# Patient Record
Sex: MALE | Race: WHITE | Employment: OTHER | URBAN - METROPOLITAN AREA
[De-identification: names, ages, dates, MRNs, and addresses within clinical notes are randomized per-mention and may not be internally consistent; named-entity substitution may affect disease eponyms.]

---

## 2018-03-04 ENCOUNTER — HOSPITAL ENCOUNTER (EMERGENCY)
Age: 78
Discharge: HOME | End: 2018-03-04
Attending: EMERGENCY MEDICINE
Payer: MEDICARE

## 2018-03-04 ENCOUNTER — APPOINTMENT (OUTPATIENT)
Dept: GENERAL RADIOLOGY | Age: 78
End: 2018-03-04
Attending: EMERGENCY MEDICINE
Payer: MEDICARE

## 2018-03-04 VITALS
TEMPERATURE: 99 F | DIASTOLIC BLOOD PRESSURE: 47 MMHG | OXYGEN SATURATION: 95 % | HEART RATE: 72 BPM | RESPIRATION RATE: 15 BRPM | WEIGHT: 190 LBS | HEIGHT: 69 IN | SYSTOLIC BLOOD PRESSURE: 113 MMHG | BODY MASS INDEX: 28.14 KG/M2

## 2018-03-04 DIAGNOSIS — J06.9 ACUTE UPPER RESPIRATORY INFECTION: Primary | ICD-10-CM

## 2018-03-04 LAB
ALBUMIN SERPL-MCNC: 3.2 G/DL (ref 3.5–4.8)
ALP LIVER SERPL-CCNC: 59 U/L (ref 45–117)
ALT SERPL-CCNC: 30 U/L (ref 17–63)
AST SERPL-CCNC: 49 U/L (ref 15–41)
BASOPHILS # BLD AUTO: 0.01 X10(3) UL (ref 0–0.1)
BASOPHILS NFR BLD AUTO: 0.3 %
BILIRUB SERPL-MCNC: 0.5 MG/DL (ref 0.1–2)
BUN BLD-MCNC: 14 MG/DL (ref 8–20)
CALCIUM BLD-MCNC: 8.2 MG/DL (ref 8.3–10.3)
CHLORIDE: 101 MMOL/L (ref 101–111)
CO2: 23 MMOL/L (ref 22–32)
CREAT BLD-MCNC: 0.96 MG/DL (ref 0.7–1.3)
EOSINOPHIL # BLD AUTO: 0.01 X10(3) UL (ref 0–0.3)
EOSINOPHIL NFR BLD AUTO: 0.3 %
ERYTHROCYTE [DISTWIDTH] IN BLOOD BY AUTOMATED COUNT: 14 % (ref 11.5–16)
GLUCOSE BLD-MCNC: 110 MG/DL (ref 70–99)
HCT VFR BLD AUTO: 35.3 % (ref 37–53)
HGB BLD-MCNC: 11.7 G/DL (ref 13–17)
IMMATURE GRANULOCYTE COUNT: 0.01 X10(3) UL (ref 0–1)
IMMATURE GRANULOCYTE RATIO %: 0.3 %
LYMPHOCYTES # BLD AUTO: 0.43 X10(3) UL (ref 0.9–4)
LYMPHOCYTES NFR BLD AUTO: 13.2 %
M PROTEIN MFR SERPL ELPH: 7.3 G/DL (ref 6.1–8.3)
MCH RBC QN AUTO: 28.6 PG (ref 27–33.2)
MCHC RBC AUTO-ENTMCNC: 33.1 G/DL (ref 31–37)
MCV RBC AUTO: 86.3 FL (ref 80–99)
MONOCYTES # BLD AUTO: 0.9 X10(3) UL (ref 0.1–1)
MONOCYTES NFR BLD AUTO: 27.7 %
NEUTROPHIL ABS PRELIM: 1.89 X10 (3) UL (ref 1.3–6.7)
NEUTROPHILS # BLD AUTO: 1.89 X10(3) UL (ref 1.3–6.7)
NEUTROPHILS NFR BLD AUTO: 58.2 %
PLATELET # BLD AUTO: 91 10(3)UL (ref 150–450)
POTASSIUM SERPL-SCNC: 4.3 MMOL/L (ref 3.6–5.1)
PRO-BETA NATRIURETIC PEPTIDE: 289 PG/ML (ref ?–450)
RBC # BLD AUTO: 4.09 X10(6)UL (ref 3.8–5.8)
RED CELL DISTRIBUTION WIDTH-SD: 44.3 FL (ref 35.1–46.3)
SODIUM SERPL-SCNC: 134 MMOL/L (ref 136–144)
TROPONIN: <0.046 NG/ML (ref ?–0.05)
WBC # BLD AUTO: 3.3 X10(3) UL (ref 4–13)

## 2018-03-04 PROCEDURE — 83880 ASSAY OF NATRIURETIC PEPTIDE: CPT | Performed by: EMERGENCY MEDICINE

## 2018-03-04 PROCEDURE — 85025 COMPLETE CBC W/AUTO DIFF WBC: CPT | Performed by: EMERGENCY MEDICINE

## 2018-03-04 PROCEDURE — 80053 COMPREHEN METABOLIC PANEL: CPT | Performed by: EMERGENCY MEDICINE

## 2018-03-04 PROCEDURE — 93010 ELECTROCARDIOGRAM REPORT: CPT

## 2018-03-04 PROCEDURE — 36415 COLL VENOUS BLD VENIPUNCTURE: CPT

## 2018-03-04 PROCEDURE — 71046 X-RAY EXAM CHEST 2 VIEWS: CPT | Performed by: EMERGENCY MEDICINE

## 2018-03-04 PROCEDURE — 99285 EMERGENCY DEPT VISIT HI MDM: CPT

## 2018-03-04 PROCEDURE — 84484 ASSAY OF TROPONIN QUANT: CPT | Performed by: EMERGENCY MEDICINE

## 2018-03-04 PROCEDURE — 93005 ELECTROCARDIOGRAM TRACING: CPT

## 2018-03-04 RX ORDER — ACETAMINOPHEN 500 MG
1000 TABLET ORAL ONCE
Status: COMPLETED | OUTPATIENT
Start: 2018-03-04 | End: 2018-03-04

## 2018-03-05 LAB
ATRIAL RATE: 79 BPM
P AXIS: 70 DEGREES
P-R INTERVAL: 228 MS
Q-T INTERVAL: 390 MS
QRS DURATION: 94 MS
QTC CALCULATION (BEZET): 447 MS
R AXIS: -22 DEGREES
T AXIS: 64 DEGREES
VENTRICULAR RATE: 79 BPM

## 2018-03-05 NOTE — ED INITIAL ASSESSMENT (HPI)
Pt to ed states \"I've got a fever and congestion in my chest\" and my chest hurts when I cough pt reports cleaning out his garage earlier today and used his blower and afterwards began to experience these symptoms

## 2018-03-05 NOTE — ED PROVIDER NOTES
Patient Seen in: [facility] Emergency Department In [place]    History   Patient presents with:  Cough/URI    Stated Complaint: cough, chest congestion    HPI    Patient is a 70-year-old male with a history of coronary artery disease status post CABG, pre Ht 175.3 cm (5' 9\")   Wt 86.2 kg   SpO2 95%   BMI 28.06 kg/m²         Physical Exam   Constitutional: He is oriented to person, place, and time. He appears well-developed and well-nourished. HENT:   Head: Normocephalic and atraumatic.    Mouth/Throat: 3/4/2018  CONCLUSION:    Borderline heart size. No acute pneumonia. Blunting of the posterior costophrenic angles on the lateral view may reflect very small pleural effusion. No lobar consolidation. The lungs do appear mildly hyperinflated.     Dictated

## 2018-03-14 ENCOUNTER — OFFICE VISIT (OUTPATIENT)
Dept: FAMILY MEDICINE CLINIC | Facility: CLINIC | Age: 78
End: 2018-03-14

## 2018-03-14 VITALS
TEMPERATURE: 98 F | RESPIRATION RATE: 18 BRPM | OXYGEN SATURATION: 98 % | DIASTOLIC BLOOD PRESSURE: 80 MMHG | SYSTOLIC BLOOD PRESSURE: 118 MMHG | WEIGHT: 189 LBS | HEART RATE: 68 BPM | HEIGHT: 64.5 IN | BODY MASS INDEX: 31.87 KG/M2

## 2018-03-14 DIAGNOSIS — J20.9 ACUTE BRONCHITIS WITH SYMPTOMS > 10 DAYS: Primary | ICD-10-CM

## 2018-03-14 PROCEDURE — 99213 OFFICE O/P EST LOW 20 MIN: CPT | Performed by: NURSE PRACTITIONER

## 2018-03-14 RX ORDER — BENZONATATE 200 MG/1
200 CAPSULE ORAL 3 TIMES DAILY PRN
Qty: 20 CAPSULE | Refills: 0 | Status: SHIPPED | OUTPATIENT
Start: 2018-03-14 | End: 2018-03-21

## 2018-03-14 RX ORDER — DOXYCYCLINE HYCLATE 100 MG/1
100 CAPSULE ORAL 2 TIMES DAILY
Qty: 20 CAPSULE | Refills: 0 | Status: SHIPPED | OUTPATIENT
Start: 2018-03-14 | End: 2018-03-24

## 2018-03-14 NOTE — PROGRESS NOTES
CHIEF COMPLAINT:   Patient presents with:  Cough: over 1 week. HPI:   Kwan Gray is a 65year old male who presents for upper respiratory symptoms for  10 days. Patient reports congestion, dry cough. Symptoms have been stable since onset.   Sotero Turner mouth daily. Disp:  Rfl:    Acidophilus/Pectin Oral Cap Take 1 capsule by mouth daily.  Disp:  Rfl:       Past Medical History:   Diagnosis Date   • Atherosclerosis of coronary artery    • Esophageal reflux    • Heart attack ([facility] [address]    • Hyperlipidemia    • ASSESSMENT AND PLAN:   Jhon Turner is a 65year old male who presents with     ASSESSMENT: Acute bronchitis with symptoms > 10 days  (primary encounter diagnosis)    PLAN: Meds as below.   Comfort care as described in Patient Instructions    Close fol

## 2018-03-30 ENCOUNTER — CHARTING TRANS (OUTPATIENT)
Dept: OTHER | Age: 78
End: 2018-03-30

## 2018-04-02 ENCOUNTER — HOSPITAL ENCOUNTER (EMERGENCY)
Age: 78
Discharge: HOME | End: 2018-04-02
Attending: EMERGENCY MEDICINE
Payer: MEDICARE

## 2018-04-02 ENCOUNTER — APPOINTMENT (OUTPATIENT)
Dept: GENERAL RADIOLOGY | Age: 78
End: 2018-04-02
Attending: EMERGENCY MEDICINE
Payer: MEDICARE

## 2018-04-02 ENCOUNTER — APPOINTMENT (OUTPATIENT)
Dept: CT IMAGING | Age: 78
End: 2018-04-02
Attending: EMERGENCY MEDICINE
Payer: MEDICARE

## 2018-04-02 VITALS
OXYGEN SATURATION: 98 % | HEIGHT: 65.5 IN | RESPIRATION RATE: 18 BRPM | TEMPERATURE: 98 F | HEART RATE: 68 BPM | WEIGHT: 185 LBS | DIASTOLIC BLOOD PRESSURE: 71 MMHG | SYSTOLIC BLOOD PRESSURE: 138 MMHG | BODY MASS INDEX: 30.45 KG/M2

## 2018-04-02 DIAGNOSIS — R25.1 SHAKINESS: Primary | ICD-10-CM

## 2018-04-02 PROCEDURE — 96360 HYDRATION IV INFUSION INIT: CPT

## 2018-04-02 PROCEDURE — 71046 X-RAY EXAM CHEST 2 VIEWS: CPT | Performed by: EMERGENCY MEDICINE

## 2018-04-02 PROCEDURE — 85025 COMPLETE CBC W/AUTO DIFF WBC: CPT | Performed by: EMERGENCY MEDICINE

## 2018-04-02 PROCEDURE — 99285 EMERGENCY DEPT VISIT HI MDM: CPT

## 2018-04-02 PROCEDURE — 93005 ELECTROCARDIOGRAM TRACING: CPT

## 2018-04-02 PROCEDURE — 81003 URINALYSIS AUTO W/O SCOPE: CPT | Performed by: EMERGENCY MEDICINE

## 2018-04-02 PROCEDURE — 93010 ELECTROCARDIOGRAM REPORT: CPT

## 2018-04-02 PROCEDURE — 70450 CT HEAD/BRAIN W/O DYE: CPT | Performed by: EMERGENCY MEDICINE

## 2018-04-02 PROCEDURE — 84484 ASSAY OF TROPONIN QUANT: CPT | Performed by: EMERGENCY MEDICINE

## 2018-04-02 PROCEDURE — 80053 COMPREHEN METABOLIC PANEL: CPT | Performed by: EMERGENCY MEDICINE

## 2018-04-03 NOTE — ED PROVIDER NOTES
Patient Seen in: [facility] Emergency Department In [place]    History   Patient presents with:  Dizziness (neurologic)  Numbness Weakness (neurologic)  Urinary Symptoms (urologic)    Stated Complaint: LIGHTHEADED/TINGLES IN HIS TOES/ SHAKINESS/TIRED SINC are as noted in HPI. Constitutional and vital signs reviewed. All other systems reviewed and negative except as noted above.     Physical Exam   ED Triage Vitals [04/02/18 1927]  BP: 142/78  Pulse: 78  Resp: 18  Temp: 98.1 °F (36.7 °C)  Temp src: Temp DIFFERENTIAL - Abnormal; Notable for the following:     WBC 3.5 (*)     HGB 12.7 (*)     .0 (*)     RDW-SD 47.1 (*)     All other components within normal limits   TROPONIN I - Normal   CBC WITH DIFFERENTIAL WITH PLATELET    Narrative:      The follo collection. ED Course as of Apr 02 2212  ------------------------------------------------------------       MDM   Patient had an IV established in the emergency room was placed a cardiac monitor was observed.   Patient remained symptom-free during his vi

## 2018-04-03 NOTE — ED INITIAL ASSESSMENT (HPI)
Patient states he had a URI and treated for bronchitis about a month ago and has felt shaky with light headedness since. Patient states urinary frequency started today.

## 2018-11-01 VITALS
BODY MASS INDEX: 30.36 KG/M2 | DIASTOLIC BLOOD PRESSURE: 70 MMHG | SYSTOLIC BLOOD PRESSURE: 124 MMHG | WEIGHT: 188.94 LBS | HEART RATE: 68 BPM | TEMPERATURE: 98.1 F | HEIGHT: 66 IN | RESPIRATION RATE: 16 BRPM

## 2019-03-19 ENCOUNTER — HOSPITAL ENCOUNTER (OUTPATIENT)
Dept: GENERAL RADIOLOGY | Age: 79
Discharge: HOME | End: 2019-03-19
Attending: FAMILY MEDICINE
Payer: MEDICARE

## 2019-03-19 DIAGNOSIS — M54.16 BILATERAL LUMBAR RADICULOPATHY: ICD-10-CM

## 2019-03-19 PROCEDURE — 72110 X-RAY EXAM L-2 SPINE 4/>VWS: CPT | Performed by: FAMILY MEDICINE

## 2019-07-10 PROBLEM — G20.A1 PARKINSON DISEASE (HCC): Status: ACTIVE | Noted: 2019-07-10

## 2019-07-10 PROBLEM — G20 PARKINSON DISEASE (HCC): Status: ACTIVE | Noted: 2019-07-10

## 2019-07-10 PROBLEM — G20.A1 PARKINSON DISEASE: Status: ACTIVE | Noted: 2019-07-10

## 2019-12-05 ENCOUNTER — APPOINTMENT (OUTPATIENT)
Dept: CT IMAGING | Age: 79
End: 2019-12-05
Attending: EMERGENCY MEDICINE
Payer: MEDICARE

## 2019-12-05 ENCOUNTER — HOSPITAL ENCOUNTER (OUTPATIENT)
Facility: HOSPITAL | Age: 79
Setting detail: OBSERVATION
Discharge: HOME | End: 2019-12-06
Attending: EMERGENCY MEDICINE | Admitting: HOSPITALIST
Payer: MEDICARE

## 2019-12-05 ENCOUNTER — APPOINTMENT (OUTPATIENT)
Dept: GENERAL RADIOLOGY | Age: 79
End: 2019-12-05
Attending: EMERGENCY MEDICINE
Payer: MEDICARE

## 2019-12-05 DIAGNOSIS — R07.89 CHEST PAIN, ATYPICAL: Primary | ICD-10-CM

## 2019-12-05 DIAGNOSIS — I48.91 ATRIAL FIBRILLATION, NEW ONSET (HCC): ICD-10-CM

## 2019-12-05 DIAGNOSIS — R42 DIZZINESS: ICD-10-CM

## 2019-12-05 PROBLEM — R73.9 HYPERGLYCEMIA: Status: ACTIVE | Noted: 2019-12-05

## 2019-12-05 PROBLEM — D64.9 ANEMIA: Status: ACTIVE | Noted: 2019-12-05

## 2019-12-05 PROBLEM — D69.6 THROMBOCYTOPENIA: Status: ACTIVE | Noted: 2019-12-05

## 2019-12-05 PROBLEM — D69.6 THROMBOCYTOPENIA (HCC): Status: ACTIVE | Noted: 2019-12-05

## 2019-12-05 PROCEDURE — 99220 INITIAL OBSERVATION CARE,LEVL III: CPT | Performed by: INTERNAL MEDICINE

## 2019-12-05 PROCEDURE — 71045 X-RAY EXAM CHEST 1 VIEW: CPT | Performed by: EMERGENCY MEDICINE

## 2019-12-05 PROCEDURE — 70450 CT HEAD/BRAIN W/O DYE: CPT | Performed by: EMERGENCY MEDICINE

## 2019-12-05 RX ORDER — ATORVASTATIN CALCIUM 20 MG/1
20 TABLET, FILM COATED ORAL NIGHTLY
Status: DISCONTINUED | OUTPATIENT
Start: 2019-12-05 | End: 2019-12-06

## 2019-12-05 RX ORDER — ONDANSETRON 2 MG/ML
4 INJECTION INTRAMUSCULAR; INTRAVENOUS EVERY 6 HOURS PRN
Status: DISCONTINUED | OUTPATIENT
Start: 2019-12-05 | End: 2019-12-06

## 2019-12-05 RX ORDER — ASPIRIN 81 MG/1
81 TABLET, CHEWABLE ORAL ONCE
Status: DISCONTINUED | OUTPATIENT
Start: 2019-12-05 | End: 2019-12-05

## 2019-12-05 RX ORDER — METOPROLOL SUCCINATE 50 MG/1
50 TABLET, EXTENDED RELEASE ORAL
Status: DISCONTINUED | OUTPATIENT
Start: 2019-12-06 | End: 2019-12-06

## 2019-12-05 RX ORDER — ASPIRIN 325 MG
325 TABLET ORAL DAILY
Status: DISCONTINUED | OUTPATIENT
Start: 2019-12-06 | End: 2019-12-06

## 2019-12-05 RX ORDER — ACETAMINOPHEN 325 MG/1
650 TABLET ORAL EVERY 6 HOURS PRN
Status: DISCONTINUED | OUTPATIENT
Start: 2019-12-05 | End: 2019-12-06

## 2019-12-05 RX ORDER — EZETIMIBE 10 MG/1
10 TABLET ORAL
Status: DISCONTINUED | OUTPATIENT
Start: 2019-12-06 | End: 2019-12-06

## 2019-12-05 RX ORDER — NITROGLYCERIN 0.4 MG/1
0.4 TABLET SUBLINGUAL EVERY 5 MIN PRN
Status: DISCONTINUED | OUTPATIENT
Start: 2019-12-05 | End: 2019-12-06

## 2019-12-05 RX ORDER — CLOPIDOGREL BISULFATE 75 MG/1
75 TABLET ORAL NIGHTLY
Status: DISCONTINUED | OUTPATIENT
Start: 2019-12-05 | End: 2019-12-06

## 2019-12-05 RX ORDER — HEPARIN SODIUM 5000 [USP'U]/ML
5000 INJECTION, SOLUTION INTRAVENOUS; SUBCUTANEOUS EVERY 8 HOURS SCHEDULED
Status: DISCONTINUED | OUTPATIENT
Start: 2019-12-05 | End: 2019-12-06

## 2019-12-05 RX ORDER — SODIUM CHLORIDE 9 MG/ML
INJECTION, SOLUTION INTRAVENOUS ONCE
Status: COMPLETED | OUTPATIENT
Start: 2019-12-05 | End: 2019-12-05

## 2019-12-05 RX ORDER — ASPIRIN 81 MG/1
81 TABLET ORAL DAILY
Status: DISCONTINUED | OUTPATIENT
Start: 2019-12-05 | End: 2019-12-05 | Stop reason: ALTCHOICE

## 2019-12-05 RX ORDER — PANTOPRAZOLE SODIUM 40 MG/1
40 TABLET, DELAYED RELEASE ORAL
Status: DISCONTINUED | OUTPATIENT
Start: 2019-12-06 | End: 2019-12-06

## 2019-12-05 RX ORDER — ASPIRIN 81 MG/1
324 TABLET, CHEWABLE ORAL ONCE
Status: COMPLETED | OUTPATIENT
Start: 2019-12-05 | End: 2019-12-05

## 2019-12-05 RX ORDER — METOCLOPRAMIDE HYDROCHLORIDE 5 MG/ML
10 INJECTION INTRAMUSCULAR; INTRAVENOUS EVERY 8 HOURS PRN
Status: DISCONTINUED | OUTPATIENT
Start: 2019-12-05 | End: 2019-12-06

## 2019-12-05 RX ORDER — ONDANSETRON 2 MG/ML
4 INJECTION INTRAMUSCULAR; INTRAVENOUS EVERY 4 HOURS PRN
Status: DISCONTINUED | OUTPATIENT
Start: 2019-12-05 | End: 2019-12-05

## 2019-12-05 NOTE — H&P
[facility]  History and Physical     Bernard Ceja Patient Status:  Emergency    8/10/1940 MRN [id number]   Location [facility] Attending Micha Clement MD   Hosp Day # 0 PCP Ivelisse Russo MD     Chief Complaint: c that this vessel is widely patent. • CABG  1984    x2       Social History:  reports that he quit smoking about 37 years ago. He has never used smokeless tobacco. He reports current alcohol use. He reports that he does not use drugs.     Family History: mouth 3 (three) times daily. , Disp: , Rfl:   FIBER COMPLETE OR, Take by mouth daily. , Disp: , Rfl:   Acidophilus/Pectin Oral Cap, Take 1 capsule by mouth daily. , Disp: , Rfl:         Review of Systems:   A comprehensive 14 point review of systems was c Afib  1. Check ECHO, TSH  2. AC per cardiology  3. Rate controlled  4. Dizziness  1. CT head negative  2. Likely due to arrtyhmia   5. Parkinsons's  1. Continue sinemet  6. CAD  1. ASA, BB  7. Ess HTN  8. Dylipidemia  1. statin  9.  GERD    Quality:  · DVT

## 2019-12-05 NOTE — ED PROVIDER NOTES
Patient Seen in: THE [facility] Emergency Department In [place]      History   Patient presents with:  Chest Pain Angina    Stated Complaint: chest pain    HPI    Patient is an 48-year-old male who presents emergency room with a history of multiple complaints vein graft to the right coronary artery. • ANGIOGRAM  07/26/2011    [address] [place] [address], Dr. Bernarda Aguilar: Successful stenting of the circumflex artery in the proximal and mid portions.  Relook angiography of the vein graft to the right coronary artery showed that this vess There is no cyanosis, clubbing, or edema appreciated. Pulses are 2+ and equal in all 4 extremities. NEURO: Patient is awake, alert and oriented to time place and person. Motor strength is 5 over 5 in all 4 extremities.  There are no gross motor or sensory persistent clinical concern then recommend MRI.    Dictated by: Mariam Heart MD on 12/05/2019 at 15:42     Approved by: Mariam Heart MD on 12/05/2019 at 15:44          Xr Chest Ap Portable  (cpt=71045)    Result Date: 12/5/2019  PROCEDURE:  XR CHEST AP PORTABLE Patient's case discussed with Dr. Melanie Kramer as well as Dr. Praveena Ortiz and the patient will be admitted to the hospital for further care. Dr. Praveena Ortiz was okay with not starting anticoagulation at this time. Patient was admitted for further care.           Disposit

## 2019-12-06 ENCOUNTER — APPOINTMENT (OUTPATIENT)
Dept: CV DIAGNOSTICS | Facility: HOSPITAL | Age: 79
End: 2019-12-06
Attending: INTERNAL MEDICINE
Payer: MEDICARE

## 2019-12-06 VITALS
HEART RATE: 73 BPM | HEIGHT: 66 IN | TEMPERATURE: 98 F | WEIGHT: 185.19 LBS | BODY MASS INDEX: 29.76 KG/M2 | SYSTOLIC BLOOD PRESSURE: 124 MMHG | RESPIRATION RATE: 17 BRPM | DIASTOLIC BLOOD PRESSURE: 66 MMHG | OXYGEN SATURATION: 99 %

## 2019-12-06 PROCEDURE — 93306 TTE W/DOPPLER COMPLETE: CPT | Performed by: INTERNAL MEDICINE

## 2019-12-06 PROCEDURE — C22G1ZZ TOMOGRAPHIC (TOMO) NUCLEAR MEDICINE IMAGING OF MYOCARDIUM USING TECHNETIUM 99M (TC-99M): ICD-10-PCS | Performed by: RADIOLOGY

## 2019-12-06 PROCEDURE — 93017 CV STRESS TEST TRACING ONLY: CPT | Performed by: INTERNAL MEDICINE

## 2019-12-06 PROCEDURE — 78452 HT MUSCLE IMAGE SPECT MULT: CPT | Performed by: INTERNAL MEDICINE

## 2019-12-06 PROCEDURE — 99217 OBSERVATION CARE DISCHARGE: CPT | Performed by: HOSPITALIST

## 2019-12-06 PROCEDURE — 93018 CV STRESS TEST I&R ONLY: CPT | Performed by: INTERNAL MEDICINE

## 2019-12-06 RX ORDER — HEPARIN SODIUM AND DEXTROSE 10000; 5 [USP'U]/100ML; G/100ML
12 INJECTION INTRAVENOUS ONCE
Status: COMPLETED | OUTPATIENT
Start: 2019-12-06 | End: 2019-12-06

## 2019-12-06 RX ORDER — HEPARIN SODIUM AND DEXTROSE 10000; 5 [USP'U]/100ML; G/100ML
INJECTION INTRAVENOUS CONTINUOUS
Status: DISCONTINUED | OUTPATIENT
Start: 2019-12-06 | End: 2019-12-06

## 2019-12-06 RX ORDER — ASPIRIN 81 MG/1
81 TABLET, CHEWABLE ORAL DAILY
Status: DISCONTINUED | OUTPATIENT
Start: 2019-12-07 | End: 2019-12-06

## 2019-12-06 RX ORDER — HEPARIN SODIUM 5000 [USP'U]/ML
60 INJECTION INTRAVENOUS; SUBCUTANEOUS ONCE
Status: DISCONTINUED | OUTPATIENT
Start: 2019-12-06 | End: 2019-12-06

## 2019-12-06 NOTE — CONSULTS
[facility] Cardiology Consultation    Ema Toño Patient Status:  Observation    8/10/1940 MRN [id number]   [place] [facility] 2NE-A Attending Lanny Marin MD   Hosp Day # 0 PCP Ludmila Martinez MD     Reason for Consultation:  Atrial Fibrillation       Hi of the circumflex artery in the proximal and mid portions. Relook angiography of the vein graft to the right coronary artery showed that this vessel is widely patent.    • CABG  1984    x2   • CATH BARE METAL STENT (BMS)     • CATH DRUG ELUTING STENT °C)  Pulse: 73  Resp: 18  BP: 114/68      General:  Appears comfortable  HEENT: No focal deficits. Neck: No JVD, carotids 2+ no bruits. Cardiac: Irregular S1S2. No S3, S4, rub, click. No murmur. Lungs: Clear to auscultation and percussion.   Abdomen: S heparinization, followed by xarelto    4.  Stress Cardiolyte Study today to exclude significant ischemia          Kristi Soto  12/6/2019  9:28 AM

## 2019-12-06 NOTE — PROGRESS NOTES
[facility]  Progress Note     [facility] Patient Status:  Observation    8/10/1940 MRN [id number]   [facility] 2NE-A Attending Elmer Mathis MD   Hosp Day # 0 PCP Teo Barnett MD     Chief Complaint: CP  S:  Patient denies chest • ezetimibe  10 mg Oral Daily   • Metoprolol Succinate ER  50 mg Oral Daily Beta Blocker   • Pantoprazole Sodium  40 mg Oral Daily with breakfast   • atorvastatin  20 mg Oral Nightly     ASSESSMENT / PLAN:   1. Acute Chest Pain- resolved   1.  F/U Check E

## 2019-12-06 NOTE — PLAN OF CARE
Assumed care at 0730. New onset of afib. Pt is A&Ox4. Up ad juan. Denies chest pain at this time. O2 sats WNL on RA. Active bowel sounds in all 4 quadrants, no tenderness upon palpation, last BM 12/4. Pt in controlled afib with rates 70s-80s on tele.  S1 S2 antiarrhythmic and heart rate control medications as ordered  - Initiate emergency measures for life threatening arrhythmias  - Monitor electrolytes and administer replacement therapy as ordered  Outcome: Progressing     Problem: HEMATOLOGIC - ADULT  Goal:

## 2019-12-06 NOTE — PROGRESS NOTES
Preliminary stress test results per Dr. Jimmy Drake: EF 68% with normal perfusion. Discussed with Dr. Brant Amos, stable for discharge from cardiology standpoint. Xarelto initiated, will address med rec. Follow up with cardiologist in [facility].

## 2019-12-20 ENCOUNTER — HOSPITAL ENCOUNTER (EMERGENCY)
Age: 79
Discharge: HOME | End: 2019-12-20
Attending: EMERGENCY MEDICINE
Payer: MEDICARE

## 2019-12-20 VITALS
WEIGHT: 180 LBS | TEMPERATURE: 98 F | HEIGHT: 65 IN | HEART RATE: 71 BPM | BODY MASS INDEX: 29.99 KG/M2 | SYSTOLIC BLOOD PRESSURE: 121 MMHG | RESPIRATION RATE: 16 BRPM | DIASTOLIC BLOOD PRESSURE: 72 MMHG | OXYGEN SATURATION: 97 %

## 2019-12-20 DIAGNOSIS — K06.8 GUMS, BLEEDING: Primary | ICD-10-CM

## 2019-12-20 PROCEDURE — 36415 COLL VENOUS BLD VENIPUNCTURE: CPT

## 2019-12-20 PROCEDURE — 99283 EMERGENCY DEPT VISIT LOW MDM: CPT

## 2019-12-20 PROCEDURE — 85025 COMPLETE CBC W/AUTO DIFF WBC: CPT | Performed by: EMERGENCY MEDICINE

## 2019-12-20 RX ORDER — TRANEXAMIC ACID 100 MG/ML
INJECTION, SOLUTION INTRAVENOUS
Status: COMPLETED
Start: 2019-12-20 | End: 2019-12-20

## 2019-12-20 RX ORDER — TRANEXAMIC ACID 100 MG/ML
5 INJECTION, SOLUTION INTRAVENOUS ONCE
Status: COMPLETED | OUTPATIENT
Start: 2019-12-20 | End: 2019-12-20

## 2019-12-20 NOTE — ED PROVIDER NOTES
Patient Seen in: THE [facility] Emergency Department In [place]      History   Patient presents with:  Bleeding    Stated Complaint: bleeding from mouth - on xarelto     HPI    This is a 79-year-old male who was on Plavix before and switched to Xarelto 3 week coronary artery. • ANGIOGRAM  07/26/2011    [address] [place] [address], Dr. Ahumada Slight: Successful stenting of the circumflex artery in the proximal and mid portions. Relook angiography of the vein graft to the right coronary artery showed that this vessel is widely patent.    • were created for panel order CBC WITH DIFFERENTIAL WITH PLATELET.   Procedure                               Abnormality         Status                     ---------                               -----------         ------                     CBC W/ DIFFERDIANA

## 2019-12-20 NOTE — ED INITIAL ASSESSMENT (HPI)
Pt was on plavix before and switched to xarelto 3weeks ago for new onset a fib. Pt woke up w mouth bleeding. Pt also has a bruise on hip from falling.

## 2020-01-27 RX ORDER — RIVAROXABAN 20 MG/1
TABLET, FILM COATED ORAL
Qty: 30 TABLET | Refills: 0 | OUTPATIENT
Start: 2020-01-27

## 2020-02-03 RX ORDER — RIVAROXABAN 20 MG/1
TABLET, FILM COATED ORAL
Qty: 30 TABLET | Refills: 3 | Status: SHIPPED | OUTPATIENT
Start: 2020-02-03 | End: 2020-06-09

## 2020-04-30 ENCOUNTER — APPOINTMENT (OUTPATIENT)
Dept: GENERAL RADIOLOGY | Age: 80
End: 2020-04-30
Attending: EMERGENCY MEDICINE
Payer: MEDICARE

## 2020-04-30 ENCOUNTER — HOSPITAL ENCOUNTER (EMERGENCY)
Age: 80
Discharge: HOME | End: 2020-04-30
Attending: EMERGENCY MEDICINE
Payer: MEDICARE

## 2020-04-30 VITALS
TEMPERATURE: 98 F | DIASTOLIC BLOOD PRESSURE: 57 MMHG | SYSTOLIC BLOOD PRESSURE: 145 MMHG | HEART RATE: 90 BPM | OXYGEN SATURATION: 95 % | RESPIRATION RATE: 18 BRPM

## 2020-04-30 DIAGNOSIS — S66.911A WRIST STRAIN, RIGHT, INITIAL ENCOUNTER: Primary | ICD-10-CM

## 2020-04-30 PROCEDURE — 73110 X-RAY EXAM OF WRIST: CPT | Performed by: EMERGENCY MEDICINE

## 2020-04-30 PROCEDURE — 99283 EMERGENCY DEPT VISIT LOW MDM: CPT

## 2020-04-30 PROCEDURE — 99284 EMERGENCY DEPT VISIT MOD MDM: CPT

## 2020-04-30 RX ORDER — TRAMADOL HYDROCHLORIDE 50 MG/1
100 TABLET ORAL ONCE
Status: COMPLETED | OUTPATIENT
Start: 2020-04-30 | End: 2020-04-30

## 2020-04-30 RX ORDER — TRAMADOL HYDROCHLORIDE 50 MG/1
TABLET ORAL EVERY 6 HOURS PRN
Qty: 20 TABLET | Refills: 0 | Status: SHIPPED | OUTPATIENT
Start: 2020-04-30 | End: 2020-05-05

## 2020-04-30 RX ORDER — ACETAMINOPHEN 500 MG
1000 TABLET ORAL ONCE
Status: COMPLETED | OUTPATIENT
Start: 2020-04-30 | End: 2020-04-30

## 2020-04-30 NOTE — ED INITIAL ASSESSMENT (HPI)
Woke this am with pain and swelling to left wrist. States yesterday was working in the garden,  Tuesday was carrying groceries. States his fingers feel ok. States he also cut the grass yesterday.

## 2020-04-30 NOTE — ED PROVIDER NOTES
Patient Seen in: THE [facility] Emergency Department In [place]      History   Patient presents with:  Upper Extremity Injury    Stated Complaint: left wrist injury    HPI    68-year-old male on Xarelto for atrial fibrillation presents to the emergency depart status: Former Smoker        Quit date: 1982        Years since quittin.1      Smokeless tobacco: Never Used    Alcohol use: Yes      Alcohol/week: 0.0 standard drinks      Comment: Social    Drug use:  No             Review of Systems    Positive work done on the prior day. No significant warmth or erythema that would suggest gout at this time.               Disposition and Plan     Clinical Impression:  Wrist strain, right, initial encounter  (primary encounter diagnosis)    Disposition:  [facility]

## 2021-06-03 PROBLEM — M48.061 SPINAL STENOSIS OF LUMBAR REGION, UNSPECIFIED WHETHER NEUROGENIC CLAUDICATION PRESENT: Status: ACTIVE | Noted: 2021-06-03

## 2021-07-08 ENCOUNTER — APPOINTMENT (OUTPATIENT)
Dept: GENERAL RADIOLOGY | Age: 81
End: 2021-07-08
Attending: EMERGENCY MEDICINE
Payer: MEDICARE

## 2021-07-08 ENCOUNTER — APPOINTMENT (OUTPATIENT)
Dept: CT IMAGING | Age: 81
End: 2021-07-08
Attending: EMERGENCY MEDICINE
Payer: MEDICARE

## 2021-07-08 ENCOUNTER — HOSPITAL ENCOUNTER (EMERGENCY)
Age: 81
Discharge: HOME | End: 2021-07-08
Attending: EMERGENCY MEDICINE
Payer: MEDICARE

## 2021-07-08 VITALS
WEIGHT: 183 LBS | DIASTOLIC BLOOD PRESSURE: 85 MMHG | TEMPERATURE: 98 F | BODY MASS INDEX: 28.72 KG/M2 | SYSTOLIC BLOOD PRESSURE: 138 MMHG | RESPIRATION RATE: 16 BRPM | HEART RATE: 70 BPM | HEIGHT: 67 IN | OXYGEN SATURATION: 97 %

## 2021-07-08 DIAGNOSIS — S42.92XA CLOSED FRACTURE OF LEFT SHOULDER, INITIAL ENCOUNTER: Primary | ICD-10-CM

## 2021-07-08 PROCEDURE — 70450 CT HEAD/BRAIN W/O DYE: CPT | Performed by: EMERGENCY MEDICINE

## 2021-07-08 PROCEDURE — 99284 EMERGENCY DEPT VISIT MOD MDM: CPT

## 2021-07-08 PROCEDURE — 73030 X-RAY EXAM OF SHOULDER: CPT | Performed by: EMERGENCY MEDICINE

## 2021-07-08 PROCEDURE — 71101 X-RAY EXAM UNILAT RIBS/CHEST: CPT | Performed by: EMERGENCY MEDICINE

## 2021-07-08 RX ORDER — HYDROCODONE BITARTRATE AND ACETAMINOPHEN 5; 325 MG/1; MG/1
1-2 TABLET ORAL EVERY 4 HOURS PRN
Qty: 20 TABLET | Refills: 0 | Status: SHIPPED | OUTPATIENT
Start: 2021-07-08

## 2021-07-08 RX ORDER — HYDROCODONE BITARTRATE AND ACETAMINOPHEN 5; 325 MG/1; MG/1
1 TABLET ORAL ONCE
Status: COMPLETED | OUTPATIENT
Start: 2021-07-08 | End: 2021-07-08
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
## 2021-07-08 NOTE — ED INITIAL ASSESSMENT (HPI)
Pt was working in the yard when he fell on his left side injuring his left shoulder and left ribs. Pt landed on grass. No loc, no other complaints.  Pt unable to move left arm

## 2021-07-12 PROBLEM — S42.202D CLOSED FRACTURE OF PROXIMAL END OF LEFT HUMERUS WITH ROUTINE HEALING: Status: ACTIVE | Noted: 2021-07-12

## 2022-03-10 PROBLEM — Z95.1 S/P CABG X 2: Status: ACTIVE | Noted: 2022-03-10

## 2022-03-10 PROBLEM — I48.0 PAF (PAROXYSMAL ATRIAL FIBRILLATION) (HCC): Status: ACTIVE | Noted: 2019-12-05

## 2022-05-07 ENCOUNTER — OFFICE VISIT (OUTPATIENT)
Dept: FAMILY MEDICINE CLINIC | Facility: CLINIC | Age: 82
End: 2022-05-07
Payer: MEDICARE

## 2022-05-07 VITALS
DIASTOLIC BLOOD PRESSURE: 74 MMHG | HEART RATE: 76 BPM | TEMPERATURE: 98 F | OXYGEN SATURATION: 98 % | SYSTOLIC BLOOD PRESSURE: 118 MMHG | RESPIRATION RATE: 20 BRPM

## 2022-05-07 DIAGNOSIS — L02.11 CUTANEOUS ABSCESS OF NECK: Primary | ICD-10-CM

## 2022-05-07 PROCEDURE — 99213 OFFICE O/P EST LOW 20 MIN: CPT | Performed by: NURSE PRACTITIONER

## 2022-05-07 RX ORDER — SULFAMETHOXAZOLE AND TRIMETHOPRIM 800; 160 MG/1; MG/1
1 TABLET ORAL 2 TIMES DAILY
Qty: 14 TABLET | Refills: 0 | Status: SHIPPED | OUTPATIENT
Start: 2022-05-07 | End: 2022-05-14

## 2022-09-23 ENCOUNTER — HOSPITAL ENCOUNTER (EMERGENCY)
Facility: HOSPITAL | Age: 82
Discharge: HOME | End: 2022-09-23
Attending: EMERGENCY MEDICINE

## 2022-09-23 VITALS
SYSTOLIC BLOOD PRESSURE: 132 MMHG | TEMPERATURE: 99 F | WEIGHT: 180 LBS | HEART RATE: 79 BPM | HEIGHT: 66 IN | BODY MASS INDEX: 28.93 KG/M2 | DIASTOLIC BLOOD PRESSURE: 66 MMHG | OXYGEN SATURATION: 99 % | RESPIRATION RATE: 22 BRPM

## 2022-09-23 DIAGNOSIS — T14.8XXA HEMORRHAGE FROM WOUND: ICD-10-CM

## 2022-09-23 DIAGNOSIS — D69.6 THROMBOCYTOPENIA (HCC): Primary | ICD-10-CM

## 2022-09-23 LAB
ANTIBODY SCREEN: NEGATIVE
BASOPHILS # BLD AUTO: 0.02 X10(3) UL (ref 0–0.2)
BASOPHILS NFR BLD AUTO: 0.3 %
EOSINOPHIL # BLD AUTO: 0.01 X10(3) UL (ref 0–0.7)
EOSINOPHIL NFR BLD AUTO: 0.2 %
ERYTHROCYTE [DISTWIDTH] IN BLOOD BY AUTOMATED COUNT: 12.6 %
HCT VFR BLD AUTO: 40.6 %
HGB BLD-MCNC: 13.4 G/DL
IMM GRANULOCYTES # BLD AUTO: 0.02 X10(3) UL (ref 0–1)
IMM GRANULOCYTES NFR BLD: 0.3 %
LYMPHOCYTES # BLD AUTO: 1.12 X10(3) UL (ref 1–4)
LYMPHOCYTES NFR BLD AUTO: 18.2 %
MCH RBC QN AUTO: 32.1 PG (ref 26–34)
MCHC RBC AUTO-ENTMCNC: 33 G/DL (ref 31–37)
MCV RBC AUTO: 97.1 FL
MONOCYTES # BLD AUTO: 0.69 X10(3) UL (ref 0.1–1)
MONOCYTES NFR BLD AUTO: 11.2 %
NEUTROPHILS # BLD AUTO: 4.29 X10 (3) UL (ref 1.5–7.7)
NEUTROPHILS # BLD AUTO: 4.29 X10(3) UL (ref 1.5–7.7)
NEUTROPHILS NFR BLD AUTO: 69.8 %
PLATELET # BLD AUTO: 110 10(3)UL (ref 150–450)
RBC # BLD AUTO: 4.18 X10(6)UL
RH BLOOD TYPE: POSITIVE
WBC # BLD AUTO: 6.2 X10(3) UL (ref 4–11)

## 2022-09-23 PROCEDURE — 36415 COLL VENOUS BLD VENIPUNCTURE: CPT

## 2022-09-23 PROCEDURE — 86901 BLOOD TYPING SEROLOGIC RH(D): CPT | Performed by: EMERGENCY MEDICINE

## 2022-09-23 PROCEDURE — 36430 TRANSFUSION BLD/BLD COMPNT: CPT

## 2022-09-23 PROCEDURE — 99285 EMERGENCY DEPT VISIT HI MDM: CPT

## 2022-09-23 PROCEDURE — 86850 RBC ANTIBODY SCREEN: CPT | Performed by: EMERGENCY MEDICINE

## 2022-09-23 PROCEDURE — 86900 BLOOD TYPING SEROLOGIC ABO: CPT | Performed by: EMERGENCY MEDICINE

## 2022-09-23 PROCEDURE — 85025 COMPLETE CBC W/AUTO DIFF WBC: CPT | Performed by: EMERGENCY MEDICINE

## 2022-09-23 NOTE — ED INITIAL ASSESSMENT (HPI)
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 Reports bleeding over night. Went to office and states it was stitched and cauterized but still won't stop. On xarelto.

## 2022-09-25 LAB — BLOOD TYPE BARCODE: 6200

## 2023-11-19 ENCOUNTER — HOSPITAL ENCOUNTER (EMERGENCY)
Age: 83
Discharge: HOME | End: 2023-11-19
Attending: STUDENT IN AN ORGANIZED HEALTH CARE EDUCATION/TRAINING PROGRAM
Payer: MEDICARE

## 2023-11-19 VITALS
BODY MASS INDEX: 27.32 KG/M2 | OXYGEN SATURATION: 95 % | WEIGHT: 170 LBS | HEIGHT: 66 IN | SYSTOLIC BLOOD PRESSURE: 142 MMHG | HEART RATE: 66 BPM | DIASTOLIC BLOOD PRESSURE: 67 MMHG | RESPIRATION RATE: 16 BRPM

## 2023-11-19 DIAGNOSIS — S76.211A INGUINAL STRAIN, RIGHT, INITIAL ENCOUNTER: Primary | ICD-10-CM

## 2023-11-19 PROCEDURE — 99282 EMERGENCY DEPT VISIT SF MDM: CPT

## 2023-11-19 PROCEDURE — 99283 EMERGENCY DEPT VISIT LOW MDM: CPT

## 2023-11-19 NOTE — ED PROVIDER NOTES
History     Chief Complaint   Patient presents with    Abdomen/Flank Pain       HPI    79year old male presents for evaluation of groin pain. Patient was getting up from a glider a few days ago and felt like he strained his right groin region, has had occasional pain when he does his leg exercises or in certain positions. No bulge, fevers, urinary or bowel symptoms. Past Medical History:   Diagnosis Date    Anemia     Atherosclerosis of coronary artery     Chronic atrial fibrillation (HCC)     Esophageal reflux     Heart attack ([facility] [address]     Hyperlipidemia     Hypertension     Lupus ([facility] [address]     Parkinson disease        Past Surgical History:   Procedure Laterality Date    [address]  2002    [address] [place], [place] [address]Dr. Idris: 1. Successful PTCA of the infarct related artery which was the proximal graft of the saphenous vein graft to the right posterior descending/LV branch which was dilated and stented using a 4 x 13 BX Velocity to a maximum diameter of 4.45 mm. 2.Proximal PDA primarily stented using a 3 x 18 Penta stent from about 70 to 80% to 0%, HUGH III flow, no dissection. ANGIOGRAM  2011    [address] [place] [address] Dr. Zaina Bryant: Successful stenting with a drug-eluting stent to the graft to the right coronary artery. Severe native CAD. Patent vein graft to LAD. Severe disease in the vein graft to the right coronary artery. ANGIOGRAM  2011    [address] [place] [address] Dr. Zaina Bryant: Successful stenting of the circumflex artery in the proximal and mid portions. Relook angiography of the vein graft to the right coronary artery showed that this vessel is widely patent.     CABG  1984    x2    CATH BARE METAL STENT (BMS)      CATH DRUG ELUTING STENT      SHOULDER SURG PROC UNLISTED Left     fracture       Social History     Socioeconomic History    Marital status:    Tobacco Use    Smoking status: Former     Types: Cigarettes     Quit date: 1982     Years since quittin.6 Smokeless tobacco: Never   Substance and Sexual Activity    Alcohol use: Yes     Alcohol/week: 0.0 standard drinks of alcohol     Comment: Social    Drug use: No                   Physical Exam     ED Triage Vitals [11/19/23 1404]   /67   Pulse 66   Resp 16   Temp    Temp src    SpO2 95 %   O2 Device None (Room air)       Physical Exam  Constitutional:       General: He is not in acute distress. Abdominal:      General: Abdomen is flat. Palpations: Abdomen is soft. Hernia: No hernia is present. Comments: Mild tenderness to palpation along the right inguinal region. No palpable fascial defects or hernias, no scrotal abnormalities. Neurological:      Mental Status: He is alert. ED Course     Labs Reviewed - No data to display  No results found. MDM     Vitals:    11/19/23 1403 11/19/23 1404   BP:  142/67   Pulse:  66   Resp:  16   SpO2:  95%   Weight: 77.1 kg    Height: 167.6 cm (5' 6\")        Patient very likely has inguinal strain, symptoms are reproducible with extension of the hip region on palpation of the inguinal/hip flexor region. No hernias are palpable. Abdominal exam is benign otherwise. Advised heating packs, range of motion exercises, supportive measures. Patient is able to ambulate. PCP follow-up and return precautions. Disposition and Plan     Clinical Impression:  1.  Inguinal strain, right, initial encounter        Disposition:  Discharge    Follow-up:  Tito Lilly [address] [place] [address]  [phone]    Follow up        Medications Prescribed:  Discharge Medication List as of 11/19/2023  2:59 PM

## 2023-11-19 NOTE — ED INITIAL ASSESSMENT (HPI)
About 3 days ago while getting out of a chair he started with rlq abd.   States \"I think I have a hernia\"

## 2024-04-20 ENCOUNTER — HOSPITAL ENCOUNTER (OUTPATIENT)
Facility: HOSPITAL | Age: 84
Setting detail: OBSERVATION
Discharge: HOME HEALTH | End: 2024-04-23
Attending: EMERGENCY MEDICINE | Admitting: STUDENT IN AN ORGANIZED HEALTH CARE EDUCATION/TRAINING PROGRAM
Payer: MEDICARE

## 2024-04-20 ENCOUNTER — APPOINTMENT (OUTPATIENT)
Dept: GENERAL RADIOLOGY | Facility: HOSPITAL | Age: 84
End: 2024-04-20
Attending: EMERGENCY MEDICINE
Payer: MEDICARE

## 2024-04-20 ENCOUNTER — APPOINTMENT (OUTPATIENT)
Dept: ULTRASOUND IMAGING | Facility: HOSPITAL | Age: 84
End: 2024-04-20
Attending: EMERGENCY MEDICINE
Payer: MEDICARE

## 2024-04-20 ENCOUNTER — APPOINTMENT (OUTPATIENT)
Dept: CT IMAGING | Facility: HOSPITAL | Age: 84
End: 2024-04-20
Attending: ORTHOPAEDIC SURGERY
Payer: MEDICARE

## 2024-04-20 DIAGNOSIS — S76.212A STRAIN OF ADDUCTOR MAGNUS MUSCLE OF LEFT LOWER EXTREMITY, INITIAL ENCOUNTER: Primary | ICD-10-CM

## 2024-04-20 DIAGNOSIS — T14.8XXA AVULSION FRACTURE: ICD-10-CM

## 2024-04-20 LAB
ALBUMIN SERPL-MCNC: 3.3 G/DL (ref 3.4–5)
ALBUMIN/GLOB SERPL: 0.9 {RATIO} (ref 1–2)
ALP LIVER SERPL-CCNC: 147 U/L
ALT SERPL-CCNC: 31 U/L
ANION GAP SERPL CALC-SCNC: 5 MMOL/L (ref 0–18)
AST SERPL-CCNC: 95 U/L (ref 15–37)
BASOPHILS # BLD AUTO: 0.03 X10(3) UL (ref 0–0.2)
BASOPHILS NFR BLD AUTO: 0.7 %
BILIRUB SERPL-MCNC: 0.7 MG/DL (ref 0.1–2)
BUN BLD-MCNC: 20 MG/DL (ref 9–23)
CALCIUM BLD-MCNC: 9.1 MG/DL (ref 8.5–10.1)
CHLORIDE SERPL-SCNC: 106 MMOL/L (ref 98–112)
CO2 SERPL-SCNC: 25 MMOL/L (ref 21–32)
CREAT BLD-MCNC: 1.31 MG/DL
EGFRCR SERPLBLD CKD-EPI 2021: 54 ML/MIN/1.73M2 (ref 60–?)
EOSINOPHIL # BLD AUTO: 0.03 X10(3) UL (ref 0–0.7)
EOSINOPHIL NFR BLD AUTO: 0.7 %
ERYTHROCYTE [DISTWIDTH] IN BLOOD BY AUTOMATED COUNT: 13.8 %
GLOBULIN PLAS-MCNC: 3.7 G/DL (ref 2.8–4.4)
GLUCOSE BLD-MCNC: 108 MG/DL (ref 70–99)
HCT VFR BLD AUTO: 37.4 %
HGB BLD-MCNC: 12.3 G/DL
IMM GRANULOCYTES # BLD AUTO: 0 X10(3) UL (ref 0–1)
IMM GRANULOCYTES NFR BLD: 0 %
LYMPHOCYTES # BLD AUTO: 1.25 X10(3) UL (ref 1–4)
LYMPHOCYTES NFR BLD AUTO: 29.8 %
MCH RBC QN AUTO: 31.7 PG (ref 26–34)
MCHC RBC AUTO-ENTMCNC: 32.9 G/DL (ref 31–37)
MCV RBC AUTO: 96.4 FL
MONOCYTES # BLD AUTO: 0.71 X10(3) UL (ref 0.1–1)
MONOCYTES NFR BLD AUTO: 16.9 %
NEUTROPHILS # BLD AUTO: 2.17 X10 (3) UL (ref 1.5–7.7)
NEUTROPHILS # BLD AUTO: 2.17 X10(3) UL (ref 1.5–7.7)
NEUTROPHILS NFR BLD AUTO: 51.9 %
OSMOLALITY SERPL CALC.SUM OF ELEC: 285 MOSM/KG (ref 275–295)
PLATELET # BLD AUTO: 87 10(3)UL (ref 150–450)
PLATELETS.RETICULATED NFR BLD AUTO: 5.3 % (ref 0–7)
POTASSIUM SERPL-SCNC: 4.3 MMOL/L (ref 3.5–5.1)
PROT SERPL-MCNC: 7 G/DL (ref 6.4–8.2)
RBC # BLD AUTO: 3.88 X10(6)UL
SODIUM SERPL-SCNC: 136 MMOL/L (ref 136–145)
WBC # BLD AUTO: 4.2 X10(3) UL (ref 4–11)

## 2024-04-20 PROCEDURE — 73552 X-RAY EXAM OF FEMUR 2/>: CPT | Performed by: EMERGENCY MEDICINE

## 2024-04-20 PROCEDURE — 99223 1ST HOSP IP/OBS HIGH 75: CPT | Performed by: HOSPITALIST

## 2024-04-20 PROCEDURE — 73700 CT LOWER EXTREMITY W/O DYE: CPT | Performed by: ORTHOPAEDIC SURGERY

## 2024-04-20 PROCEDURE — 93971 EXTREMITY STUDY: CPT | Performed by: EMERGENCY MEDICINE

## 2024-04-20 PROCEDURE — 72170 X-RAY EXAM OF PELVIS: CPT | Performed by: EMERGENCY MEDICINE

## 2024-04-20 PROCEDURE — 76377 3D RENDER W/INTRP POSTPROCES: CPT | Performed by: ORTHOPAEDIC SURGERY

## 2024-04-20 RX ORDER — MELATONIN
3 NIGHTLY PRN
Status: DISCONTINUED | OUTPATIENT
Start: 2024-04-20 | End: 2024-04-23

## 2024-04-20 RX ORDER — TRAMADOL HYDROCHLORIDE 50 MG/1
50 TABLET ORAL ONCE
Status: COMPLETED | OUTPATIENT
Start: 2024-04-20 | End: 2024-04-20

## 2024-04-20 RX ORDER — HYDROXYCHLOROQUINE SULFATE 200 MG/1
200 TABLET, FILM COATED ORAL 2 TIMES DAILY
COMMUNITY

## 2024-04-20 RX ORDER — SULFASALAZINE 500 MG/1
500 TABLET ORAL 2 TIMES DAILY
COMMUNITY

## 2024-04-20 RX ORDER — HYDROCODONE BITARTRATE AND ACETAMINOPHEN 5; 325 MG/1; MG/1
1 TABLET ORAL EVERY 4 HOURS PRN
Status: DISCONTINUED | OUTPATIENT
Start: 2024-04-20 | End: 2024-04-23

## 2024-04-20 RX ORDER — ACETAMINOPHEN 325 MG/1
650 TABLET ORAL EVERY 4 HOURS PRN
Status: DISCONTINUED | OUTPATIENT
Start: 2024-04-20 | End: 2024-04-23

## 2024-04-20 RX ORDER — MORPHINE SULFATE 4 MG/ML
4 INJECTION, SOLUTION INTRAMUSCULAR; INTRAVENOUS ONCE
Status: COMPLETED | OUTPATIENT
Start: 2024-04-20 | End: 2024-04-20

## 2024-04-20 RX ORDER — MORPHINE SULFATE 2 MG/ML
2 INJECTION, SOLUTION INTRAMUSCULAR; INTRAVENOUS EVERY 2 HOUR PRN
Status: DISCONTINUED | OUTPATIENT
Start: 2024-04-20 | End: 2024-04-23

## 2024-04-20 RX ORDER — ATORVASTATIN CALCIUM 20 MG/1
20 TABLET, FILM COATED ORAL NIGHTLY
Status: DISCONTINUED | OUTPATIENT
Start: 2024-04-20 | End: 2024-04-23

## 2024-04-20 RX ORDER — SULFASALAZINE 500 MG/1
500 TABLET ORAL 2 TIMES DAILY
Status: DISCONTINUED | OUTPATIENT
Start: 2024-04-20 | End: 2024-04-23

## 2024-04-20 RX ORDER — ONDANSETRON 2 MG/ML
4 INJECTION INTRAMUSCULAR; INTRAVENOUS EVERY 6 HOURS PRN
Status: DISCONTINUED | OUTPATIENT
Start: 2024-04-20 | End: 2024-04-23

## 2024-04-20 RX ORDER — METOPROLOL SUCCINATE 50 MG/1
50 TABLET, EXTENDED RELEASE ORAL
Status: DISCONTINUED | OUTPATIENT
Start: 2024-04-20 | End: 2024-04-23

## 2024-04-20 RX ORDER — BISACODYL 10 MG
10 SUPPOSITORY, RECTAL RECTAL
Status: DISCONTINUED | OUTPATIENT
Start: 2024-04-20 | End: 2024-04-23

## 2024-04-20 RX ORDER — MORPHINE SULFATE 2 MG/ML
1 INJECTION, SOLUTION INTRAMUSCULAR; INTRAVENOUS EVERY 2 HOUR PRN
Status: DISCONTINUED | OUTPATIENT
Start: 2024-04-20 | End: 2024-04-23

## 2024-04-20 RX ORDER — HYDROXYCHLOROQUINE SULFATE 200 MG/1
200 TABLET, FILM COATED ORAL 2 TIMES DAILY
Status: DISCONTINUED | OUTPATIENT
Start: 2024-04-20 | End: 2024-04-23

## 2024-04-20 RX ORDER — LORAZEPAM 2 MG/ML
1 INJECTION INTRAMUSCULAR
Status: ACTIVE | OUTPATIENT
Start: 2024-04-20 | End: 2024-04-21

## 2024-04-20 RX ORDER — HYDROMORPHONE HYDROCHLORIDE 1 MG/ML
0.2 INJECTION, SOLUTION INTRAMUSCULAR; INTRAVENOUS; SUBCUTANEOUS EVERY 2 HOUR PRN
Status: DISCONTINUED | OUTPATIENT
Start: 2024-04-20 | End: 2024-04-23

## 2024-04-20 RX ORDER — MELATONIN
500 2 TIMES DAILY
Status: DISCONTINUED | OUTPATIENT
Start: 2024-04-20 | End: 2024-04-23

## 2024-04-20 RX ORDER — SENNOSIDES 8.6 MG
17.2 TABLET ORAL NIGHTLY PRN
Status: DISCONTINUED | OUTPATIENT
Start: 2024-04-20 | End: 2024-04-23

## 2024-04-20 RX ORDER — POLYETHYLENE GLYCOL 3350 17 G/17G
17 POWDER, FOR SOLUTION ORAL DAILY PRN
Status: DISCONTINUED | OUTPATIENT
Start: 2024-04-20 | End: 2024-04-23

## 2024-04-20 RX ORDER — HYDROCODONE BITARTRATE AND ACETAMINOPHEN 5; 325 MG/1; MG/1
2 TABLET ORAL EVERY 4 HOURS PRN
Status: DISCONTINUED | OUTPATIENT
Start: 2024-04-20 | End: 2024-04-23

## 2024-04-20 RX ORDER — EZETIMIBE 10 MG/1
10 TABLET ORAL DAILY
Status: DISCONTINUED | OUTPATIENT
Start: 2024-04-20 | End: 2024-04-23

## 2024-04-20 RX ORDER — CARBIDOPA AND LEVODOPA 25; 100 MG/1; MG/1
1.5 TABLET, EXTENDED RELEASE ORAL 2 TIMES DAILY
COMMUNITY

## 2024-04-20 RX ORDER — BENZONATATE 100 MG/1
200 CAPSULE ORAL 3 TIMES DAILY PRN
Status: DISCONTINUED | OUTPATIENT
Start: 2024-04-20 | End: 2024-04-23

## 2024-04-20 RX ORDER — CETIRIZINE HYDROCHLORIDE 10 MG/1
10 TABLET ORAL DAILY
Status: DISCONTINUED | OUTPATIENT
Start: 2024-04-20 | End: 2024-04-23

## 2024-04-20 RX ORDER — ECHINACEA PURPUREA EXTRACT 125 MG
1 TABLET ORAL
Status: DISCONTINUED | OUTPATIENT
Start: 2024-04-20 | End: 2024-04-23

## 2024-04-20 RX ORDER — MORPHINE SULFATE 4 MG/ML
4 INJECTION, SOLUTION INTRAMUSCULAR; INTRAVENOUS EVERY 2 HOUR PRN
Status: DISCONTINUED | OUTPATIENT
Start: 2024-04-20 | End: 2024-04-23

## 2024-04-20 RX ORDER — GARLIC EXTRACT 500 MG
1 CAPSULE ORAL DAILY
Status: DISCONTINUED | OUTPATIENT
Start: 2024-04-20 | End: 2024-04-23

## 2024-04-20 RX ORDER — HYDROMORPHONE HYDROCHLORIDE 1 MG/ML
0.8 INJECTION, SOLUTION INTRAMUSCULAR; INTRAVENOUS; SUBCUTANEOUS EVERY 2 HOUR PRN
Status: DISCONTINUED | OUTPATIENT
Start: 2024-04-20 | End: 2024-04-23

## 2024-04-20 RX ORDER — ENEMA 19; 7 G/133ML; G/133ML
1 ENEMA RECTAL ONCE AS NEEDED
Status: DISCONTINUED | OUTPATIENT
Start: 2024-04-20 | End: 2024-04-23

## 2024-04-20 RX ORDER — ACETAMINOPHEN 500 MG
1000 TABLET ORAL EVERY 4 HOURS PRN
Status: DISCONTINUED | OUTPATIENT
Start: 2024-04-20 | End: 2024-04-23

## 2024-04-20 RX ORDER — CARBIDOPA AND LEVODOPA 25; 100 MG/1; MG/1
1.5 TABLET, EXTENDED RELEASE ORAL 2 TIMES DAILY
Status: DISCONTINUED | OUTPATIENT
Start: 2024-04-20 | End: 2024-04-23

## 2024-04-20 RX ORDER — ONDANSETRON 2 MG/ML
4 INJECTION INTRAMUSCULAR; INTRAVENOUS ONCE
Status: COMPLETED | OUTPATIENT
Start: 2024-04-20 | End: 2024-04-20

## 2024-04-20 RX ORDER — MORPHINE SULFATE 2 MG/ML
1 INJECTION, SOLUTION INTRAMUSCULAR; INTRAVENOUS ONCE
Status: DISCONTINUED | OUTPATIENT
Start: 2024-04-20 | End: 2024-04-20

## 2024-04-20 RX ORDER — METOCLOPRAMIDE HYDROCHLORIDE 5 MG/ML
5 INJECTION INTRAMUSCULAR; INTRAVENOUS EVERY 8 HOURS PRN
Status: DISCONTINUED | OUTPATIENT
Start: 2024-04-20 | End: 2024-04-23

## 2024-04-20 RX ORDER — HYDROMORPHONE HYDROCHLORIDE 1 MG/ML
0.4 INJECTION, SOLUTION INTRAMUSCULAR; INTRAVENOUS; SUBCUTANEOUS EVERY 2 HOUR PRN
Status: DISCONTINUED | OUTPATIENT
Start: 2024-04-20 | End: 2024-04-23

## 2024-04-20 RX ORDER — TRAMADOL HYDROCHLORIDE 50 MG/1
TABLET ORAL EVERY 6 HOURS PRN
Qty: 10 TABLET | Refills: 0 | Status: SHIPPED | OUTPATIENT
Start: 2024-04-20 | End: 2024-04-23

## 2024-04-20 RX ORDER — ASPIRIN 81 MG/1
81 TABLET ORAL EVERY OTHER DAY
Status: DISCONTINUED | OUTPATIENT
Start: 2024-04-21 | End: 2024-04-23

## 2024-04-20 RX ORDER — MORPHINE SULFATE 2 MG/ML
INJECTION, SOLUTION INTRAMUSCULAR; INTRAVENOUS
Status: COMPLETED
Start: 2024-04-20 | End: 2024-04-20

## 2024-04-20 RX ORDER — CALCIUM POLYCARBOPHIL 625 MG 625 MG/1
625 TABLET ORAL DAILY
Status: DISCONTINUED | OUTPATIENT
Start: 2024-04-20 | End: 2024-04-23

## 2024-04-20 NOTE — ED QUICK NOTES
Orders for admission, patient is aware of plan and ready to go upstairs. Any questions, please call ED RN Sil or AM Shift ED RN at extension [phone].     Patient Covid vaccination status: Fully vaccinated     COVID Test Ordered in ED: None    COVID Suspicion at Admission: N/A    Running Infusions:  None    Mental Status/LOC at time of transport: A/A/ O x 3-4    Other pertinent information: Hx of Parkinson's. Please see ED Note at 0504. Left Knee immobilizer removed at 0515 per Dr. Silva's order.  CIWA score: N/A   NIH score:  N/A

## 2024-04-20 NOTE — CM/SW NOTE
Received a call from Dr. Silva requesting to eval pt for a safe dc plan. Edcm spoke to pt and pt's son/William. Pt unable to walk even w/ assistance. ELZA placement vs HHPT discussed. Pt lives w/ the wife only. Pt's son lives near the pt's house. Pt and son made aware PT eval will be ordered. Pt and son agreeable. Dr. Silva made aware of the above. PASSR completed. PT/OT eval ordered. AIDIN referral initiated.  William/ son phone # [phone].

## 2024-04-20 NOTE — PLAN OF CARE
Patient admitted under Dr Gonzalez and Carole. Vitals stable, pain controlled. For Ct. Dr Gonzalez at bedside.

## 2024-04-20 NOTE — ED PROVIDER NOTES
Patient Seen in: [facility] Emergency Department      History     Chief Complaint   Patient presents with    Leg Pain     Stated Complaint: L Leg Pain    Subjective:   HPI    Patient is an 83-year-old male presenting to the ED with left lower extremity pain.  The history is obtained from patient as well as the son at bedside.  The patient states that he rolled over this morning and felt a sudden pop in his left thigh with pain that had worsened throughout the day.  He noticed some subtle bruising and minimal swelling as well.  This is worse with movement and attempted weightbearing.  He arrived via EMS.  It was reported he received nitrous oxide prior to arrival for pain.  He does not require anything for pain upon arrival in the ED.  There was no direct injury or trauma.  No distal weakness or loss of sensation.  No similar episodes in the past.  Present, the pain was severe and causing difficulty in ambulation.  The patient ambulates with a cane at baseline but does have a walker at home to use as needed as well or for longer distances.  He lives at home with his wife.    Objective:   Past Medical History:    Anemia    Atherosclerosis of coronary artery    Chronic atrial fibrillation (HCC)    Esophageal reflux    Heart attack (HCC)    Hyperlipidemia    Hypertension    Lupus (HCC)    Parkinson disease (HCC)              Past Surgical History:   Procedure Laterality Date    Angiogram  1984    Angiogram  1996    [facility] 1.Successful PTCA of the infarct related artery which was the proximal graft of the saphenous vein graft to the right posterior descending/LV branch which was dilated and stented using a 4 x 13 BX Velocity to a maximum diameter of 4.45 mm.  2.Proximal PDA primarily stented using a 3 x 18 Penta stent from about 70 to 80% to 0%, HUGH III flow, no dissection.     [facility] Successful stenting with a drug-eluting  stent to the graft to the right coronary artery. Severe native CAD. Patent vein graft to LAD. Severe disease in the vein graft to the right coronary artery.    Angiogram  2011    [facility], Dr. Steele: Successful stenting of the circumflex artery in the proximal and mid portions. Relook angiography of the vein graft to the right coronary artery showed that this vessel is widely patent.    Cabg  1984    x2    Cath bare metal stent (bms)      Cath drug eluting stent      Shoulder surg proc unlisted Left     fracture                Social History     Socioeconomic History    Marital status:    Tobacco Use    Smoking status: Former     Current packs/day: 0.00     Types: Cigarettes     Quit date: 1982     Years since quittin.0    Smokeless tobacco: Never   Substance and Sexual Activity    Alcohol use: Yes     Alcohol/week: 0.0 standard drinks of alcohol     Comment: Social    Drug use: No     Social Determinants of Health     Food Insecurity: No Food Insecurity (2024)    Food Insecurity     Food Insecurity: Never true   Transportation Needs: No Transportation Needs (2024)    Transportation Needs     Lack of Transportation: No   Housing Stability: Low Risk  (2024)    Housing Stability     Housing Instability: No              Review of Systems    Positive for stated complaint: L Leg Pain  Other systems are as noted in HPI.  Constitutional and vital signs reviewed.      All other systems reviewed and negative except as noted above.    Physical Exam     ED Triage Vitals [24 0036]   /56   Pulse 65   Resp 19   Temp 98 °F (36.7 °C)   Temp src Temporal   SpO2 98 %   O2 Device None (Room air)       Current:/53 (BP Location: Left arm)   Pulse 70   Temp 98 °F (36.7 °C) (Oral)   Resp 18   Ht 170.2 cm (5' 7\")   Wt 76.3 kg   SpO2 98%   BMI 26.34 kg/m²         Physical Exam  Vitals and nursing note reviewed.   Constitutional:       General: He is not in acute distress.      Appearance: Normal appearance. He is not ill-appearing.      Comments: Son at bedside.   HENT:      Head: Normocephalic and atraumatic.      Right Ear: External ear normal.      Left Ear: External ear normal.      Nose: Nose normal.      Mouth/Throat:      Mouth: Mucous membranes are moist.      Pharynx: Oropharynx is clear. No posterior oropharyngeal erythema.   Eyes:      Conjunctiva/sclera: Conjunctivae normal.   Cardiovascular:      Rate and Rhythm: Normal rate and regular rhythm.      Pulses: Normal pulses.   Pulmonary:      Effort: Pulmonary effort is normal. No respiratory distress.      Breath sounds: Normal breath sounds.   Abdominal:      General: Abdomen is flat. Bowel sounds are normal. There is no distension.      Tenderness: There is no abdominal tenderness.   Musculoskeletal:      Left hip: Decreased range of motion (due to pain in medial thigh).      Left upper leg: Swelling and tenderness present. No deformity or bony tenderness.      Left knee: Normal.      Right lower leg: No edema.      Left lower leg: No edema.        Legs:    Skin:     General: Skin is warm.      Capillary Refill: Capillary refill takes less than 2 seconds.      Findings: No rash.   Neurological:      General: No focal deficit present.      Mental Status: He is alert and oriented to person, place, and time.   Psychiatric:         Mood and Affect: Mood normal.         Behavior: Behavior normal.               ED Course     Labs Reviewed   COMP METABOLIC PANEL (14) - Abnormal; Notable for the following components:       Result Value    Glucose 108 (*)     Creatinine 1.31 (*)     eGFR-Cr 54 (*)     AST 95 (*)     Alkaline Phosphatase 147 (*)     Albumin 3.3 (*)     A/G Ratio 0.9 (*)     All other components within normal limits   COMP METABOLIC PANEL (14) - Abnormal; Notable for the following components:    Calcium, Total 8.4 (*)     AST 56 (*)     ALT 13 (*)     Total Protein 6.1 (*)     Albumin 2.6 (*)     A/G Ratio 0.7 (*)      All other components within normal limits   CBC W/ DIFFERENTIAL - Abnormal; Notable for the following components:    HGB 12.3 (*)     HCT 37.4 (*)     PLT 87.0 (*)     All other components within normal limits   CBC W/ DIFFERENTIAL - Abnormal; Notable for the following components:    WBC 3.4 (*)     RBC 3.28 (*)     HGB 10.5 (*)     HCT 32.4 (*)     PLT 77.0 (*)     All other components within normal limits   VITAMIN D, 25-HYDROXY - Normal   CBC WITH DIFFERENTIAL WITH PLATELET    Narrative:     The following orders were created for panel order CBC With Differential With Platelet.  Procedure                               Abnormality         Status                     ---------                               -----------         ------                     CBC W/ DIFFERENTIAL[[id number]]          Abnormal            Final result                 Please view results for these tests on the individual orders.   CBC WITH DIFFERENTIAL WITH PLATELET    Narrative:     The following orders were created for panel order CBC With Differential With Platelet.  Procedure                               Abnormality         Status                     ---------                               -----------         ------                     CBC W/ DIFFERENTIAL[[id number]]          Abnormal            Final result                 Please view results for these tests on the individual orders.   VITAMIN D    Narrative:     The following orders were created for panel order Vitamin D.  Procedure                               Abnormality         Status                     ---------                               -----------         ------                     Vitamin D, 25-Hydroxy[[id number]]        Normal              Final result                 Please view results for these tests on the individual orders.                      MDM      History obtained from patient and son.     Differential diagnosis includes muscle strain, DVT, dislocation, fracture  less likely given no history of direct injury or trauma.  No signs of arterial insufficiency or peripheral artery occlusion.    Previous records reviewed.  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  Patient has known history of CAD status post CABG, hypertension, dyslipidemia, A-fib on anticoagulation, Parkinson's.    Testing considered and ordered includes ultrasound of the left lower extremity as well as x-rays ordered of the pelvis and left femur.  Ultimately, when patient had difficulty with discharge home and getting up and ambulating, labs were ordered for observation as well.    I personally reviewed the radiographs and my individual interpretation shows no fracture or dislocation.  Case management regarding discussion of PT evaluation for possible inpatient rehab.  Recommending observation.  Case was discussed with Jono lassiter.  Case was also discussed with orthopedic surgery.      I also reviewed the official report which shows   US VENOUS DOPPLER (DVT STUDY) LEFT LOWER EXTREMITY    IMPRESSION:    No evidence of DVT left lower extremity.  X-RAY PELVIS: 1 AP IMAGE    COMPARISON: None     IMPRESSION:    No displaced pelvic fractures.  No hip fracture or dislocation.  If high suspicion for fracture, recommend CT pelvis.    X-RAY LEFT FEMUR: 4 VIEWS     IMPRESSION:    Subtle cortical irregularity posterior medial femoral condyle near the adductor tubercle.  Evaluation mildly limited by vascular calcifications.  If pain at this location distal adductor meng avulsion injury could be considered.  Finding is of unclear chronicity.    Otherwise, no acute fracture.  Normal alignment.  No visualized knee joint effusion.  Others who assisted in patient's care included hospitalist, orthopedic surgery, case management.    Interventions in care included tramadol.      Attempted to discharge patient home after discussion regarding disposition planning including offering consultation with case  management for PT evaluation and possible eval for rehab placement as patient lives at home with wife.  He does have a walker.  He would like to try to go home at this time and believes he would be able to ambulate with assistance of a walker and care for himself.  Patient's son requested a knee immobilizer at discharge as he felt that this would help with his pain as patient experiences pain with movement of the left lower extremity    After attempted to discharge per patient's request however he was unable to even transfer from wheelchair into the car as he had significant pain to the left lower extremity.  Patient's son brought him back into the ED as he would like to be further evaluated for PT evaluation and possible rehab                           Medical Decision Making      Disposition and Plan     Clinical Impression:  1. Strain of adductor meng muscle of left lower extremity, initial encounter    2. Avulsion fracture         Disposition:  Admit  4/20/2024  5:09 am    Follow-up:  Anthony Lai MD  [address]  [address]  [facility] [address]  [phone]    Schedule an appointment as soon as possible for a visit in 2 day(s)      Emmett Gonzalez MD  [address]  [address]  [facility] [address]  [phone]    Schedule an appointment as soon as possible for a visit in 2 day(s)      [facility] Emergency Department  [address] [address]  [phone]  Follow up  IF SYMPTOMS WORSEN, PERSIST, OR NEW SYMPTOMS DEVEL          Medications Prescribed:  Discharge Medication List as of 4/23/2024  5:08 PM        START taking these medications    Details   traMADol 50 MG Oral Tab Take 1-2 tablets ( mg total) by mouth every 6 (six) hours as needed for Pain., Print, Disp-10 tablet, R-0                               Hospital Problems       Present on Admission  Date Reviewed: 5/7/2022            ICD-10-CM Noted POA    * (Principal) Strain of adductor meng muscle of left lower  extremity S76.212A 4/20/2024 Unknown    Avulsion fracture T14.8XXA 4/20/2024 Unknown

## 2024-04-20 NOTE — H&P
[facility]  [facility]     Reginaldo Hdz Patient Status:  Observation    8/10/1940 MRN [id number]   [facility] 3SW-A Attending July Lam MD   Hosp Day # 0 PCP Anthony Lai MD     Chief Complaint: LLE pain     Subjective:    History of Present Illness:     Reginaldo Hdz is a 83 year old male with PMH CAD, chronic afib, GERD, HLD, HTN, lupus, parkinson's who p/w LLE pain. Rolled over this AM and felt a sudden poop in his L thigh. Worse through the day. A/w bruising and swelling. Worse w/ wt bearing and movement. Denies any recent trauma. Never had before. No fx's on XR. Unable to transfer to  to go home however.     History/Other:    Past Medical History:  Past Medical History:    Anemia    Atherosclerosis of coronary artery    Chronic atrial fibrillation (HCC)    Esophageal reflux    Heart attack (HCC)    Hyperlipidemia    Hypertension    Lupus (HCC)    Parkinson disease (HCC)     Past Surgical History:   Past Surgical History:   Procedure Laterality Date    Angiogram      Angiogram      [facility] 1.Successful PTCA of the infarct related artery which was the proximal graft of the saphenous vein graft to the right posterior descending/LV branch which was dilated and stented using a 4 x 13 BX Velocity to a maximum diameter of 4.45 mm.  2.Proximal PDA primarily stented using a 3 x 18 Penta stent from about 70 to 80% to 0%, HUGH III flow, no dissection.     Angiogram  2011    [facility]: Successful stenting with a drug-eluting stent to the graft to the right coronary artery. Severe native CAD. Patent vein graft to LAD. Severe disease in the vein graft to the right coronary artery.    Angiogram  2011    [facility]: Successful stenting of the circumflex artery in the proximal and mid portions. Relook angiography of the vein graft to the right coronary artery showed that this vessel is widely patent.     Cabg  1984    x2    Cath bare metal stent (bms)      Cath drug eluting stent      Shoulder surg proc unlisted Left     fracture      Family History:   Family History   Problem Relation Age of Onset    Cancer Father         Liver    Other (Other) Father         Heart Attack    Cancer Mother         uterine    Other (Other) Daughter         Rosemarieimotos    Other (Other) Son         Heart Problem     Social History:    reports that he quit smoking about 42 years ago. His smoking use included cigarettes. He has never used smokeless tobacco. He reports current alcohol use. He reports that he does not use drugs.     Allergies:   Allergies   Allergen Reactions    Inderal [Propranolol] NAUSEA AND VOMITING and UNKNOWN    Isosorbide NAUSEA AND VOMITING       Medications:    No current facility-administered medications on file prior to encounter.     Current Outpatient Medications on File Prior to Encounter   Medication Sig Dispense Refill    carbidopa-levodopa ER  MG Oral Tab CR Take 1.5 tablets by mouth 2 (two) times daily. @0600 and 2300      hydroxychloroquine 200 MG Oral Tab Take 1 tablet (200 mg total) by mouth 2 (two) times daily.      sulfaSALAzine 500 MG Oral Tab Take 1 tablet (500 mg total) by mouth 2 (two) times daily.      Niacin  MG Oral Tab CR Take 1 tablet (500 mg total) by mouth 2 (two) times daily. 180 tablet 1    EZETIMIBE 10 MG Oral Tab TAKE ONE TABLET BY MOUTH ONE TIME DAILY 90 tablet 0    carbidopa-levodopa  MG Oral Tab Take 1.5 tablets by mouth 4 (four) times daily.      XARELTO 20 MG Oral Tab TAKE ONE TABLET BY MOUTH ONE TIME DAILY WITH FOOD 90 tablet 0    Multivitamin Chewtab, ADULT, Oral Chew Tab Chew 1 tablet by mouth daily.      METOPROLOL SUCCINATE 50 MG Oral Tablet 24 Hr TAKE ONE TABLET BY MOUTH ONE TIME DAILY 90 tablet 3    PRAVASTATIN SODIUM 80 MG Oral Tab TAKE 1 TABLET BY MOUTH NIGHTLY 90 tablet 3    cetirizine 10 MG Oral Tab Take 1 tablet (10 mg total) by mouth daily.       aspirin 81 MG Oral Tab EC Take 1 tablet (81 mg total) by mouth every other day.      Omega-3 Fatty Acids (SALMON OIL OR) Take 1 capsule by mouth 3 (three) times daily.        FIBER COMPLETE OR Take by mouth daily.        Acidophilus/Pectin Oral Cap Take 1 capsule by mouth daily.         Review of Systems:   A comprehensive review of systems was completed.    Pertinent positives and negatives noted in the HPI.    Objective:   Physical Exam:    /59 (BP Location: Left arm)   Pulse 67   Temp 98.2 °F (36.8 °C) (Oral)   Resp 18   Ht 5' 7\" (1.702 m)   Wt 165 lb (74.8 kg)   SpO2 92%   BMI 25.84 kg/m²   General: No acute distress, Alert  Respiratory: No rhonchi, no wheezes  Cardiovascular: S1, S2. Regular rate and rhythm  Abdomen: Soft, Non-tender, non-distended, positive bowel sounds  Neuro: No new focal deficits  Extremities: No edema, L thigh pain better    Results:    Labs:      Labs Last 24 Hours:    Recent Labs   Lab 04/20/24  0555   RBC 3.88   HGB 12.3*   HCT 37.4*   MCV 96.4   MCH 31.7   MCHC 32.9   RDW 13.8   NEPRELIM 2.17   WBC 4.2   PLT 87.0*       Recent Labs   Lab 04/20/24  0555   *   BUN 20   CREATSERUM 1.31*   EGFRCR 54*   CA 9.1   ALB 3.3*      K 4.3      CO2 25.0   ALKPHO 147*   AST 95*   ALT 31   BILT 0.7   TP 7.0       Lab Results   Component Value Date    INR 1.08 12/05/2019       No results for input(s): \"TROP\", \"TROPHS\", \"CK\" in the last 168 hours.    No results for input(s): \"TROP\", \"PBNP\" in the last 168 hours.    No results for input(s): \"PCT\" in the last 168 hours.    Imaging: Imaging data reviewed in Epic.    Assessment & Plan:      #L thigh pain  -unable to bear wt or transfer. Better at rest after dilaudid I  -ortho  -CT L hip neg. MRI pending, r/o non displaced fx  -pain control  -PT/OT  -vitD level    #CAD  #chronic afib  -hold xarelto in case of fx requiring surgery    #GERD  #HTN  #HLD  #lupus  #parkinson's dz        Plan of care discussed with ptdestiney  MD Paolo    Supplementary Documentation:     The 21st Century Cures Act makes medical notes like these available to patients in the interest of transparency. Please be advised this is a medical document. Medical documents are intended to carry relevant information, facts as evident, and the clinical opinion of the practitioner. The medical note is intended as peer to peer communication and may appear blunt or direct. It is written in medical language and may contain abbreviations or verbiage that are unfamiliar.

## 2024-04-20 NOTE — ED QUICK NOTES
Pt was discharged and escorted to lobby per wheelchair, assisted by ED PCT and Pt's Son to private car. Pt returned to ED Triage desk per wheelchair with his Son stating that his left leg pain is worse and would like to be admitted for the offered rehab as previously discussed by ED Physician - Dr. Silva. ED Charge RN Edwina Pagan and Dr. Silva were both made aware and agreeable for Pt coming back to  and chart re-activated.

## 2024-04-20 NOTE — PHYSICAL THERAPY NOTE
PT eval orders received, chart reviewed. Noted pt with stat CT ordered for evaluation of left hip pain per orthopedist Dr. Gonzalez. Will hold therapy until Dr. Gonzalez completes evaluation of scans and recommendations made.     Second attempt 1231: RN Reports pt in significant pain would be unable to tolerate PT. CT completed and MRI has been ordered. Will continue to follow.

## 2024-04-20 NOTE — DISCHARGE INSTRUCTIONS
Weightbearing as tolerated to the left lower extremity, use walker for ambulatory assistance.    Home Health Provider  Sometimes managing your health at home requires assistance.  The [facility] team has recognized your preference to use [facility]. They can be reached by phone at [phone]. The fax number for your reference is [phone]. . A representative from the home health agency will contact you or your family to schedule your first visit.

## 2024-04-20 NOTE — PROGRESS NOTES
Patient with left hip lateral pain and pain into groin with axial load and rotation, recommend CT of left hip to rule out nondisplaced fracture    Full note to follow  Keep NpO for now

## 2024-04-20 NOTE — ED INITIAL ASSESSMENT (HPI)
Pt to ED brought by EMS for c/o intermittent left leg pain while getting out of bed yesterday AM - Friday when Pt felt something \"pop\". Rapid release Tylenol taken at 2130. Nitrous Oxide given by EMS PTA.

## 2024-04-20 NOTE — CONSULTS
ORTHOPAEDIC SURGERY CONSULT NOTE    Attending Physician: Carole  Consulting Physician: Emmett Gonzalez MD    Patient Name: Reginaldo Hdz  Age:  83 year old  Sex: male  MRN: [id number]  : 8/10/1940  Date of Admission: 2024    REASON FOR CONSULTATION:  Left hip pain    HISTORY OF PRESENT ILLNESS:  This is a 82 yo M with hx of Parkinson's chronic calf pain, was rolling over and felt a sudden pop in his left thigh with pain that worsened throughout the day with movement and weight bearing. He ambulates with a cane at baseline    Past Medical History:    Anemia    Atherosclerosis of coronary artery    Chronic atrial fibrillation (HCC)    Esophageal reflux    Heart attack (HCC)    Hyperlipidemia    Hypertension    Lupus (HCC)    Parkinson disease (HCC)     Past Surgical History:   Procedure Laterality Date    Angiogram      Angiogram  1996    [facility] 1.Successful PTCA of the infarct related artery which was the proximal graft of the saphenous vein graft to the right posterior descending/LV branch which was dilated and stented using a 4 x 13 BX Velocity to a maximum diameter of 4.45 mm.  2.Proximal PDA primarily stented using a 3 x 18 Penta stent from about 70 to 80% to 0%, HUGH III flow, no dissection.     [facility] Successful stenting with a drug-eluting stent to the graft to the right coronary artery. Severe native CAD. Patent vein graft to LAD. Severe disease in the vein graft to the right coronary artery.    [facility]  [facility] Successful stenting of the circumflex artery in the proximal and mid portions. Relook angiography of the vein graft to the right coronary artery showed that this vessel is widely patent.    Cabg  1984    x2    Cath bare metal stent (bms)      Cath drug eluting stent      Shoulder surg proc unlisted Left     fracture       Current Outpatient Medications:     traMADol 50 MG Oral Tab,  Take 1-2 tablets ( mg total) by mouth every 6 (six) hours as needed for Pain., Disp: 10 tablet, Rfl: 0  Allergies   Allergen Reactions    Inderal [Propranolol] NAUSEA AND VOMITING and UNKNOWN    Isosorbide NAUSEA AND VOMITING     [unfilled]  Family History   Problem Relation Age of Onset    Cancer Father         Liver    Other (Other) Father         Heart Attack    Cancer Mother         uterine    Other (Other) Daughter         Hosimotos    Other (Other) Son         Heart Problem       Review of Systems:  Pertinent orthopedic positives include Left hip pain    PHYSICAL EXAM:  Vitals:    04/20/24 0847   BP: 138/59   Pulse: 67   Resp: 18   Temp: 98.2 °F (36.8 °C)      Left let with pain to lateral hip on palpation and anterior hip, he can flex his hip slightly but with pain to groin and lateral hip, there is no back pain no radiation of pain down leg, he has no knee pain and no calf pain at this time, distally he can move all toes    Diagnostics:  Left hip and pelvis xrays show mild arthritic changes but no fractures or malalignment    ASSESSMENT AND PLAN:  This is a 82 yo M with left hip pain    - While adductor or hip strain is certainly possible, he is having a greater than expected amount of pain for a strain. I have recommended a STAT CT scan of the left hip to evaluate for possible non-displaced hip fracture.     MRI can be considered as well     Keep NPO for now    Will FU Today when CT is completed    Thank you for allowing me to participate in this patient’s care.    Emmett Gonzalez MD  [facility]  Board Certified Orthopedic Surgeon  Hand and Upper Extremity Specialist  [phone] Scheduling Line  [email or web address]

## 2024-04-21 ENCOUNTER — APPOINTMENT (OUTPATIENT)
Dept: MRI IMAGING | Facility: HOSPITAL | Age: 84
End: 2024-04-21
Attending: ORTHOPAEDIC SURGERY
Payer: MEDICARE

## 2024-04-21 LAB
ALBUMIN SERPL-MCNC: 2.6 G/DL (ref 3.4–5)
ALBUMIN/GLOB SERPL: 0.7 {RATIO} (ref 1–2)
ALP LIVER SERPL-CCNC: 117 U/L
ALT SERPL-CCNC: 13 U/L
ANION GAP SERPL CALC-SCNC: 6 MMOL/L (ref 0–18)
AST SERPL-CCNC: 56 U/L (ref 15–37)
BASOPHILS # BLD AUTO: 0.02 X10(3) UL (ref 0–0.2)
BASOPHILS NFR BLD AUTO: 0.6 %
BILIRUB SERPL-MCNC: 0.7 MG/DL (ref 0.1–2)
BUN BLD-MCNC: 19 MG/DL (ref 9–23)
CALCIUM BLD-MCNC: 8.4 MG/DL (ref 8.5–10.1)
CHLORIDE SERPL-SCNC: 104 MMOL/L (ref 98–112)
CO2 SERPL-SCNC: 26 MMOL/L (ref 21–32)
CREAT BLD-MCNC: 0.86 MG/DL
EGFRCR SERPLBLD CKD-EPI 2021: 85 ML/MIN/1.73M2 (ref 60–?)
EOSINOPHIL # BLD AUTO: 0.06 X10(3) UL (ref 0–0.7)
EOSINOPHIL NFR BLD AUTO: 1.8 %
ERYTHROCYTE [DISTWIDTH] IN BLOOD BY AUTOMATED COUNT: 14 %
GLOBULIN PLAS-MCNC: 3.5 G/DL (ref 2.8–4.4)
GLUCOSE BLD-MCNC: 99 MG/DL (ref 70–99)
HCT VFR BLD AUTO: 32.4 %
HGB BLD-MCNC: 10.5 G/DL
IMM GRANULOCYTES # BLD AUTO: 0.01 X10(3) UL (ref 0–1)
IMM GRANULOCYTES NFR BLD: 0.3 %
LYMPHOCYTES # BLD AUTO: 1.14 X10(3) UL (ref 1–4)
LYMPHOCYTES NFR BLD AUTO: 33.4 %
MCH RBC QN AUTO: 32 PG (ref 26–34)
MCHC RBC AUTO-ENTMCNC: 32.4 G/DL (ref 31–37)
MCV RBC AUTO: 98.8 FL
MONOCYTES # BLD AUTO: 0.55 X10(3) UL (ref 0.1–1)
MONOCYTES NFR BLD AUTO: 16.1 %
NEUTROPHILS # BLD AUTO: 1.63 X10 (3) UL (ref 1.5–7.7)
NEUTROPHILS # BLD AUTO: 1.63 X10(3) UL (ref 1.5–7.7)
NEUTROPHILS NFR BLD AUTO: 47.8 %
OSMOLALITY SERPL CALC.SUM OF ELEC: 284 MOSM/KG (ref 275–295)
PLATELET # BLD AUTO: 77 10(3)UL (ref 150–450)
PLATELETS.RETICULATED NFR BLD AUTO: 4.8 % (ref 0–7)
POTASSIUM SERPL-SCNC: 4.3 MMOL/L (ref 3.5–5.1)
PROT SERPL-MCNC: 6.1 G/DL (ref 6.4–8.2)
RBC # BLD AUTO: 3.28 X10(6)UL
SODIUM SERPL-SCNC: 136 MMOL/L (ref 136–145)
VIT D+METAB SERPL-MCNC: 38.4 NG/ML (ref 30–100)
WBC # BLD AUTO: 3.4 X10(3) UL (ref 4–11)

## 2024-04-21 PROCEDURE — 99232 SBSQ HOSP IP/OBS MODERATE 35: CPT | Performed by: HOSPITALIST

## 2024-04-21 RX ORDER — MULTIPLE VITAMINS W/ MINERALS TAB 9MG-400MCG
1 TAB ORAL DAILY
Status: DISCONTINUED | OUTPATIENT
Start: 2024-04-22 | End: 2024-04-23

## 2024-04-21 RX ORDER — LORAZEPAM 2 MG/ML
1 INJECTION INTRAMUSCULAR ONCE
Status: COMPLETED | OUTPATIENT
Start: 2024-04-20 | End: 2024-04-21

## 2024-04-21 RX ORDER — LORAZEPAM 2 MG/ML
INJECTION INTRAMUSCULAR
Status: COMPLETED
Start: 2024-04-21 | End: 2024-04-21

## 2024-04-21 NOTE — PLAN OF CARE
Patient A & O x4. VSS, on RA. C/o mild pain controlled with norco. NPO at MN. Voiding via urinal. Safety measures in place. Instructed to use call light.

## 2024-04-21 NOTE — OCCUPATIONAL THERAPY NOTE
OT order received, chart reviewed. Patient is currently on bedrest with MRIs of hip and thigh pending. Will re-attempt at a later date/time when appropriate for activity.

## 2024-04-21 NOTE — PHYSICAL THERAPY NOTE
PT orders received, chart reviewed. Pt currently has bedrest orders, as well as MRI of the hip and thigh is pending. Will hold at this time, and re-attempt to see pt when appropriate and as time allows.KATLIN

## 2024-04-21 NOTE — DIETARY NOTE
[facility]   part of Located within [facility]  NUTRITION ASSESSMENT    Pt does not meet malnutrition criteria at this time.    NUTRITION INTERVENTION:    Meal and Snacks - Liberalize diet to Regular Diet as tolerated; monitor patient po intake. Encourage adequate po of appropriate diet.  Medical Food Supplements - RD added Ensure Plus HP vanilla daily. Rationale/use for oral supplements discussed.  Vitamin and Mineral Supplements - Recommend adding Multivitamin with minerals    PATIENT STATUS: 83 year old male admitted on 4/20 presents with L hip/thigh pain. Pt screened d/t MST score 2. Visited pt at bedside. Pt reports decreased appetite/PO intake over the past 3 weeks d/t pain. Reports his UBW 3 weeks ago was 173 lbs and reports his current weight is 165 lbs per chart. Reports he was having nausea upon admit but none currently. Chronic constipation and takes miralax at baseline; last BM couple days ago per pt. No chewing or swallowing difficulties and NKFA. Offered ONS while appetite decreased and pt agreeable to vanilla Ensure. All questions answered at this time.    PMH:  has a past medical history of Anemia, Atherosclerosis of coronary artery, Chronic atrial fibrillation (HCC), Esophageal reflux, Heart attack (HCC), Hyperlipidemia, Hypertension, Lupus (HCC), and Parkinson disease (HCC).    ANTHROPOMETRICS:  Ht: 170.2 cm (5' 7\")  Wt: 76.3 kg (168 lb 3.2 oz).   BMI: Body mass index is 26.34 kg/m².  IBW: 67.3 kg    WEIGHT HISTORY: Pt reports ~8 lb wt loss x 3 weeks (4.6%, not significant per standards).  Patient Weight(s) for the past 336 hrs:   Weight   04/20/24 0852 76.3 kg (168 lb 3.2 oz)   04/20/24 0036 74.8 kg (165 lb)       Wt Readings from Last 10 Encounters:   04/20/24 76.3 kg (168 lb 3.2 oz)   11/19/23 77.1 kg (170 lb)   09/23/22 81.6 kg (180 lb)   03/10/22 83.5 kg (184 lb)   11/17/21 84.9 kg (187 lb 1.6 oz)   11/06/21 81.6 kg (180 lb)   09/02/21 81.6 kg (180 lb)   08/06/21 83.9 kg (185 lb)   07/28/21 86.3 kg  (190 lb 3.2 oz)   07/08/21 83 kg (183 lb)        NUTRITION:  Diet:       Procedures    Cardiac diet Cardiac; Is Patient on Accuchecks? No        Percent Meals Eaten (last 3 days)       None            Food Allergies: No  Cultural/Ethnic/Protestant Preferences Addressed: Yes    GI SYSTEM REVIEW: constipation and nausea; last BM 4/18  Skin/Wounds: WNL    NUTRITION RELATED PHYSICAL FINDINGS:     1. Body Fat/Muscle Mass: no wasting noted     2. Fluid Accumulation: none per RN documentation     NUTRITION PRESCRIPTION: 76.3 kg  Calories: 7994-8928 calories/day (25-30 kcal/kg)  Protein: 76-92 grams protein/day ( 1-1.2  gm/kg)  Fluid: ~1 ml/kcal or per MD discretion    NUTRITION DIAGNOSIS/PROBLEM:  Inadequate oral intake related to insufficient appetite resulting in inadequate nutrition intake as evidenced by documented/reported insufficient oral intake and documented/reported unintentional weight loss    MONITOR AND EVALUATE/NUTRITION GOALS:  PO intake of 75% of meals TID - New  PO intake of 75% of oral nutrition supplement/s - New  Weight stable within 1 to 2 lbs during admission - New      MEDICATIONS:  Probiotic, fibertab, niacin, prn zofran    LABS:  Reviewed     Pt is at Moderate nutrition risk    Beena Krishnamurthy, RD, LDN, CNSC  Clinical Dietitian  Spectra: [phone]

## 2024-04-21 NOTE — PROGRESS NOTES
[facility]   part of [facility]     Hospitalist Progress Note     Reginaldo Hdz Patient Status:  Observation    8/10/1940 MRN [id number]   Location [facility] 3SW-A Attending July Lam MD   Hosp Day # 0 PCP Anthony Lai MD     Chief Complaint: thigh pain     Subjective:     Patient s/p ativan/MRI. Drowsy currently    Objective:    Review of Systems:   A comprehensive review of systems was completed; pertinent positive and negatives stated in subjective.    Vital signs:  Temp:  [97.7 °F (36.5 °C)-98.5 °F (36.9 °C)] 97.7 °F (36.5 °C)  Pulse:  [52-63] 63  Resp:  [16-19] 16  BP: (107-136)/(47-60) 136/60  SpO2:  [94 %-97 %] 95 %    Physical Exam:    General: No acute distress  Respiratory: No wheezes, no rhonchi  Cardiovascular: S1, S2, regular rate and rhythm  Abdomen: Soft, Non-tender, non-distended, positive bowel sounds  Neuro: No new focal deficits.   Extremities: No edema      Diagnostic Data:    Labs:  Recent Labs   Lab 24  0555 24  0549   WBC 4.2 3.4*   HGB 12.3* 10.5*   MCV 96.4 98.8   PLT 87.0* 77.0*       Recent Labs   Lab 24  0555 24  0549   * 99   BUN 20 19   CREATSERUM 1.31* 0.86   CA 9.1 8.4*   ALB 3.3* 2.6*    136   K 4.3 4.3    104   CO2 25.0 26.0   ALKPHO 147* 117   AST 95* 56*   ALT 31 13*   BILT 0.7 0.7   TP 7.0 6.1*       Estimated Creatinine Clearance: 59.8 mL/min (based on SCr of 0.86 mg/dL).    No results for input(s): \"TROP\", \"TROPHS\", \"CK\" in the last 168 hours.    No results for input(s): \"PTP\", \"INR\" in the last 168 hours.               Microbiology    No results found for this visit on 24.      Imaging: Reviewed in Epic.    Medications:    [START ON 2024] multivitamin with minerals  1 tablet Oral Daily    acidophilus-pectin  1 capsule Oral Daily    aspirin  81 mg Oral QOD    carbidopa-levodopa  1.5 tablet Oral QID    carbidopa-levodopa ER  1.5 tablet Oral BID    cetirizine  10 mg Oral Daily    ezetimibe  10 mg Oral Daily     hydroxychloroquine  200 mg Oral BID    polycarbophil  625 mg Oral Daily    metoprolol succinate ER  50 mg Oral Daily Beta Blocker    niacin ER  500 mg Oral BID    atorvastatin  20 mg Oral Nightly    sulfaSALAzine  500 mg Oral BID       Assessment & Plan:      #L thigh pain, possible avulsion fx?  -unable to bear wt or transfer. Better at rest after dilaudid I  -ortho  -CT L hip neg. MRI pending, r/o non displaced fx. Unable to tolerate w/ ativan  -MRI tomorrow w/ sedation   -pain control  -PT/OT  -vitD level wnl      #CAD  #chronic afib  -hold xarelto in case of fx requiring surgery     #GERD  #HTN  #HLD  #lupus  #parkinson's dz      Leland Boone MD    Supplementary Documentation:     Quality:  DVT Mechanical Prophylaxis:   SCDs, Early ambuation  DVT Pharmacologic Prophylaxis   Medication   None         DVT Pharmacologic prophylaxis: Aspirin 81 mg      Code Status: Full Code  Milan: No urinary catheter in place  Milan Duration (in days):   Central line:    WICHO:     Discharge is dependent on: course  At this point Mr. Hdz is expected to be discharge to: tbd    The 21st Century Cures Act makes medical notes like these available to patients in the interest of transparency. Please be advised this is a medical document. Medical documents are intended to carry relevant information, facts as evident, and the clinical opinion of the practitioner. The medical note is intended as peer to peer communication and may appear blunt or direct. It is written in medical language and may contain abbreviations or verbiage that are unfamiliar.

## 2024-04-21 NOTE — PROGRESS NOTES
[facility]   part of [facility]      Reginaldo Hdz Patient Status:  Observation    8/10/1940 MRN [id number]   Location [facility] 3SW-A Attending July Lam MD   Hosp Day # 0 PCP Anthony Lai MD       Subjective:  Left thigh/hip pain     Patient sitting up in bed, reporting no pain at rest.   Denies fever, chills, chest pain, shortness of breath, calf pain        Objective:  Vital signs in last 24 hours:  Temp:  [98.2 °F (36.8 °C)-98.5 °F (36.9 °C)] 98.5 °F (36.9 °C)  Pulse:  [52-68] 61  Resp:  [17-19] 17  BP: (107-137)/(47-60) 117/60  SpO2:  [94 %-97 %] 94 %      General: A&Ox3, NAD  MSK: Compartments soft, nontender. +EHL/PF/DF. Sensation intact. Pulses intact. No signs of DVT.         Data Review  CBC:   Lab Results   Component Value Date    WBC 3.4 (L) 2024    RBC 3.28 (L) 2024    HGB 10.5 (L) 2024    HCT 32.4 (L) 2024    PLT 77.0 (L) 2024           Assessment/Plan:    Left thigh/hip pain    Continue present management  CT negative. MRI pending  Pain control  NPO at midnight pending MRI        Jacque SCHMIDT  Discussed with Dr. Gonzalez

## 2024-04-22 ENCOUNTER — APPOINTMENT (OUTPATIENT)
Dept: MRI IMAGING | Facility: HOSPITAL | Age: 84
End: 2024-04-22
Attending: ORTHOPAEDIC SURGERY
Payer: MEDICARE

## 2024-04-22 ENCOUNTER — ANESTHESIA EVENT (OUTPATIENT)
Dept: MRI IMAGING | Facility: HOSPITAL | Age: 84
End: 2024-04-22
Payer: MEDICARE

## 2024-04-22 ENCOUNTER — ANESTHESIA (OUTPATIENT)
Dept: MRI IMAGING | Facility: HOSPITAL | Age: 84
End: 2024-04-22
Payer: MEDICARE

## 2024-04-22 PROCEDURE — 99232 SBSQ HOSP IP/OBS MODERATE 35: CPT | Performed by: HOSPITALIST

## 2024-04-22 PROCEDURE — 73718 MRI LOWER EXTREMITY W/O DYE: CPT | Performed by: ORTHOPAEDIC SURGERY

## 2024-04-22 RX ORDER — HYDROMORPHONE HYDROCHLORIDE 1 MG/ML
0.2 INJECTION, SOLUTION INTRAMUSCULAR; INTRAVENOUS; SUBCUTANEOUS EVERY 5 MIN PRN
Status: DISCONTINUED | OUTPATIENT
Start: 2024-04-22 | End: 2024-04-22 | Stop reason: HOSPADM

## 2024-04-22 RX ORDER — NALOXONE HYDROCHLORIDE 0.4 MG/ML
0.08 INJECTION, SOLUTION INTRAMUSCULAR; INTRAVENOUS; SUBCUTANEOUS AS NEEDED
Status: DISCONTINUED | OUTPATIENT
Start: 2024-04-22 | End: 2024-04-22 | Stop reason: HOSPADM

## 2024-04-22 RX ORDER — HYDROMORPHONE HYDROCHLORIDE 1 MG/ML
0.4 INJECTION, SOLUTION INTRAMUSCULAR; INTRAVENOUS; SUBCUTANEOUS EVERY 5 MIN PRN
Status: DISCONTINUED | OUTPATIENT
Start: 2024-04-22 | End: 2024-04-22 | Stop reason: HOSPADM

## 2024-04-22 RX ORDER — HYDROMORPHONE HYDROCHLORIDE 1 MG/ML
0.6 INJECTION, SOLUTION INTRAMUSCULAR; INTRAVENOUS; SUBCUTANEOUS EVERY 5 MIN PRN
Status: DISCONTINUED | OUTPATIENT
Start: 2024-04-22 | End: 2024-04-22 | Stop reason: HOSPADM

## 2024-04-22 RX ORDER — SODIUM CHLORIDE, SODIUM LACTATE, POTASSIUM CHLORIDE, CALCIUM CHLORIDE 600; 310; 30; 20 MG/100ML; MG/100ML; MG/100ML; MG/100ML
INJECTION, SOLUTION INTRAVENOUS CONTINUOUS PRN
Status: DISCONTINUED | OUTPATIENT
Start: 2024-04-22 | End: 2024-04-22 | Stop reason: SURG

## 2024-04-22 RX ORDER — SODIUM CHLORIDE, SODIUM LACTATE, POTASSIUM CHLORIDE, CALCIUM CHLORIDE 600; 310; 30; 20 MG/100ML; MG/100ML; MG/100ML; MG/100ML
INJECTION, SOLUTION INTRAVENOUS CONTINUOUS
Status: DISCONTINUED | OUTPATIENT
Start: 2024-04-22 | End: 2024-04-22 | Stop reason: HOSPADM

## 2024-04-22 RX ADMIN — SODIUM CHLORIDE, SODIUM LACTATE, POTASSIUM CHLORIDE, CALCIUM CHLORIDE: 600; 310; 30; 20 INJECTION, SOLUTION INTRAVENOUS at 09:44:00

## 2024-04-22 NOTE — ANESTHESIA POSTPROCEDURE EVALUATION
[facility]    Reginaldo Hdz Patient Status:  Observation   Age/Gender 83 year old male MRN [id number]   Location [facility] 3SW-A Attending July Lam MD   Hosp Day # 0 PCP Anthony Lai MD       Anesthesia Post-op Note        Procedure Summary       Date: 04/22/24 Room / Location: [facility]    Anesthesia Start: 0942 Anesthesia Stop:     Procedure: MRI THIGH, LEFT (CPT=73718) Diagnosis: (possible adductor insertion avulsion)    Scheduled Providers:  Anesthesiologist: Eric Puckett MD    Anesthesia Type: MAC ASA Status: 3            Anesthesia Type: MAC    Vitals Value Taken Time   /64 04/22/24 1150   Temp 98.9 °F (37.2 °C) 04/22/24 1150   Pulse 68 04/22/24 1150   Resp 13 04/22/24 1150   SpO2 97 % 04/22/24 1150   Vitals shown include unfiled device data.    Patient Location: PACU    Anesthesia Type: MAC    Airway Patency: patent    Postop Pain Control: adequate    Mental Status: mildly sedated but able to meaningfully participate in the post-anesthesia evaluation    Nausea/Vomiting: none    Cardiopulmonary/Hydration status: stable euvolemic    Complications: no apparent anesthesia related complications    Postop vital signs: stable    Dental Exam: Unchanged from Preop    Patient to be discharged from PACU when criteria met.

## 2024-04-22 NOTE — PROGRESS NOTES
[facility]   part of [facility]     Hospitalist Progress Note     Reginaldo Hdz Patient Status:  Observation    8/10/1940 MRN [id number]   Location [facility] 3SW-A Attending July Lam MD   Hosp Day # 0 PCP Anthony Lai MD     Chief Complaint: thigh pain     Subjective:     Awake, not confused    Objective:    Review of Systems:   A comprehensive review of systems was completed; pertinent positive and negatives stated in subjective.    Vital signs:  Temp:  [97.7 °F (36.5 °C)-98.9 °F (37.2 °C)] 98.2 °F (36.8 °C)  Pulse:  [58-80] 69  Resp:  [11-24] 18  BP: (109-146)/() 110/55  SpO2:  [95 %-100 %] 98 %    Physical Exam:    General: No acute distress  Respiratory: No wheezes, no rhonchi  Cardiovascular: S1, S2, regular rate and rhythm  Abdomen: Soft, Non-tender, non-distended, positive bowel sounds  Neuro: No new focal deficits.   Extremities: No edema      Diagnostic Data:    Labs:  Recent Labs   Lab 24  0555 24  0549   WBC 4.2 3.4*   HGB 12.3* 10.5*   MCV 96.4 98.8   PLT 87.0* 77.0*       Recent Labs   Lab 24  0555 24  0549   * 99   BUN 20 19   CREATSERUM 1.31* 0.86   CA 9.1 8.4*   ALB 3.3* 2.6*    136   K 4.3 4.3    104   CO2 25.0 26.0   ALKPHO 147* 117   AST 95* 56*   ALT 31 13*   BILT 0.7 0.7   TP 7.0 6.1*       Estimated Creatinine Clearance: 59.8 mL/min (based on SCr of 0.86 mg/dL).    No results for input(s): \"TROP\", \"TROPHS\", \"CK\" in the last 168 hours.    No results for input(s): \"PTP\", \"INR\" in the last 168 hours.               Microbiology    No results found for this visit on 24.      Imaging: Reviewed in Epic.    Medications:    multivitamin with minerals  1 tablet Oral Daily    acidophilus-pectin  1 capsule Oral Daily    aspirin  81 mg Oral QOD    carbidopa-levodopa  1.5 tablet Oral QID    carbidopa-levodopa ER  1.5 tablet Oral BID    cetirizine  10 mg Oral Daily    ezetimibe  10 mg Oral Daily    hydroxychloroquine  200 mg Oral BID     polycarbophil  625 mg Oral Daily    metoprolol succinate ER  50 mg Oral Daily Beta Blocker    niacin ER  500 mg Oral BID    atorvastatin  20 mg Oral Nightly    sulfaSALAzine  500 mg Oral BID       Assessment & Plan:      #L thigh pain, possible avulsion fx?  -unable to bear wt or transfer. Better at rest after dilaudid I  -ortho  -CT L hip neg. MRI pending, r/o non displaced fx. Unable to tolerate w/ ativan  -s/p MRI  - L iliopsoas tear and adductor strain. Non surgical  -pain control  -PT/OT  -vitD level wnl   -PT to eval, likely to need ELZA     #CAD  #chronic afib  -resume xarelto      #GERD  #HTN  #HLD  #lupus  #parkinson's dz    Dispo: medically ready for DC once placement arranged     Leland Boone MD    Supplementary Documentation:     Quality:  DVT Mechanical Prophylaxis:   SCDs, Early ambuation  DVT Pharmacologic Prophylaxis   Medication   None         DVT Pharmacologic prophylaxis: Aspirin 81 mg      Code Status: Full Code  Milan: External urinary catheter in place  Milan Duration (in days):   Central line:    WICHO:     Discharge is dependent on: course  At this point Mr. Hdz is expected to be discharge to: tbd    The 21st Century Cures Act makes medical notes like these available to patients in the interest of transparency. Please be advised this is a medical document. Medical documents are intended to carry relevant information, facts as evident, and the clinical opinion of the practitioner. The medical note is intended as peer to peer communication and may appear blunt or direct. It is written in medical language and may contain abbreviations or verbiage that are unfamiliar.

## 2024-04-22 NOTE — ANESTHESIA PREPROCEDURE EVALUATION
PRE-OP EVALUATION    Patient Name: Reginaldo Hdz    Admit Diagnosis: Avulsion fracture [T14.8XXA]  Strain of adductor meng muscle of left lower extremity, initial encounter [Q44.570A]    Pre-op Diagnosis: * No surgery found *        Anesthesia Procedure: MRI THIGH (W+WO), LEFT (CPT=73720)    * Surgery not found *    Pre-op vitals reviewed.  Temp: 97.7 °F (36.5 °C)  Pulse: 62  Resp: 18  BP: 138/69  SpO2: 96 %  Body mass index is 26.34 kg/m².    Current medications reviewed.  Hospital Medications:   [COMPLETED] LORazepam (Ativan) 2 mg/mL injection 1 mg  1 mg Intravenous Once    multivitamin with minerals (Thera M Plus) tab 1 tablet  1 tablet Oral Daily    [COMPLETED] traMADol (Ultram) tab 50 mg  50 mg Oral Once    [COMPLETED] morphINE PF 4 MG/ML injection 4 mg  4 mg Intravenous Once    [COMPLETED] ondansetron (Zofran) 4 MG/2ML injection 4 mg  4 mg Intravenous Once    acidophilus-pectin (Probiotic) cap 1 capsule  1 capsule Oral Daily    aspirin DR tab 81 mg  81 mg Oral QOD    carbidopa-levodopa (SINEMET)  MG per tab 1.5 tablet  1.5 tablet Oral QID    carbidopa-levodopa ER (SINEMET CR)  MG per tab 1.5 tablet  1.5 tablet Oral BID    cetirizine (ZyrTEC) tab 10 mg  10 mg Oral Daily    ezetimibe (Zetia) tab 10 mg  10 mg Oral Daily    hydroxychloroquine (Plaquenil) tab 200 mg  200 mg Oral BID    polycarbophil (Fibertab) tab 625 mg  625 mg Oral Daily    metoprolol succinate ER (Toprol XL) 24 hr tab 50 mg  50 mg Oral Daily Beta Blocker    niacin ER (Niaspan) tab 500 mg  500 mg Oral BID    atorvastatin (Lipitor) tab 20 mg  20 mg Oral Nightly    sulfaSALAzine (Azulfidine) tab 500 mg  500 mg Oral BID    acetaminophen (Tylenol Extra Strength) tab 1,000 mg  1,000 mg Oral Q4H PRN    melatonin tab 3 mg  3 mg Oral Nightly PRN    polyethylene glycol (PEG 3350) (Miralax) 17 g oral packet 17 g  17 g Oral Daily PRN    sennosides (Senokot) tab 17.2 mg  17.2 mg Oral Nightly PRN    bisacodyl (Dulcolax) 10 MG rectal suppository 10  mg  10 mg Rectal Daily PRN    fleet enema (Fleet) 7-19 GM/118ML rectal enema 133 mL  1 enema Rectal Once PRN    ondansetron (Zofran) 4 MG/2ML injection 4 mg  4 mg Intravenous Q6H PRN    metoclopramide (Reglan) 5 mg/mL injection 5 mg  5 mg Intravenous Q8H PRN    benzonatate (Tessalon) cap 200 mg  200 mg Oral TID PRN    acetaminophen (Tylenol) tab 650 mg  650 mg Oral Q4H PRN    Or    HYDROcodone-acetaminophen (Norco) 5-325 MG per tab 1 tablet  1 tablet Oral Q4H PRN    Or    HYDROcodone-acetaminophen (Norco) 5-325 MG per tab 2 tablet  2 tablet Oral Q4H PRN    morphINE PF 2 MG/ML injection 1 mg  1 mg Intravenous Q2H PRN    Or    morphINE PF 2 MG/ML injection 2 mg  2 mg Intravenous Q2H PRN    Or    morphINE PF 4 MG/ML injection 4 mg  4 mg Intravenous Q2H PRN    sodium chloride (Saline Mist) 0.65 % nasal solution 1 spray  1 spray Each Nare Q3H PRN    glycerin-hypromellose- (Artificial Tears) 0.2-0.2-1 % ophthalmic solution 1 drop  1 drop Both Eyes QID PRN    HYDROmorphone (Dilaudid) 1 MG/ML injection 0.4 mg  0.4 mg Intravenous Q2H PRN    Or    HYDROmorphone (Dilaudid) 1 MG/ML injection 0.8 mg  0.8 mg Intravenous Q2H PRN    Or    HYDROmorphone (Dilaudid) 1 MG/ML injection 0.2 mg  0.2 mg Intravenous Q2H PRN    [] LORazepam (Ativan) 2 mg/mL injection 1 mg  1 mg Intravenous Once PRN       Outpatient Medications:     Medications Prior to Admission   Medication Sig Dispense Refill Last Dose    carbidopa-levodopa ER  MG Oral Tab CR Take 1.5 tablets by mouth 2 (two) times daily. @0600 and 2300   2024    hydroxychloroquine 200 MG Oral Tab Take 1 tablet (200 mg total) by mouth 2 (two) times daily.   2024    sulfaSALAzine 500 MG Oral Tab Take 1 tablet (500 mg total) by mouth 2 (two) times daily.   2024    Niacin  MG Oral Tab CR Take 1 tablet (500 mg total) by mouth 2 (two) times daily. 180 tablet 1 2024    EZETIMIBE 10 MG Oral Tab TAKE ONE TABLET BY MOUTH ONE TIME DAILY 90 tablet 0  4/19/2024    carbidopa-levodopa  MG Oral Tab Take 1.5 tablets by mouth 4 (four) times daily.   4/19/2024    XARELTO 20 MG Oral Tab TAKE ONE TABLET BY MOUTH ONE TIME DAILY WITH FOOD 90 tablet 0 4/19/2024    Multivitamin Chewtab, ADULT, Oral Chew Tab Chew 1 tablet by mouth daily.   4/19/2024    METOPROLOL SUCCINATE 50 MG Oral Tablet 24 Hr TAKE ONE TABLET BY MOUTH ONE TIME DAILY 90 tablet 3 4/19/2024    PRAVASTATIN SODIUM 80 MG Oral Tab TAKE 1 TABLET BY MOUTH NIGHTLY 90 tablet 3 4/19/2024    cetirizine 10 MG Oral Tab Take 1 tablet (10 mg total) by mouth daily.   4/19/2024    aspirin 81 MG Oral Tab EC Take 1 tablet (81 mg total) by mouth every other day.   4/19/2024    Omega-3 Fatty Acids (SALMON OIL OR) Take 1 capsule by mouth 3 (three) times daily.     4/19/2024    FIBER COMPLETE OR Take by mouth daily.     4/19/2024    Acidophilus/Pectin Oral Cap Take 1 capsule by mouth daily.   4/19/2024       Allergies: Inderal [propranolol] and Isosorbide      Anesthesia Evaluation    Patient summary reviewed.    Anesthetic Complications           GI/Hepatic/Renal      (+) GERD                           Cardiovascular      ECG reviewed.            (+) hypertension   (+) hyperlipidemia  (+) CAD    (+) CABG/stent         (+) dysrhythmias and atrial fibrillation                  Endo/Other    Negative endo/other ROS.                              Pulmonary    Negative pulmonary ROS.                       Neuro/Psych                              Parkinson disease        Past Surgical History:   Procedure Laterality Date    Angiogram  1984    Angiogram  1996    Angiogram  03/27/2002    [facility], Dr. Scott/ Mikayla: 1.Successful PTCA of the infarct related artery which was the proximal graft of the saphenous vein graft to the right posterior descending/LV branch which was dilated and stented using a 4 x 13 BX Velocity to a maximum diameter of 4.45 mm.  2.Proximal PDA primarily stented using a 3 x 18 Penta stent from about 70 to  80% to 0%, HUGH III flow, no dissection.     [facility] with a drug-eluting stent to the graft to the right coronary artery. Severe native CAD. Patent vein graft to LAD. Severe disease in the vein graft to the right coronary artery.    [facility] Success[facility] stenting of the circumflex artery in the proximal and mid portions. Relook angiography of the vein graft to the right coronary artery showed that this vessel is widely patent.    Cabg  1984    x2    Cath bare metal stent (bms)      Cath drug eluting stent      Shoulder surg proc unlisted Left     fracture     Social History     Socioeconomic History    Marital status:    Tobacco Use    Smoking status: Former     Current packs/day: 0.00     Types: Cigarettes     Quit date: 1982     Years since quittin.0    Smokeless tobacco: Never   Substance and Sexual Activity    Alcohol use: Yes     Alcohol/week: 0.0 standard drinks of alcohol     Comment: Social    Drug use: No     History   Drug Use No     Available pre-op labs reviewed.  Lab Results   Component Value Date    WBC 3.4 (L) 2024    RBC 3.28 (L) 2024    HGB 10.5 (L) 2024    HCT 32.4 (L) 2024    MCV 98.8 2024    MCH 32.0 2024    MCHC 32.4 2024    RDW 14.0 2024    PLT 77.0 (L) 2024     Lab Results   Component Value Date     2024    K 4.3 2024     2024    CO2 26.0 2024    BUN 19 2024    CREATSERUM 0.86 2024    GLU 99 2024    CA 8.4 (L) 2024            Airway      Mallampati: II  Mouth opening: >3 FB  TM distance: > 6 cm  Neck ROM: full Cardiovascular      Rhythm: irregular  Rate: abnormal     Dental    Dentition appears grossly intact         Pulmonary    Pulmonary exam normal.  Breath sounds clear to auscultation bilaterally.               Other findings              ASA: 3   Plan: MAC  NPO status  verified and patient meets guidelines.    Post-procedure pain management plan discussed with surgeon and patient.      Plan/risks discussed with: patient                Present on Admission:  **None**

## 2024-04-22 NOTE — PROGRESS NOTES
[facility]   part of [facility]    Subjective:  Left thigh/hip pain     Patient sitting up in bed, reporting no pain at rest and improved pain  Denies fever, chills, chest pain, shortness of breath, calf pain        Objective:  Vital signs in last 24 hours:  Temp:  [97.7 °F (36.5 °C)-98.5 °F (36.9 °C)] 98.4 °F (36.9 °C)  Pulse:  [58-72] 58  Resp:  [16-18] 18  BP: (117-139)/(58-63) 134/62  SpO2:  [94 %-100 %] 96 %      General: A&Ox3, NAD  MSK: Compartments soft, nontender. +EHL/PF/DF. Sensation intact. Pulses intact. No signs of DVT.     Can flex hip and bend knee better than before but still with distal thigh and lateral hip pain        Data Review  CBC:   Lab Results   Component Value Date    WBC 3.4 (L) 04/21/2024    RBC 3.28 (L) 04/21/2024    HGB 10.5 (L) 04/21/2024    HCT 32.4 (L) 04/21/2024    PLT 77.0 (L) 04/21/2024           Assessment/Plan:    Left thigh/hip pain    Continue present management  CT negative. Continue to recommend MRI hip and femur to rule out nondisplaced bony pathology, if negative, can start WBAT PT   Pain contro      Emmett Gonzalez MD  [facility]  Board Certified Orthopedic Surgeon  Hand and Upper Extremity Specialist  [phone] Scheduling Line  [email or web address]

## 2024-04-22 NOTE — OCCUPATIONAL THERAPY NOTE
OT orders received, chart reviewed. Patient still awaiting hip and thigh MRI. Will continue to hold at this time. Will reattempt as able and as appropriate pending plan of care. RN aware.

## 2024-04-22 NOTE — PHYSICAL THERAPY NOTE
PHYSICAL THERAPY EVALUATION - INPATIENT     Room Number: 373/373-A  Evaluation Date: 4/22/2024  Type of Evaluation: Initial  Physician Order: PT Eval and Treat    Presenting Problem: intermittent left leg pain and a pop  Co-Morbidities : HTN, afib, CAAD, CABG, Parkinsons  Reason for Therapy: Mobility Dysfunction and Discharge Planning    IMAGING:   MRI LEFT THIGH:  No acute osseous abnormalities are noted.  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    SOFT TISSUES:  There is a full-thickness disruption/tear involving the left iliopsoas tendon which is noted anterior inferior to the left hip joint space and approximately 3 cm proximal to the iliopsoas tendon insertion on the lesser trochanter.  There   is also a mild to moderate degree of adjacent muscular strain and edema involving the left abductor musculature including the adductor longus, meng and brevis.  No high-grade muscle injuries.   XR PELVIS, XR FEMUR, US Doppler, CT HIP (-) for acute processes     PHYSICAL THERAPY ASSESSMENT   Patient is currently functioning below baseline with bed mobility, transfers, gait, stair negotiation, maintaining seated position, standing prolonged periods, and performing household tasks.  Prior to admission, patient's baseline is Link to indep.  Patient is requiring contact guard assist and minimal assist as a result of the following impairments: decreased functional strength, decreased endurance/aerobic capacity, pain, impaired static and dynamic standing balance, impaired coordination, impaired motor planning, and decreased muscular endurance.  Physical Therapy will continue to follow for duration of hospitalization.    Patient will benefit from continued skilled PT Services at discharge to promote functional independence and safety with additional support and return home with home health PT.    PLAN  PT Treatment Plan: Bed mobility;Body mechanics;Coordination;Endurance;Energy conservation;Patient  education;Family education;Gait training;Neuromuscular re-educate;Range of motion;Strengthening;Stoop training;Stair training;Transfer training;Balance training  Rehab Potential : Good  Frequency (Obs): 3-5x/week  Number of Visits to Meet Established Goals: 3    CURRENT GOALS    Goal #1 Patient is able to demonstrate supine - sit EOB @ level: supervision   Goal #2 Patient is able to demonstrate transfers Sit to/from Stand at assistance level: modified independent   Goal #3 Patient is able to ambulate 50 feet with assist device: walker - rolling at assistance level: supervision     Goal #4 Pt able to ambulate 150 feet w/ RW at supervision.   Goal #5 Pt able to ascend/descend 4 steps with use of rail at close SBA.   Goal #6    Goal Comments: Goals established on 4/22/2024    PHYSICAL THERAPY MEDICAL/SOCIAL HISTORY  History related to current admission: Patient is a 83 year old male admitted on 4/20/2024 from home for a pop in the left leg.  Pt diagnosed with left iliopsoas tendon tear.    HOME SITUATION  Type of Home: House   Home Layout: Two level;Bed/bath upstairs;Stairlift  Stairs to Enter : 4  Railing: Yes  Stairs to Bedroom: 13  Railing: Yes    Lives With: Spouse  Drives: Yes  Patient Owned Equipment: Rolling walker;Cane  Patient Regularly Uses: Reading glasses    Prior Level of [place]:   Per pt and pt's family- lives in two story home with spouse. Has been sleeping on couch on main floor of home, however does own lift recliner on main floor. Full bath on main floor. Ambulates in community sometimes with cane. Owns RW as well.   Stairlift to reach second floor (bed/bath on second floor). Four steps to get into home with a railing. Spouse has macular degeneration and hx of stroke - can't provide much physical assist, provides supervision. Pt with no hx of falls. Drives.    SUBJECTIVE  \"I had surgery yesterday!\"    OBJECTIVE  Precautions: Bed/chair alarm  Fall Risk: High fall risk    WEIGHT BEARING  RESTRICTION  Weight Bearing Restriction: L lower extremity           L Lower Extremity: Weight Bearing as Tolerated    PAIN ASSESSMENT  Rating: 3  Location: left thigh  Management Techniques: Activity promotion;Body mechanics;Repositioning    COGNITION  Overall Cognitive Status:  Impaired  Memory:  decreased recall of recent events and decreased short term memory  Following Commands:  follows one step commands with increased time  Safety Judgement:  decreased awareness of need for assistance and decreased awareness of need for safety  Awareness of Errors:  assistance required to identify errors made, assistance required to correct errors made, and decreased awareness of errors   Awareness of Deficits:  decreased awareness of deficits    RANGE OF MOTION AND STRENGTH ASSESSMENT  Upper extremity ROM and strength are within functional limits   Lower extremity ROM is within functional limits   Lower extremity strength is within functional limits     BALANCE  Static Sitting: Fair +  Dynamic Sitting: Fair -  Static Standing: Fair -  Dynamic Standing: Fair -    ADDITIONAL TESTS                                    ACTIVITY TOLERANCE                         O2 WALK       NEUROLOGICAL FINDINGS                        AM-PAC '6-Clicks' INPATIENT SHORT FORM - BASIC MOBILITY  How much difficulty does the patient currently have...  Patient Difficulty: Turning over in bed (including adjusting bedclothes, sheets and blankets)?: A Little   Patient Difficulty: Sitting down on and standing up from a chair with arms (e.g., wheelchair, bedside commode, etc.): A Little   Patient Difficulty: Moving from lying on back to sitting on the side of the bed?: A Little   How much help from another person does the patient currently need...   Help from Another: Moving to and from a bed to a chair (including a wheelchair)?: A Little   Help from Another: Need to walk in hospital room?: A Little   Help from Another: Climbing 3-5 steps with a railing?: A  Marlen       AM-PAC Score:  Raw Score: 18   Approx Degree of Impairment: 46.58%   Standardized Score (AM-PAC Scale): 43.63   CMS Modifier (G-Code): CK    FUNCTIONAL ABILITY STATUS  Gait Assessment   Functional Mobility/Gait Assessment  Gait Assistance: Contact guard assist;Supervision  Distance (ft): 150  Assistive Device: Rolling walker  Pattern: Festinating;Shuffle    Skilled Therapy Provided     Bed Mobility:  Rolling: NT  Supine to sit: w/ HOB flat [place] to reach sitting EOB- needed assist at LLE to move leg, and wanted writers UE to pull for leverage   Sit to supine: NT     Transfer Mobility:  Sit to stand: SBA to RW- v/c for hand placement with poor carry through- pulls via BUE on RW   Stand to sit: SBA  Gait = supervision, intermittent CGA w/ RW x 150 feet, festinating shuffle gait pattern. A few times picked up RW     Therapist's Comments:   Patient presents sitting up in bed. Son, daughter, and spouse present in room. Discussed role and goal of physical therapy in hospital setting. Pt in agreement to session. Pain reported at 3/10- to left thigh. Educated on WBAT to LLE- pt verbalizes understanding. Pt intermittently confused during session stating he had surgery yesterday- needed re-directing.     Bed mobility at [place] w/ HOB flat. Sit to stand SBA to RW. Ambulated 150 feet w/ RW at supervision, intermittent CGA. Increased difficulty with backwards stepping when backing up to bedside chair. Lots of verbal cues for safety awareness and insight into deficits/need for safety and assistance. Pt upright in chair at end of session. Discussed importance of out of bed mobility. Encouraged continued ambulation with nursing staff. Pt verbalizes understanding. RN aware.     Exercise/Education Provided:  Bed mobility  Body mechanics  Energy conservation  Functional activity tolerated  Gait training  Posture  Transfer training    Patient End of Session: Up in chair;Needs met;Call light within reach;RN aware of  session/findings;All patient questions and concerns addressed;Alarm set;Family present;Discussed recommendations with /    Patient Evaluation Complexity Level:  History Moderate - 1 or 2 personal factors and/or co-morbidities   Examination of body systems Low - addressing 1-2 elements   Clinical Presentation Low - Stable   Clinical Decision Making Low - Stable     PT Session Time: 35 minutes  Gait Training: 15 minutes

## 2024-04-22 NOTE — PROGRESS NOTES
MRI confirms Left Iliopsoas tendon tear and adductor strain  No surgical intervention required  Recommend supportive care, pain control and PT WBAT  Placement if needed

## 2024-04-22 NOTE — OCCUPATIONAL THERAPY NOTE
OCCUPATIONAL THERAPY EVALUATION - INPATIENT     Room Number: 373/373-A  Evaluation Date: 4/22/2024  Type of Evaluation: Initial  Presenting Problem: L thigh pain, ileopsoas tendon tear    Physician Order: IP Consult to Occupational Therapy  Reason for Therapy: ADL/IADL Dysfunction and Discharge Planning    OCCUPATIONAL THERAPY ASSESSMENT   Patient is currently functioning near baseline with toileting, lower body dressing, bed mobility, transfers, and dynamic standing balance. Prior to admission, patient's baseline is independent except help sock management PRN.  Patient is requiring  grossly CGA to min assist for transfers and LB/standing ADLs  as a result of the following impairments: decreased functional reach, decreased endurance, pain, and impaired dynamic standing balance. Occupational Therapy will continue to follow for duration of hospitalization.    IMAGING:   MRI LEFT THIGH:  No acute osseous abnormalities are noted.  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    SOFT TISSUES:  There is a full-thickness disruption/tear involving the left iliopsoas tendon which is noted anterior inferior to the left hip joint space and approximately 3 cm proximal to the iliopsoas tendon insertion on the lesser trochanter.  There   is also a mild to moderate degree of adjacent muscular strain and edema involving the left abductor musculature including the adductor longus, meng and brevis.  No high-grade muscle injuries.   XR PELVIS, XR FEMUR, US Doppler, CT HIP (-) for acute processes     Patient will benefit from continued skilled OT Services at discharge to promote functional independence and safety with additional support and return home with home health OT      History Related to Current Admission: Patient is a 83 year old male admitted on 4/20/2024 with Presenting Problem: L thigh pain, ileopsoas tendon tear. Co-Morbidities : HTN, afib, CAAD, CABG, Parkinsons    WEIGHT BEARING RESTRICTION  Weight Bearing  Restriction: L lower extremity           L Lower Extremity: Weight Bearing as Tolerated    Recommendations for nursing staff:   Transfers: 1-person  Toileting location: toilet    EVALUATION SESSION:  Patient Start of Session: supine  FUNCTIONAL TRANSFER ASSESSMENT  Sit to Stand: Edge of Bed  Edge of Bed: Supervision  Toilet Transfer: Contact Guard Assist (cues for hand placement on grab bar, reports counter adjacent at home, discussed recommendation of safety frame or RTS with arms for increased safety)    BED MOBILITY  Supine to Sit : Minimal Assist (reports has been sleeping on couch lately, can sleep on recliner)    BALANCE ASSESSMENT     FUNCTIONAL ADL ASSESSMENT  LB Dressing Seated: Minimal Assist (to don B shoes, reports typically uses long-handled shoehorn at home)  LB Dressing Standing: Contact Guard Assist (simulated LB dressing to/from knees with unilateral hand support on RW)  Toileting Standing: Contact Guard Assist (simulated LB dressing to/from knees with unilateral hand support on RW)      ACTIVITY TOLERANCE:                          O2 SATURATIONS       COGNITION  Attention Span:  attends with cues to redirect  Following Commands:  follows one step commands with increased time and follows one step commands with repetition  Awareness of Deficits:  decreased awareness of deficits    Upper Extremity   ROM: within functional limits (B shoulders approx 50-75% AROM, reports baseline but functional)  Strength: within functional limits     EDUCATION PROVIDED  Patient: Role of Occupational Therapy; Functional Transfer Techniques; Fall Prevention; Weight Bear Status; Compensatory ADL Techniques  Patient's Response to Education: Verbalized Understanding; Requires Further Education (wife, son and daughter at bedside)    Equipment used: RW  Demonstrates functional use, Would benefit from additional trial      Therapist comments: Patient motivated and participatory, grossly CGA to min assist with min-mod safety  cues, reporting no increase in pain above 3/10 with activity. Supportive family at bedside, aware of recommendation of initial increased supervision, son and daughter both report live locally. Will continue to follow.    Patient End of Session: Up in chair;Needs met;Call light within reach;RN aware of session/findings;All patient questions and concerns addressed;Alarm set;Family present    OCCUPATIONAL PROFILE    HOME SITUATION  Type of Home: House  Home Layout: Two level;Bed/bath upstairs;Stairlift  Lives With: Spouse    Toilet and Equipment: Standard height toilet (counter adjacent)  Shower/Tub and Equipment: Tub-shower combo;Shower chair;Grab bar  Other Equipment: Long-handled shoehorn    Occupation/Status: retired, worked in construction     Drives: Yes  Patient Regularly Uses: Reading glasses    Prior Level of Function: Patient reports independent in most I/ADL and functional mobility without device (cane PRN in community) prior to admission. Patient reports he vacuums, help with laundry, cooks. Wife helps with sock management PRN and supervision for shower transfers/bathing. Has been sleeping on the couch for the past month due to Parkinson's, per wife.     SUBJECTIVE   \"The pain isn't that bad\"    PAIN ASSESSMENT  Rating: 3  Location: L thigh  Management Techniques: Activity promotion;Repositioning;Nurse notified;Body mechanics    OBJECTIVE  Precautions: Bed/chair alarm  Fall Risk: High fall risk      ASSESSMENTS    AM-PAC ‘6-Clicks’ Inpatient Daily Activity Short Form  -   Putting on and taking off regular lower body clothing?: A Little  -   Bathing (including washing, rinsing, drying)?: A Little  -   Toileting, which includes using toilet, bedpan or urinal? : A Little  -   Putting on and taking off regular upper body clothing?: None  -   Taking care of personal grooming such as brushing teeth?: None  -   Eating meals?: None    AM-PAC Score:  Score: 21  Approx Degree of Impairment: 32.79%  Standardized Score  (AM-PAC Scale): 44.27    ADDITIONAL TESTS     NEUROLOGICAL FINDINGS      COGNITION ASSESSMENTS       PLAN  OT Treatment Plan: Balance activities;ADL training;Functional transfer training;Endurance training;Patient/Family education;Patient/Family training;Compensatory technique education  Rehab Potential : Good  Frequency: 3x/week  Number of Visits to Meet Established Goals: 2    ADL GOALS   Patient will perform lower body dressing with supervision  Patient will perform toileting with supervision    FUNCTIONAL TRANSFER GOALS   Patient will perform supine to sit with supervision [optional, reports can sleep in recliner if needed]  Patient will perform sit to supine with supervision [optional, reports can sleep in recliner if needed]  Patient will transfer to toilet with supervision    Patient Evaluation Complexity Level:   Occupational Profile/Medical History LOW - Brief history including review of medical or therapy records    Specific performance deficits impacting engagement in ADL/IADL LOW  1 - 3 performance deficits    Client Assessment/Performance Deficits LOW - No comorbidities nor modifications of tasks    Clinical Decision Making LOW - Analysis of occupational profile, problem-focused assessments, limited treatment options    Overall Complexity LOW     OT Session Time: 35 minutes  Self-Care Home Management: 10 minutes

## 2024-04-22 NOTE — PHYSICAL THERAPY NOTE
PT order received. Chart reviewed. Pt has MRI of the hip and thigh pending at this time. Will hold on initiation of therapy services until imaging completed and POC developed by orthopedics. RN aware.

## 2024-04-22 NOTE — CM/SW NOTE
04/22/24 1600   CM/SW Referral Data   Referral Source Physician   Reason for Referral Discharge planning   Informant Patient;Spouse/Significant Other;Son;Daughter   Patient Info   Patient's Current Mental Status at Time of Assessment Alert;Oriented   Patient's Home Environment House   Patient lives with Spouse/Significant other   Patient Status Prior to Admission   Independent with ADLs and Mobility No   Pt. requires assistance with Housework;Bathing   Services in place prior to admission DME/Supplies at home   Type of DME/Supplies Wheeled Walker;Stair Lift   Discharge Needs   Anticipated D/C needs Home health care   Choice of Post-Acute Provider   Informed patient of right to choose their preferred provider Yes     Notified by RN that pt just worked w/therapy and he's ambulating better and can discharge home w/HH    Pt is a 83 year old male admitted w/ leg pain, iliopsoas tendon tear.    Met w/pt, wife, daughter and son at the bedside to discuss plans.  Pt/family agreeable to  services at discharge. Wife inquired about assistance w/ADLs at home.  Provided caregiver list and contact for [facility] liaison    Referral sent in Aidin for     / to remain available for support and/or discharge planning.     Esther Wood MBA MSN, RN CTL/  [id number]

## 2024-04-22 NOTE — PLAN OF CARE
Patient confused, transferred from [facility] to [facility] for safety.  Patient asks to urinate a lot only giving out about a 100 ml at a time.  Bladder scanned a few time but never over 250.  Tried a purewick but patient would pull off gown, brief, and purewick multiple times.  Starting to be more aware this morning.

## 2024-04-23 VITALS
TEMPERATURE: 98 F | WEIGHT: 168.19 LBS | DIASTOLIC BLOOD PRESSURE: 53 MMHG | BODY MASS INDEX: 26.4 KG/M2 | HEART RATE: 70 BPM | HEIGHT: 67 IN | RESPIRATION RATE: 18 BRPM | OXYGEN SATURATION: 98 % | SYSTOLIC BLOOD PRESSURE: 101 MMHG

## 2024-04-23 PROBLEM — S76.212A STRAIN OF ADDUCTOR MAGNUS MUSCLE OF LEFT LOWER EXTREMITY: Status: ACTIVE | Noted: 2024-04-20

## 2024-04-23 PROBLEM — S76.819A STRAIN OF ILIOPSOAS MUSCLE: Status: ACTIVE | Noted: 2024-04-20

## 2024-04-23 PROCEDURE — 99239 HOSP IP/OBS DSCHRG MGMT >30: CPT | Performed by: HOSPITALIST

## 2024-04-23 RX ORDER — HYDROCODONE BITARTRATE AND ACETAMINOPHEN 5; 325 MG/1; MG/1
1-2 TABLET ORAL EVERY 4 HOURS PRN
Qty: 20 TABLET | Refills: 0 | Status: SHIPPED | OUTPATIENT
Start: 2024-04-23

## 2024-04-23 NOTE — PHYSICAL THERAPY NOTE
PHYSICAL THERAPY TREATMENT NOTE - INPATIENT    Room Number: 373/373-A     Session: 1     Number of Visits to Meet Established Goals: 3    Presenting Problem: intermittent left leg pain and a pop  Co-Morbidities : HTN, afib, CAAD, CABG, Parkinsons    ASSESSMENT   Patient demonstrates fair progress this session, goals  remain in progress.    Patient continues to function below baseline with transfers, gait, and stair negotiation.  Contributing factors to remaining limitations include decreased functional strength, decreased endurance/aerobic capacity, and decreased muscular endurance.  Next session anticipate patient to progress transfers, gait, and stair negotiation.  Physical Therapy will continue to follow patient for duration of hospitalization.    Patient continues to benefit from continued skilled PT services: at discharge to promote functional independence in home.  Anticipate patient will return home with home health PT.    PLAN  PT Treatment Plan: Bed mobility;Body mechanics;Coordination;Endurance;Energy conservation;Patient education;Family education;Gait training;Neuromuscular re-educate;Range of motion;Strengthening;Stoop training;Stair training;Transfer training;Balance training  Rehab Potential : Good  Frequency (Obs): 3-5x/week    CURRENT GOALS   Goal #1 Patient is able to demonstrate supine - sit EOB @ level: supervision   Goal #2 Patient is able to demonstrate transfers Sit to/from Stand at assistance level: modified independent   Goal #3 Patient is able to ambulate 50 feet with assist device: walker - rolling at assistance level: supervision      Goal #4 Pt able to ambulate 150 feet w/ RW at supervision.   Goal #5 Pt able to ascend/descend 4 steps with use of rail at close SBA.   Goal #6     Goal Comments: Goals established on 4/22/2024 4/23/2024 all goals ongoing    SUBJECTIVE  Pt pleasant and cooperative    OBJECTIVE  Precautions: Bed/chair alarm    WEIGHT BEARING RESTRICTION  Weight Bearing  Restriction: L lower extremity           L Lower Extremity: Weight Bearing as Tolerated    PAIN ASSESSMENT   Rating: Unable to rate  Location: denies any pain  Management Techniques: Activity promotion;Body mechanics;Repositioning    BALANCE                                                                                                                       Static Sitting: Fair +  Dynamic Sitting: Fair           Static Standing: Fair -  Dynamic Standing: Fair -    ACTIVITY TOLERANCE                         O2 WALK         AM-PAC '6-Clicks' INPATIENT SHORT FORM - BASIC MOBILITY  How much difficulty does the patient currently have...  Patient Difficulty: Turning over in bed (including adjusting bedclothes, sheets and blankets)?: A Little   Patient Difficulty: Sitting down on and standing up from a chair with arms (e.g., wheelchair, bedside commode, etc.): A Little   Patient Difficulty: Moving from lying on back to sitting on the side of the bed?: A Little   How much help from another person does the patient currently need...   Help from Another: Moving to and from a bed to a chair (including a wheelchair)?: A Little   Help from Another: Need to walk in hospital room?: A Little   Help from Another: Climbing 3-5 steps with a railing?: A Little       AM-PAC Score:  Raw Score: 18   Approx Degree of Impairment: 46.58%   Standardized Score (AM-PAC Scale): 43.63   CMS Modifier (G-Code): CK    FUNCTIONAL ABILITY STATUS  Gait Assessment   Functional Mobility/Gait Assessment  Gait Assistance: Contact guard assist  Distance (ft): 60, 100  Assistive Device: Rolling walker  Pattern: Shuffle  Stairs: Stairs;Car transfer  How Many Stairs: 4x1  Device: 1 Rail  Assist: Contact guard assist  Pattern: Ascend and Descend  Ascend and Descend : Step to  Car transfer: contact guard assist    Skilled Therapy Provided  Pt presents up in chair c family and SW  Pt demos fair sit to stand mechanics c minimal cuing for sequencing  Pt ambulates  well with RW and maintaining it within [place]   Pt educated on car transfer - required cuing on sequencing and safety  Pt educated on stair navigation - cuing for step to pattern for increased safety as pt was attempting to do reciprocal pattern  Pt demos good standing tolerance without LOB noted when performing urination task per RN request    Bed Mobility:  Rolling: NT   Supine<>Sit: NT   Sit<>Supine: NT     Transfer Mobility:  Sit<>Stand: CGA   Stand<>Sit: CGA   Gait: CGA    Therapist's Comments: RN aware of session      THERAPEUTIC EXERCISES  Lower Extremity Alternating marching  Ankle pumps  Heel raises     Upper Extremity      Position      Repetitions   10   Sets   1     Patient End of Session: Up in chair;Needs met;Family present;With  staff;Call light within reach;RN aware of session/findings;All patient questions and concerns addressed;Alarm set    PT Session Time: 30 minutes  Gait Trainin minutes  Therapeutic Activity: 12 minutes  Therapeutic Exercise: 6 minutes   Neuromuscular Re-education: 0 minutes

## 2024-04-23 NOTE — PLAN OF CARE
Pt walking with min assist today.  Worked with therapy, met goals for dc to home with HH.  Denies pain.  Voiding per urinal.  Confused at times.  Family at bedside.  Seen by CAROL Juarez for dc to home today.  HH arranged by [facility].  Pt and family verbalized understanding of POC and agrees.  Plan dc approx 1730.

## 2024-04-23 NOTE — PLAN OF CARE
Written and verbal dc instructions given to pt and family.  Verbalized understanding.  Left hospital with family.

## 2024-04-23 NOTE — CM/SW NOTE
04/23/24 1309   Choice of Post-Acute Provider   Informed patient of right to choose their preferred provider Yes   List of appropriate post-acute services provided to patient/family with quality data Yes   Patient/family choice [facility]     Daughter called and choice for HH is [facility].  Reserved in [facility] and notified [facility]     / to remain available for support and/or discharge planning.     Esther Wood MBA MSN, RN CTL/  [id number]

## 2024-04-23 NOTE — DISCHARGE SUMMARY
[facility]     Reginaldo Hdz Patient Status:  Observation    8/10/1940 MRN [id number]   Location [facility] 3SW-A Attending July Lam MD   Hosp Day # 0 PCP Anthony Lai MD     Date of Admission: 2024  Date of Discharge:   24    Discharge Disposition: Home or Self Care    Discharge Diagnosis:  #L thigh pain, L iliopsoas tear and adductor strain  -unable to bear wt or transfer. Better at rest after dilaudid I  -ortho  -CT L hip neg.   -s/p MRI  - L iliopsoas tear and adductor strain. Non surgical  -pain control  -PT/OT  -vitD level wnl   -PT - HHC     #CAD  #chronic afib  -resume xarelto      #GERD  #HTN  #HLD  #lupus  #parkinson's dz       History of Present Illness: Reginaldo Hdz is a 83 year old male with PMH CAD, chronic afib, GERD, HLD, HTN, lupus, parkinson's who p/w LLE pain. Rolled over this AM and felt a sudden poop in his L thigh. Worse through the day. A/w bruising and swelling. Worse w/ wt bearing and movement. Denies any recent trauma. Never had before. No fx's on XR. Unable to transfer to  to go home however.     Brief Synopsis: pt w/ L thigh pain. Found to have L iliopsoas tear and adductor strain. Non surgical. Cleared by PT for HHC. Ok to [place] home.     Lace+ Score: 50  59-90 High Risk  29-58 Medium Risk  0-28   Low Risk       TCM Follow-Up Recommendation:  LACE 29-58: Moderate Risk of readmission after discharge from the hospital.      Procedures during hospitalization:   none    Incidental or significant findings and recommendations (brief descriptions):  none    Lab/Test results pending at Discharge:   none    Consultants:  ortho    Discharge Medication List:     Discharge Medications        START taking these medications        Instructions Prescription details   traMADol 50 MG Tabs  Commonly known as: Ultram      Take 1-2 tablets ( mg total) by mouth every 6 (six) hours as needed for Pain.   Stop taking on: 2024  Quantity: 10  tablet  Refills: 0            CONTINUE taking these medications        Instructions Prescription details   acidophilus-pectin Caps  Commonly known as: Probiotic      Take 1 capsule by mouth daily.   Refills: 0     aspirin 81 MG Tbec      Take 1 tablet (81 mg total) by mouth every other day.   Refills: 0     carbidopa-levodopa  MG Tabs  Commonly known as: SINEMET      Take 1.5 tablets by mouth 4 (four) times daily.   Refills: 0     carbidopa-levodopa ER  MG Tbcr  Commonly known as: SINEMET CR      Take 1.5 tablets by mouth 2 (two) times daily. @0600 and 2300   Refills: 0     cetirizine 10 MG Tabs  Commonly known as: ZyrTEC      Take 1 tablet (10 mg total) by mouth daily.   Refills: 0     ezetimibe 10 MG Tabs  Commonly known as: Zetia      TAKE ONE TABLET BY MOUTH ONE TIME DAILY   Quantity: 90 tablet  Refills: 0     FIBER COMPLETE OR      Take by mouth daily.   Refills: 0     hydroxychloroquine 200 MG Tabs  Commonly known as: Plaquenil      Take 1 tablet (200 mg total) by mouth 2 (two) times daily.   Refills: 0     metoprolol succinate ER 50 MG Tb24  Commonly known as: Toprol XL      TAKE ONE TABLET BY MOUTH ONE TIME DAILY   Quantity: 90 tablet  Refills: 3     multivitamin Chew      Chew 1 tablet by mouth daily.   Refills: 0     Niacin  MG Tbcr  Commonly known as: SLO-NIACIN      Take 1 tablet (500 mg total) by mouth 2 (two) times daily.   Quantity: 180 tablet  Refills: 1     Pravastatin Sodium 80 MG Tabs  Commonly known as: PRAVACHOL      TAKE 1 TABLET BY MOUTH NIGHTLY   Quantity: 90 tablet  Refills: 3     SALMON OIL OR      Take 1 capsule by mouth 3 (three) times daily.   Refills: 0     sulfaSALAzine 500 MG Tabs  Commonly known as: Azulfidine      Take 1 tablet (500 mg total) by mouth 2 (two) times daily.   Refills: 0     Xarelto 20 MG Tabs  Generic drug: rivaroxaban      TAKE ONE TABLET BY MOUTH ONE TIME DAILY WITH FOOD   Quantity: 90 tablet  Refills: 0               Where to Get Your Medications         Please  your prescriptions at the location directed by your doctor or nurse    Bring a paper prescription for each of these medications  traMADol 50 MG Tabs         [place]    Follow-up appointment:   Anthony Lai MD  [address]  [address]  [facility] [address]  [phone]    Schedule an appointment as soon as possible for a visit in 2 day(s)      Emmett Gonzalez MD  [address]  [address]  [facility] [address]  [phone]    Schedule an appointment as soon as possible for a visit in 2 day(s)      [facility]  [address] [place] [address]  [phone]  Follow up  IF SYMPTOMS WORSEN, PERSIST, OR NEW SYMPTOMS DEVEL    Appointments for Next 30 Days 2024 - 2024      None            Vital signs:  Temp:  [97.8 °F (36.6 °C)-98.9 °F (37.2 °C)] 98 °F (36.7 °C)  Pulse:  [69-80] 70  Resp:  [11-24] 18  BP: (101-146)/() 101/53  SpO2:  [95 %-100 %] 98 %    Physical Exam:    General: No acute distress   Lungs: clear to auscultation  Cardiovascular: S1, S2  Abdomen: Soft      -----------------------------------------------------------------------------------------------  PATIENT DISCHARGE INSTRUCTIONS: See electronic chart    Leland Boone MD    Total time spent on discharge plannin minutes     The  [employer] Cures Act makes medical notes like these available to patients in the interest of transparency. Please be advised this is a medical document. Medical documents are intended to carry relevant information, facts as evident, and the clinical opinion of the practitioner. The medical note is intended as peer to peer communication and may appear blunt or direct. It is written in medical language and may contain abbreviations or verbiage that are unfamiliar.

## 2024-04-23 NOTE — OCCUPATIONAL THERAPY NOTE
OCCUPATIONAL THERAPY TREATMENT NOTE - INPATIENT     Room Number: 373/373-A  Session: 1   Number of Visits to Meet Established Goals: 2    Presenting Problem: L thigh pain, ileopsoas tendon tear  Prior Level of Function: Patient reports independent in most I/ADL and functional mobility without device (cane PRN in community) prior to admission. Patient reports he vacuums, help with laundry, cooks. Wife helps with sock management PRN and supervision for shower transfers/bathing. Has been sleeping on the couch for the past month due to Parkinson's, per wife.     ASSESSMENT   Patient demonstrates good  progress this session, goals remain in progress.    Patient functions near baseline with toileting, lower body dressing, bed mobility, transfers, functional standing tolerance, and energy conservation strategies.   Occupational Therapy will discharge patient at this time as all goals have been met at SBA/supervision.    Patient would benefit from home with HHOT at discharge.         OT Device Recommendations: Transfer tub bench (safety frame)    History: Patient is a 83 year old male admitted on 4/20/2024 with Presenting Problem: L thigh pain, ileopsoas tendon tear. Co-Morbidities : HTN, afib, CAAD, CABG, Parkinsons    IMAGING:   MRI LEFT THIGH:  No acute osseous abnormalities are noted.  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    SOFT TISSUES:  There is a full-thickness disruption/tear involving the left iliopsoas tendon which is noted anterior inferior to the left hip joint space and approximately 3 cm proximal to the iliopsoas tendon insertion on the lesser trochanter.  There   is also a mild to moderate degree of adjacent muscular strain and edema involving the left abductor musculature including the adductor longus, meng and brevis.  No high-grade muscle injuries.   XR PELVIS, XR FEMUR, US Doppler, CT HIP (-) for acute processes     WEIGHT BEARING RESTRICTION  Weight Bearing Restriction: L lower  extremity           L Lower Extremity: Weight Bearing as Tolerated    Recommendations for nursing staff:   Transfers: 1 person  Toileting location: toilet    TREATMENT SESSION:  Patient Start of Session: semi supine in bed; family present  FUNCTIONAL TRANSFER ASSESSMENT  Sit to Stand: Edge of Bed  Edge of Bed: Supervision  Toilet Transfer: Stand-by Assist    BED MOBILITY  Supine to Sit : Minimal Assist  Sit to Supine (OT): Minimal Assist    BALANCE ASSESSMENT  Static Sitting: Supervision  Static Standing: Stand-by Assist    FUNCTIONAL ADL ASSESSMENT  Grooming Standing: Stand-by Assist  UB Dressing Seated: Supervision  LB Dressing Seated: Minimal Assist  LB Dressing Standing: Contact Guard Assist  Toileting Standing: Stand-by Assist      ACTIVITY TOLERANCE: WFL                         O2 SATURATIONS       EDUCATION PROVIDED  Patient: Role of Occupational Therapy; Plan of Care; Discharge Recommendations; DME Recommendations; Functional Transfer Techniques; Fall Prevention; Posture/Positioning; Energy Conservation; Abdominal Protective Strategies  Patient's Response to Education: Verbalized Understanding; Returned Demonstration      Equipment used: RW  Demonstrates functional use     Therapist comments: Pt received semi supine in bed, pleasant and cooperative for OT. Pt agreeable for OOB activity and ADL training. Pt performs bed mobility at min A to sit EOB with cues provided for hand placement and B LE engagement. While seated EOB, pt completes LB dressing requiring min A to thread underwear and pants through B LE and CGA/SBA to advance up past hips in standing. Pt then reports needing to use the bathroom and performs ambulatory toilet transfer with RW requiring SBA. While pt is seated on toilet, this writer discussed with family AE/DME needs for increased independence at home with ADLs. Recommended tub transfer bench and 3 in 1 commode to place beside couch as family reports that pt has tendency to need the bathroom  during the night. Pt then completes kait-care hygiene in standing requiring SBA and hand washing hygiene in standing at SBA. Pt returns to sitting EOB and completes UB dressing requiring supervision. Min A required to return to supine in bed. Family with no further questions/concerns at this time.  Patient End of Session: In bed;Needs met;Call light within reach;RN aware of session/findings;All patient questions and concerns addressed;Alarm set;Family present    SUBJECTIVE  \"I'm running out of here.\"    PAIN ASSESSMENT  Ratin  Location: denies  Management Techniques: Activity promotion;Repositioning;Nurse notified;Body mechanics     OBJECTIVE  Precautions: Bed/chair alarm    AM-PAC ‘6-Clicks’ Inpatient Daily Activity Short Form  -   Putting on and taking off regular lower body clothing?: A Little  -   Bathing (including washing, rinsing, drying)?: A Little  -   Toileting, which includes using toilet, bedpan or urinal? : A Little  -   Putting on and taking off regular upper body clothing?: None  -   Taking care of personal grooming such as brushing teeth?: None  -   Eating meals?: None    AM-PAC Score:  Score: 21  Approx Degree of Impairment: 32.79%  Standardized Score (AM-PAC Scale): 44.27    PLAN  OT Treatment Plan: Balance activities;ADL training;Functional transfer training;Endurance training;Patient/Family education;Patient/Family training;Compensatory technique education  Rehab Potential : Good  Frequency: 3x/week    OT Goals:     All goals ongoing     ADL GOALS   Patient will perform lower body dressing with supervision  Patient will perform toileting with supervision     FUNCTIONAL TRANSFER GOALS   Patient will perform supine to sit with supervision [optional, reports can sleep in recliner if needed]  Patient will perform sit to supine with supervision [optional, reports can sleep in recliner if needed]  Patient will transfer to toilet with supervision    OT Session Time: 30 minutes  Self-Care Home  Management: 30 minutes

## 2024-04-23 NOTE — CM/SW NOTE
Met w/pt and wife and daughter at the bedside w/list of available HH providers.  Await choice    Desiree, from [facility] also met w/pt and family and plan is to start caregivers tomorrow.  Daughter to stay overnight for a few nights and decide whether overnight cg will be needed.  Family considering AL as well and Desiree will assist w/possible respite stay in an AL as a trial in the near future    / to remain available for support and/or discharge planning.     Esther TUBBSA MSN, RN CTL/  [phone]

## 2024-04-23 NOTE — PLAN OF CARE
Patient is alert and oriented x 1. Vitals stable on room air. Denies pain. Tingling to LLE toes. Patient reporting urgency to void but unable to in urinal and when standing.  straight cath 500mL. DTV 0430. Last BM 4/18/23 bowel regimen in place. Tolerating regular diet. Patient has unsteady gait, requires moderate assistance with ambulation. Plan discharge with  once cleared. Plan of care discussed with patient.

## 2024-05-19 ENCOUNTER — HOSPITAL ENCOUNTER (EMERGENCY)
Facility: HOSPITAL | Age: 84
Discharge: HOME | End: 2024-05-19
Attending: EMERGENCY MEDICINE

## 2024-05-19 VITALS
HEIGHT: 66 IN | RESPIRATION RATE: 18 BRPM | BODY MASS INDEX: 26.52 KG/M2 | TEMPERATURE: 98 F | SYSTOLIC BLOOD PRESSURE: 145 MMHG | OXYGEN SATURATION: 98 % | HEART RATE: 73 BPM | DIASTOLIC BLOOD PRESSURE: 92 MMHG | WEIGHT: 165 LBS

## 2024-05-19 DIAGNOSIS — N30.90 CYSTITIS: Primary | ICD-10-CM

## 2024-05-19 LAB
ALBUMIN SERPL-MCNC: 3 G/DL (ref 3.4–5)
ALBUMIN/GLOB SERPL: 0.7 {RATIO} (ref 1–2)
ALP LIVER SERPL-CCNC: 180 U/L
ALT SERPL-CCNC: 20 U/L
ANION GAP SERPL CALC-SCNC: 4 MMOL/L (ref 0–18)
AST SERPL-CCNC: 117 U/L (ref 15–37)
BASOPHILS # BLD AUTO: 0.02 X10(3) UL (ref 0–0.2)
BASOPHILS NFR BLD AUTO: 0.4 %
BILIRUB SERPL-MCNC: 0.9 MG/DL (ref 0.1–2)
BILIRUB UR QL STRIP.AUTO: NEGATIVE
BUN BLD-MCNC: 19 MG/DL (ref 9–23)
CALCIUM BLD-MCNC: 8.8 MG/DL (ref 8.5–10.1)
CHLORIDE SERPL-SCNC: 104 MMOL/L (ref 98–112)
CO2 SERPL-SCNC: 27 MMOL/L (ref 21–32)
COLOR UR AUTO: YELLOW
CREAT BLD-MCNC: 1.1 MG/DL
EGFRCR SERPLBLD CKD-EPI 2021: 66 ML/MIN/1.73M2 (ref 60–?)
EOSINOPHIL # BLD AUTO: 0.08 X10(3) UL (ref 0–0.7)
EOSINOPHIL NFR BLD AUTO: 1.7 %
ERYTHROCYTE [DISTWIDTH] IN BLOOD BY AUTOMATED COUNT: 15.1 %
GLOBULIN PLAS-MCNC: 4.3 G/DL (ref 2.8–4.4)
GLUCOSE BLD-MCNC: 114 MG/DL (ref 70–99)
GLUCOSE UR STRIP.AUTO-MCNC: NORMAL MG/DL
HCT VFR BLD AUTO: 34.2 %
HGB BLD-MCNC: 11.8 G/DL
IMM GRANULOCYTES # BLD AUTO: 0.01 X10(3) UL (ref 0–1)
IMM GRANULOCYTES NFR BLD: 0.2 %
KETONES UR STRIP.AUTO-MCNC: NEGATIVE MG/DL
LEUKOCYTE ESTERASE UR QL STRIP.AUTO: 500
LYMPHOCYTES # BLD AUTO: 1.44 X10(3) UL (ref 1–4)
LYMPHOCYTES NFR BLD AUTO: 30.1 %
MCH RBC QN AUTO: 33.6 PG (ref 26–34)
MCHC RBC AUTO-ENTMCNC: 34.5 G/DL (ref 31–37)
MCV RBC AUTO: 97.4 FL
MONOCYTES # BLD AUTO: 0.66 X10(3) UL (ref 0.1–1)
MONOCYTES NFR BLD AUTO: 13.8 %
NEUTROPHILS # BLD AUTO: 2.58 X10 (3) UL (ref 1.5–7.7)
NEUTROPHILS # BLD AUTO: 2.58 X10(3) UL (ref 1.5–7.7)
NEUTROPHILS NFR BLD AUTO: 53.8 %
NITRITE UR QL STRIP.AUTO: NEGATIVE
OSMOLALITY SERPL CALC.SUM OF ELEC: 283 MOSM/KG (ref 275–295)
PH UR STRIP.AUTO: 7 [PH] (ref 5–8)
PLATELET # BLD AUTO: 91 10(3)UL (ref 150–450)
POTASSIUM SERPL-SCNC: 3.9 MMOL/L (ref 3.5–5.1)
PROT SERPL-MCNC: 7.3 G/DL (ref 6.4–8.2)
PROT UR STRIP.AUTO-MCNC: 50 MG/DL
RBC # BLD AUTO: 3.51 X10(6)UL
RBC #/AREA URNS AUTO: >10 /HPF
SODIUM SERPL-SCNC: 135 MMOL/L (ref 136–145)
SP GR UR STRIP.AUTO: 1.02 (ref 1–1.03)
UROBILINOGEN UR STRIP.AUTO-MCNC: NORMAL MG/DL
WBC # BLD AUTO: 4.8 X10(3) UL (ref 4–11)
WBC #/AREA URNS AUTO: >50 /HPF
WBC CLUMPS UR QL AUTO: PRESENT /HPF
YEAST UR QL: PRESENT /HPF

## 2024-05-19 PROCEDURE — 87086 URINE CULTURE/COLONY COUNT: CPT | Performed by: EMERGENCY MEDICINE

## 2024-05-19 PROCEDURE — 80053 COMPREHEN METABOLIC PANEL: CPT | Performed by: EMERGENCY MEDICINE

## 2024-05-19 PROCEDURE — 99284 EMERGENCY DEPT VISIT MOD MDM: CPT

## 2024-05-19 PROCEDURE — 36415 COLL VENOUS BLD VENIPUNCTURE: CPT

## 2024-05-19 PROCEDURE — 99283 EMERGENCY DEPT VISIT LOW MDM: CPT

## 2024-05-19 PROCEDURE — 81001 URINALYSIS AUTO W/SCOPE: CPT | Performed by: EMERGENCY MEDICINE

## 2024-05-19 PROCEDURE — 85025 COMPLETE CBC W/AUTO DIFF WBC: CPT | Performed by: EMERGENCY MEDICINE

## 2024-05-19 RX ORDER — CEPHALEXIN 500 MG/1
500 CAPSULE ORAL 3 TIMES DAILY
Qty: 21 CAPSULE | Refills: 0 | Status: SHIPPED | OUTPATIENT
Start: 2024-05-19 | End: 2024-05-26

## 2024-05-19 NOTE — ED PROVIDER NOTES
Patient Seen in: [facility] Emergency Department      History     Chief Complaint   Patient presents with    Urinary Symptoms     Stated Complaint: URINARY SYMPTOMS    Subjective:   HPI    83-year-old male with a past medical history as below including hypertension, hyperlipidemia, CAD, atrial fibrillation, lupus and Parkinson's disease presents with urinary symptoms starting yesterday with frequent urination and only urinating small amounts at a time.  He reports some dysuria and suprapubic discomfort as well.  Denies fever, chills, nausea or vomiting.  Denies flank pain.  States he has been moving his bowels regularly.  Son states he does have a history of enlarged prostate but has not needed catheter before.  Unsure if he has had UTIs in the past.    Objective:   Past Medical History:    Anemia    Atherosclerosis of coronary artery    Chronic atrial fibrillation (HCC)    Esophageal reflux    Heart attack (HCC)    Hyperlipidemia    Hypertension    Lupus (HCC)    Parkinson disease (HCC)              Past Surgical History:   Procedure Laterality Date    Angiogram  1984    Angiogram  1996    Angiogram  03/27/2002    [facility], Dr. Scott/ Mikayla: 1.Successful PTCA of the infarct related artery which was the proximal graft of the saphenous vein graft to the right posterior descending/LV branch which was dilated and stented using a 4 x 13 BX Velocity to a maximum diameter of 4.45 mm.  2.Proximal PDA primarily stented using a 3 x 18 Penta stent from about 70 to 80% to 0%, HUGH III flow, no dissection.     [facility] Successful stenting with a drug-eluting stent to the graft to the right coronary artery. Severe native CAD. Patent vein graft to LAD. Severe disease in the vein graft to the right coronary artery.    [facility]  [facility] Successful stenting of the circumflex artery in the proximal and mid portions. Relook angiography of the vein graft to the  right coronary artery showed that this vessel is widely patent.    Cabg  1984    x2    Cath bare metal stent (bms)      Cath drug eluting stent      Shoulder surg proc unlisted Left     fracture                Social History     Socioeconomic History    Marital status:    Tobacco Use    Smoking status: Former     Current packs/day: 0.00     Types: Cigarettes     Quit date: 1982     Years since quittin.1    Smokeless tobacco: Never   Substance and Sexual Activity    Alcohol use: Yes     Alcohol/week: 0.0 standard drinks of alcohol     Comment: Social    Drug use: No     Social Determinants of Health     Food Insecurity: No Food Insecurity (2024)    Food Insecurity     Food Insecurity: Never true   Transportation Needs: No Transportation Needs (2024)    Transportation Needs     Lack of Transportation: No   Housing Stability: Low Risk  (2024)    Housing Stability     Housing Instability: No              Review of Systems    Positive for stated complaint: URINARY SYMPTOMS  Other systems are as noted in HPI.  Constitutional and vital signs reviewed.      All other systems reviewed and negative except as noted above.    Physical Exam     ED Triage Vitals   BP 24 1030 (!) 145/92   Pulse 24 1015 70   Resp 24 1015 19   Temp 24 1032 98.2 °F (36.8 °C)   Temp src 24 1032 Temporal   SpO2 24 1015 100 %   O2 Device 24 1030 None (Room air)       Current Vitals:   Vital Signs  BP: (!) 145/92  Pulse: 73  Resp: 18  Temp: 98.2 °F (36.8 °C)  Temp src: Temporal  MAP (mmHg): (!) 106    Oxygen Therapy  SpO2: 98 %  O2 Device: None (Room air)            Physical Exam  Vitals and nursing note reviewed.   Constitutional:       General: He is not in acute distress.     Appearance: He is well-developed. He is not ill-appearing.   HENT:      Head: Normocephalic and atraumatic.      Mouth/Throat:      Mouth: Mucous membranes are moist.   Eyes:      General: No scleral  icterus.     Extraocular Movements: Extraocular movements intact.   Cardiovascular:      Rate and Rhythm: Normal rate and regular rhythm.   Pulmonary:      Effort: Pulmonary effort is normal.      Breath sounds: Normal breath sounds.   Abdominal:      General: Bowel sounds are normal. There is no distension.      Palpations: Abdomen is soft. There is no mass.      Tenderness: There is abdominal tenderness. There is no guarding or rebound.      Comments: Mild suprapubic tenderness  No distention noted     Musculoskeletal:      Cervical back: Neck supple.   Skin:     General: Skin is warm and dry.      Capillary Refill: Capillary refill takes less than 2 seconds.   Neurological:      Mental Status: He is alert and oriented to person, place, and time.      GCS: GCS eye subscore is 4. GCS verbal subscore is 5. GCS motor subscore is 6.   Psychiatric:         Mood and Affect: Mood normal.         Behavior: Behavior normal.               ED Course     Labs Reviewed   URINALYSIS WITH CULTURE REFLEX - Abnormal; Notable for the following components:       Result Value    Clarity Urine Ex.Turbid (*)     Blood Urine 1+ (*)     Protein Urine 50 (*)     Leukocyte Esterase Urine 500 (*)     WBC Urine >50 (*)     RBC Urine >10 (*)     Bacteria Urine 1+ (*)     Squamous Epi. Cells Few (*)     Yeast Urine Present (*)     WBC Clump Present (*)     All other components within normal limits   COMP METABOLIC PANEL (14) - Abnormal; Notable for the following components:    Glucose 114 (*)     Sodium 135 (*)      (*)     Alkaline Phosphatase 180 (*)     Albumin 3.0 (*)     A/G Ratio 0.7 (*)     All other components within normal limits   CBC W/ DIFFERENTIAL - Abnormal; Notable for the following components:    RBC 3.51 (*)     HGB 11.8 (*)     HCT 34.2 (*)     PLT 91.0 (*)     All other components within normal limits   CBC WITH DIFFERENTIAL WITH PLATELET    Narrative:     The following orders were created for panel order CBC With  Differential With Platelet.  Procedure                               Abnormality         Status                     ---------                               -----------         ------                     CBC W/ DIFFERENTIAL[[id number]]          Abnormal            Final result                 Please view results for these tests on the individual orders.   URINE CULTURE, ROUTINE                      MDM   83-year-old male with a past medical history as below including hypertension, hyperlipidemia, CAD, atrial fibrillation, lupus and Parkinson's disease presents with urinary symptoms starting yesterday with frequent urination and only urinating small amounts at a time.     Differential includes but is not limited to UTI, urinary retention, overactive bladder    Bladder scan showed only 250 mL and patient was able to void afterwards.    UA shows strong evidence UTI.  Labs are otherwise unremarkable with normal WBC and renal function.    Will treat cystitis with Rx for Keflex pending culture results.  Instructed to push p.o. fluids and follow-up with his PCP.  Return precaution discussed.    Medical Decision Making  Amount and/or Complexity of Data Reviewed  Independent Historian: caregiver     Details: Son, see HPI  Labs: ordered. Decision-making details documented in ED Course.    Risk  Prescription drug management.        Disposition and Plan     Clinical Impression:  1. Cystitis         Disposition:  Discharge  5/19/2024 11:23 am    Follow-up:  Anthony Lai MD  [address]  [address] [place] [address]  [phone]    Schedule an appointment as soon as possible for a visit in 2 day(s)            Medications Prescribed:  Current Discharge Medication List        START taking these medications    Details   cephalexin 500 MG Oral Cap Take 1 capsule (500 mg total) by mouth 3 (three) times daily for 7 days.  Qty: 21 capsule, Refills: 0

## 2024-05-19 NOTE — ED INITIAL ASSESSMENT (HPI)
Patient to the ED with c/o urinary retention. States he has only been able to urinate a small amount. States he goes back to bed and then wakes up shortly after with the intense urge to urinate again. Symptoms have been ongoing for approx one or two days. Patient mentions that he does have a hernia. States this has never happened before. Patient states he follows [facility].

## 2024-07-01 ENCOUNTER — HOSPITAL ENCOUNTER (EMERGENCY)
Age: 84
Discharge: HOME | End: 2024-07-01
Attending: STUDENT IN AN ORGANIZED HEALTH CARE EDUCATION/TRAINING PROGRAM
Payer: MEDICARE

## 2024-07-01 ENCOUNTER — APPOINTMENT (OUTPATIENT)
Dept: GENERAL RADIOLOGY | Age: 84
End: 2024-07-01
Attending: STUDENT IN AN ORGANIZED HEALTH CARE EDUCATION/TRAINING PROGRAM
Payer: MEDICARE

## 2024-07-01 VITALS
WEIGHT: 170 LBS | DIASTOLIC BLOOD PRESSURE: 72 MMHG | BODY MASS INDEX: 27 KG/M2 | HEART RATE: 63 BPM | RESPIRATION RATE: 16 BRPM | SYSTOLIC BLOOD PRESSURE: 155 MMHG | OXYGEN SATURATION: 99 % | TEMPERATURE: 98 F

## 2024-07-01 DIAGNOSIS — S22.060A COMPRESSION FRACTURE OF T8 VERTEBRA, INITIAL ENCOUNTER (HCC): Primary | ICD-10-CM

## 2024-07-01 PROCEDURE — 99284 EMERGENCY DEPT VISIT MOD MDM: CPT

## 2024-07-01 PROCEDURE — 99283 EMERGENCY DEPT VISIT LOW MDM: CPT

## 2024-07-01 PROCEDURE — 72072 X-RAY EXAM THORAC SPINE 3VWS: CPT | Performed by: STUDENT IN AN ORGANIZED HEALTH CARE EDUCATION/TRAINING PROGRAM

## 2024-07-01 RX ORDER — PANTOPRAZOLE SODIUM 40 MG/1
40 TABLET, DELAYED RELEASE ORAL DAILY
Qty: 30 TABLET | Refills: 0 | Status: SHIPPED | OUTPATIENT
Start: 2024-07-01 | End: 2024-07-31

## 2024-07-01 NOTE — ED PROVIDER NOTES
History     Chief Complaint   Patient presents with    Back Pain       HPI    83 year old male presents with mid back pain for the past couple days, aching sensation worse with sitting forward or bending, no pain at rest or with ambulation.  No weakness or paresthesias.  No fevers or vomiting.  Patient is on anticoagulation for A-fib.  He thinks the symptoms started after his caretaker was moving his legs to help get him into bed.          Past Medical History:    Anemia    Atherosclerosis of coronary artery    Chronic atrial fibrillation (HCC)    Esophageal reflux    Heart attack (HCC)    Hyperlipidemia    Hypertension    Lupus (HCC)    Parkinson disease (HCC)       Past Surgical History:   Procedure Laterality Date    Angiogram  1984    Angiogram  1996    Angiogram  03/27/2002    [facility], Dr. Scott/ Mikayla: 1.Successful PTCA of the infarct related artery which was the proximal graft of the saphenous vein graft to the right posterior descending/LV branch which was dilated and stented using a 4 x 13 BX Velocity to a maximum diameter of 4.45 mm.  2.Proximal PDA primarily stented using a 3 x 18 Penta stent from about 70 to 80% to 0%, HGUH III flow, no dissection.     Angiogram  06/24/2011    [facility], Dr. Steele: Successful stenting with a drug-eluting stent to the graft to the right coronary artery. Severe native CAD. Patent vein graft to LAD. Severe disease in the vein graft to the right coronary artery.    [facility] Successful stenting of the circumflex artery in the proximal and mid portions. Relook angiography of the vein graft to the right coronary artery showed that this vessel is widely patent.    Cabg  1984    x2    Cath bare metal stent (bms)      Cath drug eluting stent      Shoulder surg proc unlisted Left     fracture       Social History     Socioeconomic History    Marital status:    Tobacco Use    Smoking status: Former     Current packs/day: 0.00      Types: Cigarettes     Quit date: 1982     Years since quittin.2    Smokeless tobacco: Never   Substance and Sexual Activity    Alcohol use: Yes     Alcohol/week: 0.0 standard drinks of alcohol     Comment: Social    Drug use: No     Social Determinants of Health     Food Insecurity: No Food Insecurity (2024)    Food Insecurity     Food Insecurity: Never true   Transportation Needs: No Transportation Needs (2024)    Transportation Needs     Lack of Transportation: No   Housing Stability: Low Risk  (2024)    Housing Stability     Housing Instability: No                   Physical Exam     ED Triage Vitals [24 1037]   /69   Pulse 65   Resp 16   Temp 97.9 °F (36.6 °C)   Temp src Temporal   SpO2 97 %   O2 Device None (Room air)       Physical Exam  Constitutional:       General: He is not in acute distress.  Eyes:      Extraocular Movements: Extraocular movements intact.   Cardiovascular:      Rate and Rhythm: Normal rate.      Pulses: Normal pulses.   Pulmonary:      Effort: Pulmonary effort is normal. No respiratory distress.   Musculoskeletal:      Cervical back: Normal range of motion.      Comments: no midline C/L TTP  Mild parathoracic spasm and tenderness with mild midline tenderness.  ranging well, can easily turn neck side to side  no deformities  BLE strength 5/5, ranging legs easily, sensation equal  no saddle anesthesia  BLE warm to touch with normal cap refill       Neurological:      General: No focal deficit present.      Mental Status: He is alert.              ED Course     Labs Reviewed - No data to display  XR THORACIC SPINE (3 VIEWS) (CPT=72072)    Result Date: 2024  CONCLUSION:  See above.   LOCATION:  [facility]    Dictated by (CST): Stromberg, LeRoy, MD on 2024 at 11:50 AM     Finalized by (CST): Stromberg, LeRoy, MD on 2024 at 11:52 AM            [facility]     Vitals:    24 1037 24 1145 24 1253   BP: 135/69 147/74 155/72   Pulse: 65 58 63    Resp: 16     Temp: 97.9 °F (36.6 °C)     TempSrc: Temporal     SpO2: 97% 98% 99%   Weight: 77.1 kg         Thoracic spinal tenderness and paraspinal strain, possible fracture.  Neurovascularly intact distally.  No signs of cord compression.  Will get imaging    ED Course as of 07/01/24 1351  ------------------------------------------------------------  Time: 07/01 1201  Comment: My interpretation of x-ray with appears to be compression fracture.  Radiology is noting severe compression fracture at T8     I discussed the case with neurosurgery on-call for spine, recommends TLSO brace and can follow-up in clinic.  Patient was fitted for a brace by [facility].  He is able to ambulate, discharged in stable condition, pain control as needed, follow-up with spine clinic.    Disposition and Plan     Clinical Impression:  1. Compression fracture of T8 vertebra, initial encounter ([facility])        Disposition:  Discharge    Follow-up:  Ben Martinez MD  [address] [place] [id number]  [phone]    Follow up        Medications Prescribed:  Current Discharge Medication List

## 2024-07-09 ENCOUNTER — HOSPITAL ENCOUNTER (INPATIENT)
Facility: HOSPITAL | Age: 84
LOS: 4 days | Discharge: HOME | End: 2024-07-14
Attending: EMERGENCY MEDICINE | Admitting: INTERNAL MEDICINE
Payer: MEDICARE

## 2024-07-09 ENCOUNTER — APPOINTMENT (OUTPATIENT)
Dept: GENERAL RADIOLOGY | Facility: HOSPITAL | Age: 84
End: 2024-07-09
Attending: EMERGENCY MEDICINE
Payer: MEDICARE

## 2024-07-09 DIAGNOSIS — D69.6 THROMBOCYTOPENIA (HCC): ICD-10-CM

## 2024-07-09 DIAGNOSIS — Z20.822 EXPOSURE TO CONFIRMED CASE OF COVID-19: ICD-10-CM

## 2024-07-09 DIAGNOSIS — D64.9 CHRONIC ANEMIA: ICD-10-CM

## 2024-07-09 DIAGNOSIS — U07.1 COVID-19: ICD-10-CM

## 2024-07-09 DIAGNOSIS — R60.0 PERIPHERAL EDEMA: ICD-10-CM

## 2024-07-09 DIAGNOSIS — J90 PLEURAL EFFUSION: Primary | ICD-10-CM

## 2024-07-09 DIAGNOSIS — R06.00 DYSPNEA, UNSPECIFIED TYPE: ICD-10-CM

## 2024-07-09 LAB
ALBUMIN SERPL-MCNC: 2.3 G/DL (ref 3.4–5)
ALBUMIN/GLOB SERPL: 0.5 {RATIO} (ref 1–2)
ALP LIVER SERPL-CCNC: 204 U/L
ALT SERPL-CCNC: 30 U/L
ANION GAP SERPL CALC-SCNC: 4 MMOL/L (ref 0–18)
AST SERPL-CCNC: 184 U/L (ref 15–37)
BASOPHILS # BLD AUTO: 0 X10(3) UL (ref 0–0.2)
BASOPHILS NFR BLD AUTO: 0 %
BILIRUB SERPL-MCNC: 0.6 MG/DL (ref 0.1–2)
BUN BLD-MCNC: 17 MG/DL (ref 9–23)
CALCIUM BLD-MCNC: 8.6 MG/DL (ref 8.5–10.1)
CHLORIDE SERPL-SCNC: 106 MMOL/L (ref 98–112)
CO2 SERPL-SCNC: 28 MMOL/L (ref 21–32)
CREAT BLD-MCNC: 0.94 MG/DL
EGFRCR SERPLBLD CKD-EPI 2021: 80 ML/MIN/1.73M2 (ref 60–?)
EOSINOPHIL # BLD AUTO: 0 X10(3) UL (ref 0–0.7)
EOSINOPHIL NFR BLD AUTO: 0 %
ERYTHROCYTE [DISTWIDTH] IN BLOOD BY AUTOMATED COUNT: 13.8 %
GLOBULIN PLAS-MCNC: 4.3 G/DL (ref 2.8–4.4)
GLUCOSE BLD-MCNC: 112 MG/DL (ref 70–99)
HCT VFR BLD AUTO: 31.2 %
HGB BLD-MCNC: 10.8 G/DL
IMM GRANULOCYTES # BLD AUTO: 0.02 X10(3) UL (ref 0–1)
IMM GRANULOCYTES NFR BLD: 0.3 %
LYMPHOCYTES # BLD AUTO: 0.77 X10(3) UL (ref 1–4)
LYMPHOCYTES NFR BLD AUTO: 13 %
MCH RBC QN AUTO: 32.6 PG (ref 26–34)
MCHC RBC AUTO-ENTMCNC: 34.6 G/DL (ref 31–37)
MCV RBC AUTO: 94.3 FL
MONOCYTES # BLD AUTO: 0.54 X10(3) UL (ref 0.1–1)
MONOCYTES NFR BLD AUTO: 9.1 %
NEUTROPHILS # BLD AUTO: 4.6 X10 (3) UL (ref 1.5–7.7)
NEUTROPHILS # BLD AUTO: 4.6 X10(3) UL (ref 1.5–7.7)
NEUTROPHILS NFR BLD AUTO: 77.6 %
OSMOLALITY SERPL CALC.SUM OF ELEC: 288 MOSM/KG (ref 275–295)
PLATELET # BLD AUTO: 75 10(3)UL (ref 150–450)
POTASSIUM SERPL-SCNC: 3.8 MMOL/L (ref 3.5–5.1)
PROT SERPL-MCNC: 6.6 G/DL (ref 6.4–8.2)
RBC # BLD AUTO: 3.31 X10(6)UL
SODIUM SERPL-SCNC: 138 MMOL/L (ref 136–145)
TROPONIN I SERPL HS-MCNC: 37 NG/L
WBC # BLD AUTO: 5.9 X10(3) UL (ref 4–11)

## 2024-07-09 PROCEDURE — 85025 COMPLETE CBC W/AUTO DIFF WBC: CPT | Performed by: EMERGENCY MEDICINE

## 2024-07-09 PROCEDURE — 96374 THER/PROPH/DIAG INJ IV PUSH: CPT

## 2024-07-09 PROCEDURE — 71045 X-RAY EXAM CHEST 1 VIEW: CPT | Performed by: EMERGENCY MEDICINE

## 2024-07-09 PROCEDURE — 93005 ELECTROCARDIOGRAM TRACING: CPT

## 2024-07-09 PROCEDURE — 93010 ELECTROCARDIOGRAM REPORT: CPT

## 2024-07-09 PROCEDURE — 0241U SARS-COV-2/FLU A AND B/RSV BY PCR (GENEXPERT): CPT | Performed by: EMERGENCY MEDICINE

## 2024-07-09 PROCEDURE — 80053 COMPREHEN METABOLIC PANEL: CPT | Performed by: EMERGENCY MEDICINE

## 2024-07-09 PROCEDURE — 84484 ASSAY OF TROPONIN QUANT: CPT | Performed by: EMERGENCY MEDICINE

## 2024-07-09 PROCEDURE — 83880 ASSAY OF NATRIURETIC PEPTIDE: CPT | Performed by: EMERGENCY MEDICINE

## 2024-07-09 PROCEDURE — 99285 EMERGENCY DEPT VISIT HI MDM: CPT

## 2024-07-10 ENCOUNTER — APPOINTMENT (OUTPATIENT)
Dept: CV DIAGNOSTICS | Facility: HOSPITAL | Age: 84
End: 2024-07-10
Attending: INTERNAL MEDICINE
Payer: MEDICARE

## 2024-07-10 ENCOUNTER — APPOINTMENT (OUTPATIENT)
Dept: ULTRASOUND IMAGING | Facility: HOSPITAL | Age: 84
End: 2024-07-10
Attending: HOSPITALIST
Payer: MEDICARE

## 2024-07-10 PROBLEM — R60.0 PERIPHERAL EDEMA: Status: ACTIVE | Noted: 2024-07-10

## 2024-07-10 PROBLEM — D64.9 CHRONIC ANEMIA: Status: ACTIVE | Noted: 2024-07-10

## 2024-07-10 PROBLEM — Z20.822 EXPOSURE TO CONFIRMED CASE OF COVID-19: Status: ACTIVE | Noted: 2024-07-10

## 2024-07-10 PROBLEM — U07.1 COVID-19: Status: ACTIVE | Noted: 2024-07-10

## 2024-07-10 PROBLEM — R06.00 DYSPNEA, UNSPECIFIED TYPE: Status: ACTIVE | Noted: 2024-07-10

## 2024-07-10 LAB
ANION GAP SERPL CALC-SCNC: 6 MMOL/L (ref 0–18)
ATRIAL RATE: 62 BPM
BASOPHILS # BLD AUTO: 0.01 X10(3) UL (ref 0–0.2)
BASOPHILS NFR BLD AUTO: 0.1 %
BILIRUB UR QL STRIP.AUTO: NEGATIVE
BUN BLD-MCNC: 17 MG/DL (ref 9–23)
CALCIUM BLD-MCNC: 8.6 MG/DL (ref 8.5–10.1)
CHLORIDE SERPL-SCNC: 105 MMOL/L (ref 98–112)
CLARITY UR REFRACT.AUTO: CLEAR
CO2 SERPL-SCNC: 27 MMOL/L (ref 21–32)
COLOR UR AUTO: YELLOW
CREAT BLD-MCNC: 0.94 MG/DL
CREAT BLD-MCNC: 0.94 MG/DL
EGFRCR SERPLBLD CKD-EPI 2021: 80 ML/MIN/1.73M2 (ref 60–?)
EGFRCR SERPLBLD CKD-EPI 2021: 80 ML/MIN/1.73M2 (ref 60–?)
EOSINOPHIL # BLD AUTO: 0 X10(3) UL (ref 0–0.7)
EOSINOPHIL NFR BLD AUTO: 0 %
ERYTHROCYTE [DISTWIDTH] IN BLOOD BY AUTOMATED COUNT: 13.7 %
FLUAV + FLUBV RNA SPEC NAA+PROBE: NEGATIVE
FLUAV + FLUBV RNA SPEC NAA+PROBE: NEGATIVE
GLUCOSE BLD-MCNC: 90 MG/DL (ref 70–99)
GLUCOSE UR STRIP.AUTO-MCNC: NORMAL MG/DL
HCT VFR BLD AUTO: 33.9 %
HGB BLD-MCNC: 11.4 G/DL
IMM GRANULOCYTES # BLD AUTO: 0.02 X10(3) UL (ref 0–1)
IMM GRANULOCYTES NFR BLD: 0.3 %
KETONES UR STRIP.AUTO-MCNC: NEGATIVE MG/DL
LEUKOCYTE ESTERASE UR QL STRIP.AUTO: NEGATIVE
LYMPHOCYTES # BLD AUTO: 0.74 X10(3) UL (ref 1–4)
LYMPHOCYTES NFR BLD AUTO: 10.8 %
MAGNESIUM SERPL-MCNC: 1.7 MG/DL (ref 1.6–2.6)
MCH RBC QN AUTO: 32.5 PG (ref 26–34)
MCHC RBC AUTO-ENTMCNC: 33.6 G/DL (ref 31–37)
MCV RBC AUTO: 96.6 FL
MONOCYTES # BLD AUTO: 0.4 X10(3) UL (ref 0.1–1)
MONOCYTES NFR BLD AUTO: 5.8 %
NEUTROPHILS # BLD AUTO: 5.67 X10 (3) UL (ref 1.5–7.7)
NEUTROPHILS # BLD AUTO: 5.67 X10(3) UL (ref 1.5–7.7)
NEUTROPHILS NFR BLD AUTO: 83 %
NITRITE UR QL STRIP.AUTO: NEGATIVE
NT-PROBNP SERPL-MCNC: 1591 PG/ML (ref ?–450)
OSMOLALITY SERPL CALC.SUM OF ELEC: 287 MOSM/KG (ref 275–295)
P AXIS: 69 DEGREES
P-R INTERVAL: 268 MS
PH UR STRIP.AUTO: 6.5 [PH] (ref 5–8)
PLATELET # BLD AUTO: 82 10(3)UL (ref 150–450)
POTASSIUM SERPL-SCNC: 3.4 MMOL/L (ref 3.5–5.1)
Q-T INTERVAL: 460 MS
QRS DURATION: 84 MS
QTC CALCULATION (BEZET): 466 MS
R AXIS: -36 DEGREES
RBC # BLD AUTO: 3.51 X10(6)UL
RBC UR QL AUTO: NEGATIVE
RSV RNA SPEC NAA+PROBE: NEGATIVE
SARS-COV-2 RNA RESP QL NAA+PROBE: DETECTED
SODIUM SERPL-SCNC: 138 MMOL/L (ref 136–145)
SP GR UR STRIP.AUTO: 1.01 (ref 1–1.03)
T AXIS: -13 DEGREES
TROPONIN I SERPL HS-MCNC: 50 NG/L
UROBILINOGEN UR STRIP.AUTO-MCNC: NORMAL MG/DL
VENTRICULAR RATE: 62 BPM
WBC # BLD AUTO: 6.8 X10(3) UL (ref 4–11)

## 2024-07-10 PROCEDURE — 97161 PT EVAL LOW COMPLEX 20 MIN: CPT

## 2024-07-10 PROCEDURE — 83735 ASSAY OF MAGNESIUM: CPT

## 2024-07-10 PROCEDURE — 97530 THERAPEUTIC ACTIVITIES: CPT

## 2024-07-10 PROCEDURE — 87070 CULTURE OTHR SPECIMN AEROBIC: CPT | Performed by: HOSPITALIST

## 2024-07-10 PROCEDURE — 87205 SMEAR GRAM STAIN: CPT | Performed by: HOSPITALIST

## 2024-07-10 PROCEDURE — 93971 EXTREMITY STUDY: CPT | Performed by: HOSPITALIST

## 2024-07-10 PROCEDURE — 81003 URINALYSIS AUTO W/O SCOPE: CPT | Performed by: EMERGENCY MEDICINE

## 2024-07-10 PROCEDURE — 93306 TTE W/DOPPLER COMPLETE: CPT | Performed by: INTERNAL MEDICINE

## 2024-07-10 PROCEDURE — 80048 BASIC METABOLIC PNL TOTAL CA: CPT

## 2024-07-10 PROCEDURE — 82565 ASSAY OF CREATININE: CPT | Performed by: HOSPITALIST

## 2024-07-10 PROCEDURE — 85025 COMPLETE CBC W/AUTO DIFF WBC: CPT

## 2024-07-10 PROCEDURE — 84484 ASSAY OF TROPONIN QUANT: CPT | Performed by: INTERNAL MEDICINE

## 2024-07-10 PROCEDURE — XW033E5 INTRODUCTION OF REMDESIVIR ANTI-INFECTIVE INTO PERIPHERAL VEIN, PERCUTANEOUS APPROACH, NEW TECHNOLOGY GROUP 5: ICD-10-PCS | Performed by: HOSPITALIST

## 2024-07-10 RX ORDER — FUROSEMIDE 10 MG/ML
40 INJECTION INTRAMUSCULAR; INTRAVENOUS ONCE
Status: COMPLETED | OUTPATIENT
Start: 2024-07-10 | End: 2024-07-10

## 2024-07-10 RX ORDER — SENNOSIDES 8.6 MG
17.2 TABLET ORAL NIGHTLY PRN
Status: DISCONTINUED | OUTPATIENT
Start: 2024-07-10 | End: 2024-07-14

## 2024-07-10 RX ORDER — BISACODYL 10 MG
10 SUPPOSITORY, RECTAL RECTAL
Status: DISCONTINUED | OUTPATIENT
Start: 2024-07-10 | End: 2024-07-14

## 2024-07-10 RX ORDER — MAGNESIUM OXIDE 400 MG/1
400 TABLET ORAL ONCE
Status: COMPLETED | OUTPATIENT
Start: 2024-07-10 | End: 2024-07-10

## 2024-07-10 RX ORDER — CETIRIZINE HYDROCHLORIDE 10 MG/1
10 TABLET ORAL DAILY
Status: DISCONTINUED | OUTPATIENT
Start: 2024-07-10 | End: 2024-07-10 | Stop reason: DRUGHIGH

## 2024-07-10 RX ORDER — SULFASALAZINE 500 MG/1
500 TABLET ORAL 2 TIMES DAILY
Status: DISCONTINUED | OUTPATIENT
Start: 2024-07-10 | End: 2024-07-11

## 2024-07-10 RX ORDER — POTASSIUM CHLORIDE 20 MEQ/1
40 TABLET, EXTENDED RELEASE ORAL EVERY 4 HOURS
Status: COMPLETED | OUTPATIENT
Start: 2024-07-10 | End: 2024-07-10

## 2024-07-10 RX ORDER — ASPIRIN 81 MG/1
81 TABLET ORAL EVERY OTHER DAY
Status: DISCONTINUED | OUTPATIENT
Start: 2024-07-10 | End: 2024-07-10

## 2024-07-10 RX ORDER — PANTOPRAZOLE SODIUM 40 MG/1
40 TABLET, DELAYED RELEASE ORAL DAILY
Status: DISCONTINUED | OUTPATIENT
Start: 2024-07-10 | End: 2024-07-14

## 2024-07-10 RX ORDER — POLYETHYLENE GLYCOL 3350 17 G/17G
17 POWDER, FOR SOLUTION ORAL DAILY PRN
Status: DISCONTINUED | OUTPATIENT
Start: 2024-07-10 | End: 2024-07-14

## 2024-07-10 RX ORDER — METOCLOPRAMIDE HYDROCHLORIDE 5 MG/ML
10 INJECTION INTRAMUSCULAR; INTRAVENOUS EVERY 8 HOURS PRN
Status: DISCONTINUED | OUTPATIENT
Start: 2024-07-10 | End: 2024-07-14

## 2024-07-10 RX ORDER — LACTOBACILLUS ACIDOPHILUS 500MM CELL
1 CAPSULE ORAL DAILY
Status: DISCONTINUED | OUTPATIENT
Start: 2024-07-10 | End: 2024-07-14

## 2024-07-10 RX ORDER — GUAIFENESIN 600 MG/1
600 TABLET, EXTENDED RELEASE ORAL 2 TIMES DAILY
Status: DISCONTINUED | OUTPATIENT
Start: 2024-07-10 | End: 2024-07-14

## 2024-07-10 RX ORDER — CARBIDOPA AND LEVODOPA 25; 100 MG/1; MG/1
1.5 TABLET, EXTENDED RELEASE ORAL 2 TIMES DAILY
Status: DISCONTINUED | OUTPATIENT
Start: 2024-07-10 | End: 2024-07-10

## 2024-07-10 RX ORDER — EZETIMIBE 10 MG/1
10 TABLET ORAL DAILY
Status: DISCONTINUED | OUTPATIENT
Start: 2024-07-10 | End: 2024-07-14

## 2024-07-10 RX ORDER — ENEMA 19; 7 G/133ML; G/133ML
1 ENEMA RECTAL ONCE AS NEEDED
Status: DISCONTINUED | OUTPATIENT
Start: 2024-07-10 | End: 2024-07-14

## 2024-07-10 RX ORDER — METOPROLOL SUCCINATE 50 MG/1
50 TABLET, EXTENDED RELEASE ORAL DAILY
Status: DISCONTINUED | OUTPATIENT
Start: 2024-07-10 | End: 2024-07-14

## 2024-07-10 RX ORDER — ECHINACEA PURPUREA EXTRACT 125 MG
1 TABLET ORAL
Status: DISCONTINUED | OUTPATIENT
Start: 2024-07-10 | End: 2024-07-14

## 2024-07-10 RX ORDER — ACETAMINOPHEN 500 MG
500 TABLET ORAL EVERY 4 HOURS PRN
Status: DISCONTINUED | OUTPATIENT
Start: 2024-07-10 | End: 2024-07-14

## 2024-07-10 RX ORDER — FUROSEMIDE 10 MG/ML
40 INJECTION INTRAMUSCULAR; INTRAVENOUS DAILY
Status: DISCONTINUED | OUTPATIENT
Start: 2024-07-10 | End: 2024-07-12

## 2024-07-10 RX ORDER — CARBIDOPA AND LEVODOPA 25; 100 MG/1; MG/1
1 TABLET, EXTENDED RELEASE ORAL 2 TIMES DAILY
Status: DISCONTINUED | OUTPATIENT
Start: 2024-07-10 | End: 2024-07-14

## 2024-07-10 RX ORDER — HYDROCODONE BITARTRATE AND ACETAMINOPHEN 5; 325 MG/1; MG/1
1-2 TABLET ORAL EVERY 4 HOURS PRN
Status: DISCONTINUED | OUTPATIENT
Start: 2024-07-10 | End: 2024-07-14

## 2024-07-10 RX ORDER — ATORVASTATIN CALCIUM 20 MG/1
20 TABLET, FILM COATED ORAL NIGHTLY
Status: DISCONTINUED | OUTPATIENT
Start: 2024-07-10 | End: 2024-07-14

## 2024-07-10 RX ORDER — CETIRIZINE HYDROCHLORIDE 5 MG/1
5 TABLET ORAL DAILY
Status: DISCONTINUED | OUTPATIENT
Start: 2024-07-10 | End: 2024-07-14

## 2024-07-10 RX ORDER — ONDANSETRON 2 MG/ML
4 INJECTION INTRAMUSCULAR; INTRAVENOUS EVERY 6 HOURS PRN
Status: DISCONTINUED | OUTPATIENT
Start: 2024-07-10 | End: 2024-07-14

## 2024-07-10 RX ORDER — ASPIRIN 81 MG/1
81 TABLET, CHEWABLE ORAL DAILY
Status: DISCONTINUED | OUTPATIENT
Start: 2024-07-10 | End: 2024-07-14

## 2024-07-10 RX ORDER — HEPARIN SODIUM 5000 [USP'U]/ML
5000 INJECTION, SOLUTION INTRAVENOUS; SUBCUTANEOUS EVERY 8 HOURS SCHEDULED
Status: DISCONTINUED | OUTPATIENT
Start: 2024-07-10 | End: 2024-07-10

## 2024-07-10 RX ORDER — POTASSIUM CHLORIDE 20 MEQ/1
40 TABLET, EXTENDED RELEASE ORAL ONCE
Status: COMPLETED | OUTPATIENT
Start: 2024-07-10 | End: 2024-07-10

## 2024-07-10 RX ORDER — BENZONATATE 200 MG/1
200 CAPSULE ORAL 3 TIMES DAILY PRN
Status: DISCONTINUED | OUTPATIENT
Start: 2024-07-10 | End: 2024-07-14

## 2024-07-10 NOTE — PLAN OF CARE
Patient is very fatigue, alert to self, place , not time. NSR PAC's, Lungs diminished, +BS< Hernia, Edema to bilateral legs.  ID consulted for Covid+.  Tylenol given for temp and pain control.  Meds taken with applesauce with no difficulty.   Primofit intact. Forgets that primofit is on and RN needs to let him know that he wont get wet when voiding.  Patient concerned about letting wife know that he is in the hospital. Per family request patient does not know that wife who has dementia is also in the hospital with Covid.  Start Remdesevir today.   Wife (asymptomic with COVID) will be discharged home today.  [id number] pm, son in law brought in back brace for patient because  patient fractured his back in the past (about 2 weeks according to patient).  Problem: CARDIOVASCULAR - ADULT  Goal: Maintains optimal cardiac output and hemodynamic stability  Description: INTERVENTIONS:  - Monitor vital signs, rhythm, and trends  - Monitor for bleeding, hypotension and signs of decreased cardiac output  - Evaluate effectiveness of vasoactive medications to optimize hemodynamic stability  - Monitor arterial and/or venous puncture sites for bleeding and/or hematoma  - Assess quality of pulses, skin color and temperature  - Assess for signs of decreased coronary artery perfusion - ex. Angina  - Evaluate fluid balance, assess for edema, trend weights  Outcome: Progressing  Goal: Absence of cardiac arrhythmias or at baseline  Description: INTERVENTIONS:  - Continuous cardiac monitoring, monitor vital signs, obtain 12 lead EKG if indicated  - Evaluate effectiveness of antiarrhythmic and heart rate control medications as ordered  - Initiate emergency measures for life threatening arrhythmias  - Monitor electrolytes and administer replacement therapy as ordered  Outcome: Progressing     Problem: RESPIRATORY - ADULT  Goal: Achieves optimal ventilation and oxygenation  Description: INTERVENTIONS:  - Assess for changes in respiratory  status  - Assess for changes in mentation and behavior  - Position to facilitate oxygenation and minimize respiratory effort  - Oxygen supplementation based on oxygen saturation or ABGs  - Provide Smoking Cessation handout, if applicable  - Encourage broncho-pulmonary hygiene including cough, deep breathe, Incentive Spirometry  - Assess the need for suctioning and perform as needed  - Assess and instruct to report SOB or any respiratory difficulty  - Respiratory Therapy support as indicated  - Manage/alleviate anxiety  - Monitor for signs/symptoms of CO2 retention  Outcome: Progressing     Problem: Impaired Functional Mobility  Goal: Achieve highest/safest level of mobility/gait  Description: Interventions:  - Assess patient's functional ability and stability  - Promote increasing activity/tolerance for mobility and gait  - Educate and engage patient/family in tolerated activity level and precautions    Outcome: Progressing     Problem: Impaired Cognition  Goal: Patient will exhibit improved attention, thought processing and/or memory  Description: Interventions:  - Allow additional time for processing after asking questions or providing instructions  Outcome: Progressing     Problem: Impaired Communication  Goal: Patient will achieve maximal communication potential  Description: Interventions:  - Allow additional time for processing after asking questions or providing instructions  Outcome: Progressing     Problem: PAIN - ADULT  Goal: Verbalizes/displays adequate comfort level or patient's stated pain goal  Description: INTERVENTIONS:  - Encourage pt to monitor pain and request assistance  - Assess pain using appropriate pain scale  - Administer analgesics based on type and severity of pain and evaluate response  - Implement non-pharmacological measures as appropriate and evaluate response  - Consider cultural and social influences on pain and pain management  - Manage/alleviate anxiety  - Utilize distraction  and/or relaxation techniques  - Monitor for opioid side effects  - Notify MD/LIP if interventions unsuccessful or patient reports new pain  - Anticipate increased pain with activity and pre-medicate as appropriate  Outcome: Progressing     Problem: SAFETY ADULT - FALL  Goal: Free from fall injury  Description: INTERVENTIONS:  - Assess pt frequently for physical needs  - Identify cognitive and physical deficits and behaviors that affect risk of falls.  - [place] fall precautions as indicated by assessment.  - Educate pt/family on patient safety including physical limitations  - Instruct pt to call for assistance with activity based on assessment  - Modify environment to reduce risk of injury  - Provide assistive devices as appropriate  - Consider OT/PT consult to assist with strengthening/mobility  - Encourage toileting schedule  Outcome: Progressing     Problem: DISCHARGE PLANNING  Goal: Discharge to home or other facility with appropriate resources  Description: INTERVENTIONS:  - Identify barriers to discharge w/pt and caregiver  - Include patient/family/discharge partner in discharge planning  - Arrange for needed discharge resources and transportation as appropriate  - Identify discharge learning needs (meds, wound care, etc)  - Arrange for interpreters to assist at discharge as needed  - Consider post-discharge preferences of patient/family/discharge partner  - Complete POLST form as appropriate  - Assess patient's ability to be responsible for managing their own health  - Refer to Case Management Department for coordinating discharge planning if the patient needs post-hospital services based on physician/LIP order or complex needs related to functional status, cognitive ability or social support system  Outcome: Progressing     Problem: Patient/Family Goals  Goal: Patient/Family Long Term Goal  Description: Patient's Long Term Goal: Discharge adequate resources    Interventions:  - Assistance from CM/SW if  needed  - See additional Care Plan goals for specific interventions  Outcome: Progressing  Goal: Patient/Family Short Term Goal  Description: Patient's Short Term Goal: Feel better     Interventions:   - PRN medications  - See additional Care Plan goals for specific interventions  Outcome: Progressing

## 2024-07-10 NOTE — DIETARY NOTE
[facility]   part of [facility]   CLINICAL NUTRITION    Reginaldo Hdz     Admitting diagnosis:  Peripheral edema [R60.0]  Pleural effusion [J90]  Thrombocytopenia (HCC) [D69.6]  Chronic anemia [D64.9]  Dyspnea, unspecified type [R06.00]  COVID-19 [U07.1]  Exposure to confirmed case of COVID-19 [Z20.822]    Ht: 170.2 cm (5' 7\")  Wt: 71.5 kg (157 lb 10.1 oz).   Body mass index is 24.69 kg/m².    Wt Readings from Last 6 Encounters:   07/10/24 71.5 kg (157 lb 10.1 oz)   07/01/24 77.1 kg (170 lb)   05/19/24 74.8 kg (165 lb)   04/20/24 76.3 kg (168 lb 3.2 oz)   11/19/23 77.1 kg (170 lb)   09/23/22 81.6 kg (180 lb)        Labs/Meds reviewed    Diet:       Procedures    Cardiac diet Cardiac; Sodium Restriction: 2 GM NA; Fluid Restriction: 2000 ml; Is Patient on Accuchecks? No     Percent Meals Eaten (last 3 days)       None            Pt chart reviewed d/t consult for low sodium edu. 82 y/o M with hx of AFIB, cabg, GERD, MI, HTN, lupus, PD, neuropathy admitted for fever, cough, weakness. Found to be COVID positive. Due to COVID precautions, did not provide bedside education. Provided low NA handout for RN to give pt.     Tolerating po diet without diarrhea, emesis, or constipation.   No significant weight changes noted.     Patient is at low nutrition risk at this time.    Please consult if patient status changes or nutrition issues arise.    Agueda \"Yumiko\" NAOMI Mcrae, LDN  Clinical Dietitian

## 2024-07-10 NOTE — CONSULTS
[facility] Consultation    Reginaldo Hdz Patient Status:  Inpatient    8/10/1940 MRN [id number]   [facility] 8NE-A Attending Stevenson Dowell MD   Hosp Day # 0 PCP Olvin Dinh MD     Reason for Consultation:  CHF      History of Present Illness:  Reginaldo Hdz is a a(n) 83 year old male. He has CAD, s/p CABG , Paroxysmal Atrial Fibrillation (xarelto), GERD, Hypertension , lupus, PD, thrombocytopenia, Dyslipidemia .  Limited historian.  Brought to ER with fatigue, fever to 102, cough.  Positive for covid in ER.  Notes increase in LE edema.  Pleural effusions seen on CXR.  Concern for CHF.    History:  Past Medical History:    Anemia    Atherosclerosis of coronary artery    Chronic atrial fibrillation (HCC)    Esophageal reflux    Heart attack (HCC)    Hyperlipidemia    Hypertension    Lupus (HCC)    Parkinson disease (HCC)     Past Surgical History:   Procedure Laterality Date    Angiogram      Angiogram      Angiogram  2002    [facility], Dr. Scott/ Mikayla: 1.Successful PTCA of the infarct related artery which was the proximal graft of the saphenous vein graft to the right posterior descending/LV branch which was dilated and stented using a 4 x 13 BX Velocity to a maximum diameter of 4.45 mm.  2.Proximal PDA primarily stented using a 3 x 18 Penta stent from about 70 to 80% to 0%, HUGH III flow, no dissection.     Angiogram  2011    [facility], Dr. Steele: Successful stenting with a drug-eluting stent to the graft to the right coronary artery. Severe native CAD. Patent vein graft to LAD. Severe disease in the vein graft to the right coronary artery.    [facility] Successful stenting of the circumflex artery in the proximal and mid portions. Relook angiography of the vein graft to the right coronary artery showed that this vessel is widely patent.    Cabg  1984    x2    Cath bare metal stent (bms)      Cath drug eluting stent      Shoulder  surg proc unlisted Left     fracture     Family History   Problem Relation Age of Onset    Cancer Father         Liver    Other (Other) Father         Heart Attack    Cancer Mother         uterine    Other (Other) Daughter         Minesh    Other (Other) Son         Heart Problem         Allergies:  Allergies   Allergen Reactions    Inderal [Propranolol] NAUSEA AND VOMITING and UNKNOWN    Isosorbide NAUSEA AND VOMITING       Medications:   furosemide  40 mg Intravenous Daily    guaiFENesin ER  600 mg Oral BID    acidophilus  1 capsule Oral Daily    carbidopa-levodopa  1.5 tablet Oral QID    rivaroxaban  20 mg Oral Daily with food    carbidopa-levodopa ER  1 tablet Oral BID       Continuous Infusions:      Social History:   reports that he quit smoking about 42 years ago. His smoking use included cigarettes. He has never used smokeless tobacco. He reports current alcohol use. He reports that he does not use drugs.    Review of Systems:  All systems were reviewed and are negative except as described above in HPI.    Physical Exam:      Wt Readings from Last 3 Encounters:   07/10/24 157 lb 10.1 oz (71.5 kg)   07/01/24 170 lb (77.1 kg)   05/19/24 165 lb (74.8 kg)       Vitals:    07/10/24 0247 07/10/24 0250 07/10/24 0335 07/10/24 0520   BP:    125/61   BP Location:    Right arm   Pulse:  71 74 73   Resp:       Temp:    98.6 °F (37 °C)   TempSrc:    Oral   SpO2:  98% 100% 99%   Weight: 160 lb 4.8 oz (72.7 kg) 157 lb 10.1 oz (71.5 kg) 157 lb 10.1 oz (71.5 kg)    Height:           Temp:  [97.9 °F (36.6 °C)-98.6 °F (37 °C)] 98.6 °F (37 °C)  Pulse:  [] 73  Resp:  [13-18] 16  BP: (125-159)/(61-91) 125/61  SpO2:  [95 %-100 %] 99 %    Temp: 98.6 °F (37 °C)  Pulse: 73  Resp: 16  BP: 125/61      General:  Appears comfortable  HEENT: No focal deficits.  Neck: No JVD, carotids 2+ no bruits.  Cardiac: Regular S1S2.  No S3, S4, rub, click.  No murmur.  Lungs: Clear to auscultation and percussion.  Abdomen: Soft, non-tender.    Extremities: 2+  LE edema, R>L.  No clubbing or cyanosis  Neurologic: Alert and oriented, normal affect.  Skin: Warm and dry.     Labs:      HEM:  Recent Labs   Lab 07/09/24 2255   WBC 5.9   HGB 10.8*   HCT 31.2*   PLT 75.0*       Chem:  Recent Labs   Lab 07/09/24  2255 07/10/24  0248     --    K 3.8  --      --    CO2 28.0  --    BUN 17  --    CREATSERUM 0.94 0.94   ALT 30  --    *  --    ALB 2.3*  --        No results for input(s): \"INR\" in the last 168 hours.                  Lab Results   Component Value Date    TROP <0.045 12/06/2019    TROP <0.045 12/05/2019    TROP <0.045 12/05/2019         Invalid input(s): \"PBNPML\"                 Telemetry: SR    Laboratories and Data:  Diagnostics:    EKG, 7/10/2024:  NSR, FAV, PAC's, ASMI    CXR, 7/9/2024:    Impression   CONCLUSION:  Bilateral pleural effusions and bibasilar atelectasis.              Impression:    1.  Covid (fatigue, fever)  2. Volume o/l. Suspect an element of CHF.  Elevated BNP to 1591  3. CAD, s/p CABG 1984  4. Paroxysmal Atrial Fibrillation.  NSR today.  On DOAC  5. Hypertension   6. PD  7. SLE      Recommend:    1.  Telemetry  2. IV lasix  3. Follow I/O's, weights, BMPs  4. Echo for LV fct  5. Will check 2nd troponin  6. Continue op meds          Magdaleno Do MD  7/10/2024  7:48 AM

## 2024-07-10 NOTE — PLAN OF CARE
Problem: CARDIOVASCULAR - ADULT  Goal: Maintains optimal cardiac output and hemodynamic stability  Description: INTERVENTIONS:  - Monitor vital signs, rhythm, and trends  - Monitor for bleeding, hypotension and signs of decreased cardiac output  - Evaluate effectiveness of vasoactive medications to optimize hemodynamic stability  - Monitor arterial and/or venous puncture sites for bleeding and/or hematoma  - Assess quality of pulses, skin color and temperature  - Assess for signs of decreased coronary artery perfusion - ex. Angina  - Evaluate fluid balance, assess for edema, trend weights  7/10/2024 1835 by Niki Benedict, RN  Outcome: Progressing  7/10/2024 1031 by Niki Benedict RN  Outcome: Progressing  Goal: Absence of cardiac arrhythmias or at baseline  Description: INTERVENTIONS:  - Continuous cardiac monitoring, monitor vital signs, obtain 12 lead EKG if indicated  - Evaluate effectiveness of antiarrhythmic and heart rate control medications as ordered  - Initiate emergency measures for life threatening arrhythmias  - Monitor electrolytes and administer replacement therapy as ordered  7/10/2024 1835 by Niki Benedict, RN  Outcome: Progressing  7/10/2024 1031 by Niki Benedict, RN  Outcome: Progressing     Problem: RESPIRATORY - ADULT  Goal: Achieves optimal ventilation and oxygenation  Description: INTERVENTIONS:  - Assess for changes in respiratory status  - Assess for changes in mentation and behavior  - Position to facilitate oxygenation and minimize respiratory effort  - Oxygen supplementation based on oxygen saturation or ABGs  - Provide Smoking Cessation handout, if applicable  - Encourage broncho-pulmonary hygiene including cough, deep breathe, Incentive Spirometry  - Assess the need for suctioning and perform as needed  - Assess and instruct to report SOB or any respiratory difficulty  - Respiratory Therapy support as indicated  - Manage/alleviate anxiety  - Monitor for signs/symptoms of CO2  retention  7/10/2024 1835 by Niki Benedict RN  Outcome: Progressing  7/10/2024 1031 by Niki Benedict RN  Outcome: Progressing     Problem: Impaired Functional Mobility  Goal: Achieve highest/safest level of mobility/gait  Description: Interventions:  - Assess patient's functional ability and stability  - Promote increasing activity/tolerance for mobility and gait  - Educate and engage patient/family in tolerated activity level and precautions    7/10/2024 1835 by Niki Benedict RN  Outcome: Progressing  7/10/2024 1031 by Niki Benedict RN  Outcome: Progressing     Problem: Impaired Cognition  Goal: Patient will exhibit improved attention, thought processing and/or memory  Description: Interventions:  - Allow additional time for processing after asking questions or providing instructions  7/10/2024 1835 by Niki Benedict RN  Outcome: Progressing  7/10/2024 1031 by Niki Benedict RN  Outcome: Progressing     Problem: Impaired Communication  Goal: Patient will achieve maximal communication potential  Description: Interventions:  - Allow additional time for processing after asking questions or providing instructions  7/10/2024 1835 by Niki Benedict RN  Outcome: Progressing  7/10/2024 1031 by Niki Benedict RN  Outcome: Progressing     Problem: PAIN - ADULT  Goal: Verbalizes/displays adequate comfort level or patient's stated pain goal  Description: INTERVENTIONS:  - Encourage pt to monitor pain and request assistance  - Assess pain using appropriate pain scale  - Administer analgesics based on type and severity of pain and evaluate response  - Implement non-pharmacological measures as appropriate and evaluate response  - Consider cultural and social influences on pain and pain management  - Manage/alleviate anxiety  - Utilize distraction and/or relaxation techniques  - Monitor for opioid side effects  - Notify MD/LIP if interventions unsuccessful or patient reports new pain  - Anticipate increased pain with activity and pre-medicate  as appropriate  7/10/2024 1835 by Niki Benedict, RN  Outcome: Progressing  7/10/2024 1031 by Niki Benedict, RN  Outcome: Progressing     Problem: SAFETY ADULT - FALL  Goal: Free from fall injury  Description: INTERVENTIONS:  - Assess pt frequently for physical needs  - Identify cognitive and physical deficits and behaviors that affect risk of falls.  - [place] fall precautions as indicated by assessment.  - Educate pt/family on patient safety including physical limitations  - Instruct pt to call for assistance with activity based on assessment  - Modify environment to reduce risk of injury  - Provide assistive devices as appropriate  - Consider OT/PT consult to assist with strengthening/mobility  - Encourage toileting schedule  7/10/2024 1835 by Niki Benedict, RN  Outcome: Progressing  7/10/2024 1031 by Niki Benedict, RN  Outcome: Progressing     Problem: DISCHARGE PLANNING  Goal: Discharge to home or other facility with appropriate resources  Description: INTERVENTIONS:  - Identify barriers to discharge w/pt and caregiver  - Include patient/family/discharge partner in discharge planning  - Arrange for needed discharge resources and transportation as appropriate  - Identify discharge learning needs (meds, wound care, etc)  - Arrange for interpreters to assist at discharge as needed  - Consider post-discharge preferences of patient/family/discharge partner  - Complete POLST form as appropriate  - Assess patient's ability to be responsible for managing their own health  - Refer to Case Management Department for coordinating discharge planning if the patient needs post-hospital services based on physician/LIP order or complex needs related to functional status, cognitive ability or social support system  7/10/2024 1835 by Niki Benedict, RN  Outcome: Progressing  7/10/2024 1031 by Niki Benedict, RN  Outcome: Progressing     Problem: Patient/Family Goals  Goal: Patient/Family Long Term Goal  Description: Patient's Long Term Goal:  Discharge adequate resources    Interventions:  - Assistance from CM/SW if needed  - See additional Care Plan goals for specific interventions  7/10/2024 1835 by Niki Benedict, RN  Outcome: Progressing  7/10/2024 1031 by Niki Benedict, RN  Outcome: Progressing  Goal: Patient/Family Short Term Goal  Description: Patient's Short Term Goal: Feel better     Interventions:   - PRN medications  - See additional Care Plan goals for specific interventions  7/10/2024 1835 by Niki Benedict, RN  Outcome: Progressing  7/10/2024 1031 by Niki Benedict, RN  Outcome: Progressing

## 2024-07-10 NOTE — ED PROVIDER NOTES
Patient Seen in: [facility] Emergency Department      History     Chief Complaint   Patient presents with    Fatigue     Stated Complaint:     Subjective:   83-year-old male, history of CAD with bypass, A-fib on Xarelto, GERD, hypertension, lupus and Parkinson disease, presents with fever and cough and congestion weakness.  Was currently admitted with COVID and UTI.  He started with mild cough 2 days ago, got worse today with fever of 102 just prior to arrival and was medicated.  States his legs feel like rubber.  States also has worsening peripheral edema with no known history of CHF he states.  Cardiologist is duly out of [place] and PCP is doing [place] as well            Objective:   Past Medical History:    Anemia    Atherosclerosis of coronary artery    Chronic atrial fibrillation (HCC)    Esophageal reflux    Heart attack (HCC)    Hyperlipidemia    Hypertension    Lupus (HCC)    Parkinson disease (HCC)              Past Surgical History:   Procedure Laterality Date    Angiogram  1984    Angiogram  1996    Angiogram  03/27/2002    [facility], Dr. Scott/ Mikayla: 1.Successful PTCA of the infarct related artery which was the proximal graft of the saphenous vein graft to the right posterior descending/LV branch which was dilated and stented using a 4 x 13 BX Velocity to a maximum diameter of 4.45 mm.  2.Proximal PDA primarily stented using a 3 x 18 Penta stent from about 70 to 80% to 0%, HUGH III flow, no dissection.     Angiogram  06/24/2011    [facility], Dr. Steele: Successful stenting with a drug-eluting stent to the graft to the right coronary artery. Severe native CAD. Patent vein graft to LAD. Severe disease in the vein graft to the right coronary artery.    [facility]  [facility] Successful stenting of the circumflex artery in the proximal and mid portions. Relook angiography of the vein graft to the right coronary artery showed that this vessel is widely patent.    Cabg  1984    x2     Cath bare metal stent (bms)      Cath drug eluting stent      Shoulder surg proc unlisted Left     fracture                Social History     Socioeconomic History    Marital status:    Tobacco Use    Smoking status: Former     Current packs/day: 0.00     Types: Cigarettes     Quit date: 1982     Years since quittin.3    Smokeless tobacco: Never   Substance and Sexual Activity    Alcohol use: Yes     Alcohol/week: 0.0 standard drinks of alcohol     Comment: Social    Drug use: No     Social Determinants of Health     Food Insecurity: No Food Insecurity (2024)    Food Insecurity     Food Insecurity: Never true   Transportation Needs: No Transportation Needs (2024)    Transportation Needs     Lack of Transportation: No   Housing Stability: Low Risk  (2024)    Housing Stability     Housing Instability: No              Review of Systems   Constitutional:  Positive for fever.   HENT:  Positive for congestion.    Respiratory:  Positive for cough and shortness of breath.    Cardiovascular:  Positive for leg swelling. Negative for chest pain.   Gastrointestinal:  Negative for abdominal pain.   Genitourinary:  Negative for dysuria.   Musculoskeletal:  Positive for gait problem.   Neurological:  Positive for weakness.   Hematological:  Bruises/bleeds easily.       Positive for stated Chief Complaint: Fatigue    Other systems are as noted in HPI.  Constitutional and vital signs reviewed.      All other systems reviewed and negative except as noted above.    Physical Exam     ED Triage Vitals   BP 24 2253 140/77   Pulse 24 2251 (!) 126   Resp 24 2251 18   Temp 24 97.9 °F (36.6 °C)   Temp src 24 2253 Oral   SpO2 24 96 %   O2 Device 24 None (Room air)       Current Vitals:   Vital Signs  BP: (!) 159/91  Pulse: 55  Resp: 17  Temp: 97.9 °F (36.6 °C)  Temp src: Oral  MAP (mmHg): (!) 111    Oxygen Therapy  SpO2: 96 %  O2 Device: None (Room  air)            Physical Exam  Vitals and nursing note reviewed.   Constitutional:       Appearance: He is not toxic-appearing.   HENT:      Head: Normocephalic.      Nose: Congestion present.      Mouth/Throat:      Pharynx: Oropharynx is clear.   Eyes:      Extraocular Movements: Extraocular movements intact.   Cardiovascular:      Rate and Rhythm: Normal rate. Rhythm irregular.      Pulses: Normal pulses.      Heart sounds: Murmur heard.   Pulmonary:      Breath sounds: Rales present.   Abdominal:      Palpations: Abdomen is soft.      Tenderness: There is no abdominal tenderness.   Musculoskeletal:         General: Swelling present. No tenderness.      Cervical back: Neck supple.      Right lower leg: Edema present.      Left lower leg: Edema present.   Skin:     General: Skin is warm and dry.   Neurological:      General: No focal deficit present.      Mental Status: He is alert. Mental status is at baseline.      Cranial Nerves: No cranial nerve deficit.   Psychiatric:         Mood and Affect: Mood normal.         Behavior: Behavior normal.               ED Course     Labs Reviewed   COMP METABOLIC PANEL (14) - Abnormal; Notable for the following components:       Result Value    Glucose 112 (*)      (*)     Alkaline Phosphatase 204 (*)     Albumin 2.3 (*)     A/G Ratio 0.5 (*)     All other components within normal limits   PRO BETA NATRIURETIC PEPTIDE - Abnormal; Notable for the following components:    Pro-Beta Natriuretic Peptide 1,591 (*)     All other components within normal limits   SARS-COV-2/FLU A AND B/RSV BY PCR (GENEXPERT) - Abnormal; Notable for the following components:    SARS-CoV-2 (COVID-19) - (GeneXpert) Detected (*)     All other components within normal limits    Narrative:     This test is intended for the qualitative detection and differentiation of SARS-CoV-2, influenza A, influenza B, and respiratory syncytial virus (RSV) viral RNA in nasopharyngeal or nares swabs from  individuals suspected of respiratory viral infection consistent with COVID-19 by their healthcare provider. Signs and symptoms of respiratory viral infection due to SARS-CoV-2, influenza, and RSV can be similar.    Test performed using the Xpert Xpress SARS-CoV-2/FLU/RSV (real time RT-PCR)  assay on the [employer] instrument, [employer], [place] [address].   This test is being used under the Food and Drug Administration's Emergency Use Authorization.    The authorized Fact Sheet for Healthcare Providers for this assay is available upon request from the laboratory.   CBC W/ DIFFERENTIAL - Abnormal; Notable for the following components:    RBC 3.31 (*)     HGB 10.8 (*)     HCT 31.2 (*)     PLT 75.0 (*)     Lymphocyte Absolute 0.77 (*)     All other components within normal limits   TROPONIN I HIGH SENSITIVITY - Normal   CBC WITH DIFFERENTIAL WITH PLATELET    Narrative:     The following orders were created for panel order CBC With Differential With Platelet.  Procedure                               Abnormality         Status                     ---------                               -----------         ------                     CBC W/ DIFFERENTIAL[[id number]]          Abnormal            Final result                 Please view results for these tests on the individual orders.   URINALYSIS WITH CULTURE REFLEX   RAINBOW DRAW LAVENDER   RAINBOW DRAW LIGHT GREEN   RAINBOW DRAW BLUE     EKG    Rate, intervals and axes as noted on EKG Report.  Rate: 62  Rhythm: Sinus Rhythm  Reading: EKG sinus rhythm 62 bpm.  First-degree AV block with PACs noted.  Left axis deviation.  No ST elevations..  To December 2019, sinus rhythm with PACs replaces atrial flutter                          MDM      XR CHEST AP PORTABLE  (CPT=71045)    Result Date: 7/9/2024  CONCLUSION:  Bilateral pleural effusions and bibasilar atelectasis.   LOCATION:  [facility]      Dictated by (CST): Duglas Hastings MD on 7/09/2024 at 11:25 PM     Finalized by (CST):  Duglas Hastings MD on 7/09/2024 at 11:26 PM        Admission disposition: 7/10/2024 12:14 AM         I Independently interpreted the x-ray of the chest no bilateral pleural effusions.  Also sternotomy wires    Differential diagnosis includes, but is not limited to, CHF, COVID, viral syndrome bacterial infection, ACS, deconditioning    External chart review demonstrates her outpatient visit with cardiology from [facility] at bedside helpful to provide information on the history presenting illness    83-year-old male presents with general fatigue and weakness in setting of probable COVID-19.  Wife hospitalized with COVID.  Patient started with a cough 2 days ago, fever of 102 today.  Afebrile here.  Bilateral pleural effusions with some Rales.  Peripheral edema.  He is anticoagulated and compliant.  PACs on EKG here with a history of paroxysmal/chronic A-fib.  Does not feel safe at home.  2-week.  Needed help and out of the car as well.  Suspect COVID-19 however COVID swab is still pending in lab for quite some time.  Some chronic thrombocytopenia.  Given IV Lasix.  Cardiology consultation aware.  Admitted to the hospitalist, awaiting bed assignment                           Medical Decision Making      Disposition and Plan     Clinical Impression:  1. Pleural effusion    2. Peripheral edema    3. Dyspnea, unspecified type    4. Chronic anemia    5. Exposure to confirmed case of COVID-19    6. Thrombocytopenia (HCC)    7. COVID-19         Disposition:  Admit  7/10/2024 12:14 am    Follow-up:  No follow-up provider specified.        Medications Prescribed:  Current Discharge Medication List                            Hospital Problems       Present on Admission  Date Reviewed: 5/7/2022            ICD-10-CM Noted POA    * (Principal) Pleural effusion J90 7/9/2024 Unknown

## 2024-07-10 NOTE — PHYSICAL THERAPY NOTE
PT orders received and chart reviewed. Pt occupied with echo. Will follow and re-attempt as able and appropriate.

## 2024-07-10 NOTE — H&P
[facility]   Hospitalist Team  [facility]   part of [facility]     History and Physical    Reginaldo Hdz Patient Status:  Inpatient    8/10/1940 MRN [id number]   Location [facility] 8NE-A Attending Stevenson Dowell MD   Hosp Day # 0 PCP Olvin Dinh MD     Admit Date:  2024    Is this a shared or split note between Advanced Practice Provider and Physician? Yes    ASSESSMENT / PLAN:   83 year old male from home with a PMHx sig for anemia, AFIB (xarelto), s/p pci, s/p cabg, GERD, h/o MI, HTN, HLD, lupus, PD, peripheral neuropathy presents to the hospital with fever, cough, and weakness.    COVID19 with shortness of breath  -progressively worsening cough x 2 days, fever 102 PTA, endorsed bilateral peripheral edema as well  -COVID + , no leukocytosis, mild fever  -CXR w bilateral pleural effusions and atelectasis  -sputum cx in process  -consult ID, will start remdesivir x 3 days given his immunosuppression     Acute CHF exacerbation  Peripheral Edema R > L  -pro BNP elevated 1,591  -cardiology consulted, IV lasix, obtain echo, check 2nd troponin   -RLE doppler neg for DVT    Elevated AST  -, has been increasing this year previously 117 in 2024  Monitor serial and AST's and LFTs  PD  -carbidopa-levodopa    HTN  AFIB  -metoprolol  -asa and xarelto continue for now    HLD  -zetia and statin    Gerd  -ppi    Lupus  Plaquenil  -    Discusses POC with daughter Zari who agrees with the plan.      MA/ACO Reach  -Re- Entry: no  -Consults: cards, ID  -Discharge Needs  -Appointments: PCP      WIL patrick in am  -diet-cardiac    Prophy  -SCD  -xarelto    Dispo  -pending clinical course  PCP: Olvin Dinh MD       Outpatient records or previous hospital records reviewed.   Further recommendations pending patient's clinical course.  [facility] hospitalist  team to continue to follow patient while in house  Concerns regarding plan of care were discussed with patient. Patient agrees with  plan as detailed above. Discussed plan of care with Dr. Elizabeth    Note: This chart was prepared using voice recognition software and may contain unintended word substitution errors.     Sigifredo BARRERA  [facility] Hospitalist Team   Available via Perfect Serve or Bubble Chat (check Availability)    7/10/2024               HISTORY:   CC:   Chief Complaint   Patient presents with    Fatigue        PCP: Olvin Dinh MD    History of Present Illness:83 year old male with a PMHx sig for anemia, AFIB (xarelto), s/p pci, s/p cabg, GERD, h/o MI, HTN, HLD, lupus, PD, peripheral neuropathy presents to the hospital with fever, cough, and weakness.  COVID + in ED, consult ID.  Peripheral edema appreciated in ED R>L, cards consulted.       Review of Systems  12 point systems reviewed, please see HPI for pertinent positives, otherwise negative    OBJECTIVE:  /66 (BP Location: Right arm)   Pulse 74   Temp 99.1 °F (37.3 °C) (Oral)   Resp 18   Ht 5' 7\" (1.702 m)   Wt 157 lb 10.1 oz (71.5 kg)   SpO2 95%   BMI 24.69 kg/m²     GENERAL: elderly, sleepy  NEUROLOGIC: A/A; Ox3: strength normal; sensations intact  RESPIRATORY: normal expansion; non labored, CTA   CARDIOVASCULAR: regular,nl S1 S2  ABDOMEN:  Soft, BS+; non distended, non tender    EXTREMITIES: + LE edema R>L pulse palpable bilaterally     PMH  Past Medical History:    Anemia    Atherosclerosis of coronary artery    Chronic atrial fibrillation (HCC)    Esophageal reflux    Heart attack (HCC)    Hyperlipidemia    Hypertension    Lupus (HCC)    Parkinson disease (HCC)        [facility]  Past Surgical History:   Procedure Laterality Date    Angiogram  1984    Angiogram  1996    Angiogram  03/27/2002    [facility], Dr. Scott/ Mikayla: 1.Successful PTCA of the infarct related artery which was the proximal graft of the saphenous vein graft to the right posterior descending/LV branch which was dilated and stented using a 4 x 13 BX Velocity to a maximum diameter  of 4.45 mm.  2.Proximal PDA primarily stented using a 3 x 18 Penta stent from about 70 to 80% to 0%, HUGH III flow, no dissection.     Angiogram  06/24/2011    [facility], Dr. Steele: Successful stenting with a drug-eluting stent to the graft to the right coronary artery. Severe native CAD. Patent vein graft to LAD. Severe disease in the vein graft to the right coronary artery.    [facility] of the circumflex artery in the proximal and mid portions. Relook angiography of the vein graft to the right coronary artery showed that this vessel is widely patent.    Cabg  1984    x2    Cath bare metal stent (bms)      Cath drug eluting stent      Shoulder surg proc unlisted Left     fracture        ALL:  Allergies   Allergen Reactions    Inderal [Propranolol] NAUSEA AND VOMITING and UNKNOWN    Isosorbide NAUSEA AND VOMITING        Home Medications:  Outpatient Medications Marked as Taking for the 7/9/24 encounter (Hospital Encounter)   Medication Sig Dispense Refill    pantoprazole 40 MG Oral Tab EC Take 1 tablet (40 mg total) by mouth daily. 30 tablet 0    HYDROcodone-acetaminophen 5-325 MG Oral Tab Take 1-2 tablets by mouth every 4 (four) hours as needed for Pain. 20 tablet 0    carbidopa-levodopa ER  MG Oral Tab CR Take 1 tablet by mouth 2 (two) times daily. @0600 and 2300      sulfaSALAzine 500 MG Oral Tab Take 1 tablet (500 mg total) by mouth 2 (two) times daily.      Niacin  MG Oral Tab CR Take 1 tablet (500 mg total) by mouth 2 (two) times daily. 180 tablet 1    EZETIMIBE 10 MG Oral Tab TAKE ONE TABLET BY MOUTH ONE TIME DAILY 90 tablet 0    carbidopa-levodopa  MG Oral Tab Take 1.5 tablets by mouth 4 (four) times daily.      XARELTO 20 MG Oral Tab TAKE ONE TABLET BY MOUTH ONE TIME DAILY WITH FOOD 90 tablet 0    Multivitamin Chewtab, ADULT, Oral Chew Tab Chew 1 tablet by mouth daily.      METOPROLOL SUCCINATE 50 MG Oral Tablet 24 Hr TAKE ONE TABLET BY  MOUTH ONE TIME DAILY 90 tablet 3    PRAVASTATIN SODIUM 80 MG Oral Tab TAKE 1 TABLET BY MOUTH NIGHTLY 90 tablet 3    cetirizine 10 MG Oral Tab Take 1 tablet (10 mg total) by mouth daily.      Omega-3 Fatty Acids (SALMON OIL OR) Take 1 capsule by mouth 3 (three) times daily.        FIBER COMPLETE OR Take by mouth daily.        Acidophilus/Pectin Oral Cap Take 1 capsule by mouth daily.         Soc Hx  Social History     Tobacco Use    Smoking status: Former     Current packs/day: 0.00     Types: Cigarettes     Quit date: 1982     Years since quittin.3    Smokeless tobacco: Never   Substance Use Topics    Alcohol use: Yes     Alcohol/week: 0.0 standard drinks of alcohol     Comment: Social        Fam Hx  Family History   Problem Relation Age of Onset    Cancer Father         Liver    Other (Other) Father         Heart Attack    Cancer Mother         uterine    Other (Other) Daughter         Hosimotos    Other (Other) Son         Heart Problem                  DIAGNOSTIC DATA:   CBC/Chem  Recent Labs   Lab 24  2255   WBC 5.9   HGB 10.8*   MCV 94.3   PLT 75.0*       Recent Labs   Lab 07/09/24  2255 07/10/24  0248     --    K 3.8  --      --    CO2 28.0  --    BUN 17  --    CREATSERUM 0.94 0.94   *  --    CA 8.6  --        Recent Labs   Lab 24  2255   ALT 30   *   ALB 2.3*       No results for input(s): \"TROP\" in the last 168 hours.      CXR: image personally reviewed     Radiology: XR CHEST AP PORTABLE  (CPT=71045)    Result Date: 2024  CONCLUSION:  Bilateral pleural effusions and bibasilar atelectasis.   LOCATION:  [place]      Dictated by (CST): Duglas Hastinsg MD on 2024 at 11:25 PM     Finalized by (CST): Duglas Hastings MD on 2024 at 11:26 PM          SEE ATTENDING NOTE BELOW    Patient seen and examined by me independently and agree with above    -Appears to have acute CHF exacerbation, being diuresed with IV Lasix, echo pending strict I's and O's  -COVID-19  viral infection, getting remdesivir as patient is immunosuppressed on crack window  -Parkinson's disease, continue Sinemet at this time,

## 2024-07-10 NOTE — ED QUICK NOTES
Orders for admission, patient is aware of plan and ready to go upstairs. Any questions, please call ED RN Milagros at extension [phone].     Patient Covid vaccination status: Fully vaccinated     COVID Test Ordered in ED: SARS-CoV-2/Flu A and B/RSV by PCR (GeneXpert)    COVID Suspicion at Admission: N/A    Running Infusions:      Mental Status/LOC at time of transport: a/o x3    Other pertinent information:   CIWA score: N/A   NIH score:  N/A

## 2024-07-10 NOTE — PLAN OF CARE
NURSING ADMISSION NOTE      Patient admitted via Cart  Oriented to room.  Safety precautions initiated.  Bed in low position.  Call light in reach.    Assumed care of patient at approximately 0230. Pt A&Ox3-4, forgetful at times. Glasses. Pt maintaining saturations on room air . Pt does have a productive cough, pt produced thick tan sputum which was sent. On telemetry, NSR w/PAC's. Has Hx Afib on Xarelto. BLE edema , R>L. Per patient he noticed BLE edema about 2 weeks ago. Pt denies c/o pain. Pt up using rolling walker x1-2 assist. Primofit applied d/t increased UOP from IV lasix. Pt updated on plan of care and verbalized understanding.     Problem: CARDIOVASCULAR - ADULT  Goal: Maintains optimal cardiac output and hemodynamic stability  Description: INTERVENTIONS:  - Monitor vital signs, rhythm, and trends  - Monitor for bleeding, hypotension and signs of decreased cardiac output  - Evaluate effectiveness of vasoactive medications to optimize hemodynamic stability  - Monitor arterial and/or venous puncture sites for bleeding and/or hematoma  - Assess quality of pulses, skin color and temperature  - Assess for signs of decreased coronary artery perfusion - ex. Angina  - Evaluate fluid balance, assess for edema, trend weights  7/10/2024 0432 by Mayuri Barksdale RN  Outcome: Progressing  7/10/2024 0432 by Mayuri Barksdale RN  Outcome: Progressing  7/10/2024 0431 by Mayuri Barksdale RN  Outcome: Progressing  Goal: Absence of cardiac arrhythmias or at baseline  Description: INTERVENTIONS:  - Continuous cardiac monitoring, monitor vital signs, obtain 12 lead EKG if indicated  - Evaluate effectiveness of antiarrhythmic and heart rate control medications as ordered  - Initiate emergency measures for life threatening arrhythmias  - Monitor electrolytes and administer replacement therapy as ordered  7/10/2024 0432 by Mayuri Barksdale RN  Outcome: Progressing  7/10/2024 0432 by Myauri Barksdale RN  Outcome:  Progressing  7/10/2024 0431 by Mayuri Barksdale RN  Outcome: Progressing     Problem: RESPIRATORY - ADULT  Goal: Achieves optimal ventilation and oxygenation  Description: INTERVENTIONS:  - Assess for changes in respiratory status  - Assess for changes in mentation and behavior  - Position to facilitate oxygenation and minimize respiratory effort  - Oxygen supplementation based on oxygen saturation or ABGs  - Provide Smoking Cessation handout, if applicable  - Encourage broncho-pulmonary hygiene including cough, deep breathe, Incentive Spirometry  - Assess the need for suctioning and perform as needed  - Assess and instruct to report SOB or any respiratory difficulty  - Respiratory Therapy support as indicated  - Manage/alleviate anxiety  - Monitor for signs/symptoms of CO2 retention  7/10/2024 0432 by Mayuri Barksdale RN  Outcome: Progressing  7/10/2024 0432 by Mayuri Barksdale RN  Outcome: Progressing  7/10/2024 0431 by Mayuri Barksdale RN  Outcome: Progressing     Problem: Impaired Functional Mobility  Goal: Achieve highest/safest level of mobility/gait  Description: Interventions:  - Assess patient's functional ability and stability  - Promote increasing activity/tolerance for mobility and gait  - Educate and engage patient/family in tolerated activity level and precautions    7/10/2024 0432 by Mayuri Barksdale RN  Outcome: Progressing  7/10/2024 0432 by Mayuri Barksdale RN  Outcome: Progressing  7/10/2024 0431 by Mayuri Barksdale RN  Outcome: Progressing     Problem: Impaired Cognition  Goal: Patient will exhibit improved attention, thought processing and/or memory  Description: Interventions:  - Allow additional time for processing after asking questions or providing instructions  7/10/2024 0432 by Mayuri Barksdale RN  Outcome: Progressing  7/10/2024 0432 by Mayuri Barksdale RN  Outcome: Progressing  7/10/2024 0431 by Mayuri Barksdale RN  Outcome: Progressing     Problem: Impaired Communication  Goal: Patient will  achieve maximal communication potential  Description: Interventions:  - Allow additional time for processing after asking questions or providing instructions  7/10/2024 0432 by Mayuri Barksdale RN  Outcome: Progressing  7/10/2024 0432 by Mayuri Barksdale RN  Outcome: Progressing  7/10/2024 0431 by Mayuri Barksdale RN  Outcome: Progressing     Problem: PAIN - ADULT  Goal: Verbalizes/displays adequate comfort level or patient's stated pain goal  Description: INTERVENTIONS:  - Encourage pt to monitor pain and request assistance  - Assess pain using appropriate pain scale  - Administer analgesics based on type and severity of pain and evaluate response  - Implement non-pharmacological measures as appropriate and evaluate response  - Consider cultural and social influences on pain and pain management  - Manage/alleviate anxiety  - Utilize distraction and/or relaxation techniques  - Monitor for opioid side effects  - Notify MD/LIP if interventions unsuccessful or patient reports new pain  - Anticipate increased pain with activity and pre-medicate as appropriate  7/10/2024 0432 by Mayuri Barksdale RN  Outcome: Progressing  7/10/2024 0432 by Mayuri Barksdale RN  Outcome: Progressing  7/10/2024 0431 by Mayuri Barksdale RN  Outcome: Progressing     Problem: SAFETY ADULT - FALL  Goal: Free from fall injury  Description: INTERVENTIONS:  - Assess pt frequently for physical needs  - Identify cognitive and physical deficits and behaviors that affect risk of falls.  - [place] fall precautions as indicated by assessment.  - Educate pt/family on patient safety including physical limitations  - Instruct pt to call for assistance with activity based on assessment  - Modify environment to reduce risk of injury  - Provide assistive devices as appropriate  - Consider OT/PT consult to assist with strengthening/mobility  - Encourage toileting schedule  7/10/2024 0432 by Mayuri Barksdale RN  Outcome: Progressing  7/10/2024 0432 by Mayuri Barksdale  RN  Outcome: Progressing  7/10/2024 0431 by Mayuri Barksdale RN  Outcome: Progressing     Problem: DISCHARGE PLANNING  Goal: Discharge to home or other facility with appropriate resources  Description: INTERVENTIONS:  - Identify barriers to discharge w/pt and caregiver  - Include patient/family/discharge partner in discharge planning  - Arrange for needed discharge resources and transportation as appropriate  - Identify discharge learning needs (meds, wound care, etc)  - Arrange for interpreters to assist at discharge as needed  - Consider post-discharge preferences of patient/family/discharge partner  - Complete POLST form as appropriate  - Assess patient's ability to be responsible for managing their own health  - Refer to Case Management Department for coordinating discharge planning if the patient needs post-hospital services based on physician/LIP order or complex needs related to functional status, cognitive ability or social support system  7/10/2024 0432 by Mayuri Barksdale RN  Outcome: Progressing  7/10/2024 0432 by Mayuri Barksdale RN  Outcome: Progressing  7/10/2024 0431 by Mayuri Barksdale RN  Outcome: Progressing     Problem: Patient/Family Goals  Goal: Patient/Family Long Term Goal  Description: Patient's Long Term Goal: Discharge adequate resources    Interventions:  - Assistance from CM/SW if needed  - See additional Care Plan goals for specific interventions  7/10/2024 0432 by Mayuri Barksdale RN  Outcome: Progressing  7/10/2024 0432 by Mayuri Barksdale RN  Outcome: Progressing  7/10/2024 0431 by Mayuri Barksdale RN  Outcome: Progressing  Goal: Patient/Family Short Term Goal  Description: Patient's Short Term Goal: Feel better     Interventions:   - PRN medications  - See additional Care Plan goals for specific interventions  7/10/2024 0432 by Mayuri Barksdale RN  Outcome: Progressing  7/10/2024 0432 by Mayuri Barksdale RN  Outcome: Progressing  7/10/2024 0431 by Mayuri Barksdale RN  Outcome: Progressing

## 2024-07-10 NOTE — CONSULTS
Infectious Disease Initial Consultation      Date of admission: 7/9/2024 10:46 PM     Date of service: 07/10/24 9:07 AM    Consult requested by: Stevenson Dowell MD     Reason for consult: COVID-19 infection    Chief complaint: Fever and generalized weakness    History of present illness: Reginaldo Hdz is a 83 year old male with history of CAD, A-fib on Xarelto, hypertension, lupus on sulfasalazine, Parkinson disease, who presents here with fever, cough and congestion that started 2 days prior to admission.  He also noted worsening peripheral edema.    In the emergency room, the patient was afebrile, hemodynamically stable.  He is on room air.  Labs revealed slightly elevated LFTs but otherwise unremarkable CMP.  proBNP was elevated at 1591.  CBC showed mild anemia but otherwise unremarkable.  UA without pyuria.  COVID-19 PCR was positive.  Chest x-ray showed bilateral pleural effusion and mild interstitial edema.  No focal infiltrates noted.  No antimicrobials were started.    Review of systems:  All other components of the review of systems are negative, except those described in the history of present illness.     Past Medical History:    Anemia    Atherosclerosis of coronary artery    Chronic atrial fibrillation (HCC)    Esophageal reflux    Heart attack (HCC)    Hyperlipidemia    Hypertension    Lupus (HCC)    Parkinson disease (HCC)     Past Surgical History:   Procedure Laterality Date    Angiogram  1984    Angiogram  1996    [facility] 1.Successful PTCA of the infarct related artery which was the proximal graft of the saphenous vein graft to the right posterior descending/LV branch which was dilated and stented using a 4 x 13 BX Velocity to a maximum diameter of 4.45 mm.  2.Proximal PDA primarily stented using a 3 x 18 Penta stent from about 70 to 80% to 0%, HUGH III flow, no dissection.     Angiogram  06/24/2011    [facility]: Successful stenting with  a drug-eluting stent to the graft to the right coronary artery. Severe native CAD. Patent vein graft to LAD. Severe disease in the vein graft to the right coronary artery.    Angiogram  2011    [facility], Dr. Steele: Successful stenting of the circumflex artery in the proximal and mid portions. Relook angiography of the vein graft to the right coronary artery showed that this vessel is widely patent.    Cabg  1984    x2    Cath bare metal stent (bms)      Cath drug eluting stent      Shoulder surg proc unlisted Left     fracture     Social History     Socioeconomic History    Marital status:    Tobacco Use    Smoking status: Former     Current packs/day: 0.00     Types: Cigarettes     Quit date: 1982     Years since quittin.3    Smokeless tobacco: Never   Substance and Sexual Activity    Alcohol use: Yes     Alcohol/week: 0.0 standard drinks of alcohol     Comment: Social    Drug use: No     Social Determinants of Health     Food Insecurity: No Food Insecurity (7/10/2024)    Food Insecurity     Food Insecurity: Never true   Transportation Needs: No Transportation Needs (7/10/2024)    Transportation Needs     Lack of Transportation: No   Housing Stability: Low Risk  (7/10/2024)    Housing Stability     Housing Instability: No     Family History   Problem Relation Age of Onset    Cancer Father         Liver    Other (Other) Father         Heart Attack    Cancer Mother         uterine    Other (Other) Daughter         Hosimotos    Other (Other) Son         Heart Problem     Reviewed, see above    Medications:    acetaminophen    polyethylene glycol (PEG 3350)    sennosides    bisacodyl    fleet enema    ondansetron    metoclopramide    furosemide    guaiFENesin ER    benzonatate    sodium chloride    acidophilus    carbidopa-levodopa    rivaroxaban    carbidopa-levodopa ER    cetirizine    ezetimibe    HYDROcodone-acetaminophen    metoprolol succinate ER    pantoprazole    atorvastatin     sulfaSALAzine     Allergies:  Allergies   Allergen Reactions    Inderal [Propranolol] NAUSEA AND VOMITING and UNKNOWN    Isosorbide NAUSEA AND VOMITING       Physical Exam:  Vitals:    07/10/24 0731   BP: 126/66   Pulse: 74   Resp: 18   Temp: 99.1 °F (37.3 °C)     Vitals signs and nursing note reviewed.   Constitutional:       Appearance: Normal appearance.  Sleeping but easily arousable  HENT:      Head: Normocephalic and atraumatic.      Mouth: Mucous membranes are moist.   Neck:      Musculoskeletal: Neck supple.   Cardiovascular:      Rate and Rhythm: Normal rate.      Heart sounds: Normal heart sounds. No murmur. No friction rub. No gallop.    Pulmonary:      Effort: Pulmonary effort is normal. No respiratory distress.      Breath sounds: Diminished breath sounds at the bases. No stridor. No wheezing, rhonchi or rales.   Chest:      Chest wall: No tenderness.   Abdominal:      General: Abdomen is flat. There is no distension.      Palpations: Abdomen is soft. There is no mass.      Tenderness: There is no tenderness. There is no guarding or rebound.      Hernia: No hernia is present.   Musculoskeletal:      Right lower leg: +2 edema.      Left lower leg: +2 edema.   Skin:     General: Skin is warm and dry.     Laboratory data:  I have independently reviewed all lab results; including old microbiological results.  Lab Results   Component Value Date    WBC 5.9 07/09/2024    HGB 10.8 07/09/2024    HCT 31.2 07/09/2024    PLT 75.0 07/09/2024    CREATSERUM 0.94 07/10/2024    BUN 17 07/09/2024     07/09/2024    K 3.8 07/09/2024     07/09/2024    CO2 28.0 07/09/2024     07/09/2024    CA 8.6 07/09/2024    ALB 2.3 07/09/2024    ALKPHO 204 07/09/2024    BILT 0.6 07/09/2024    TP 6.6 07/09/2024     07/09/2024    ALT 30 07/09/2024    TROPHS 37 07/09/2024        Recent Labs   Lab 07/09/24  2255   RBC 3.31*   HGB 10.8*   HCT 31.2*   MCV 94.3   MCH 32.6   MCHC 34.6   RDW 13.8   NEPRELIM 4.60   WBC 5.9    PLT 75.0*       Microbiology data:  No results found for this visit on 07/09/24.      Radiology:  I have reviewed all imaging data available independently.   Chest x-ray:  Bilateral pleural effusion.  Mild vascular congestion.    Impression:  Reginaldo Hdz is a 83 year old male with     COVID-19 infection, presenting here with weakness and fever  The patient is otherwise not immunosuppressed  Symptoms are mild  No hypoxia or respiratory failure  Chest x-ray without pneumonia  He would benefit from Paxlovid; however, that interacts with his Xarelto  Mild congestive heart failure with mild vascular congestion and bilateral pleural effusion  Management as per the primary team  History of lupus  On hydroxychloroquine and sulfasalazine  Immunosuppressed    Recommendations:     Supportive care as per primary team  Given his immunosuppression, will start remdesivir for 3 days  Management of heart failure as per the primary team  ID will sign off, please call us with any questions or changes status.  Thank you for this consultation.    The plan of care was discussed with the primary hospital team, Stevenson Dowell MD     Recommendations were also discussed with the patient; all questions were answered.     Thank you for this consultation. Please don't hesitate to call the ID team for questions or any acute changes in patient's clinical condition.    Please note that this report has been produced using speech recognition software and may contain errors related to that system including, but not limited to, errors in grammar, punctuation, and spelling, as well as words and phrases that possibly may have been recognized inappropriately.  If there are any questions or concerns, contact the dictating provider for clarification.    The 21st Century Cures Act makes medical notes like these available to patients in the interest of transparency. Please be advised this is a medical document. Medical documents are intended to carry  relevant information, facts as evident, and the clinical opinion of the practitioner. The medical note is intended as peer to peer communication and may appear blunt or direct. It is written in medical language and may contain abbreviations or verbiage that are unfamiliar.     Tosin Mercado MD  [facility]. Tel: [phone]. Fax: [phone].     Reginaldo Hdz : 8/10/1940 MRN: [id number] [facility]: [id number]

## 2024-07-10 NOTE — PHYSICAL THERAPY NOTE
PHYSICAL THERAPY EVALUATION - INPATIENT     Room Number: 8600/8600-A  Evaluation Date: 7/10/2024  Type of Evaluation: Initial  Physician Order: PT Eval and Treat    Presenting Problem: covid, CHF  Co-Morbidities : PD, lupus, anemia, MI, afib, HTN, CABG, L shoulder surgery  Reason for Therapy: Mobility Dysfunction and Discharge Planning    PHYSICAL THERAPY ASSESSMENT   Patient is currently functioning below baseline with bed mobility, transfers, and gait.  Prior to admission, patient's baseline is mod ind with RW.  Patient is requiring contact guard assist as a result of the following impairments: decreased functional strength, decreased endurance/aerobic capacity, impaired   balance, and decreased muscular endurance.  Physical Therapy will continue to follow for duration of hospitalization.    Patient will benefit from continued skilled PT Services at discharge to promote prior level of function and safety with additional support and return home with home health PT. Patient would benefit from a more supportive living environment.     PLAN  PT Treatment Plan: Bed mobility;Endurance;Energy conservation;Patient education;Family education;Gait training;Strengthening;Transfer training;Balance training;Stair training;Stoop training  Rehab Potential : Good  Frequency (Obs): 3-5x/week  Number of Visits to Meet Established Goals: 4      CURRENT GOALS    Goal #1 Patient is able to demonstrate supine - sit EOB @ level: supervision     Goal #2 Patient is able to demonstrate transfers Sit to/from Stand at assistance level: supervision     Goal #3 Patient is able to ambulate 75 feet with assist device: walker - rolling at assistance level: supervision     Goal #4    Goal #5    Goal #6    Goal Comments: Goals established on 7/10/2024      PHYSICAL THERAPY MEDICAL/SOCIAL HISTORY  History related to current admission: Patient is a 83 year old male admitted on 7/9/2024 from home for covid and CHF.      HOME SITUATION  Type of  Home: House   Home Layout: Two level;Bed/bath upstairs;Able to live on main level;Stairlift  Stairs to Enter : 4  Railing: Yes  Stairs to Bedroom: 13  Railing: Yes    Lives With: Spouse  Drives: No  Patient Owned Equipment: Rolling walker;Cane (lift recliner)  Patient Regularly Uses: Glasses    Prior Level of Island: Pt is typically mod ind with ADLs though has assist for showers. Pt ambulates with a RW. Pt does not drive. Pt has a lift recliner and a full bath on the first floor.     SUBJECTIVE  Pt's spouse also in the hospital and visiting during this evaluation. Both pt and pt's spouse crying and appeared confused and needed re-directing. Emotional support provided.       OBJECTIVE  Precautions: Bed/chair alarm  Fall Risk: High fall risk    WEIGHT BEARING RESTRICTION  Weight Bearing Restriction: None                PAIN ASSESSMENT  Ratin          COGNITION  Orientation Level:  oriented x4  Following Commands:  follows one step commands with increased time  Safety Judgement:  decreased awareness of need for assistance and decreased awareness of need for safety  Awareness of Deficits:  decreased awareness of deficits  Problem Solving:  assistance required to identify errors made, assistance required to generate solutions, and assistance required to implement solutions    RANGE OF MOTION AND STRENGTH ASSESSMENT  Upper extremity ROM and strength are within functional limits     Lower extremity ROM is within functional limits     Lower extremity strength is within functional limits, except generalized weakness      BALANCE  Static Sitting: Good  Dynamic Sitting: Fair +  Static Standing: Fair -  Dynamic Standing: Poor +    ADDITIONAL TESTS                                    ACTIVITY TOLERANCE                         O2 WALK       NEUROLOGICAL FINDINGS                        AM-PAC '6-Clicks' INPATIENT SHORT FORM - BASIC MOBILITY  How much difficulty does the patient currently have...  Patient Difficulty:  Turning over in bed (including adjusting bedclothes, sheets and blankets)?: A Little   Patient Difficulty: Sitting down on and standing up from a chair with arms (e.g., wheelchair, bedside commode, etc.): A Little   Patient Difficulty: Moving from lying on back to sitting on the side of the bed?: A Little   How much help from another person does the patient currently need...   Help from Another: Moving to and from a bed to a chair (including a wheelchair)?: A Little   Help from Another: Need to walk in hospital room?: A Little   Help from Another: Climbing 3-5 steps with a railing?: A Lot       AM-PAC Score:  Raw Score: 17   Approx Degree of Impairment: 50.57%   Standardized Score (AM-PAC Scale): 42.13   CMS Modifier (G-Code): CK    FUNCTIONAL ABILITY STATUS  Gait Assessment   Functional Mobility/Gait Assessment  Gait Assistance: Contact guard assist  Distance (ft): 2  Assistive Device: Rolling walker  Pattern: Shuffle (kyphotic posture)    Skilled Therapy Provided: Per RN okay to work with pt. Pt received on commode and was agreeable to PT session.     Bed Mobility:  Rolling: NT  Supine to sit: NT   Sit to supine: NT     Transfer Mobility:  Sit to stand: min A that progressed to CGA. Pt tolerated static standing with supervision while RN provided total A for pericare   Stand to sit: CGA with cues for hand placement   Gait = Pt took several steps from commode to chair with RW and CGA    Therapist's Comments: Pt educated on role of therapy, goals for session, safety, fall prevention, and activity recommendations.     Exercise/Education Provided:  Energy conservation  Functional activity tolerated  Posture  Strengthening  Transfer training    Patient End of Session: Up in chair;With  staff;Needs met;Call light within reach;RN aware of session/findings;All patient questions and concerns addressed;Alarm set;Family present      Patient Evaluation Complexity Level:  History Moderate - 1 or 2 personal factors and/or  co-morbidities   Examination of body systems Low - addressing 1-2 elements   Clinical Presentation Low - Stable   Clinical Decision Making Low - Stable       PT Session Time: 23 minutes  Therapeutic Activity: 8 minutes

## 2024-07-11 LAB
ALBUMIN SERPL-MCNC: 2.2 G/DL (ref 3.4–5)
ALP LIVER SERPL-CCNC: 193 U/L
ALT SERPL-CCNC: 49 U/L
ANION GAP SERPL CALC-SCNC: 6 MMOL/L (ref 0–18)
AST SERPL-CCNC: 203 U/L (ref 15–37)
BASOPHILS # BLD AUTO: 0.02 X10(3) UL (ref 0–0.2)
BASOPHILS NFR BLD AUTO: 0.5 %
BILIRUB DIRECT SERPL-MCNC: 0.3 MG/DL (ref 0–0.2)
BILIRUB SERPL-MCNC: 0.6 MG/DL (ref 0.1–2)
BUN BLD-MCNC: 18 MG/DL (ref 9–23)
CALCIUM BLD-MCNC: 8.5 MG/DL (ref 8.5–10.1)
CHLORIDE SERPL-SCNC: 107 MMOL/L (ref 98–112)
CO2 SERPL-SCNC: 28 MMOL/L (ref 21–32)
CREAT BLD-MCNC: 0.86 MG/DL
EGFRCR SERPLBLD CKD-EPI 2021: 85 ML/MIN/1.73M2 (ref 60–?)
EOSINOPHIL # BLD AUTO: 0.02 X10(3) UL (ref 0–0.7)
EOSINOPHIL NFR BLD AUTO: 0.5 %
ERYTHROCYTE [DISTWIDTH] IN BLOOD BY AUTOMATED COUNT: 13.6 %
GLUCOSE BLD-MCNC: 88 MG/DL (ref 70–99)
HCT VFR BLD AUTO: 32.5 %
HGB BLD-MCNC: 11.1 G/DL
IMM GRANULOCYTES # BLD AUTO: 0.01 X10(3) UL (ref 0–1)
IMM GRANULOCYTES NFR BLD: 0.3 %
LYMPHOCYTES # BLD AUTO: 0.89 X10(3) UL (ref 1–4)
LYMPHOCYTES NFR BLD AUTO: 22.4 %
MAGNESIUM SERPL-MCNC: 2 MG/DL (ref 1.6–2.6)
MCH RBC QN AUTO: 32.2 PG (ref 26–34)
MCHC RBC AUTO-ENTMCNC: 34.2 G/DL (ref 31–37)
MCV RBC AUTO: 94.2 FL
MONOCYTES # BLD AUTO: 0.42 X10(3) UL (ref 0.1–1)
MONOCYTES NFR BLD AUTO: 10.6 %
NEUTROPHILS # BLD AUTO: 2.62 X10 (3) UL (ref 1.5–7.7)
NEUTROPHILS # BLD AUTO: 2.62 X10(3) UL (ref 1.5–7.7)
NEUTROPHILS NFR BLD AUTO: 65.7 %
OSMOLALITY SERPL CALC.SUM OF ELEC: 293 MOSM/KG (ref 275–295)
PLATELET # BLD AUTO: 88 10(3)UL (ref 150–450)
POTASSIUM SERPL-SCNC: 3.6 MMOL/L (ref 3.5–5.1)
POTASSIUM SERPL-SCNC: 3.8 MMOL/L (ref 3.5–5.1)
POTASSIUM SERPL-SCNC: 3.8 MMOL/L (ref 3.5–5.1)
PROT SERPL-MCNC: 6.6 G/DL (ref 6.4–8.2)
RBC # BLD AUTO: 3.45 X10(6)UL
SODIUM SERPL-SCNC: 141 MMOL/L (ref 136–145)
TROPONIN I SERPL HS-MCNC: 41 NG/L
WBC # BLD AUTO: 4 X10(3) UL (ref 4–11)

## 2024-07-11 PROCEDURE — 83735 ASSAY OF MAGNESIUM: CPT | Performed by: INTERNAL MEDICINE

## 2024-07-11 PROCEDURE — 85025 COMPLETE CBC W/AUTO DIFF WBC: CPT | Performed by: HOSPITALIST

## 2024-07-11 PROCEDURE — 84484 ASSAY OF TROPONIN QUANT: CPT | Performed by: INTERNAL MEDICINE

## 2024-07-11 PROCEDURE — 84132 ASSAY OF SERUM POTASSIUM: CPT | Performed by: HOSPITALIST

## 2024-07-11 PROCEDURE — 84132 ASSAY OF SERUM POTASSIUM: CPT | Performed by: INTERNAL MEDICINE

## 2024-07-11 PROCEDURE — 80076 HEPATIC FUNCTION PANEL: CPT

## 2024-07-11 PROCEDURE — 80048 BASIC METABOLIC PNL TOTAL CA: CPT | Performed by: HOSPITALIST

## 2024-07-11 RX ORDER — POTASSIUM CHLORIDE 20 MEQ/1
40 TABLET, EXTENDED RELEASE ORAL ONCE
Status: COMPLETED | OUTPATIENT
Start: 2024-07-11 | End: 2024-07-11

## 2024-07-11 RX ORDER — ALPRAZOLAM 0.25 MG/1
0.25 TABLET ORAL DAILY PRN
Status: DISCONTINUED | OUTPATIENT
Start: 2024-07-11 | End: 2024-07-14

## 2024-07-11 RX ORDER — ALPRAZOLAM 0.25 MG/1
0.25 TABLET ORAL NIGHTLY PRN
Status: DISCONTINUED | OUTPATIENT
Start: 2024-07-11 | End: 2024-07-11

## 2024-07-11 RX ORDER — HYDROXYCHLOROQUINE SULFATE 200 MG/1
200 TABLET, FILM COATED ORAL 2 TIMES DAILY
Status: DISCONTINUED | OUTPATIENT
Start: 2024-07-11 | End: 2024-07-14

## 2024-07-11 NOTE — PROGRESS NOTES
[facility] Cardiology Progress Note        Reginaldo Hdz Patient Status:  Inpatient    8/10/1940 MRN [id number]   [facility] 8NE-A Attending Stevenson Dowell MD   Hosp Day # 1 PCP Olvin Dinh MD     Subjective:  The patient denies  chest pain and shortness of breath.    Medications:   potassium chloride  40 mEq Oral Once    furosemide  40 mg Intravenous Daily    guaiFENesin ER  600 mg Oral BID    acidophilus  1 capsule Oral Daily    carbidopa-levodopa  1.5 tablet Oral QID    rivaroxaban  20 mg Oral Daily with food    carbidopa-levodopa ER  1 tablet Oral BID    ezetimibe  10 mg Oral Daily    metoprolol succinate ER  50 mg Oral Daily    pantoprazole  40 mg Oral Daily    atorvastatin  20 mg Oral Nightly    sulfaSALAzine  500 mg Oral BID    cetirizine  5 mg Oral Daily    aspirin  81 mg Oral Daily    remdesivir  100 mg Intravenous Q24H       Continuous Infusions:        Allergies:  Allergies   Allergen Reactions    Inderal [Propranolol] NAUSEA AND VOMITING and UNKNOWN    Isosorbide NAUSEA AND VOMITING         Objective:        Intake/Output:      Intake/Output Summary (Last 24 hours) at 2024 0741  Last data filed at 2024 0520  Gross per 24 hour   Intake --   Output 1600 ml   Net -1600 ml     Wt Readings from Last 3 Encounters:   24 152 lb 5.4 oz (69.1 kg)   24 170 lb (77.1 kg)   24 165 lb (74.8 kg)       Physical Exam:        Vitals:    07/10/24 1542 07/10/24 2001 07/10/24 2335 24 0520   BP: 100/49 109/66 142/68 146/86   BP Location: Right arm Right arm Right arm Right arm   Pulse: 71 84 67 69   Resp: 18 20 18 15   Temp: 97.8 °F (36.6 °C) 97.8 °F (36.6 °C) 97.7 °F (36.5 °C) 97.5 °F (36.4 °C)   TempSrc: Oral Oral Oral Oral   SpO2: 97% 100% 99% 100%   Weight:    152 lb 5.4 oz (69.1 kg)   Height:           Temp:  [97.5 °F (36.4 °C)-98.2 °F (36.8 °C)] 97.5 °F (36.4 °C)  Pulse:  [67-84] 69  Resp:  [15-20] 15  BP: (100-146)/(49-86) 146/86  SpO2:  [95  %-100 %] 100 %      Temp: 97.5 °F (36.4 °C)  Pulse: 69  Resp: 15  BP: 146/86  General:  Appears comfortable  HEENT: No focal deficits.  Neck: No JVD, carotids 2+ no bruits.  Cardiac: Regular S1S2.  No S3, S4, rub, click.  No murmur.  Lungs: basilar rales  Abdomen: Soft, non-tender.   Extremities: No LE edema.  No clubbing or cyanosis.    Neurologic: Alert and oriented, normal affect.  Skin: Warm and dry.           LABS:      HEM:  Recent Labs   Lab 07/09/24  2255 07/10/24  1007 07/11/24  0501   WBC 5.9 6.8 4.0   HGB 10.8* 11.4* 11.1*   HCT 31.2* 33.9* 32.5*   PLT 75.0* 82.0* 88.0*       Chem:  Recent Labs   Lab 07/09/24  2255 07/10/24  0248 07/10/24  1007 07/11/24  0501     --  138 141   K 3.8  --  3.4* 3.8  3.8  3.6     --  105 107   CO2 28.0  --  27.0 28.0   BUN 17  --  17 18   CREATSERUM 0.94 0.94 0.94 0.86   CA 8.6  --  8.6 8.5   MG  --   --  1.7 2.0   *  --  90 88       Recent Labs   Lab 07/09/24 2255 07/11/24  0501   ALT 30 49   * 203*   ALB 2.3* 2.2*       No results for input(s): \"PT\", \"PTT\", \"INR\" in the last 168 hours.        Lab Results   Component Value Date    TROP <0.045 12/06/2019    TROP <0.045 12/05/2019    TROP <0.045 12/05/2019         Invalid input(s): \"PBNPML\"                       Diagnostics:          Telemetry:      Laboratories and Data:  Diagnostics:     EKG, 7/10/2024:  NSR, FAV, PAC's, ASMI     CXR, 7/9/2024:    Impression   CONCLUSION:  Bilateral pleural effusions and bibasilar atelectasis.          echo, 7/10/24:    1. Left ventricle: The cavity size was normal. Wall thickness was normal.      Systolic function was normal. The estimated ejection fraction was 60-65%,      by visual assessment. Unable to assess LV diastolic function due to heart      rhythm.   2. Left atrium: The left atrial volume was moderately increased.   3. Aortic root: The aortic root was 4.0cm diameter. The aortic root was      mildly dilated.   4. Pulmonary arteries: Systolic  pressure was mildly increased, in the range      of 35mm Hg to 40mm Hg. Estimated pulmonary artery diastolic pressure was      15mm Hg.         Impression:     1.  Covid (fatigue, fever). On remdesivir    2. Volume o/l. Suspect an element of CHF.      -     Elevated BNP to 1591.     -     net out = 2.4 liters    3. CAD, s/p CABG 1984    -          Normal troponins x 2    4. Paroxysmal Atrial Fibrillation.  NSR today.  On DOAC  5. Hypertension   6. PD  7. SLE        Recommend:     1.  Telemetry  2. Continue IV lasix today  3. Follow I/O's, weights, BMPs  4. Echo for LV fct shows LVEF = 60-65%  6. Continue op meds       Magdaleno Do MD  7/11/2024  7:41 AM

## 2024-07-11 NOTE — DISCHARGE INSTRUCTIONS
Sometimes managing your health at home requires assistance.  The [facility] team has recognized your preference to use [facility].  They can be reached at [phone].  The fax number for your reference is [phone].  A representative from the home health agency will contact you or your family to schedule your first visit.      Repeat liver function test in 1 week with primary care doctor    Going Home Instructions  In this section you will find the tools which will guide you through the first few days after you leave the hospital. Continued use of these tools will help you develop the skills necessary to keep your heart failure under control.     Home Care Instructions Following Heart Failure - the most important things to do every day include:   Weigh yourself and review the “Self-Check Plan” sheet every morning.   Call your cardiologist office if you are in the “Pay Attention-Use Caution” (yellow zone) or “Medical Alert-Warning!” (red zone) as outlined in the Self-Check Plan sheet.  Take your medicines as prescribed.  Limit your sodium (salt) intake.  Know when to call your cardiologist, primary doctor, or nurse.  Know when to seek emergency care.      Things for You to Remember:   1. See your doctor or healthcare provider as written on your discharge instructions.  It is important that you attend this appointment to make sure your symptoms are under control.     2. Your recommended sodium intake is 3220-7878 mg daily.    3.  Weigh yourself every day.    4. Some exercise and activity is important to help keep your heart functioning and strong. Unless instructed not to exercise, you may walk at a slow to moderate pace for 10-15 minutes 2-3 days per week to start. Pace your activity to prevent shortness of breath or fatigue. Stop exercising if you develop chest pain, lightheadedness, or significant shortness of breath.       Call Your Cardiologist If:   You gain 2-3 pounds in one day  or 5 pounds in one week.  You have more difficulty breathing.  You are getting more tired with normal activity.  You are more short of breath lying down, or awaken at night short of breath.  You have swelling of your feet or legs.  You urinate less often during the day and more often at night.  You have cramps in your legs.  You have blurred vision or see yellowish-green halos around objects of lights.    Go to the Emergency Room If:   You have pain or tightness in your chest  You are extremely short of breath  You are coughing up pink-frothy mucus  You are traveling and develop symptoms of worsening heart failure      ** Please follow up with your cardiologist or Advanced Practice Provider as written on your discharge instructions. If you are not provided with an appointment, let your nurse know so you can get an appointment**

## 2024-07-11 NOTE — CM/SW NOTE
Sw spoke to Woody from [facility]  [phone] ( formerly [facility]) requesting clinical for their DON to review (fax [phone]).  Clinical faxed- due to pt having COVID they could not accept as new admission until after 5 days for positive test.    Peggy Andres, TAMIKAW  /Discharge Planner

## 2024-07-11 NOTE — CDS QUERY
Dear Sigifredo Clark,    Please clarify the type of heart failure      (    ) Acute Diastolic Heart Failure  (    ) Other - please specify:         DOCUMENTATION FROM THE MEDICAL RECORD    Clinical Indicators: 7/9 ED 83 Y/M- fatigue, rales present with BLE edema    BNP- 1591    CXR- CXR- Bilateral pleural effusions and bibasilar atelectasis    Echo - Systolic function was normal. The estimated ejection fraction was 60-65%, by visual assessment. Unable to assess LV diastolic function due to heart rhythm.     Weight 7/10- 160 Ib 4.8 ounces, weight 7/11 152 Ib 5.4 oz    7/0-711 Hospitalist PN- Acute CHF exacerbation  Peripheral Edema R > L  -pro BNP elevated 1,591  -cardiology consulted, IV lasix (2.4 L urine output in 24 H), obtain echo (EF 60-65, mild PAH), trop normal x 3      7/10 cardiology- Volume o/l. Suspect an element of CHF      Risks: HTN, DM, AVR    Treatment: CXR, IV lasix, echo, strict I/Os, cardiology consult, cardiac monitoring, heart failure coordinator consult        Use of terms such as suspected, possible, or probable (associated with a specific diagnosis that is being evaluated, monitored, or treated as if it exists) are acceptable and can be coded in the inpatient setting, when documented at the time of discharge.     Please add any additional documentation to your progress note and continue to document this through discharge.                Clinical : Mary Grace Love -[phone]. Thank You.    THIS FORM IS A PERMANENT PART OF THE MEDICAL RECORD

## 2024-07-11 NOTE — PROGRESS NOTES
[facility]   part of [facility]     Progress Note  Reginaldo Hdz Patient Status:  Inpatient    8/10/1940 MRN [id number]   Location [facility] 8NE-A Attending Stevenson Dowell MD   Hosp Day # 1 PCP Olvin Dinh MD       SEE ATTENDING NOTE AT BOTTOM OF PAGE    Is this a shared or split note between Advanced Practice Provider and Physician? Yes    Assessment and Plan:    83 year old male from home with a PMHx sig for anemia, AFIB (xarelto), s/p pci, s/p cabg, GERD, h/o MI, HTN, HLD, lupus, PD, peripheral neuropathy presents to the hospital with fever, cough, and weakness.     COVID19 with shortness of breath  -progressively worsening cough x 2 days, fever 102 PTA, endorsed bilateral peripheral edema as well  -COVID + , no leukocytosis, mild fever  -CXR w bilateral pleural effusions and atelectasis  -sputum cx w 2+ normal resp dave, 3+WBCs, 1+gram pos rods, 2+ gram neg rods  -consult ID, will start remdesivir x 3 days given his immunosuppression      Acute diastolic CHF exacerbation  Peripheral Edema R > L  -pro BNP elevated 1,591  -cardiology consulted, IV lasix (2.4 L urine output in 24 H), obtain echo (EF 60-65, mild PAH), trop normal x 3  -RLE doppler neg for DVT  -Continue Lasix for now, transition to p.o. diuretics when okay with cardiology     Elevated AST  - continuing to uptrend, will discuss with Dr Elizabeth  -, has been increasing this year previously 117 in 2024, he does take niacin as an outpatient which was initiated in 2022, his AST was normal in    Monitor serial and AST's and LFTs  -Could be secondary to hepatic congestion, continue to monitor    PD  -carbidopa-levodopa     HTN  AFIB  -metoprolol  -asa and xarelto continue for now     HLD  -zetia and statin     Gerd  -ppi     Lupus  -Plaquenil     Discusses POC with daughter Zari who agrees with the plan.        MA/ACO Reach  -Re- Entry: no  -Consults: cards, ID  -Discharge  Needs  -Appointments: PCP       WIL patrick in am  -diet-cardiac     Prophy  -SCD  -xarelto     Dispo  -pending clinical course    PCP: Olvin Dinh MD    Concerns regarding plan of care were discussed with patient. Patient agrees with plan as detailed above. Discussed plan of care with Dr. Elizabeth    Note: This chart was prepared using voice recognition software and may contain unintended word substitution errors.          Sigifredo Clark JIM  [facility] Hospitalist Team  Contact via [employer] and [employer] (Check Availability)  7/11/2024             SUBJECTIVE:         Patient is doing well, not short of breath, laying down in bed, not on any oxygen at this time no fevers or chills at             OBJECTIVE:   Blood pressure 129/73, pulse 84, temperature 98 °F (36.7 °C), temperature source Oral, resp. rate 19, height 5' 7\" (1.702 m), weight 152 lb 5.4 oz (69.1 kg), SpO2 100%.    GENERAL: no apparent distress, elderly  NEURO: A/A Ox3  RESP: non labored, CTA  CARDIO: Regular, no murmur  ABD: soft, NT, ND, BS+  EXTREMITIES: + edema R > L, no calf tenderness, pulses palpable     DIAGNOSTIC DATA:   Labs:     Recent Labs   Lab 07/09/24  2255 07/10/24  1007 07/11/24  0501   WBC 5.9 6.8 4.0   HGB 10.8* 11.4* 11.1*   MCV 94.3 96.6 94.2   PLT 75.0* 82.0* 88.0*       Recent Labs   Lab 07/09/24  2255 07/10/24  0248 07/10/24  1007 07/11/24  0501     --  138 141   K 3.8  --  3.4* 3.8  3.8  3.6     --  105 107   CO2 28.0  --  27.0 28.0   BUN 17  --  17 18   CREATSERUM 0.94 0.94 0.94 0.86   CA 8.6  --  8.6 8.5   MG  --   --  1.7 2.0   *  --  90 88       Recent Labs   Lab 07/09/24 2255 07/11/24  0501   ALT 30 49   * 203*   ALB 2.3* 2.2*       No results for input(s): \"PGLU\" in the last 168 hours.    No results for input(s): \"TROP\" in the last 168 hours.      MEDICATIONS      Current Facility-Administered Medications   Medication Dose Route Frequency    hydroxychloroquine (Plaquenil) tab  200 mg  200 mg Oral BID    ALPRAZolam (Xanax) tab 0.25 mg  0.25 mg Oral Daily PRN    acetaminophen (Tylenol Extra Strength) tab 500 mg  500 mg Oral Q4H PRN    polyethylene glycol (PEG 3350) (Miralax) 17 g oral packet 17 g  17 g Oral Daily PRN    sennosides (Senokot) tab 17.2 mg  17.2 mg Oral Nightly PRN    bisacodyl (Dulcolax) 10 MG rectal suppository 10 mg  10 mg Rectal Daily PRN    fleet enema (Fleet) 7-19 GM/118ML rectal enema 133 mL  1 enema Rectal Once PRN    ondansetron (Zofran) 4 MG/2ML injection 4 mg  4 mg Intravenous Q6H PRN    metoclopramide (Reglan) 5 mg/mL injection 10 mg  10 mg Intravenous Q8H PRN    furosemide (Lasix) 10 mg/mL injection 40 mg  40 mg Intravenous Daily    guaiFENesin ER (Mucinex) 12 hr tab 600 mg  600 mg Oral BID    benzonatate (Tessalon) cap 200 mg  200 mg Oral TID PRN    sodium chloride (Saline Mist) 0.65 % nasal solution 1 spray  1 spray Each Nare Q3H PRN    acidophilus (Probiotic) cap/tab 1 capsule  1 capsule Oral Daily    carbidopa-levodopa (SINEMET)  MG per tab 1.5 tablet  1.5 tablet Oral QID    rivaroxaban (Xarelto) tab 20 mg  20 mg Oral Daily with food    carbidopa-levodopa ER (SINEMET CR)  MG per tab 1 tablet  1 tablet Oral BID    ezetimibe (Zetia) tab 10 mg  10 mg Oral Daily    HYDROcodone-acetaminophen (Norco) 5-325 MG per tab 1-2 tablet  1-2 tablet Oral Q4H PRN    metoprolol succinate ER (Toprol XL) 24 hr tab 50 mg  50 mg Oral Daily    pantoprazole (Protonix) DR tab 40 mg  40 mg Oral Daily    atorvastatin (Lipitor) tab 20 mg  20 mg Oral Nightly    cetirizine (ZyrTEC) tab 5 mg  5 mg Oral Daily    aspirin chewable tab 81 mg  81 mg Oral Daily                  IMAGING     US VENOUS DOPPLER LEG RIGHT - DIAG IMG (CPT=93971)    Result Date: 7/10/2024  CONCLUSION:  No DVT.   LOCATION:  [id number]    Dictated by (CST): William Johnson MD on 7/10/2024 at 9:21 AM     Finalized by (CST): William Johnson MD on 7/10/2024 at 9:22 AM       XR CHEST AP PORTABLE   (CPT=71045)    Result Date: 7/9/2024  CONCLUSION:  Bilateral pleural effusions and bibasilar atelectasis.   LOCATION:  [facility]      Dictated by (CST): Duglas Hastings MD on 7/09/2024 at 11:25 PM     Finalized by (CST): Duglas Hastings MD on 7/09/2024 at 11:26 PM          SEE ATTENDING NOTE BELOW:   Patient seen and examined by me dependently and agree with above,    Acute diastolic heart failure exacerbation, continue IV Lasix, echo completed and reviewed, continue remdesivir for COVID-19 viral infection  EF of 60%,-unable to assess diastolic dysfunction, elevated pulmonary artery pressure

## 2024-07-11 NOTE — PLAN OF CARE
Pt admitted to unit for COVID. Received pt in bed. A&Ox 4. RA. Rhythm NSR. GI/ WNL. Some complaint of pain in back. All medications given as ordered. Pt resting in bed w/ all safety measures in place and call light within reach.     Care completed over shift:Tylenol x1 PRN for back pain.    Plan is continue diuretics.    Problem: CARDIOVASCULAR - ADULT  Goal: Maintains optimal cardiac output and hemodynamic stability  Description: INTERVENTIONS:  - Monitor vital signs, rhythm, and trends  - Monitor for bleeding, hypotension and signs of decreased cardiac output  - Evaluate effectiveness of vasoactive medications to optimize hemodynamic stability  - Monitor arterial and/or venous puncture sites for bleeding and/or hematoma  - Assess quality of pulses, skin color and temperature  - Assess for signs of decreased coronary artery perfusion - ex. Angina  - Evaluate fluid balance, assess for edema, trend weights  Outcome: Progressing  Goal: Absence of cardiac arrhythmias or at baseline  Description: INTERVENTIONS:  - Continuous cardiac monitoring, monitor vital signs, obtain 12 lead EKG if indicated  - Evaluate effectiveness of antiarrhythmic and heart rate control medications as ordered  - Initiate emergency measures for life threatening arrhythmias  - Monitor electrolytes and administer replacement therapy as ordered  Outcome: Progressing     Problem: RESPIRATORY - ADULT  Goal: Achieves optimal ventilation and oxygenation  Description: INTERVENTIONS:  - Assess for changes in respiratory status  - Assess for changes in mentation and behavior  - Position to facilitate oxygenation and minimize respiratory effort  - Oxygen supplementation based on oxygen saturation or ABGs  - Provide Smoking Cessation handout, if applicable  - Encourage broncho-pulmonary hygiene including cough, deep breathe, Incentive Spirometry  - Assess the need for suctioning and perform as needed  - Assess and instruct to report SOB or any respiratory  difficulty  - Respiratory Therapy support as indicated  - Manage/alleviate anxiety  - Monitor for signs/symptoms of CO2 retention  Outcome: Progressing     Problem: Impaired Functional Mobility  Goal: Achieve highest/safest level of mobility/gait  Description: Interventions:  - Assess patient's functional ability and stability  - Promote increasing activity/tolerance for mobility and gait  - Educate and engage patient/family in tolerated activity level and precautions    Outcome: Progressing     Problem: Impaired Cognition  Goal: Patient will exhibit improved attention, thought processing and/or memory  Description: Interventions:  - Allow additional time for processing after asking questions or providing instructions  Outcome: Progressing     Problem: Impaired Communication  Goal: Patient will achieve maximal communication potential  Description: Interventions:  - Allow additional time for processing after asking questions or providing instructions  Outcome: Progressing     Problem: PAIN - ADULT  Goal: Verbalizes/displays adequate comfort level or patient's stated pain goal  Description: INTERVENTIONS:  - Encourage pt to monitor pain and request assistance  - Assess pain using appropriate pain scale  - Administer analgesics based on type and severity of pain and evaluate response  - Implement non-pharmacological measures as appropriate and evaluate response  - Consider cultural and social influences on pain and pain management  - Manage/alleviate anxiety  - Utilize distraction and/or relaxation techniques  - Monitor for opioid side effects  - Notify MD/LIP if interventions unsuccessful or patient reports new pain  - Anticipate increased pain with activity and pre-medicate as appropriate  Outcome: Progressing     Problem: SAFETY ADULT - FALL  Goal: Free from fall injury  Description: INTERVENTIONS:  - Assess pt frequently for physical needs  - Identify cognitive and physical deficits and behaviors that affect risk  of falls.  - [place] fall precautions as indicated by assessment.  - Educate pt/family on patient safety including physical limitations  - Instruct pt to call for assistance with activity based on assessment  - Modify environment to reduce risk of injury  - Provide assistive devices as appropriate  - Consider OT/PT consult to assist with strengthening/mobility  - Encourage toileting schedule  Outcome: Progressing     Problem: DISCHARGE PLANNING  Goal: Discharge to home or other facility with appropriate resources  Description: INTERVENTIONS:  - Identify barriers to discharge w/pt and caregiver  - Include patient/family/discharge partner in discharge planning  - Arrange for needed discharge resources and transportation as appropriate  - Identify discharge learning needs (meds, wound care, etc)  - Arrange for interpreters to assist at discharge as needed  - Consider post-discharge preferences of patient/family/discharge partner  - Complete POLST form as appropriate  - Assess patient's ability to be responsible for managing their own health  - Refer to Case Management Department for coordinating discharge planning if the patient needs post-hospital services based on physician/LIP order or complex needs related to functional status, cognitive ability or social support system  Outcome: Progressing     Problem: Patient/Family Goals  Goal: Patient/Family Long Term Goal  Description: Patient's Long Term Goal: Discharge adequate resources    Interventions:  - Assistance from CM/SW if needed  - See additional Care Plan goals for specific interventions  Outcome: Progressing  Goal: Patient/Family Short Term Goal  Description: Patient's Short Term Goal: Feel better     Interventions:   - PRN medications  - See additional Care Plan goals for specific interventions  Outcome: Progressing

## 2024-07-11 NOTE — CM/SW NOTE
07/11/24 1000   CM/SW Referral Data   Referral Source Physician   Reason for Referral Discharge planning   Informant Daughter;EMR;Clinical Staff Member     Sw spoke to pt's daughter today re: dc planning.  Pt is 84 yo male, admitted due to sob, COVID, CHF.    Pt normally resides with his wife who had also been admitted to the hospital with COVID.  She dc's home yesterday.  Daughter states that her mother has some cognitive disorder and can be verbally abusive at home and paranoid.    Pt and his wife have a private caregiver in place from 8pm- 2pm every day.  Discussed PT recs for [facility] and daughter agrees.  Pt had [facility] and daughter would like to use them again. Referral sent in aidin.    Daughter states that family has been looking into getting pt and wife into [facility] [place]- pt in assisted and wife in memory care there.  Daughter states they need MD to complete admission form.  If [facility] would take pt directly from hospital, she is asking that hospitalist complete form. She will see if they will accept him right away especially since he has covid.    Peggy Andres LCSW  /Discharge Planner

## 2024-07-11 NOTE — PLAN OF CARE
Assumed care of pt at 0730. A/ox3/4 - forgetful at times, but very anxious. PRN dose of xanax administered. On rm air, SR w/ first degree block and frequent PVCs on tele. Reported slight back pain. Breath sounds diminished w/ crackles upon auscultation. Reports dry, non-productive cough. Edema present in bilateral legs, right > left. Up x1 assist to commode - had a BM this morning. Impaired vision - wears glasses.    Discussed POC w/ pt.    Problem: CARDIOVASCULAR - ADULT  Goal: Maintains optimal cardiac output and hemodynamic stability  Description: INTERVENTIONS:  - Monitor vital signs, rhythm, and trends  - Monitor for bleeding, hypotension and signs of decreased cardiac output  - Evaluate effectiveness of vasoactive medications to optimize hemodynamic stability  - Monitor arterial and/or venous puncture sites for bleeding and/or hematoma  - Assess quality of pulses, skin color and temperature  - Assess for signs of decreased coronary artery perfusion - ex. Angina  - Evaluate fluid balance, assess for edema, trend weights  Outcome: Progressing  Goal: Absence of cardiac arrhythmias or at baseline  Description: INTERVENTIONS:  - Continuous cardiac monitoring, monitor vital signs, obtain 12 lead EKG if indicated  - Evaluate effectiveness of antiarrhythmic and heart rate control medications as ordered  - Initiate emergency measures for life threatening arrhythmias  - Monitor electrolytes and administer replacement therapy as ordered  Outcome: Progressing     Problem: RESPIRATORY - ADULT  Goal: Achieves optimal ventilation and oxygenation  Description: INTERVENTIONS:  - Assess for changes in respiratory status  - Assess for changes in mentation and behavior  - Position to facilitate oxygenation and minimize respiratory effort  - Oxygen supplementation based on oxygen saturation or ABGs  - Provide Smoking Cessation handout, if applicable  - Encourage broncho-pulmonary hygiene including cough, deep breathe, Incentive  Spirometry  - Assess the need for suctioning and perform as needed  - Assess and instruct to report SOB or any respiratory difficulty  - Respiratory Therapy support as indicated  - Manage/alleviate anxiety  - Monitor for signs/symptoms of CO2 retention  Outcome: Progressing     Problem: Impaired Functional Mobility  Goal: Achieve highest/safest level of mobility/gait  Description: Interventions:  - Assess patient's functional ability and stability  - Promote increasing activity/tolerance for mobility and gait  - Educate and engage patient/family in tolerated activity level and precautions    Outcome: Progressing     Problem: Impaired Cognition  Goal: Patient will exhibit improved attention, thought processing and/or memory  Description: Interventions:  - Allow additional time for processing after asking questions or providing instructions  Outcome: Progressing     Problem: Impaired Communication  Goal: Patient will achieve maximal communication potential  Description: Interventions:  - Allow additional time for processing after asking questions or providing instructions  Outcome: Progressing     Problem: PAIN - ADULT  Goal: Verbalizes/displays adequate comfort level or patient's stated pain goal  Description: INTERVENTIONS:  - Encourage pt to monitor pain and request assistance  - Assess pain using appropriate pain scale  - Administer analgesics based on type and severity of pain and evaluate response  - Implement non-pharmacological measures as appropriate and evaluate response  - Consider cultural and social influences on pain and pain management  - Manage/alleviate anxiety  - Utilize distraction and/or relaxation techniques  - Monitor for opioid side effects  - Notify MD/LIP if interventions unsuccessful or patient reports new pain  - Anticipate increased pain with activity and pre-medicate as appropriate  Outcome: Progressing     Problem: SAFETY ADULT - FALL  Goal: Free from fall injury  Description:  INTERVENTIONS:  - Assess pt frequently for physical needs  - Identify cognitive and physical deficits and behaviors that affect risk of falls.  - [place] fall precautions as indicated by assessment.  - Educate pt/family on patient safety including physical limitations  - Instruct pt to call for assistance with activity based on assessment  - Modify environment to reduce risk of injury  - Provide assistive devices as appropriate  - Consider OT/PT consult to assist with strengthening/mobility  - Encourage toileting schedule  Outcome: Progressing     Problem: DISCHARGE PLANNING  Goal: Discharge to home or other facility with appropriate resources  Description: INTERVENTIONS:  - Identify barriers to discharge w/pt and caregiver  - Include patient/family/discharge partner in discharge planning  - Arrange for needed discharge resources and transportation as appropriate  - Identify discharge learning needs (meds, wound care, etc)  - Arrange for interpreters to assist at discharge as needed  - Consider post-discharge preferences of patient/family/discharge partner  - Complete POLST form as appropriate  - Assess patient's ability to be responsible for managing their own health  - Refer to Case Management Department for coordinating discharge planning if the patient needs post-hospital services based on physician/LIP order or complex needs related to functional status, cognitive ability or social support system  Outcome: Progressing     Problem: Patient/Family Goals  Goal: Patient/Family Long Term Goal  Description: Patient's Long Term Goal: Discharge adequate resources    Interventions:  - Assistance from CM/SW if needed  - See additional Care Plan goals for specific interventions  Outcome: Progressing  Goal: Patient/Family Short Term Goal  Description: Patient's Short Term Goal: Feel better     Interventions:   - PRN medications  - See additional Care Plan goals for specific interventions  Outcome: Progressing

## 2024-07-12 LAB
BASOPHILS # BLD AUTO: 0.01 X10(3) UL (ref 0–0.2)
BASOPHILS NFR BLD AUTO: 0.3 %
CREAT BLD-MCNC: 0.75 MG/DL
EGFRCR SERPLBLD CKD-EPI 2021: 89 ML/MIN/1.73M2 (ref 60–?)
EOSINOPHIL # BLD AUTO: 0.01 X10(3) UL (ref 0–0.7)
EOSINOPHIL NFR BLD AUTO: 0.3 %
ERYTHROCYTE [DISTWIDTH] IN BLOOD BY AUTOMATED COUNT: 13.4 %
HCT VFR BLD AUTO: 34.5 %
HGB BLD-MCNC: 11.7 G/DL
IMM GRANULOCYTES # BLD AUTO: 0.01 X10(3) UL (ref 0–1)
IMM GRANULOCYTES NFR BLD: 0.3 %
LYMPHOCYTES # BLD AUTO: 0.75 X10(3) UL (ref 1–4)
LYMPHOCYTES NFR BLD AUTO: 22 %
MCH RBC QN AUTO: 32.4 PG (ref 26–34)
MCHC RBC AUTO-ENTMCNC: 33.9 G/DL (ref 31–37)
MCV RBC AUTO: 95.6 FL
MONOCYTES # BLD AUTO: 0.33 X10(3) UL (ref 0.1–1)
MONOCYTES NFR BLD AUTO: 9.7 %
NEUTROPHILS # BLD AUTO: 2.3 X10 (3) UL (ref 1.5–7.7)
NEUTROPHILS # BLD AUTO: 2.3 X10(3) UL (ref 1.5–7.7)
NEUTROPHILS NFR BLD AUTO: 67.4 %
PLATELET # BLD AUTO: 88 10(3)UL (ref 150–450)
POTASSIUM SERPL-SCNC: 3.7 MMOL/L (ref 3.5–5.1)
RBC # BLD AUTO: 3.61 X10(6)UL
WBC # BLD AUTO: 3.4 X10(3) UL (ref 4–11)

## 2024-07-12 PROCEDURE — 97116 GAIT TRAINING THERAPY: CPT

## 2024-07-12 PROCEDURE — 82565 ASSAY OF CREATININE: CPT | Performed by: HOSPITALIST

## 2024-07-12 PROCEDURE — 99211 OFF/OP EST MAY X REQ PHY/QHP: CPT

## 2024-07-12 PROCEDURE — 97110 THERAPEUTIC EXERCISES: CPT

## 2024-07-12 PROCEDURE — 84132 ASSAY OF SERUM POTASSIUM: CPT | Performed by: INTERNAL MEDICINE

## 2024-07-12 PROCEDURE — 85025 COMPLETE CBC W/AUTO DIFF WBC: CPT | Performed by: HOSPITALIST

## 2024-07-12 RX ORDER — POTASSIUM CHLORIDE 20 MEQ/1
40 TABLET, EXTENDED RELEASE ORAL ONCE
Status: COMPLETED | OUTPATIENT
Start: 2024-07-12 | End: 2024-07-12

## 2024-07-12 NOTE — PHYSICAL THERAPY NOTE
PHYSICAL THERAPY TREATMENT NOTE - INPATIENT    Room Number: 8600/8600-A     Session: 1     Number of Visits to Meet Established Goals: 4    Presenting Problem: covid, CHF  Co-Morbidities : PD, lupus, anemia, MI, afib, HTN, CABG, L shoulder surgery    ASSESSMENT   Patient demonstrates good  progress this session, goals remain in progress.    Patient continues to function below baseline with transfers, gait, and stair negotiation. Contributing factors to remaining limitations include decreased functional strength, decreased endurance/aerobic capacity, pain, impaired standing balance, decreased muscular endurance, and medical status.  Next session anticipate patient to progress bed mobility, transfers, gait, and stair negotiation.  Physical Therapy will continue to follow patient for duration of hospitalization.    Patient continues to benefit from continued skilled PT services: at discharge to promote prior level of function and safety with additional support and return home with home health PT.    PLAN  PT Treatment Plan: Bed mobility;Endurance;Energy conservation;Patient education;Family education;Gait training;Strengthening;Transfer training;Balance training;Stair training;Stoop training  Rehab Potential : Good  Frequency (Obs): 3-5x/week    CURRENT GOALS     Goal #1 Patient is able to demonstrate supine - sit EOB @ level: supervision      Goal #2 Patient is able to demonstrate transfers Sit to/from Stand at assistance level: supervision      Goal #3 Patient is able to ambulate 75 feet with assist device: walker - rolling at assistance level: supervision      Goal #4     Goal #5     Goal #6     Goal Comments: Goals established on 7/10/2024     7/12/2024 all goals ongoing    SUBJECTIVE  \"Can you get this off me.\"  Re: TLSO    OBJECTIVE  Precautions: Bed/chair alarm    WEIGHT BEARING RESTRICTION  Weight Bearing Restriction: None                PAIN ASSESSMENT   Rating: Unable to rate  Location: abdomen  Management  Techniques: Activity promotion    BALANCE                                                                                                                       Static Sitting: Good  Dynamic Sitting: Fair +           Static Standing: Fair  Dynamic Standing: Fair -    ACTIVITY TOLERANCE                         O2 WALK         AM-PAC '6-Clicks' INPATIENT SHORT FORM - BASIC MOBILITY  How much difficulty does the patient currently have...  Patient Difficulty: Turning over in bed (including adjusting bedclothes, sheets and blankets)?: A Little   Patient Difficulty: Sitting down on and standing up from a chair with arms (e.g., wheelchair, bedside commode, etc.): A Little   Patient Difficulty: Moving from lying on back to sitting on the side of the bed?: A Little   How much help from another person does the patient currently need...   Help from Another: Moving to and from a bed to a chair (including a wheelchair)?: A Little   Help from Another: Need to walk in hospital room?: A Little   Help from Another: Climbing 3-5 steps with a railing?: A Lot       AM-PAC Score:  Raw Score: 17   Approx Degree of Impairment: 50.57%   Standardized Score (AM-PAC Scale): 42.13   CMS Modifier (G-Code): CK    FUNCTIONAL ABILITY STATUS  Gait Assessment   Functional Mobility/Gait Assessment  Gait Assistance: Contact guard assist  Distance (ft): 40  Assistive Device: Rolling walker  Pattern: Shuffle (flexed posture)    Skilled Therapy Provided    Bed Mobility:  Rolling: not teste   Supine<>Sit: not tested   Sit<>Supine: not tested     Transfer Mobility:  Sit<>Stand: CGA from bedside chair to RW   Stand<>Sit: CGA   Gait: pt amb within pt room x 40 feet with RW CGA, no LOB.    Therapist's Comments: per RN pt ok to be seen. Discussed with RN, TLSO noted at bedside.  No orders regarding TLSO.  Per RN pt has T8 compression fx.  Per pt, pt has had TLSO/fx x 1week.  Pt required max assist to don TLSO in sitting, adjusted in standing with RW. Pt  instructed in seated B LE ex for strength and endurance. Pt vitals monitored and stable throughout session on RA. Pt amb within pt room due to covid prec, x 40 feet with RW. Pt was seated back in bedside chair, reports SOB, O2 sat wNL.  Pt requesting this writer to doff TLSO while pt in chair.  RN notified of above.  Pt left in sitting with needs in reach and alarm set.      THERAPEUTIC EXERCISES  Lower Extremity Alternating marching  Ankle pumps  LAQ     Upper Extremity N/a     Position Sitting     Repetitions   10   Sets   1-2     Patient End of Session: Up in chair;Needs met;Call light within reach;RN aware of session/findings;All patient questions and concerns addressed;Alarm set    PT Session Time: 25 minutes  Gait Training: 10 minutes  Therapeutic Activity: 5 minutes  Therapeutic Exercise: 15 minutes

## 2024-07-12 NOTE — PROGRESS NOTES
[facility] Hospitalist Progress Note                                                                     [facility]   part of [facility]      Reginaldo Hdz  8/10/1940    SUBJECTIVE: Patient denies any chest pain, palpitations, shortness of breath, cough, nausea, vomiting, abdominal pain.    OBJECTIVE:  Temp:  [97.6 °F (36.4 °C)-98.6 °F (37 °C)] 98 °F (36.7 °C)  Pulse:  [61-84] 66  Resp:  [16-20] 20  BP: (129-174)/() 174/87  SpO2:  [95 %-100 %] 98 %  Exam  Gen: No acute distress, alert and oriented  Pulm: Lungs clear bilaterally, normal respiratory effort, no crackles, no wheezing  CV: Heart with regular rate and rhythm, no murmur.   Abd: Abdomen soft, nontender, nondistended, bowel sounds present  MSK: No significant pitting edema or tenderness of the LE  Skin: no new rashes or lesions    Labs:   Recent Labs   Lab 07/09/24  2255 07/10/24  1007 07/11/24  0501 07/12/24  0504   WBC 5.9 6.8 4.0 3.4*   HGB 10.8* 11.4* 11.1* 11.7*   MCV 94.3 96.6 94.2 95.6   PLT 75.0* 82.0* 88.0* 88.0*       Recent Labs   Lab 07/09/24  2255 07/10/24  0248 07/10/24  1007 07/11/24  0501 07/12/24  0504     --  138 141  --    K 3.8  --  3.4* 3.8  3.8  3.6 3.7     --  105 107  --    CO2 28.0  --  27.0 28.0  --    BUN 17  --  17 18  --    CREATSERUM 0.94 0.94 0.94 0.86 0.75   CA 8.6  --  8.6 8.5  --    MG  --   --  1.7 2.0  --    *  --  90 88  --        Recent Labs   Lab 07/09/24 2255 07/11/24  0501   ALT 30 49   * 203*   ALB 2.3* 2.2*       No results for input(s): \"PGLU\" in the last 168 hours.    Meds:   Scheduled:    hydroxychloroquine  200 mg Oral BID    furosemide  40 mg Intravenous Daily    guaiFENesin ER  600 mg Oral BID    acidophilus  1 capsule Oral Daily    carbidopa-levodopa  1.5 tablet Oral QID    rivaroxaban  20 mg Oral Daily with food    carbidopa-levodopa ER  1 tablet Oral BID    ezetimibe  10 mg Oral Daily    metoprolol succinate ER  50 mg Oral  Daily    pantoprazole  40 mg Oral Daily    atorvastatin  20 mg Oral Nightly    cetirizine  5 mg Oral Daily    aspirin  81 mg Oral Daily     Continuous Infusions:   PRN:   ALPRAZolam    acetaminophen    polyethylene glycol (PEG 3350)    sennosides    bisacodyl    fleet enema    ondansetron    metoclopramide    benzonatate    sodium chloride    HYDROcodone-acetaminophen    Assessment and Plan:     83 year old male from home with a PMHx sig for anemia, AFIB (xarelto), s/p pci, s/p cabg, GERD, h/o MI, HTN, HLD, lupus, PD, peripheral neuropathy presents to the hospital with fever, cough, and weakness.     COVID19 with shortness of breath  -progressively worsening cough x 2 days, fever 102 PTA, endorsed bilateral peripheral edema as well  -COVID + , no leukocytosis, mild fever  -CXR w bilateral pleural effusions and atelectasis  -sputum cx w 2+ normal resp dave, 3+WBCs, 1+gram pos rods, 2+ gram neg rods  -consult ID, will start remdesivir x 3 days given his immunosuppression --> stopped as off o2     Acute diastolic CHF exacerbation  Peripheral Edema R > L  -pro BNP elevated 1,591  -cardiology consulted, IV lasix (2.4 L urine output in 24 H), obtain echo (EF 60-65, mild PAH), trop normal x 3  -RLE doppler neg for DVT  -Continue Lasix for now, transition to p.o. diuretics when okay with cardiology--> stopped per cards today      Elevated AST  -, has been increasing this year previously 117 in 5/2024, he does take niacin as an outpatient which was initiated in 4/2022, his AST was normal in 2021   Monitor serial and AST's and LFTs  -Could be secondary to hepatic congestion, continue to monitor  -if elevated tomorrow will consult GI vs outpt GI eval     PD  -carbidopa-levodopa     HTN  AFIB  -metoprolol  -asa and xarelto continue for now     HLD  -zetia and statin     Gerd  -ppi     Lupus  -Plaquenil     MA/ACJANET Reach  -Re- Entry: no  -Consults: cards, ID  -Discharge Needs  -Appointments: PCP       WIL patrick in  am  -diet-cardiac     Prophy  -SCD  -xarelto     Dispo  -pending clinical course     PCP: MD Delvin Shelton MD  [facility]  [phone]    Addendum: cardiology stopped lasix. Will monitor off lasix and repeat LFT as uptrending. If all stable tomorrow will likely dc tomorrow

## 2024-07-12 NOTE — PLAN OF CARE
Patient is alert, oriented x4, but forgetful. On room air maintaining SPO@ >92%. On 2L fluid restriction. Purewick in place with moderate amount of  output. Edema noted on bilateral legs. No PRN meds given. Isolation maintained. At 0415, SBP of 160-170's. Notified provider- no new orders. Call light within reach. Bed on lower position. Kept monitored.       Problem: CARDIOVASCULAR - ADULT  Goal: Maintains optimal cardiac output and hemodynamic stability  Description: INTERVENTIONS:  - Monitor vital signs, rhythm, and trends  - Monitor for bleeding, hypotension and signs of decreased cardiac output  - Evaluate effectiveness of vasoactive medications to optimize hemodynamic stability  - Monitor arterial and/or venous puncture sites for bleeding and/or hematoma  - Assess quality of pulses, skin color and temperature  - Assess for signs of decreased coronary artery perfusion - ex. Angina  - Evaluate fluid balance, assess for edema, trend weights  Outcome: Progressing  Goal: Absence of cardiac arrhythmias or at baseline  Description: INTERVENTIONS:  - Continuous cardiac monitoring, monitor vital signs, obtain 12 lead EKG if indicated  - Evaluate effectiveness of antiarrhythmic and heart rate control medications as ordered  - Initiate emergency measures for life threatening arrhythmias  - Monitor electrolytes and administer replacement therapy as ordered  Outcome: Progressing     Problem: RESPIRATORY - ADULT  Goal: Achieves optimal ventilation and oxygenation  Description: INTERVENTIONS:  - Assess for changes in respiratory status  - Assess for changes in mentation and behavior  - Position to facilitate oxygenation and minimize respiratory effort  - Oxygen supplementation based on oxygen saturation or ABGs  - Provide Smoking Cessation handout, if applicable  - Encourage broncho-pulmonary hygiene including cough, deep breathe, Incentive Spirometry  - Assess the need for suctioning and perform as needed  - Assess and  instruct to report SOB or any respiratory difficulty  - Respiratory Therapy support as indicated  - Manage/alleviate anxiety  - Monitor for signs/symptoms of CO2 retention  Outcome: Progressing     Problem: Impaired Cognition  Goal: Patient will exhibit improved attention, thought processing and/or memory  Description: Interventions:  - Allow additional time for processing after asking questions or providing instructions  Outcome: Progressing     Problem: PAIN - ADULT  Goal: Verbalizes/displays adequate comfort level or patient's stated pain goal  Description: INTERVENTIONS:  - Encourage pt to monitor pain and request assistance  - Assess pain using appropriate pain scale  - Administer analgesics based on type and severity of pain and evaluate response  - Implement non-pharmacological measures as appropriate and evaluate response  - Consider cultural and social influences on pain and pain management  - Manage/alleviate anxiety  - Utilize distraction and/or relaxation techniques  - Monitor for opioid side effects  - Notify MD/LIP if interventions unsuccessful or patient reports new pain  - Anticipate increased pain with activity and pre-medicate as appropriate  Outcome: Progressing     Problem: SAFETY ADULT - FALL  Goal: Free from fall injury  Description: INTERVENTIONS:  - Assess pt frequently for physical needs  - Identify cognitive and physical deficits and behaviors that affect risk of falls.  - [place] fall precautions as indicated by assessment.  - Educate pt/family on patient safety including physical limitations  - Instruct pt to call for assistance with activity based on assessment  - Modify environment to reduce risk of injury  - Provide assistive devices as appropriate  - Consider OT/PT consult to assist with strengthening/mobility  - Encourage toileting schedule  Outcome: Progressing

## 2024-07-12 NOTE — CM/SW NOTE
Saulo sent completed Medical Application for [facility] to [facility].    TAMIKA SoniW  /Discharge Planner

## 2024-07-12 NOTE — CM/SW NOTE
Sw spoke to daughter today.  The soonest that pt could get into [facility] would be Monday.  She does have 24/7 caregivers for her mother right now.  Plan will be for pt to dc home with [facility] and then family will coordinate move into [facility] from home.    Await clearance for dc.      Peggy Andres LCSW  /Discharge Planner

## 2024-07-12 NOTE — PLAN OF CARE
Assumed care of pt at 0730. A/ox3/4 - forgetful at times, anxious again this morning. PRN dose of xanax administered. On rm air, SR w/ first degree block and frequent PVCs on tele. Reported slight back pain and slight right flank pain. Breath sounds diminished upon auscultation. Reports dry, non-productive cough. Up x1 assist & walker. Impaired vision - wears glasses.     Discussed POC w/ pt.    1211: paged cardiology NP asking if patient cleared for discharge from their standpoint. No response received as of 142    Problem: CARDIOVASCULAR - ADULT  Goal: Maintains optimal cardiac output and hemodynamic stability  Description: INTERVENTIONS:  - Monitor vital signs, rhythm, and trends  - Monitor for bleeding, hypotension and signs of decreased cardiac output  - Evaluate effectiveness of vasoactive medications to optimize hemodynamic stability  - Monitor arterial and/or venous puncture sites for bleeding and/or hematoma  - Assess quality of pulses, skin color and temperature  - Assess for signs of decreased coronary artery perfusion - ex. Angina  - Evaluate fluid balance, assess for edema, trend weights  Outcome: Progressing  Goal: Absence of cardiac arrhythmias or at baseline  Description: INTERVENTIONS:  - Continuous cardiac monitoring, monitor vital signs, obtain 12 lead EKG if indicated  - Evaluate effectiveness of antiarrhythmic and heart rate control medications as ordered  - Initiate emergency measures for life threatening arrhythmias  - Monitor electrolytes and administer replacement therapy as ordered  Outcome: Progressing     Problem: RESPIRATORY - ADULT  Goal: Achieves optimal ventilation and oxygenation  Description: INTERVENTIONS:  - Assess for changes in respiratory status  - Assess for changes in mentation and behavior  - Position to facilitate oxygenation and minimize respiratory effort  - Oxygen supplementation based on oxygen saturation or ABGs  - Provide Smoking Cessation handout, if applicable  -  Encourage broncho-pulmonary hygiene including cough, deep breathe, Incentive Spirometry  - Assess the need for suctioning and perform as needed  - Assess and instruct to report SOB or any respiratory difficulty  - Respiratory Therapy support as indicated  - Manage/alleviate anxiety  - Monitor for signs/symptoms of CO2 retention  Outcome: Progressing     Problem: Impaired Functional Mobility  Goal: Achieve highest/safest level of mobility/gait  Description: Interventions:  - Assess patient's functional ability and stability  - Promote increasing activity/tolerance for mobility and gait  - Educate and engage patient/family in tolerated activity level and precautions    Outcome: Progressing     Problem: Impaired Cognition  Goal: Patient will exhibit improved attention, thought processing and/or memory  Description: Interventions:  - Allow additional time for processing after asking questions or providing instructions  Outcome: Progressing     Problem: Impaired Communication  Goal: Patient will achieve maximal communication potential  Description: Interventions:  - Allow additional time for processing after asking questions or providing instructions  Outcome: Progressing     Problem: PAIN - ADULT  Goal: Verbalizes/displays adequate comfort level or patient's stated pain goal  Description: INTERVENTIONS:  - Encourage pt to monitor pain and request assistance  - Assess pain using appropriate pain scale  - Administer analgesics based on type and severity of pain and evaluate response  - Implement non-pharmacological measures as appropriate and evaluate response  - Consider cultural and social influences on pain and pain management  - Manage/alleviate anxiety  - Utilize distraction and/or relaxation techniques  - Monitor for opioid side effects  - Notify MD/LIP if interventions unsuccessful or patient reports new pain  - Anticipate increased pain with activity and pre-medicate as appropriate  Outcome: Progressing      Problem: SAFETY ADULT - FALL  Goal: Free from fall injury  Description: INTERVENTIONS:  - Assess pt frequently for physical needs  - Identify cognitive and physical deficits and behaviors that affect risk of falls.  - [place] fall precautions as indicated by assessment.  - Educate pt/family on patient safety including physical limitations  - Instruct pt to call for assistance with activity based on assessment  - Modify environment to reduce risk of injury  - Provide assistive devices as appropriate  - Consider OT/PT consult to assist with strengthening/mobility  - Encourage toileting schedule  Outcome: Progressing     Problem: DISCHARGE PLANNING  Goal: Discharge to home or other facility with appropriate resources  Description: INTERVENTIONS:  - Identify barriers to discharge w/pt and caregiver  - Include patient/family/discharge partner in discharge planning  - Arrange for needed discharge resources and transportation as appropriate  - Identify discharge learning needs (meds, wound care, etc)  - Arrange for interpreters to assist at discharge as needed  - Consider post-discharge preferences of patient/family/discharge partner  - Complete POLST form as appropriate  - Assess patient's ability to be responsible for managing their own health  - Refer to Case Management Department for coordinating discharge planning if the patient needs post-hospital services based on physician/LIP order or complex needs related to functional status, cognitive ability or social support system  Outcome: Progressing     Problem: Patient/Family Goals  Goal: Patient/Family Long Term Goal  Description: Patient's Long Term Goal: Discharge adequate resources    Interventions:  - Assistance from CM/SW if needed  - See additional Care Plan goals for specific interventions  Outcome: Progressing  Goal: Patient/Family Short Term Goal  Description: Patient's Short Term Goal: Feel better     Interventions:   - PRN medications  - See additional  Care Plan goals for specific interventions  Outcome: Progressing     Problem: Diabetes/Glucose Control  Goal: Glucose maintained within prescribed range  Description: INTERVENTIONS:  - Monitor Blood Glucose as ordered  - Assess for signs and symptoms of hyperglycemia and hypoglycemia  - Administer ordered medications to maintain glucose within target range  - Assess barriers to adequate nutritional intake and initiate nutrition consult as needed  - Instruct patient on self management of diabetes  Outcome: Progressing

## 2024-07-12 NOTE — PROGRESS NOTES
[facility] Cardiology Progress Note        Reginaldo dHz Patient Status:  Inpatient    8/10/1940 MRN [id number]   [facility] 8NE-A Attending Stevenson Dowell MD   Hosp Day # 2 PCP Olvin Dinh MD     Subjective:  The patient denies  chest pain and shortness of breath.    Medications:   hydroxychloroquine  200 mg Oral BID    furosemide  40 mg Intravenous Daily    guaiFENesin ER  600 mg Oral BID    acidophilus  1 capsule Oral Daily    carbidopa-levodopa  1.5 tablet Oral QID    rivaroxaban  20 mg Oral Daily with food    carbidopa-levodopa ER  1 tablet Oral BID    ezetimibe  10 mg Oral Daily    metoprolol succinate ER  50 mg Oral Daily    pantoprazole  40 mg Oral Daily    atorvastatin  20 mg Oral Nightly    cetirizine  5 mg Oral Daily    aspirin  81 mg Oral Daily       Continuous Infusions:        Allergies:  Allergies   Allergen Reactions    Inderal [Propranolol] NAUSEA AND VOMITING and UNKNOWN    Isosorbide NAUSEA AND VOMITING         Objective:        Intake/Output:      Intake/Output Summary (Last 24 hours) at 2024 0855  Last data filed at 2024 0815  Gross per 24 hour   Intake 85 ml   Output 3250 ml   Net -3165 ml     Wt Readings from Last 3 Encounters:   24 144 lb (65.3 kg)   24 170 lb (77.1 kg)   24 165 lb (74.8 kg)       Physical Exam:        Vitals:    24 0424 24 0500 24 0700 24 0822   BP:  (!) 174/87  (!) 165/78   BP Location:    Left arm   Pulse:  66 76 74   Resp:    20   Temp:    98 °F (36.7 °C)   TempSrc:    Oral   SpO2:  98% 98% 100%   Weight: 144 lb (65.3 kg)      Height:           Temp:  [97.6 °F (36.4 °C)-98.6 °F (37 °C)] 98 °F (36.7 °C)  Pulse:  [61-84] 74  Resp:  [16-20] 20  BP: (140-174)/() 165/78  SpO2:  [95 %-100 %] 100 %      Temp: 98 °F (36.7 °C)  Pulse: 74  Resp: 20  BP: 165/78  General:  Appears comfortable  HEENT: No focal deficits.  Neck: No JVD, carotids 2+ no bruits.  Cardiac: Regular S1S2.   No S3, S4, rub, click.  No murmur.  Lungs: clear  Abdomen: Soft, non-tender.   Extremities: No LE edema.  No clubbing or cyanosis.    Neurologic: Alert and oriented, normal affect.  Skin: Warm and dry.           LABS:      HEM:  Recent Labs   Lab 07/09/24  2255 07/10/24  1007 07/11/24  0501 07/12/24  0504   WBC 5.9 6.8 4.0 3.4*   HGB 10.8* 11.4* 11.1* 11.7*   HCT 31.2* 33.9* 32.5* 34.5*   PLT 75.0* 82.0* 88.0* 88.0*       Chem:  Recent Labs   Lab 07/09/24  2255 07/10/24  0248 07/10/24  1007 07/11/24  0501 07/12/24  0504     --  138 141  --    K 3.8  --  3.4* 3.8  3.8  3.6 3.7     --  105 107  --    CO2 28.0  --  27.0 28.0  --    BUN 17  --  17 18  --    CREATSERUM 0.94 0.94 0.94 0.86 0.75   CA 8.6  --  8.6 8.5  --    MG  --   --  1.7 2.0  --    *  --  90 88  --        Recent Labs   Lab 07/09/24 2255 07/11/24  0501   ALT 30 49   * 203*   ALB 2.3* 2.2*       No results for input(s): \"PT\", \"PTT\", \"INR\" in the last 168 hours.        Lab Results   Component Value Date    TROP <0.045 12/06/2019    TROP <0.045 12/05/2019    TROP <0.045 12/05/2019         Invalid input(s): \"PBNPML\"                       Diagnostics:          Telemetry:      Laboratories and Data:  Diagnostics:     EKG, 7/10/2024:  NSR, FAV, PAC's, ASMI     CXR, 7/9/2024:    Impression   CONCLUSION:  Bilateral pleural effusions and bibasilar atelectasis.          echo, 7/10/24:    1. Left ventricle: The cavity size was normal. Wall thickness was normal.      Systolic function was normal. The estimated ejection fraction was 60-65%,      by visual assessment. Unable to assess LV diastolic function due to heart      rhythm.   2. Left atrium: The left atrial volume was moderately increased.   3. Aortic root: The aortic root was 4.0cm diameter. The aortic root was      mildly dilated.   4. Pulmonary arteries: Systolic pressure was mildly increased, in the range      of 35mm Hg to 40mm Hg. Estimated pulmonary artery diastolic  pressure was      15mm Hg.         Impression:     1.  Covid (fatigue, fever).     2. Volume o/l. Suspect an element of CHF.  Seems better post lasix    -     Elevated BNP to 1591.     -     net out = 5.6 liters    3. CAD, s/p CABG 1984    -          Normal troponins x 2    4. Paroxysmal Atrial Fibrillation.  NSR today.  On DOAC  5. Hypertension   6. PD  7. SLE        Recommend:     1.  Telemetry  2. Stop IV lasix following this am's dose  3. Follow I/O's, weights,  4. Echo for LV fct shows LVEF = 60-65%  6. Continue op meds       Magdaleno Do MD

## 2024-07-13 LAB
ALBUMIN SERPL-MCNC: 2.3 G/DL (ref 3.4–5)
ALBUMIN/GLOB SERPL: 0.5 {RATIO} (ref 1–2)
ALP LIVER SERPL-CCNC: 216 U/L
ALT SERPL-CCNC: 33 U/L
ANION GAP SERPL CALC-SCNC: 3 MMOL/L (ref 0–18)
AST SERPL-CCNC: 210 U/L (ref 15–37)
BASOPHILS # BLD AUTO: 0.02 X10(3) UL (ref 0–0.2)
BASOPHILS NFR BLD AUTO: 0.5 %
BILIRUB SERPL-MCNC: 0.6 MG/DL (ref 0.1–2)
BUN BLD-MCNC: 17 MG/DL (ref 9–23)
CALCIUM BLD-MCNC: 8.9 MG/DL (ref 8.5–10.1)
CHLORIDE SERPL-SCNC: 106 MMOL/L (ref 98–112)
CO2 SERPL-SCNC: 28 MMOL/L (ref 21–32)
CREAT BLD-MCNC: 0.78 MG/DL
DEPRECATED HBV CORE AB SER IA-ACNC: 350.7 NG/ML
EGFRCR SERPLBLD CKD-EPI 2021: 88 ML/MIN/1.73M2 (ref 60–?)
EOSINOPHIL # BLD AUTO: 0.03 X10(3) UL (ref 0–0.7)
EOSINOPHIL NFR BLD AUTO: 0.8 %
ERYTHROCYTE [DISTWIDTH] IN BLOOD BY AUTOMATED COUNT: 13.3 %
GLOBULIN PLAS-MCNC: 4.6 G/DL (ref 2.8–4.4)
GLUCOSE BLD-MCNC: 100 MG/DL (ref 70–99)
HCT VFR BLD AUTO: 34.3 %
HGB BLD-MCNC: 11.8 G/DL
IMM GRANULOCYTES # BLD AUTO: 0.02 X10(3) UL (ref 0–1)
IMM GRANULOCYTES NFR BLD: 0.5 %
IRON SATN MFR SERPL: 19 %
IRON SERPL-MCNC: 43 UG/DL
LYMPHOCYTES # BLD AUTO: 0.74 X10(3) UL (ref 1–4)
LYMPHOCYTES NFR BLD AUTO: 20 %
MAGNESIUM SERPL-MCNC: 1.9 MG/DL (ref 1.6–2.6)
MCH RBC QN AUTO: 32.8 PG (ref 26–34)
MCHC RBC AUTO-ENTMCNC: 34.4 G/DL (ref 31–37)
MCV RBC AUTO: 95.3 FL
MONOCYTES # BLD AUTO: 0.34 X10(3) UL (ref 0.1–1)
MONOCYTES NFR BLD AUTO: 9.2 %
NEUTROPHILS # BLD AUTO: 2.55 X10 (3) UL (ref 1.5–7.7)
NEUTROPHILS # BLD AUTO: 2.55 X10(3) UL (ref 1.5–7.7)
NEUTROPHILS NFR BLD AUTO: 69 %
OSMOLALITY SERPL CALC.SUM OF ELEC: 286 MOSM/KG (ref 275–295)
PLATELET # BLD AUTO: 106 10(3)UL (ref 150–450)
POTASSIUM SERPL-SCNC: 3.8 MMOL/L (ref 3.5–5.1)
POTASSIUM SERPL-SCNC: 3.8 MMOL/L (ref 3.5–5.1)
PROT SERPL-MCNC: 6.9 G/DL (ref 6.4–8.2)
RBC # BLD AUTO: 3.6 X10(6)UL
SODIUM SERPL-SCNC: 137 MMOL/L (ref 136–145)
TIBC SERPL-MCNC: 224 UG/DL (ref 240–450)
TRANSFERRIN SERPL-MCNC: 150 MG/DL (ref 200–360)
WBC # BLD AUTO: 3.7 X10(3) UL (ref 4–11)

## 2024-07-13 PROCEDURE — 93010 ELECTROCARDIOGRAM REPORT: CPT | Performed by: INTERNAL MEDICINE

## 2024-07-13 PROCEDURE — 80053 COMPREHEN METABOLIC PANEL: CPT | Performed by: HOSPITALIST

## 2024-07-13 PROCEDURE — 82728 ASSAY OF FERRITIN: CPT | Performed by: INTERNAL MEDICINE

## 2024-07-13 PROCEDURE — 84132 ASSAY OF SERUM POTASSIUM: CPT | Performed by: INTERNAL MEDICINE

## 2024-07-13 PROCEDURE — 83735 ASSAY OF MAGNESIUM: CPT | Performed by: HOSPITALIST

## 2024-07-13 PROCEDURE — 83550 IRON BINDING TEST: CPT | Performed by: INTERNAL MEDICINE

## 2024-07-13 PROCEDURE — 85025 COMPLETE CBC W/AUTO DIFF WBC: CPT | Performed by: HOSPITALIST

## 2024-07-13 PROCEDURE — 83540 ASSAY OF IRON: CPT | Performed by: INTERNAL MEDICINE

## 2024-07-13 PROCEDURE — 93005 ELECTROCARDIOGRAM TRACING: CPT

## 2024-07-13 RX ORDER — POTASSIUM CHLORIDE 20 MEQ/1
40 TABLET, EXTENDED RELEASE ORAL ONCE
Status: COMPLETED | OUTPATIENT
Start: 2024-07-13 | End: 2024-07-13

## 2024-07-13 NOTE — PLAN OF CARE
Received report on this Pt with bedside rounding at 0720. Pt A&Ox4, can be forgetful at times.  Pt is in SR with 1st degree AVB on Tele monitor, sats greater than 92% on RA.  PRN Norco administered per Pt request for abdominal pain, effective, Dr. Arevalo notified, orders received.  GI Dr. Thorne consulted. Plan for Pt to be NPO after midnight and Ultrasound in AM.  Potassium 3.8, replaced per protocol. POC discussed with Pt and his questions answered. Call light is in reach, bed alarm is on for safety.  Pt turned and repositioned, declined to get out of bed and in chair.  Will continue POC.     Problem: CARDIOVASCULAR - ADULT  Goal: Maintains optimal cardiac output and hemodynamic stability  Description: INTERVENTIONS:  - Monitor vital signs, rhythm, and trends  - Monitor for bleeding, hypotension and signs of decreased cardiac output  - Evaluate effectiveness of vasoactive medications to optimize hemodynamic stability  - Monitor arterial and/or venous puncture sites for bleeding and/or hematoma  - Assess quality of pulses, skin color and temperature  - Assess for signs of decreased coronary artery perfusion - ex. Angina  - Evaluate fluid balance, assess for edema, trend weights  Outcome: Progressing  Goal: Absence of cardiac arrhythmias or at baseline  Description: INTERVENTIONS:  - Continuous cardiac monitoring, monitor vital signs, obtain 12 lead EKG if indicated  - Evaluate effectiveness of antiarrhythmic and heart rate control medications as ordered  - Initiate emergency measures for life threatening arrhythmias  - Monitor electrolytes and administer replacement therapy as ordered  Outcome: Progressing     Problem: RESPIRATORY - ADULT  Goal: Achieves optimal ventilation and oxygenation  Description: INTERVENTIONS:  - Assess for changes in respiratory status  - Assess for changes in mentation and behavior  - Position to facilitate oxygenation and minimize respiratory effort  - Oxygen supplementation based on  oxygen saturation or ABGs  - Provide Smoking Cessation handout, if applicable  - Encourage broncho-pulmonary hygiene including cough, deep breathe, Incentive Spirometry  - Assess the need for suctioning and perform as needed  - Assess and instruct to report SOB or any respiratory difficulty  - Respiratory Therapy support as indicated  - Manage/alleviate anxiety  - Monitor for signs/symptoms of CO2 retention  Outcome: Progressing     Problem: Impaired Functional Mobility  Goal: Achieve highest/safest level of mobility/gait  Description: Interventions:  - Assess patient's functional ability and stability  - Promote increasing activity/tolerance for mobility and gait  - Educate and engage patient/family in tolerated activity level and precautions    Outcome: Progressing     Problem: Impaired Cognition  Goal: Patient will exhibit improved attention, thought processing and/or memory  Description: Interventions:  - Allow additional time for processing after asking questions or providing instructions  Outcome: Progressing     Problem: PAIN - ADULT  Goal: Verbalizes/displays adequate comfort level or patient's stated pain goal  Description: INTERVENTIONS:  - Encourage pt to monitor pain and request assistance  - Assess pain using appropriate pain scale  - Administer analgesics based on type and severity of pain and evaluate response  - Implement non-pharmacological measures as appropriate and evaluate response  - Consider cultural and social influences on pain and pain management  - Manage/alleviate anxiety  - Utilize distraction and/or relaxation techniques  - Monitor for opioid side effects  - Notify MD/LIP if interventions unsuccessful or patient reports new pain  - Anticipate increased pain with activity and pre-medicate as appropriate  Outcome: Progressing     Problem: SAFETY ADULT - FALL  Goal: Free from fall injury  Description: INTERVENTIONS:  - Assess pt frequently for physical needs  - Identify cognitive and  physical deficits and behaviors that affect risk of falls.  - [place] fall precautions as indicated by assessment.  - Educate pt/family on patient safety including physical limitations  - Instruct pt to call for assistance with activity based on assessment  - Modify environment to reduce risk of injury  - Provide assistive devices as appropriate  - Consider OT/PT consult to assist with strengthening/mobility  - Encourage toileting schedule  Outcome: Progressing     Problem: Patient/Family Goals  Goal: Patient/Family Long Term Goal  Description: Patient's Long Term Goal: Discharge adequate resources    Interventions:  - Assistance from CM/SW if needed  - See additional Care Plan goals for specific interventions  Outcome: Progressing  Goal: Patient/Family Short Term Goal  Description: Patient's Short Term Goal: Feel better     Interventions:   - PRN medications  - See additional Care Plan goals for specific interventions  Outcome: Progressing

## 2024-07-13 NOTE — PLAN OF CARE
Assumed care of pt at 1930 on 7/12  A/Ox4, forgetful at times, easily redirected  Room air w/ adequate o2 sats.   NSR w/ 1st degree block on tele. No cardiac symptoms.   Continent of bladder and bowel. Voiding per primofit for accurate I/Os.   Possible discharge 7/13.   Fall and isolation precautions in place. Call light in reach. Pt updated on plan of care.       Problem: CARDIOVASCULAR - ADULT  Goal: Maintains optimal cardiac output and hemodynamic stability  Description: INTERVENTIONS:  - Monitor vital signs, rhythm, and trends  - Monitor for bleeding, hypotension and signs of decreased cardiac output  - Evaluate effectiveness of vasoactive medications to optimize hemodynamic stability  - Monitor arterial and/or venous puncture sites for bleeding and/or hematoma  - Assess quality of pulses, skin color and temperature  - Assess for signs of decreased coronary artery perfusion - ex. Angina  - Evaluate fluid balance, assess for edema, trend weights  Outcome: Progressing  Goal: Absence of cardiac arrhythmias or at baseline  Description: INTERVENTIONS:  - Continuous cardiac monitoring, monitor vital signs, obtain 12 lead EKG if indicated  - Evaluate effectiveness of antiarrhythmic and heart rate control medications as ordered  - Initiate emergency measures for life threatening arrhythmias  - Monitor electrolytes and administer replacement therapy as ordered  Outcome: Progressing     Problem: RESPIRATORY - ADULT  Goal: Achieves optimal ventilation and oxygenation  Description: INTERVENTIONS:  - Assess for changes in respiratory status  - Assess for changes in mentation and behavior  - Position to facilitate oxygenation and minimize respiratory effort  - Oxygen supplementation based on oxygen saturation or ABGs  - Provide Smoking Cessation handout, if applicable  - Encourage broncho-pulmonary hygiene including cough, deep breathe, Incentive Spirometry  - Assess the need for suctioning and perform as needed  - Assess and  instruct to report SOB or any respiratory difficulty  - Respiratory Therapy support as indicated  - Manage/alleviate anxiety  - Monitor for signs/symptoms of CO2 retention  Outcome: Progressing     Problem: Impaired Functional Mobility  Goal: Achieve highest/safest level of mobility/gait  Description: Interventions:  - Assess patient's functional ability and stability  - Promote increasing activity/tolerance for mobility and gait  - Educate and engage patient/family in tolerated activity level and precautions    Outcome: Progressing     Problem: Impaired Cognition  Goal: Patient will exhibit improved attention, thought processing and/or memory  Description: Interventions:  - Allow additional time for processing after asking questions or providing instructions  Outcome: Progressing     Problem: Impaired Communication  Goal: Patient will achieve maximal communication potential  Description: Interventions:  - Allow additional time for processing after asking questions or providing instructions  Outcome: Progressing     Problem: PAIN - ADULT  Goal: Verbalizes/displays adequate comfort level or patient's stated pain goal  Description: INTERVENTIONS:  - Encourage pt to monitor pain and request assistance  - Assess pain using appropriate pain scale  - Administer analgesics based on type and severity of pain and evaluate response  - Implement non-pharmacological measures as appropriate and evaluate response  - Consider cultural and social influences on pain and pain management  - Manage/alleviate anxiety  - Utilize distraction and/or relaxation techniques  - Monitor for opioid side effects  - Notify MD/LIP if interventions unsuccessful or patient reports new pain  - Anticipate increased pain with activity and pre-medicate as appropriate  Outcome: Progressing     Problem: SAFETY ADULT - FALL  Goal: Free from fall injury  Description: INTERVENTIONS:  - Assess pt frequently for physical needs  - Identify cognitive and physical  deficits and behaviors that affect risk of falls.  - [place] fall precautions as indicated by assessment.  - Educate pt/family on patient safety including physical limitations  - Instruct pt to call for assistance with activity based on assessment  - Modify environment to reduce risk of injury  - Provide assistive devices as appropriate  - Consider OT/PT consult to assist with strengthening/mobility  - Encourage toileting schedule  Outcome: Progressing     Problem: DISCHARGE PLANNING  Goal: Discharge to home or other facility with appropriate resources  Description: INTERVENTIONS:  - Identify barriers to discharge w/pt and caregiver  - Include patient/family/discharge partner in discharge planning  - Arrange for needed discharge resources and transportation as appropriate  - Identify discharge learning needs (meds, wound care, etc)  - Arrange for interpreters to assist at discharge as needed  - Consider post-discharge preferences of patient/family/discharge partner  - Complete POLST form as appropriate  - Assess patient's ability to be responsible for managing their own health  - Refer to Case Management Department for coordinating discharge planning if the patient needs post-hospital services based on physician/LIP order or complex needs related to functional status, cognitive ability or social support system  Outcome: Progressing     Problem: Patient/Family Goals  Goal: Patient/Family Long Term Goal  Description: Patient's Long Term Goal: Discharge adequate resources    Interventions:  - Assistance from CM/SW if needed  - See additional Care Plan goals for specific interventions  Outcome: Progressing  Goal: Patient/Family Short Term Goal  Description: Patient's Short Term Goal: Feel better     Interventions:   - PRN medications  - See additional Care Plan goals for specific interventions  Outcome: Progressing     Problem: Diabetes/Glucose Control  Goal: Glucose maintained within prescribed range  Description:  INTERVENTIONS:  - Monitor Blood Glucose as ordered  - Assess for signs and symptoms of hyperglycemia and hypoglycemia  - Administer ordered medications to maintain glucose within target range  - Assess barriers to adequate nutritional intake and initiate nutrition consult as needed  - Instruct patient on self management of diabetes  Outcome: Progressing     Problem: CARDIOVASCULAR - ADULT  Goal: Maintains optimal cardiac output and hemodynamic stability  Description: INTERVENTIONS:  - Monitor vital signs, rhythm, and trends  - Monitor for bleeding, hypotension and signs of decreased cardiac output  - Evaluate effectiveness of vasoactive medications to optimize hemodynamic stability  - Monitor arterial and/or venous puncture sites for bleeding and/or hematoma  - Assess quality of pulses, skin color and temperature  - Assess for signs of decreased coronary artery perfusion - ex. Angina  - Evaluate fluid balance, assess for edema, trend weights  Outcome: Progressing  Goal: Absence of cardiac arrhythmias or at baseline  Description: INTERVENTIONS:  - Continuous cardiac monitoring, monitor vital signs, obtain 12 lead EKG if indicated  - Evaluate effectiveness of antiarrhythmic and heart rate control medications as ordered  - Initiate emergency measures for life threatening arrhythmias  - Monitor electrolytes and administer replacement therapy as ordered  Outcome: Progressing     Problem: RESPIRATORY - ADULT  Goal: Achieves optimal ventilation and oxygenation  Description: INTERVENTIONS:  - Assess for changes in respiratory status  - Assess for changes in mentation and behavior  - Position to facilitate oxygenation and minimize respiratory effort  - Oxygen supplementation based on oxygen saturation or ABGs  - Provide Smoking Cessation handout, if applicable  - Encourage broncho-pulmonary hygiene including cough, deep breathe, Incentive Spirometry  - Assess the need for suctioning and perform as needed  - Assess and  deficits and behaviors that affect risk of falls.  - [place] fall precautions as indicated by assessment.  - Educate pt/family on patient safety including physical limitations  - Instruct pt to call for assistance with activity based on assessment  - Modify environment to reduce risk of injury  - Provide assistive devices as appropriate  - Consider OT/PT consult to assist with strengthening/mobility  - Encourage toileting schedule  Outcome: Progressing     Problem: DISCHARGE PLANNING  Goal: Discharge to home or other facility with appropriate resources  Description: INTERVENTIONS:  - Identify barriers to discharge w/pt and caregiver  - Include patient/family/discharge partner in discharge planning  - Arrange for needed discharge resources and transportation as appropriate  - Identify discharge learning needs (meds, wound care, etc)  - Arrange for interpreters to assist at discharge as needed  - Consider post-discharge preferences of patient/family/discharge partner  - Complete POLST form as appropriate  - Assess patient's ability to be responsible for managing their own health  - Refer to Case Management Department for coordinating discharge planning if the patient needs post-hospital services based on physician/LIP order or complex needs related to functional status, cognitive ability or social support system  Outcome: Progressing     Problem: Patient/Family Goals  Goal: Patient/Family Long Term Goal  Description: Patient's Long Term Goal: Discharge adequate resources    Interventions:  - Assistance from CM/SW if needed  - See additional Care Plan goals for specific interventions  Outcome: Progressing  Goal: Patient/Family Short Term Goal  Description: Patient's Short Term Goal: Feel better     Interventions:   - PRN medications  - See additional Care Plan goals for specific interventions  Outcome: Progressing     Problem: Diabetes/Glucose Control  Goal: Glucose maintained within prescribed range  Description:  INTERVENTIONS:  - Monitor Blood Glucose as ordered  - Assess for signs and symptoms of hyperglycemia and hypoglycemia  - Administer ordered medications to maintain glucose within target range  - Assess barriers to adequate nutritional intake and initiate nutrition consult as needed  - Instruct patient on self management of diabetes  Outcome: Progressing

## 2024-07-13 NOTE — PROGRESS NOTES
[facility] Hospitalist Progress Note                                                                     [facility] [place]   [facility] of [facility]      Reginaldo Hdz  8/10/1940    SUBJECTIVE: Patient denies any chest pain, palpitations, shortness of breath, cough, nausea, vomiting, abdominal pain.    OBJECTIVE:  Temp:  [97.6 °F (36.4 °C)-98 °F (36.7 °C)] 97.9 °F (36.6 °C)  Pulse:  [64-80] 67  Resp:  [16-20] 18  BP: (117-165)/(70-95) 160/76  SpO2:  [80 %-100 %] 80 %  Exam  Gen: No acute distress, alert and oriented  Pulm: Lungs clear bilaterally, normal respiratory effort, no crackles, no wheezing  CV: Heart with regular rate and rhythm, no murmur.   Abd: Abdomen soft, nontender, nondistended, bowel sounds present  MSK: No significant pitting edema or tenderness of the LE  Skin: no new rashes or lesions    Labs:   Recent Labs   Lab 07/09/24  2255 07/10/24  1007 07/11/24  0501 07/12/24  0504 07/13/24  0734   WBC 5.9 6.8 4.0 3.4* 3.7*   HGB 10.8* 11.4* 11.1* 11.7* 11.8*   MCV 94.3 96.6 94.2 95.6 95.3   PLT 75.0* 82.0* 88.0* 88.0* 106.0*       Recent Labs   Lab 07/09/24  2255 07/10/24  0248 07/10/24  1007 07/11/24  0501 07/12/24  0504 07/13/24  0734     --  138 141  --  137   K 3.8  --  3.4* 3.8  3.8  3.6 3.7 3.8  3.8     --  105 107  --  106   CO2 28.0  --  27.0 28.0  --  28.0   BUN 17  --  17 18  --  17   CREATSERUM 0.94 0.94 0.94 0.86 0.75 0.78   CA 8.6  --  8.6 8.5  --  8.9   MG  --   --  1.7 2.0  --  1.9   *  --  90 88  --  100*       Recent Labs   Lab 07/09/24  2255 07/11/24  0501 07/13/24  0734   ALT 30 49 33   * 203* 210*   ALB 2.3* 2.2* 2.3*       No results for input(s): \"PGLU\" in the last 168 hours.    Meds:   Scheduled:    hydroxychloroquine  200 mg Oral BID    guaiFENesin ER  600 mg Oral BID    acidophilus  1 capsule Oral Daily    carbidopa-levodopa  1.5 tablet Oral QID    rivaroxaban  20 mg Oral Daily with food    carbidopa-levodopa ER  1  tablet Oral BID    ezetimibe  10 mg Oral Daily    metoprolol succinate ER  50 mg Oral Daily    pantoprazole  40 mg Oral Daily    atorvastatin  20 mg Oral Nightly    cetirizine  5 mg Oral Daily    aspirin  81 mg Oral Daily     Continuous Infusions:   PRN:   ALPRAZolam    acetaminophen    polyethylene glycol (PEG 3350)    sennosides    bisacodyl    fleet enema    ondansetron    metoclopramide    benzonatate    sodium chloride    HYDROcodone-acetaminophen    Assessment and Plan:     83 year old male from home with a PMHx sig for anemia, AFIB (xarelto), s/p pci, s/p cabg, GERD, h/o MI, HTN, HLD, lupus, PD, peripheral neuropathy presents to the hospital with fever, cough, and weakness.     COVID19 with shortness of breath  -progressively worsening cough x 2 days, fever 102 PTA, endorsed bilateral peripheral edema as well  -COVID + , no leukocytosis, mild fever  -CXR w bilateral pleural effusions and atelectasis  -sputum cx w 2+ normal resp dave, 3+WBCs, 1+gram pos rods, 2+ gram neg rods  -consult ID, will start remdesivir x 3 days given his immunosuppression --> stopped as off o2     Acute diastolic CHF exacerbation  Peripheral Edema R > L  -pro BNP elevated 1,591  -cardiology consulted, IV lasix (2.4 L urine output in 24 H), obtain echo (EF 60-65, mild PAH), trop normal x 3  -RLE doppler neg for DVT  -Continue Lasix for now, transition to p.o. diuretics when okay with cardiology--> stopped per cards      Elevated AST  -, has been increasing this year previously 117 in 5/2024, he does take niacin as an outpatient which was initiated in 4/2022, his AST was normal in 2021   -Monitor serial and AST's and LFTs  -Could be secondary to hepatic congestion, continue to monitor  -could be secondary to covid infection  -remains up trending, GI consulted, US abd ordered     PD  -carbidopa-levodopa     HTN  AFIB  -metoprolol  -asa and xarelto continue for now     HLD  -zetia and statin     Gerd  -ppi     Lupus  -Plaquenil      MA/ACO [place]  -Re- Entry: no  -Consults: cards, ID, GI  -Discharge Needs  -Appointments: PCP       WIL patrick in am  -diet-cardiac     Prophy  -SCD  -xarelto     Dispo  -no discharge      PCP: MD Delvin Shelton MD  [facility]  [phone]

## 2024-07-13 NOTE — PROGRESS NOTES
Cardiology Progress Note    Reginaldo Hdz Patient Status:  Inpatient    8/10/1940 MRN [id number]   Location [facility] 8NE-A Attending Delvin Arevalo MD   Hosp Day # 3 PCP Olvin Dinh MD     Impression:  1. COVID (fatigue, fever).   2. Volume o/l. Suspect an element of CHF.  Seems better post lasix  3. CAD, s/p CABG ; normal troponins x 2  4. Paroxysmal Atrial Fibrillation.  NSR today.  On DOAC  5. Hypertension   6. PD  7. SLE  8. Elevated liver enzymes     Recommend:  No change to current medication regimen  Follow volume status  Agree with abdominal ultrasund    Subjective:  Confused    Objective:  /88 (BP Location: Right arm)   Pulse 77   Temp 97.9 °F (36.6 °C) (Oral)   Resp 18   Ht 67\"   Wt 145 lb 15.1 oz (66.2 kg)   SpO2 96%   BMI 22.86 kg/m²   Temp (24hrs), Av.8 °F (36.6 °C), Min:97.6 °F (36.4 °C), Max:97.9 °F (36.6 °C)      Intake/Output Summary (Last 24 hours) at 2024 1042  Last data filed at 2024 0940  Gross per 24 hour   Intake 240 ml   Output 1650 ml   Net -1410 ml     Wt Readings from Last 3 Encounters:   24 145 lb 15.1 oz (66.2 kg)   24 170 lb (77.1 kg)   24 165 lb (74.8 kg)       General: Confused; in no acute distress  Cardiac: Regular rate and rhythm; no murmurs/rubs/gallops are appreciated  Lungs: Clear to auscultation bilaterally; no accessory muscle use  Abdomen: Soft, non-tender; bowel sounds are normoactive  Extremities: No clubbing/cyanosis/edema; moves all 4 extremities normally    Current Facility-Administered Medications   Medication Dose Route Frequency    hydroxychloroquine (Plaquenil) tab 200 mg  200 mg Oral BID    ALPRAZolam (Xanax) tab 0.25 mg  0.25 mg Oral Daily PRN    acetaminophen (Tylenol Extra Strength) tab 500 mg  500 mg Oral Q4H PRN    polyethylene glycol (PEG 3350) (Miralax) 17 g oral packet 17 g  17 g Oral Daily PRN    sennosides (Senokot) tab 17.2 mg  17.2 mg Oral Nightly PRN    bisacodyl (Dulcolax) 10 MG rectal  suppository 10 mg  10 mg Rectal Daily PRN    fleet enema (Fleet) 7-19 GM/118ML rectal enema 133 mL  1 enema Rectal Once PRN    ondansetron (Zofran) 4 MG/2ML injection 4 mg  4 mg Intravenous Q6H PRN    metoclopramide (Reglan) 5 mg/mL injection 10 mg  10 mg Intravenous Q8H PRN    guaiFENesin ER (Mucinex) 12 hr tab 600 mg  600 mg Oral BID    benzonatate (Tessalon) cap 200 mg  200 mg Oral TID PRN    sodium chloride (Saline Mist) 0.65 % nasal solution 1 spray  1 spray Each Nare Q3H PRN    acidophilus (Probiotic) cap/tab 1 capsule  1 capsule Oral Daily    carbidopa-levodopa (SINEMET)  MG per tab 1.5 tablet  1.5 tablet Oral QID    rivaroxaban (Xarelto) tab 20 mg  20 mg Oral Daily with food    carbidopa-levodopa ER (SINEMET CR)  MG per tab 1 tablet  1 tablet Oral BID    ezetimibe (Zetia) tab 10 mg  10 mg Oral Daily    HYDROcodone-acetaminophen (Norco) 5-325 MG per tab 1-2 tablet  1-2 tablet Oral Q4H PRN    metoprolol succinate ER (Toprol XL) 24 hr tab 50 mg  50 mg Oral Daily    pantoprazole (Protonix) DR tab 40 mg  40 mg Oral Daily    atorvastatin (Lipitor) tab 20 mg  20 mg Oral Nightly    cetirizine (ZyrTEC) tab 5 mg  5 mg Oral Daily    aspirin chewable tab 81 mg  81 mg Oral Daily       Laboratory Data:  Lab Results   Component Value Date    WBC 3.7 07/13/2024    HGB 11.8 07/13/2024    HCT 34.3 07/13/2024    .0 07/13/2024     Lab Results   Component Value Date    INR 1.08 12/05/2019     Lab Results   Component Value Date     07/13/2024    K 3.8 07/13/2024    K 3.8 07/13/2024     07/13/2024    CO2 28.0 07/13/2024    BUN 17 07/13/2024    CREATSERUM 0.78 07/13/2024     07/13/2024    CA 8.9 07/13/2024    MG 1.9 07/13/2024       Telemetry: No malignant tachyarrhythmias or bradyarrhythmias      Thank you for allowing our practice to participate in the care of your patient. Please do not hesitate to contact me if you have any questions.    Latrell Cobb MD, FACC

## 2024-07-13 NOTE — CONSULTS
[facility] IP Report of Consultation Gastroenterology    7/13/2024    Reginaldo Hdz  male   Umair Thorne MD     [id number]  8/10/1940 Primary Care Physician  Olvin Dinh MD     Reason for Consultation: elevated LFTs    HPI: 83M w/ mmp, including CAD w/ h/o MI s/p PCI + CABG, diastolic CHF w/ acute exacerbation, PAF on chronic a/c (Xarelto), SLE, Parkinson's Disease, GERD, among other issues.    Consulted for incidental persistent LFT elevations (predominantly AST <300) over the last 1-2 months. Also noted to be COVID+ on admission, started on remdesivir this admission.    No overt HPB sxs or other localizing findings.    PROBLEM LIST:     Patient Active Problem List   Diagnosis    Coronary artery disease involving native heart without angina pectoris    Hyperlipidemia    Essential hypertension    Parkinson disease (HCC)    Thrombocytopenia (HCC)    Anemia    Hyperglycemia    Chest pain, atypical    Atrial fibrillation (HCC)    Benign essential HTN    Dyslipidemia    Dizziness    PAF (paroxysmal atrial fibrillation) (HCC)    Spinal stenosis of lumbar region, unspecified whether neurogenic claudication present    Closed fracture of proximal end of left humerus with routine healing    S/P CABG x 2    Strain of adductor meng muscle of left lower extremity    Avulsion fracture    Pleural effusion    Peripheral edema    Dyspnea, unspecified type    Chronic anemia    Exposure to confirmed case of COVID-19    COVID-19       PATIENT HISTORY:     Past Medical History:    Anemia    Atherosclerosis of coronary artery    Chronic atrial fibrillation (HCC)    Esophageal reflux    Heart attack (HCC)    Hyperlipidemia    Hypertension    Lupus (HCC)    Parkinson disease (HCC)      Past Surgical History:   Procedure Laterality Date    Angiogram  1984    Angiogram  1996    Angiogram  03/27/2002    [facility], Dr. Scott/ Mikayla: 1.Successful PTCA of the infarct related artery which was the proximal graft of the saphenous vein  graft to the right posterior descending/LV branch which was dilated and stented using a 4 x 13 BX Velocity to a maximum diameter of 4.45 mm.  2.Proximal PDA primarily stented using a 3 x 18 Penta stent from about 70 to 80% to 0%, HUGH III flow, no dissection.     Angiogram  2011    [facility], Dr. Steele: Successful stenting with a drug-eluting stent to the graft to the right coronary artery. Severe native CAD. Patent vein graft to LAD. Severe disease in the vein graft to the right coronary artery.    Angiogram  2011    [facility], Dr. Steele: Successful stenting of the circumflex artery in the proximal and mid portions. Relook angiography of the vein graft to the right coronary artery showed that this vessel is widely patent.    Cabg  1984    x2    Cath bare metal stent (bms)      Cath drug eluting stent      Shoulder surg proc unlisted Left     fracture      Family History   Problem Relation Age of Onset    Cancer Father         Liver    Other (Other) Father         Heart Attack    Cancer Mother         uterine    Other (Other) Daughter         Hosimotos    Other (Other) Son         Heart Problem      Social History     Socioeconomic History    Marital status:    Tobacco Use    Smoking status: Former     Current packs/day: 0.00     Types: Cigarettes     Quit date: 1982     Years since quittin.3    Smokeless tobacco: Never   Substance and Sexual Activity    Alcohol use: Yes     Alcohol/week: 0.0 standard drinks of alcohol     Comment: Social    Drug use: No     Social Determinants of Health     Food Insecurity: No Food Insecurity (7/10/2024)    Food Insecurity     Food Insecurity: Never true   Transportation Needs: No Transportation Needs (7/10/2024)    Transportation Needs     Lack of Transportation: No   Housing Stability: Low Risk  (7/10/2024)    Housing Stability     Housing Instability: No        Allergies;  Allergies   Allergen Reactions    Inderal [Propranolol] NAUSEA AND VOMITING and  UNKNOWN    Isosorbide NAUSEA AND VOMITING        Medications:    Current Facility-Administered Medications:     hydroxychloroquine (Plaquenil) tab 200 mg, 200 mg, Oral, BID    ALPRAZolam (Xanax) tab 0.25 mg, 0.25 mg, Oral, Daily PRN    acetaminophen (Tylenol Extra Strength) tab 500 mg, 500 mg, Oral, Q4H PRN    polyethylene glycol (PEG 3350) (Miralax) 17 g oral packet 17 g, 17 g, Oral, Daily PRN    sennosides (Senokot) tab 17.2 mg, 17.2 mg, Oral, Nightly PRN    bisacodyl (Dulcolax) 10 MG rectal suppository 10 mg, 10 mg, Rectal, Daily PRN    fleet enema (Fleet) 7-19 GM/118ML rectal enema 133 mL, 1 enema, Rectal, Once PRN    ondansetron (Zofran) 4 MG/2ML injection 4 mg, 4 mg, Intravenous, Q6H PRN    metoclopramide (Reglan) 5 mg/mL injection 10 mg, 10 mg, Intravenous, Q8H PRN    guaiFENesin ER (Mucinex) 12 hr tab 600 mg, 600 mg, Oral, BID    benzonatate (Tessalon) cap 200 mg, 200 mg, Oral, TID PRN    sodium chloride (Saline Mist) 0.65 % nasal solution 1 spray, 1 spray, Each Nare, Q3H PRN    acidophilus (Probiotic) cap/tab 1 capsule, 1 capsule, Oral, Daily    carbidopa-levodopa (SINEMET)  MG per tab 1.5 tablet, 1.5 tablet, Oral, QID    rivaroxaban (Xarelto) tab 20 mg, 20 mg, Oral, Daily with food    carbidopa-levodopa ER (SINEMET CR)  MG per tab 1 tablet, 1 tablet, Oral, BID    ezetimibe (Zetia) tab 10 mg, 10 mg, Oral, Daily    HYDROcodone-acetaminophen (Norco) 5-325 MG per tab 1-2 tablet, 1-2 tablet, Oral, Q4H PRN    metoprolol succinate ER (Toprol XL) 24 hr tab 50 mg, 50 mg, Oral, Daily    pantoprazole (Protonix) DR tab 40 mg, 40 mg, Oral, Daily    atorvastatin (Lipitor) tab 20 mg, 20 mg, Oral, Nightly    cetirizine (ZyrTEC) tab 5 mg, 5 mg, Oral, Daily    aspirin chewable tab 81 mg, 81 mg, Oral, Daily     REVIEW OF SYSTEMS:   10pt ROS performed and o/w negative, see HPI for pertinent details.      EXAM:   /88 (BP Location: Right arm)   Pulse 77   Temp 97.9 °F (36.6 °C) (Oral)   Resp 18   Ht 5' 7\"  (1.702 m)   Wt 145 lb 15.1 oz (66.2 kg)   SpO2 96%   BMI 22.86 kg/m²  Body mass index is 22.86 kg/m².  Gen: cooperative, pleasant, not diaphoretic, nad   HEENT: ncat, normal neck ROM, normal op w/o ulcer/exudate, anicteric, mmm   Resp/Chest: normal respiratory effort, not dyspneic, no incr WOB/cough  CV: no reported palpitations or syncope  Abd: nt, nd, no clinical features suggestive of peritoneal s/s noted  Ext: no c/c/e   Skin: warm, perfused, no jaundice, no pallor  Neuro: aaox3, grossly intact, no asterixis noted, normal speech       LAB/IMAGING RESULTS:     Lab Results   Component Value Date    WBC 3.7 07/13/2024    HGB 11.8 07/13/2024    HCT 34.3 07/13/2024    .0 07/13/2024     [unfilled]  Lab Results   Component Value Date    WBC 3.7 07/13/2024    HGB 11.8 07/13/2024    HCT 34.3 07/13/2024    .0 07/13/2024    CREATSERUM 0.78 07/13/2024    BUN 17 07/13/2024     07/13/2024    K 3.8 07/13/2024    K 3.8 07/13/2024     07/13/2024    CO2 28.0 07/13/2024     07/13/2024    CA 8.9 07/13/2024    ALB 2.3 07/13/2024    ALKPHO 216 07/13/2024    BILT 0.6 07/13/2024    TP 6.9 07/13/2024     07/13/2024    ALT 33 07/13/2024    MG 1.9 07/13/2024         ASSESSMENT AND PLAN:   #Elevated LFTs in setting of acute-on-chronic dCHF exacerbation + COVID + medications, no typical HPB sxs at present (stable)  #COVID-19 infection (symptomatic)  #Chronic anticoagulation    Advise Complete Abd US to evaluate further.  Continue to follow LFT trend.  Multifactorial nonspecific LFT elevation which pre-dated COVID + some of med list, but could be cardiac in nature in light of multiple comorbidities.  Check chronic hepatitis markers, can be done w/ repeat LFTs tomorrow.  Will continue to follow w/ you.  Discussed w/ pt + RN today.    The patient indicates understanding of these issues and agrees to the plan.      A total of 60 minutes was spent in direct patient care + assessment, chart review,  and care coordination today.    Umair Thorne MD  Department of Gastroenterology  [facility]  7/13/2024  11:55 AM

## 2024-07-14 ENCOUNTER — APPOINTMENT (OUTPATIENT)
Dept: ULTRASOUND IMAGING | Facility: HOSPITAL | Age: 84
End: 2024-07-14
Attending: INTERNAL MEDICINE
Payer: MEDICARE

## 2024-07-14 VITALS
SYSTOLIC BLOOD PRESSURE: 124 MMHG | BODY MASS INDEX: 22.91 KG/M2 | OXYGEN SATURATION: 100 % | HEIGHT: 67 IN | WEIGHT: 145.94 LBS | DIASTOLIC BLOOD PRESSURE: 67 MMHG | TEMPERATURE: 98 F | HEART RATE: 69 BPM | RESPIRATION RATE: 24 BRPM

## 2024-07-14 LAB
ALBUMIN SERPL-MCNC: 2.3 G/DL (ref 3.4–5)
ALBUMIN/GLOB SERPL: 0.5 {RATIO} (ref 1–2)
ALP LIVER SERPL-CCNC: 202 U/L
ALT SERPL-CCNC: 45 U/L
ANION GAP SERPL CALC-SCNC: 6 MMOL/L (ref 0–18)
AST SERPL-CCNC: 158 U/L (ref 15–37)
ATRIAL RATE: 66 BPM
BASOPHILS # BLD AUTO: 0.02 X10(3) UL (ref 0–0.2)
BASOPHILS NFR BLD AUTO: 0.5 %
BILIRUB DIRECT SERPL-MCNC: 0.3 MG/DL (ref 0–0.2)
BILIRUB SERPL-MCNC: 0.7 MG/DL (ref 0.1–2)
BUN BLD-MCNC: 14 MG/DL (ref 9–23)
CALCIUM BLD-MCNC: 8.7 MG/DL (ref 8.5–10.1)
CERULOPLASMIN SERPL-MCNC: 31 MG/DL (ref 20–60)
CHLORIDE SERPL-SCNC: 103 MMOL/L (ref 98–112)
CO2 SERPL-SCNC: 26 MMOL/L (ref 21–32)
CREAT BLD-MCNC: 0.76 MG/DL
EGFRCR SERPLBLD CKD-EPI 2021: 89 ML/MIN/1.73M2 (ref 60–?)
EOSINOPHIL # BLD AUTO: 0.04 X10(3) UL (ref 0–0.7)
EOSINOPHIL NFR BLD AUTO: 1 %
ERYTHROCYTE [DISTWIDTH] IN BLOOD BY AUTOMATED COUNT: 13.2 %
GLOBULIN PLAS-MCNC: 4.5 G/DL (ref 2.8–4.4)
GLUCOSE BLD-MCNC: 94 MG/DL (ref 70–99)
HAV AB SER QL IA: REACTIVE
HAV IGM SER QL: NONREACTIVE
HBV CORE AB SERPL QL IA: NONREACTIVE
HBV SURFACE AB SER QL: NONREACTIVE
HBV SURFACE AB SERPL IA-ACNC: <3.1 MIU/ML
HBV SURFACE AG SER-ACNC: <0.1 [IU]/L
HBV SURFACE AG SERPL QL IA: NONREACTIVE
HCT VFR BLD AUTO: 34.2 %
HCV AB SERPL QL IA: NONREACTIVE
HGB BLD-MCNC: 11.6 G/DL
IMM GRANULOCYTES # BLD AUTO: 0.01 X10(3) UL (ref 0–1)
IMM GRANULOCYTES NFR BLD: 0.3 %
INR BLD: 1.57 (ref 0.8–1.2)
LYMPHOCYTES # BLD AUTO: 0.82 X10(3) UL (ref 1–4)
LYMPHOCYTES NFR BLD AUTO: 20.9 %
MAGNESIUM SERPL-MCNC: 2 MG/DL (ref 1.6–2.6)
MCH RBC QN AUTO: 32.4 PG (ref 26–34)
MCHC RBC AUTO-ENTMCNC: 33.9 G/DL (ref 31–37)
MCV RBC AUTO: 95.5 FL
MONOCYTES # BLD AUTO: 0.46 X10(3) UL (ref 0.1–1)
MONOCYTES NFR BLD AUTO: 11.7 %
NEUTROPHILS # BLD AUTO: 2.58 X10 (3) UL (ref 1.5–7.7)
NEUTROPHILS # BLD AUTO: 2.58 X10(3) UL (ref 1.5–7.7)
NEUTROPHILS NFR BLD AUTO: 65.6 %
OSMOLALITY SERPL CALC.SUM OF ELEC: 280 MOSM/KG (ref 275–295)
P AXIS: 64 DEGREES
P-R INTERVAL: 256 MS
PLATELET # BLD AUTO: 112 10(3)UL (ref 150–450)
POTASSIUM SERPL-SCNC: 3.8 MMOL/L (ref 3.5–5.1)
POTASSIUM SERPL-SCNC: 3.8 MMOL/L (ref 3.5–5.1)
PROT SERPL-MCNC: 6.8 G/DL (ref 6.4–8.2)
PROTHROMBIN TIME: 18.9 SECONDS (ref 11.6–14.8)
Q-T INTERVAL: 426 MS
QRS DURATION: 86 MS
QTC CALCULATION (BEZET): 426 MS
R AXIS: -39 DEGREES
RBC # BLD AUTO: 3.58 X10(6)UL
SODIUM SERPL-SCNC: 135 MMOL/L (ref 136–145)
T AXIS: -5 DEGREES
VENTRICULAR RATE: 60 BPM
WBC # BLD AUTO: 3.9 X10(3) UL (ref 4–11)

## 2024-07-14 PROCEDURE — 86704 HEP B CORE ANTIBODY TOTAL: CPT | Performed by: INTERNAL MEDICINE

## 2024-07-14 PROCEDURE — 80053 COMPREHEN METABOLIC PANEL: CPT | Performed by: HOSPITALIST

## 2024-07-14 PROCEDURE — 86039 ANTINUCLEAR ANTIBODIES (ANA): CPT | Performed by: INTERNAL MEDICINE

## 2024-07-14 PROCEDURE — 85025 COMPLETE CBC W/AUTO DIFF WBC: CPT | Performed by: HOSPITALIST

## 2024-07-14 PROCEDURE — 82248 BILIRUBIN DIRECT: CPT | Performed by: INTERNAL MEDICINE

## 2024-07-14 PROCEDURE — 87340 HEPATITIS B SURFACE AG IA: CPT | Performed by: INTERNAL MEDICINE

## 2024-07-14 PROCEDURE — 86706 HEP B SURFACE ANTIBODY: CPT | Performed by: INTERNAL MEDICINE

## 2024-07-14 PROCEDURE — 86708 HEPATITIS A ANTIBODY: CPT | Performed by: INTERNAL MEDICINE

## 2024-07-14 PROCEDURE — 83735 ASSAY OF MAGNESIUM: CPT | Performed by: HOSPITALIST

## 2024-07-14 PROCEDURE — 86364 TISS TRNSGLTMNASE EA IG CLAS: CPT | Performed by: INTERNAL MEDICINE

## 2024-07-14 PROCEDURE — 86038 ANTINUCLEAR ANTIBODIES: CPT | Performed by: INTERNAL MEDICINE

## 2024-07-14 PROCEDURE — 76700 US EXAM ABDOM COMPLETE: CPT | Performed by: INTERNAL MEDICINE

## 2024-07-14 PROCEDURE — 86709 HEPATITIS A IGM ANTIBODY: CPT | Performed by: INTERNAL MEDICINE

## 2024-07-14 PROCEDURE — 85610 PROTHROMBIN TIME: CPT | Performed by: INTERNAL MEDICINE

## 2024-07-14 PROCEDURE — 84132 ASSAY OF SERUM POTASSIUM: CPT | Performed by: HOSPITALIST

## 2024-07-14 PROCEDURE — 86803 HEPATITIS C AB TEST: CPT | Performed by: INTERNAL MEDICINE

## 2024-07-14 PROCEDURE — 82390 ASSAY OF CERULOPLASMIN: CPT | Performed by: INTERNAL MEDICINE

## 2024-07-14 PROCEDURE — 83516 IMMUNOASSAY NONANTIBODY: CPT | Performed by: INTERNAL MEDICINE

## 2024-07-14 RX ORDER — POTASSIUM CHLORIDE 20 MEQ/1
40 TABLET, EXTENDED RELEASE ORAL ONCE
Status: COMPLETED | OUTPATIENT
Start: 2024-07-14 | End: 2024-07-14

## 2024-07-14 RX ORDER — FUROSEMIDE 20 MG/1
20 TABLET ORAL DAILY
Qty: 7 TABLET | Refills: 0 | Status: SHIPPED | OUTPATIENT
Start: 2024-07-14

## 2024-07-14 RX ORDER — FUROSEMIDE 20 MG/1
20 TABLET ORAL DAILY
Status: DISCONTINUED | OUTPATIENT
Start: 2024-07-14 | End: 2024-07-14

## 2024-07-14 NOTE — CM/SW NOTE
07/14/24 1200   Discharge disposition   Expected discharge disposition Home-Health   Additional Home Care/Hospice Provider Other  ([facility])     CM visited patient room to deliver IM and follow up for discharge plan; patient verbalized he would like to go home, but asked CM to call daughter Chloé to discuss discharge plan.  CM called Chloé for discharge plan update; Chloé had multiple questions regarding patient's ambulatory status.  CM reviewed most recent therapy note with Chloé via phone and provided phone number for Medicare Appeals (Manjit VILLARREALO) from IM notice provided to patient.  Chloé voiced safety concerns regarding patient return home and stated she is working on Assisted Living Facility placement for patient and spouse.  CM informed Chloé home health services have been arranged with [facility]; CM spoke to  agency to confirm start of care tomorrow.  Chloé requested transportation with patient lift assist for discharge home.  [facility] transportation (+covid 19) arranged for 5pm, PCS form completed.  RN, patient and family updated on dc plan.       Shirley Sanabria RN Case Manager [id number]

## 2024-07-14 NOTE — PROGRESS NOTES
[facility] GI Follow-up    7/14/2024    Reginaldo Hdz  male   Umair Thorne MD     [id number]  8/10/1940 Primary Care Physician  Olvin Dinh MD     S: NAEO. Still awaiting Abd US today.    PROBLEM LIST:     Patient Active Problem List   Diagnosis    Coronary artery disease involving native heart without angina pectoris    Hyperlipidemia    Essential hypertension    Parkinson disease (HCC)    Thrombocytopenia (HCC)    Anemia    Hyperglycemia    Chest pain, atypical    Atrial fibrillation (HCC)    Benign essential HTN    Dyslipidemia    Dizziness    PAF (paroxysmal atrial fibrillation) (HCC)    Spinal stenosis of lumbar region, unspecified whether neurogenic claudication present    Closed fracture of proximal end of left humerus with routine healing    S/P CABG x 2    Strain of adductor meng muscle of left lower extremity    Avulsion fracture    Pleural effusion    Peripheral edema    Dyspnea, unspecified type    Chronic anemia    Exposure to confirmed case of COVID-19    COVID-19       Medications:    Current Facility-Administered Medications:     hydroxychloroquine (Plaquenil) tab 200 mg, 200 mg, Oral, BID    ALPRAZolam (Xanax) tab 0.25 mg, 0.25 mg, Oral, Daily PRN    acetaminophen (Tylenol Extra Strength) tab 500 mg, 500 mg, Oral, Q4H PRN    polyethylene glycol (PEG 3350) (Miralax) 17 g oral packet 17 g, 17 g, Oral, Daily PRN    sennosides (Senokot) tab 17.2 mg, 17.2 mg, Oral, Nightly PRN    bisacodyl (Dulcolax) 10 MG rectal suppository 10 mg, 10 mg, Rectal, Daily PRN    fleet enema (Fleet) 7-19 GM/118ML rectal enema 133 mL, 1 enema, Rectal, Once PRN    ondansetron (Zofran) 4 MG/2ML injection 4 mg, 4 mg, Intravenous, Q6H PRN    metoclopramide (Reglan) 5 mg/mL injection 10 mg, 10 mg, Intravenous, Q8H PRN    guaiFENesin ER (Mucinex) 12 hr tab 600 mg, 600 mg, Oral, BID    benzonatate (Tessalon) cap 200 mg, 200 mg, Oral, TID PRN    sodium chloride (Saline Mist) 0.65 % nasal solution 1 spray, 1 spray, Each  Nare, Q3H PRN    acidophilus (Probiotic) cap/tab 1 capsule, 1 capsule, Oral, Daily    carbidopa-levodopa (SINEMET)  MG per tab 1.5 tablet, 1.5 tablet, Oral, QID    rivaroxaban (Xarelto) tab 20 mg, 20 mg, Oral, Daily with food    carbidopa-levodopa ER (SINEMET CR)  MG per tab 1 tablet, 1 tablet, Oral, BID    ezetimibe (Zetia) tab 10 mg, 10 mg, Oral, Daily    HYDROcodone-acetaminophen (Norco) 5-325 MG per tab 1-2 tablet, 1-2 tablet, Oral, Q4H PRN    metoprolol succinate ER (Toprol XL) 24 hr tab 50 mg, 50 mg, Oral, Daily    pantoprazole (Protonix) DR tab 40 mg, 40 mg, Oral, Daily    atorvastatin (Lipitor) tab 20 mg, 20 mg, Oral, Nightly    cetirizine (ZyrTEC) tab 5 mg, 5 mg, Oral, Daily    aspirin chewable tab 81 mg, 81 mg, Oral, Daily       EXAM:   /81 (BP Location: Right arm)   Pulse 71   Temp 97.9 °F (36.6 °C) (Oral)   Resp 18   Ht 5' 7\" (1.702 m)   Wt 145 lb 15.1 oz (66.2 kg)   SpO2 98%   BMI 22.86 kg/m²  Body mass index is 22.86 kg/m².  Gen: cooperative, pleasant, not diaphoretic, nad   HEENT: ncat, normal neck ROM, normal op w/o ulcer/exudate, anicteric, mmm   Resp/Chest: normal respiratory effort, not dyspneic, no incr WOB/cough  CV: no reported palpitations or syncope  Abd: nt, nd, no clinical features suggestive of peritoneal s/s noted  Ext: no c/c/e   Skin: warm, perfused, no jaundice, no pallor  Neuro: aaox3, grossly intact, no asterixis noted, normal speech       LAB/IMAGING RESULTS:     Lab Results   Component Value Date    WBC 3.9 07/14/2024    HGB 11.6 07/14/2024    HCT 34.2 07/14/2024    .0 07/14/2024     [unfilled]  Lab Results   Component Value Date    WBC 3.9 07/14/2024    HGB 11.6 07/14/2024    HCT 34.2 07/14/2024    .0 07/14/2024    CREATSERUM 0.76 07/14/2024    BUN 14 07/14/2024     07/14/2024    K 3.8 07/14/2024    K 3.8 07/14/2024     07/14/2024    CO2 26.0 07/14/2024    GLU 94 07/14/2024    CA 8.7 07/14/2024    ALB 2.3 07/14/2024    ALKPHO  202 07/14/2024    BILT 0.7 07/14/2024    TP 6.8 07/14/2024     07/14/2024    ALT 45 07/14/2024    INR 1.57 07/14/2024    PTP 18.9 07/14/2024    MG 2.0 07/14/2024     HepA/B/C serologies negative, HBV non-immune (update HBV vax through PCP when able).    ASSESSMENT AND PLAN:   #Elevated LFTs in setting of acute-on-chronic dCHF exacerbation + COVID + medications, no typical HPB sxs at present (stable)  #COVID-19 infection (symptomatic)  #Chronic anticoagulation    Awaiting Abd US today.  No further acute mgmt from a GI standpoint required if no evidence of overt biliary obstruction.  Chronic hepatitis studies ordered, pending.  LFTs remain o/w stable if not sl improved, no immediate concerns.  Update HBV vax through PCP when able.  Will continue to follow w/ you.  Could potentially dc home soon from a GI standpoint if US unrevealing, may need rpt LFTs w/ PCP in 1-2wks.  Discussed w/ RN today.    The patient indicates understanding of these issues and agrees to the plan.    A total of 35 minutes was spent in direct patient care + assessment, chart review, and care coordination today.    Umair Thorne MD  Department of Gastroenterology  [facility]  7/14/2024  10:25 AM

## 2024-07-14 NOTE — PLAN OF CARE
Assumed care of pt at 1930  A/Ox4, forgetful at times  Room air w/ adequate o2 sats. Pt denies shortness of breath.   NSR w/ 1st degree on tele. No cardiac symptoms.   Pt denies pain at this time.   Voiding per primofit.   Plan for US of abdomen 7/14.   Fall isolation precautions in place. Call light in reach. Pt updated on plan of care.     Problem: CARDIOVASCULAR - ADULT  Goal: Maintains optimal cardiac output and hemodynamic stability  Description: INTERVENTIONS:  - Monitor vital signs, rhythm, and trends  - Monitor for bleeding, hypotension and signs of decreased cardiac output  - Evaluate effectiveness of vasoactive medications to optimize hemodynamic stability  - Monitor arterial and/or venous puncture sites for bleeding and/or hematoma  - Assess quality of pulses, skin color and temperature  - Assess for signs of decreased coronary artery perfusion - ex. Angina  - Evaluate fluid balance, assess for edema, trend weights  Outcome: Progressing  Goal: Absence of cardiac arrhythmias or at baseline  Description: INTERVENTIONS:  - Continuous cardiac monitoring, monitor vital signs, obtain 12 lead EKG if indicated  - Evaluate effectiveness of antiarrhythmic and heart rate control medications as ordered  - Initiate emergency measures for life threatening arrhythmias  - Monitor electrolytes and administer replacement therapy as ordered  Outcome: Progressing     Problem: RESPIRATORY - ADULT  Goal: Achieves optimal ventilation and oxygenation  Description: INTERVENTIONS:  - Assess for changes in respiratory status  - Assess for changes in mentation and behavior  - Position to facilitate oxygenation and minimize respiratory effort  - Oxygen supplementation based on oxygen saturation or ABGs  - Provide Smoking Cessation handout, if applicable  - Encourage broncho-pulmonary hygiene including cough, deep breathe, Incentive Spirometry  - Assess the need for suctioning and perform as needed  - Assess and instruct to report SOB  or any respiratory difficulty  - Respiratory Therapy support as indicated  - Manage/alleviate anxiety  - Monitor for signs/symptoms of CO2 retention  Outcome: Progressing     Problem: Impaired Functional Mobility  Goal: Achieve highest/safest level of mobility/gait  Description: Interventions:  - Assess patient's functional ability and stability  - Promote increasing activity/tolerance for mobility and gait  - Educate and engage patient/family in tolerated activity level and precautions    Outcome: Progressing     Problem: Impaired Cognition  Goal: Patient will exhibit improved attention, thought processing and/or memory  Description: Interventions:  - Allow additional time for processing after asking questions or providing instructions  Outcome: Progressing     Problem: Impaired Communication  Goal: Patient will achieve maximal communication potential  Description: Interventions:  - Allow additional time for processing after asking questions or providing instructions  Outcome: Progressing     Problem: PAIN - ADULT  Goal: Verbalizes/displays adequate comfort level or patient's stated pain goal  Description: INTERVENTIONS:  - Encourage pt to monitor pain and request assistance  - Assess pain using appropriate pain scale  - Administer analgesics based on type and severity of pain and evaluate response  - Implement non-pharmacological measures as appropriate and evaluate response  - Consider cultural and social influences on pain and pain management  - Manage/alleviate anxiety  - Utilize distraction and/or relaxation techniques  - Monitor for opioid side effects  - Notify MD/LIP if interventions unsuccessful or patient reports new pain  - Anticipate increased pain with activity and pre-medicate as appropriate  Outcome: Progressing     Problem: SAFETY ADULT - FALL  Goal: Free from fall injury  Description: INTERVENTIONS:  - Assess pt frequently for physical needs  - Identify cognitive and physical deficits and behaviors  that affect risk of falls.  - [place] fall precautions as indicated by assessment.  - Educate pt/family on patient safety including physical limitations  - Instruct pt to call for assistance with activity based on assessment  - Modify environment to reduce risk of injury  - Provide assistive devices as appropriate  - Consider OT/PT consult to assist with strengthening/mobility  - Encourage toileting schedule  Outcome: Progressing     Problem: DISCHARGE PLANNING  Goal: Discharge to home or other facility with appropriate resources  Description: INTERVENTIONS:  - Identify barriers to discharge w/pt and caregiver  - Include patient/family/discharge partner in discharge planning  - Arrange for needed discharge resources and transportation as appropriate  - Identify discharge learning needs (meds, wound care, etc)  - Arrange for interpreters to assist at discharge as needed  - Consider post-discharge preferences of patient/family/discharge partner  - Complete POLST form as appropriate  - Assess patient's ability to be responsible for managing their own health  - Refer to Case Management Department for coordinating discharge planning if the patient needs post-hospital services based on physician/LIP order or complex needs related to functional status, cognitive ability or social support system  Outcome: Progressing     Problem: Patient/Family Goals  Goal: Patient/Family Long Term Goal  Description: Patient's Long Term Goal: Discharge adequate resources    Interventions:  - Assistance from CM/SW if needed  - See additional Care Plan goals for specific interventions  Outcome: Progressing  Goal: Patient/Family Short Term Goal  Description: Patient's Short Term Goal: Feel better     Interventions:   - PRN medications  - See additional Care Plan goals for specific interventions  Outcome: Progressing     Problem: Diabetes/Glucose Control  Goal: Glucose maintained within prescribed range  Description: INTERVENTIONS:  - Monitor  Blood Glucose as ordered  - Assess for signs and symptoms of hyperglycemia and hypoglycemia  - Administer ordered medications to maintain glucose within target range  - Assess barriers to adequate nutritional intake and initiate nutrition consult as needed  - Instruct patient on self management of diabetes  Outcome: Progressing

## 2024-07-14 NOTE — PROGRESS NOTES
Pt dc'd home via ambulance as scheduled. IV dc'd prior to discharge home. Tele monitor off and returned. Discharge instructions provided to pt, pt verbalized understanding of instructions. Dc instructions, belongings and pt's brace sent with with home.

## 2024-07-14 NOTE — PLAN OF CARE
Received patient after report. Patient resting in bed on room air. Denies pain. US completed, see results. Cleared for discharge by GI and cardiology. Awaiting orders from hospitalist. Family updated on plan of care.

## 2024-07-14 NOTE — PROGRESS NOTES
[facility] Hospitalist Progress Note                                                                     [facility]   part of [facility]      Reginaldo Hdz  8/10/1940    SUBJECTIVE: Patient denies any chest pain, palpitations, shortness of breath, cough, nausea, vomiting, abdominal pain.    OBJECTIVE:  Temp:  [97.8 °F (36.6 °C)-98.2 °F (36.8 °C)] 97.8 °F (36.6 °C)  Pulse:  [58-77] 70  Resp:  [16-23] 23  BP: (102-149)/(66-89) 146/89  SpO2:  [94 %-100 %] 95 %  Exam  Gen: No acute distress, alert and oriented  Pulm: Lungs clear bilaterally, normal respiratory effort, no crackles, no wheezing  CV: Heart with regular rate and rhythm, no murmur.   Abd: Abdomen soft, nontender, nondistended, bowel sounds present  MSK: No significant pitting edema or tenderness of the LE  Skin: no new rashes or lesions    Labs:   Recent Labs   Lab 07/10/24  1007 07/11/24  0501 07/12/24  0504 07/13/24  0734 07/14/24  0529   WBC 6.8 4.0 3.4* 3.7* 3.9*   HGB 11.4* 11.1* 11.7* 11.8* 11.6*   MCV 96.6 94.2 95.6 95.3 95.5   PLT 82.0* 88.0* 88.0* 106.0* 112.0*   INR  --   --   --   --  1.57*       Recent Labs   Lab 07/09/24  2255 07/10/24  0248 07/10/24  1007 07/11/24  0501 07/12/24  0504 07/13/24  0734 07/14/24  0529     --  138 141  --  137 135*   K 3.8  --  3.4* 3.8  3.8  3.6 3.7 3.8  3.8 3.8  3.8     --  105 107  --  106 103   CO2 28.0  --  27.0 28.0  --  28.0 26.0   BUN 17  --  17 18  --  17 14   CREATSERUM 0.94   < > 0.94 0.86 0.75 0.78 0.76   CA 8.6  --  8.6 8.5  --  8.9 8.7   MG  --   --  1.7 2.0  --  1.9 2.0   *  --  90 88  --  100* 94    < > = values in this interval not displayed.       Recent Labs   Lab 07/09/24  2255 07/11/24  0501 07/13/24  0734 07/14/24  0529   ALT 30 49 33 45   * 203* 210* 158*   ALB 2.3* 2.2* 2.3* 2.3*       No results for input(s): \"PGLU\" in the last 168 hours.    Meds:   Scheduled:    hydroxychloroquine  200 mg Oral BID    guaiFENesin  ER  600 mg Oral BID    acidophilus  1 capsule Oral Daily    carbidopa-levodopa  1.5 tablet Oral QID    rivaroxaban  20 mg Oral Daily with food    carbidopa-levodopa ER  1 tablet Oral BID    ezetimibe  10 mg Oral Daily    metoprolol succinate ER  50 mg Oral Daily    pantoprazole  40 mg Oral Daily    atorvastatin  20 mg Oral Nightly    cetirizine  5 mg Oral Daily    aspirin  81 mg Oral Daily     Continuous Infusions:   PRN:   ALPRAZolam    acetaminophen    polyethylene glycol (PEG 3350)    sennosides    bisacodyl    fleet enema    ondansetron    metoclopramide    benzonatate    sodium chloride    HYDROcodone-acetaminophen    Assessment and Plan:     83 year old male from home with a PMHx sig for anemia, AFIB (xarelto), s/p pci, s/p cabg, GERD, h/o MI, HTN, HLD, lupus, PD, peripheral neuropathy presents to the hospital with fever, cough, and weakness.     COVID19 with shortness of breath  -progressively worsening cough x 2 days, fever 102 PTA, endorsed bilateral peripheral edema as well  -COVID + , no leukocytosis, mild fever  -CXR w bilateral pleural effusions and atelectasis  -sputum cx w 2+ normal resp dave, 3+WBCs, 1+gram pos rods, 2+ gram neg rods  -consult ID, will start remdesivir x 3 days given his immunosuppression --> stopped as off o2     Acute diastolic CHF exacerbation  Peripheral Edema R > L  -pro BNP elevated 1,591  -cardiology consulted, IV lasix (2.4 L urine output in 24 H), obtain echo (EF 60-65, mild PAH), trop normal x 3  -RLE doppler neg for DVT  -Continue Lasix for now, transition to p.o. diuretics when okay with cardiology--> stopped per cards      Elevated AST  -, has been increasing this year previously 117 in 5/2024, he does take niacin as an outpatient which was initiated in 4/2022, his AST was normal in 2021   -Monitor serial and AST's and LFTs--> some improvement today   -Could be secondary to hepatic congestion, continue to monitor  -could be secondary to covid infection  -remains  up trending, GI consulted, US abd ordered, chronic hepatatis panel ordered --> pending  -Follow GI rec     PD  -carbidopa-levodopa     HTN  AFIB  -metoprolol  -asa and xarelto continue for now     HLD  -zetia and statin     Gerd  -ppi     Lupus  -Plaquenil     MA/ACO Reach  -Re- Entry: no  -Consults: cards, ID, GI  -Discharge Needs  -Appointments: PCP       WIL patrick in am  -diet-cardiac     Prophy  -SCD  -xarelto     Dispo  -no discharge      PCP: MD Delvin Shelton MD  [facility] Hospitalist  [phone]

## 2024-07-14 NOTE — PROGRESS NOTES
Cardiology Progress Note    Reginaldo Hdz Patient Status:  Inpatient    8/10/1940 MRN [id number]   Location [facility] 8NE-A Attending Delvin Arevalo MD   Hosp Day # 4 PCP Olvin Dinh MD     Impression:  1. COVID (fatigue, fever).   2. Volume o/l. Suspect an element of CHF.  Seems better post lasix  3. CAD, s/p CABG ; normal troponins x 2  4. Paroxysmal Atrial Fibrillation.  NSR today.  On DOAC  5. Hypertension   6. PD  7. SLE  8. Elevated liver enzymes     Recommend:  Start oral diuretic. Appears euvolemic.  Agree with abdominal ultrasund->getting done today.    Subjective:  The patient denies any chest pain or dyspnea at this time.    Objective:  /81 (BP Location: Right arm)   Pulse 71   Temp 97.9 °F (36.6 °C) (Oral)   Resp 18   Ht 67\"   Wt 145 lb 15.1 oz (66.2 kg)   SpO2 98%   BMI 22.86 kg/m²   Temp (24hrs), Av.9 °F (36.6 °C), Min:97.8 °F (36.6 °C), Max:98.2 °F (36.8 °C)      Intake/Output Summary (Last 24 hours) at 2024 1037  Last data filed at 2024 0722  Gross per 24 hour   Intake 360 ml   Output 1000 ml   Net -640 ml     Wt Readings from Last 3 Encounters:   24 145 lb 15.1 oz (66.2 kg)   24 170 lb (77.1 kg)   24 165 lb (74.8 kg)       General: Awake and alert; in no acute distress  Cardiac: Regular rate and rhythm; no murmurs/rubs/gallops are appreciated  Lungs: Clear to auscultation bilaterally; no accessory muscle use  Abdomen: Soft, non-tender; bowel sounds are normoactive  Extremities: No clubbing/cyanosis/edema; moves all 4 extremities normally    Current Facility-Administered Medications   Medication Dose Route Frequency    hydroxychloroquine (Plaquenil) tab 200 mg  200 mg Oral BID    ALPRAZolam (Xanax) tab 0.25 mg  0.25 mg Oral Daily PRN    acetaminophen (Tylenol Extra Strength) tab 500 mg  500 mg Oral Q4H PRN    polyethylene glycol (PEG 3350) (Miralax) 17 g oral packet 17 g  17 g Oral Daily PRN    sennosides (Senokot) tab 17.2 mg  17.2 mg  Oral Nightly PRN    bisacodyl (Dulcolax) 10 MG rectal suppository 10 mg  10 mg Rectal Daily PRN    fleet enema (Fleet) 7-19 GM/118ML rectal enema 133 mL  1 enema Rectal Once PRN    ondansetron (Zofran) 4 MG/2ML injection 4 mg  4 mg Intravenous Q6H PRN    metoclopramide (Reglan) 5 mg/mL injection 10 mg  10 mg Intravenous Q8H PRN    guaiFENesin ER (Mucinex) 12 hr tab 600 mg  600 mg Oral BID    benzonatate (Tessalon) cap 200 mg  200 mg Oral TID PRN    sodium chloride (Saline Mist) 0.65 % nasal solution 1 spray  1 spray Each Nare Q3H PRN    acidophilus (Probiotic) cap/tab 1 capsule  1 capsule Oral Daily    carbidopa-levodopa (SINEMET)  MG per tab 1.5 tablet  1.5 tablet Oral QID    rivaroxaban (Xarelto) tab 20 mg  20 mg Oral Daily with food    carbidopa-levodopa ER (SINEMET CR)  MG per tab 1 tablet  1 tablet Oral BID    ezetimibe (Zetia) tab 10 mg  10 mg Oral Daily    HYDROcodone-acetaminophen (Norco) 5-325 MG per tab 1-2 tablet  1-2 tablet Oral Q4H PRN    metoprolol succinate ER (Toprol XL) 24 hr tab 50 mg  50 mg Oral Daily    pantoprazole (Protonix) DR tab 40 mg  40 mg Oral Daily    atorvastatin (Lipitor) tab 20 mg  20 mg Oral Nightly    cetirizine (ZyrTEC) tab 5 mg  5 mg Oral Daily    aspirin chewable tab 81 mg  81 mg Oral Daily       Laboratory Data:  Lab Results   Component Value Date    WBC 3.9 07/14/2024    HGB 11.6 07/14/2024    HCT 34.2 07/14/2024    .0 07/14/2024     Lab Results   Component Value Date    INR 1.57 (H) 07/14/2024    INR 1.08 12/05/2019     Lab Results   Component Value Date     07/14/2024    K 3.8 07/14/2024    K 3.8 07/14/2024     07/14/2024    CO2 26.0 07/14/2024    BUN 14 07/14/2024    CREATSERUM 0.76 07/14/2024    GLU 94 07/14/2024    CA 8.7 07/14/2024    MG 2.0 07/14/2024       Telemetry: No malignant tachyarrhythmias or bradyarrhythmias      Thank you for allowing our practice to participate in the care of your patient. Please do not hesitate to contact me if  you have any questions.    Latrell Cobb MD, FACC

## 2024-07-16 LAB
ACTIN SMOOTH MUSCLE AB: 8 UNITS
M2 MITOCHONDRIAL AB: <20 UNITS
NUCLEAR IGG TITR SER IF: POSITIVE {TITER}

## 2024-07-17 LAB — ANA NUCLEOLAR TITR SER IF: 80 {TITER}

## 2024-07-18 LAB
TTG IGA SER-ACNC: 1.2 U/ML (ref ?–7)
TTG IGG SER-ACNC: 0.8 U/ML (ref ?–7)

## 2024-08-02 ENCOUNTER — APPOINTMENT (OUTPATIENT)
Dept: ULTRASOUND IMAGING | Facility: HOSPITAL | Age: 84
End: 2024-08-02
Attending: STUDENT IN AN ORGANIZED HEALTH CARE EDUCATION/TRAINING PROGRAM
Payer: MEDICARE

## 2024-08-02 ENCOUNTER — HOSPITAL ENCOUNTER (EMERGENCY)
Facility: HOSPITAL | Age: 84
Discharge: HOME | End: 2024-08-02
Attending: STUDENT IN AN ORGANIZED HEALTH CARE EDUCATION/TRAINING PROGRAM
Payer: MEDICARE

## 2024-08-02 VITALS
BODY MASS INDEX: 23 KG/M2 | RESPIRATION RATE: 16 BRPM | WEIGHT: 145.94 LBS | SYSTOLIC BLOOD PRESSURE: 147 MMHG | HEART RATE: 73 BPM | OXYGEN SATURATION: 100 % | TEMPERATURE: 98 F | DIASTOLIC BLOOD PRESSURE: 72 MMHG

## 2024-08-02 DIAGNOSIS — N45.3 EPIDIDYMOORCHITIS: ICD-10-CM

## 2024-08-02 DIAGNOSIS — R33.9 URINARY RETENTION: Primary | ICD-10-CM

## 2024-08-02 LAB
BILIRUB UR QL STRIP.AUTO: NEGATIVE
CLARITY UR REFRACT.AUTO: CLEAR
COLOR UR AUTO: YELLOW
GLUCOSE UR STRIP.AUTO-MCNC: NORMAL MG/DL
HYALINE CASTS #/AREA URNS AUTO: PRESENT /LPF
KETONES UR STRIP.AUTO-MCNC: NEGATIVE MG/DL
LEUKOCYTE ESTERASE UR QL STRIP.AUTO: 25
NITRITE UR QL STRIP.AUTO: NEGATIVE
PH UR STRIP.AUTO: 6 [PH] (ref 5–8)
PROT UR STRIP.AUTO-MCNC: 20 MG/DL
RBC #/AREA URNS AUTO: >10 /HPF
RBC UR QL AUTO: NEGATIVE
SP GR UR STRIP.AUTO: 1.02 (ref 1–1.03)
UROBILINOGEN UR STRIP.AUTO-MCNC: NORMAL MG/DL

## 2024-08-02 PROCEDURE — 81001 URINALYSIS AUTO W/SCOPE: CPT | Performed by: STUDENT IN AN ORGANIZED HEALTH CARE EDUCATION/TRAINING PROGRAM

## 2024-08-02 PROCEDURE — 76870 US EXAM SCROTUM: CPT | Performed by: STUDENT IN AN ORGANIZED HEALTH CARE EDUCATION/TRAINING PROGRAM

## 2024-08-02 PROCEDURE — 99284 EMERGENCY DEPT VISIT MOD MDM: CPT

## 2024-08-02 PROCEDURE — 93975 VASCULAR STUDY: CPT | Performed by: STUDENT IN AN ORGANIZED HEALTH CARE EDUCATION/TRAINING PROGRAM

## 2024-08-02 RX ORDER — LIDOCAINE HYDROCHLORIDE 20 MG/ML
10 JELLY TOPICAL ONCE
Status: COMPLETED | OUTPATIENT
Start: 2024-08-02 | End: 2024-08-02

## 2024-08-02 RX ORDER — CIPROFLOXACIN 500 MG/1
500 TABLET, FILM COATED ORAL 2 TIMES DAILY
Qty: 20 TABLET | Refills: 0 | Status: SHIPPED | OUTPATIENT
Start: 2024-08-02 | End: 2024-08-06

## 2024-08-02 RX ORDER — CIPROFLOXACIN 500 MG/1
500 TABLET, FILM COATED ORAL 2 TIMES DAILY
Qty: 20 TABLET | Refills: 0 | Status: SHIPPED | OUTPATIENT
Start: 2024-08-02 | End: 2024-08-02

## 2024-08-02 RX ORDER — HYDROCODONE BITARTRATE AND ACETAMINOPHEN 5; 325 MG/1; MG/1
1 TABLET ORAL ONCE
Status: COMPLETED | OUTPATIENT
Start: 2024-08-02 | End: 2024-08-02

## 2024-08-02 RX ORDER — CIPROFLOXACIN 500 MG/1
500 TABLET, FILM COATED ORAL ONCE
Status: COMPLETED | OUTPATIENT
Start: 2024-08-02 | End: 2024-08-02

## 2024-08-02 RX ORDER — TAMSULOSIN HYDROCHLORIDE 0.4 MG/1
0.4 CAPSULE ORAL DAILY
Qty: 10 CAPSULE | Refills: 0 | Status: SHIPPED | OUTPATIENT
Start: 2024-08-02 | End: 2024-08-02

## 2024-08-02 RX ORDER — NYSTATIN 100000 U/G
CREAM TOPICAL 2 TIMES DAILY
COMMUNITY

## 2024-08-02 RX ORDER — TAMSULOSIN HYDROCHLORIDE 0.4 MG/1
0.4 CAPSULE ORAL DAILY
Qty: 10 CAPSULE | Refills: 0 | Status: SHIPPED | OUTPATIENT
Start: 2024-08-02 | End: 2024-08-12

## 2024-08-02 RX ORDER — FLUCONAZOLE 150 MG/1
150 TABLET ORAL ONCE
COMMUNITY
End: 2024-08-06

## 2024-08-02 NOTE — ED PROVIDER NOTES
History     Chief Complaint   Patient presents with    Other     Scrotal tear while showering       HPI    83 year old male presents for evaluation of skin changes to his scrotum.  For the past few days he states he has had redness, pruritus, pain along the skin of his lower scrotum, he has been applying a antifungal ointment for the past couple days, reports while nurse was changing him today she excellently scratched and became bleeding.  He has no urinary symptoms.  He is not diabetic.  Per documentation, patient is receiving topical antifungal and was given Diflucan by mouth yesterday.          Past Medical History:    Anemia    Atherosclerosis of coronary artery    Chronic atrial fibrillation (HCC)    Esophageal reflux    Heart attack (HCC)    Hyperlipidemia    Hypertension    Lupus (HCC)    Parkinson disease (HCC)       Past Surgical History:   Procedure Laterality Date    Angiogram  1984    Angiogram  1996    Angiogram  03/27/2002    [facility], Dr. Scott/ Mikayla: 1.Successful PTCA of the infarct related artery which was the proximal graft of the saphenous vein graft to the right posterior descending/LV branch which was dilated and stented using a 4 x 13 BX Velocity to a maximum diameter of 4.45 mm.  2.Proximal PDA primarily stented using a 3 x 18 Penta stent from about 70 to 80% to 0%, HUGH III flow, no dissection.     [facility] with a drug-eluting stent to the graft to the right coronary artery. Severe native CAD. Patent vein graft to LAD. Severe disease in the vein graft to the right coronary artery.    [facility]  [facility] Success[facility] stenting of the circumflex artery in the proximal and mid portions. Relook angiography of the vein graft to the right coronary artery showed that this vessel is widely patent.    Cabg  1984    x2    Cath bare metal stent (bms)      Cath drug eluting stent      Shoulder surg proc unlisted Left      fracture       Social History     Socioeconomic History    Marital status:    Tobacco Use    Smoking status: Former     Current packs/day: 0.00     Types: Cigarettes     Quit date: 1982     Years since quittin.3    Smokeless tobacco: Never   Substance and Sexual Activity    Alcohol use: Yes     Alcohol/week: 0.0 standard drinks of alcohol     Comment: Social    Drug use: No     Social Determinants of Health     Food Insecurity: No Food Insecurity (7/10/2024)    Food Insecurity     Food Insecurity: Never true   Transportation Needs: No Transportation Needs (7/10/2024)    Transportation Needs     Lack of Transportation: No   Housing Stability: Low Risk  (7/10/2024)    Housing Stability     Housing Instability: No                   Physical Exam     ED Triage Vitals   BP 24 1646 152/82   Pulse 24 1646 69   Resp 24 1646 16   Temp 24 1649 98.1 °F (36.7 °C)   Temp src 24 1649 Oral   SpO2 24 1646 100 %   O2 Device 24 1646 None (Room air)       Physical Exam  Constitutional:       General: He is not in acute distress.  Eyes:      Extraocular Movements: Extraocular movements intact.   Cardiovascular:      Rate and Rhythm: Normal rate.      Pulses: Normal pulses.   Pulmonary:      Effort: Pulmonary effort is normal. No respiratory distress.   Genitourinary:     Penis: Normal.       Comments: There is some induration to the lower scrotal skin with erythema and erythema of the inner groin skin folds with satellite lesions.  Dried blood along the scrotum, no active bleeding.  Musculoskeletal:      Cervical back: Normal range of motion.   Neurological:      General: No focal deficit present.      Mental Status: He is alert.              ED Course     Labs Reviewed   URINALYSIS, ROUTINE - Abnormal; Notable for the following components:       Result Value    Protein Urine 20 (*)     Leukocyte Esterase Urine 25 (*)     WBC Urine 6-10 (*)     RBC Urine >10 (*)     Squamous  Epi. Cells Few (*)     Ca Oxalate Crystals Few (*)     Hyaline Casts Present (*)     All other components within normal limits     US SCROTUM W/ DOPPLER (CPT=93975/16851)    Result Date: 8/2/2024  CONCLUSION:  Bilateral hyperemic right greater than left epididymis.  Heterogeneous echotexture to both testicles with color flow.  Findings suspicious for epididymo-orchitis.  No discrete drainable abscess appreciated.  Bilateral scrotal skin thickening.  LOCATION:  [facility]    Dictated by (CST): Pia Connell MD on 8/02/2024 at 7:23 PM     Finalized by (CST): Pia Connell MD on 8/02/2024 at 7:26 PM            MDM     Vitals:    08/02/24 1646 08/02/24 1649 08/02/24 1930 08/02/24 2000   BP: 152/82  147/64 147/72   Pulse: 69  66 73   Resp: 16      Temp:  98.1 °F (36.7 °C)     TempSrc:  Oral     SpO2: 100%  100% 100%   Weight: 66.2 kg          Patient appears to have intertrigo, likely fungal.  Very dry irritated skin, possibility of cellulitis, will plan for prophylactic treatment.  Will get ultrasound to evaluate for scrotal abscess.    ED Course as of 08/02/24 2158  ------------------------------------------------------------  Time: 08/02 1935  Comment: Ultrasound is showing signs of epididymal orchitis.  Will plan to start patient on antibiotics.  Urinalysis with mild pyuria, no bacteria seen.  On postvoid residual patient has over 600 cc in the bladder, he is retaining.  Will place Milan catheter.  ------------------------------------------------------------  Time: 08/02 1942  Comment: Plan to start patient on Flomax, urology follow-up.     Urine is freely flowing after catheterization.  Patient remains hemodynamically stable and comfortable.  Discharged back to his assisted living in stable condition with urology follow-up.    Disposition and Plan     Clinical Impression:  1. Urinary retention    2. Epididymoorchitis        Disposition:  Discharge    Follow-up:  [facility]   [address] [place] [address]  [id number]  [phone]  Schedule an appointment as soon as possible for a visit        Medications Prescribed:  Discharge Medication List as of 8/2/2024  8:21 PM        START taking these medications    Details   ciprofloxacin 500 MG Oral Tab Take 1 tablet (500 mg total) by mouth 2 (two) times daily for 10 days., Normal, Disp-20 tablet, R-0      tamsulosin 0.4 MG Oral Cap Take 1 capsule (0.4 mg total) by mouth daily for 10 days., Normal, Disp-10 capsule, R-0

## 2024-08-02 NOTE — ED QUICK NOTES
PT urinated 3 times since he has been in this ED, about 100mls each time. Belly is firm and distended. Notified Dr Hickey.

## 2024-08-02 NOTE — ED INITIAL ASSESSMENT (HPI)
Pt presents to ED via EMS with c/o scrotal abrasion with bleeding. Pt states the person who was showering him \"tore the skin right off\". Pt is on blood thinners.

## 2024-08-04 ENCOUNTER — HOSPITAL ENCOUNTER (INPATIENT)
Facility: HOSPITAL | Age: 84
LOS: 2 days | Discharge: HOME HEALTH | End: 2024-08-06
Attending: EMERGENCY MEDICINE | Admitting: INTERNAL MEDICINE
Payer: MEDICARE

## 2024-08-04 ENCOUNTER — APPOINTMENT (OUTPATIENT)
Dept: CT IMAGING | Facility: HOSPITAL | Age: 84
End: 2024-08-04
Attending: EMERGENCY MEDICINE
Payer: MEDICARE

## 2024-08-04 ENCOUNTER — APPOINTMENT (OUTPATIENT)
Dept: GENERAL RADIOLOGY | Facility: HOSPITAL | Age: 84
End: 2024-08-04
Attending: EMERGENCY MEDICINE
Payer: MEDICARE

## 2024-08-04 ENCOUNTER — HOSPITAL ENCOUNTER (INPATIENT)
Facility: HOSPITAL | Age: 84
LOS: 2 days | Discharge: HOME | End: 2024-08-06
Attending: EMERGENCY MEDICINE | Admitting: INTERNAL MEDICINE
Payer: MEDICARE

## 2024-08-04 DIAGNOSIS — D64.9 ANEMIA, UNSPECIFIED TYPE: ICD-10-CM

## 2024-08-04 DIAGNOSIS — R31.0 GROSS HEMATURIA: ICD-10-CM

## 2024-08-04 DIAGNOSIS — D69.6 THROMBOCYTOPENIA (HCC): ICD-10-CM

## 2024-08-04 DIAGNOSIS — S22.41XA MULTIPLE FRACTURES OF RIBS, RIGHT SIDE, INITIAL ENCOUNTER FOR CLOSED FRACTURE: Primary | ICD-10-CM

## 2024-08-04 LAB
ALBUMIN SERPL-MCNC: 3.7 G/DL (ref 3.2–4.8)
ALBUMIN/GLOB SERPL: 1 {RATIO} (ref 1–2)
ALP LIVER SERPL-CCNC: 130 U/L
ALT SERPL-CCNC: 12 U/L
ANION GAP SERPL CALC-SCNC: 3 MMOL/L (ref 0–18)
AST SERPL-CCNC: 50 U/L (ref ?–34)
BASOPHILS # BLD AUTO: 0.02 X10(3) UL (ref 0–0.2)
BASOPHILS NFR BLD AUTO: 0.3 %
BILIRUB SERPL-MCNC: 0.5 MG/DL (ref 0.2–1.1)
BILIRUB UR QL STRIP.AUTO: NEGATIVE
BUN BLD-MCNC: 22 MG/DL (ref 9–23)
CALCIUM BLD-MCNC: 9.4 MG/DL (ref 8.7–10.4)
CHLORIDE SERPL-SCNC: 107 MMOL/L (ref 98–112)
CO2 SERPL-SCNC: 28 MMOL/L (ref 21–32)
CREAT BLD-MCNC: 1.19 MG/DL
EGFRCR SERPLBLD CKD-EPI 2021: 60 ML/MIN/1.73M2 (ref 60–?)
EOSINOPHIL # BLD AUTO: 0.08 X10(3) UL (ref 0–0.7)
EOSINOPHIL NFR BLD AUTO: 1.4 %
ERYTHROCYTE [DISTWIDTH] IN BLOOD BY AUTOMATED COUNT: 14.1 %
GLOBULIN PLAS-MCNC: 3.7 G/DL (ref 2–3.5)
GLUCOSE BLD-MCNC: 149 MG/DL (ref 70–99)
GLUCOSE UR STRIP.AUTO-MCNC: NORMAL MG/DL
HCT VFR BLD AUTO: 32.5 %
HGB BLD-MCNC: 10.9 G/DL
IMM GRANULOCYTES # BLD AUTO: 0.03 X10(3) UL (ref 0–1)
IMM GRANULOCYTES NFR BLD: 0.5 %
KETONES UR STRIP.AUTO-MCNC: NEGATIVE MG/DL
LEUKOCYTE ESTERASE UR QL STRIP.AUTO: 75
LYMPHOCYTES # BLD AUTO: 0.8 X10(3) UL (ref 1–4)
LYMPHOCYTES NFR BLD AUTO: 14 %
MCH RBC QN AUTO: 32.6 PG (ref 26–34)
MCHC RBC AUTO-ENTMCNC: 33.5 G/DL (ref 31–37)
MCV RBC AUTO: 97.3 FL
MONOCYTES # BLD AUTO: 0.65 X10(3) UL (ref 0.1–1)
MONOCYTES NFR BLD AUTO: 11.4 %
NEUTROPHILS # BLD AUTO: 4.14 X10 (3) UL (ref 1.5–7.7)
NEUTROPHILS # BLD AUTO: 4.14 X10(3) UL (ref 1.5–7.7)
NEUTROPHILS NFR BLD AUTO: 72.4 %
NITRITE UR QL STRIP.AUTO: NEGATIVE
OSMOLALITY SERPL CALC.SUM OF ELEC: 292 MOSM/KG (ref 275–295)
PH UR STRIP.AUTO: 6 [PH] (ref 5–8)
PLATELET # BLD AUTO: 101 10(3)UL (ref 150–450)
POTASSIUM SERPL-SCNC: 4.3 MMOL/L (ref 3.5–5.1)
PROT SERPL-MCNC: 7.4 G/DL (ref 5.7–8.2)
PROT UR STRIP.AUTO-MCNC: 100 MG/DL
RBC # BLD AUTO: 3.34 X10(6)UL
RBC #/AREA URNS AUTO: >10 /HPF
SODIUM SERPL-SCNC: 138 MMOL/L (ref 136–145)
SP GR UR STRIP.AUTO: >1.03 (ref 1–1.03)
UROBILINOGEN UR STRIP.AUTO-MCNC: NORMAL MG/DL
WBC # BLD AUTO: 5.7 X10(3) UL (ref 4–11)

## 2024-08-04 PROCEDURE — 81001 URINALYSIS AUTO W/SCOPE: CPT | Performed by: EMERGENCY MEDICINE

## 2024-08-04 PROCEDURE — 87086 URINE CULTURE/COLONY COUNT: CPT | Performed by: INTERNAL MEDICINE

## 2024-08-04 PROCEDURE — 99285 EMERGENCY DEPT VISIT HI MDM: CPT

## 2024-08-04 PROCEDURE — 71101 X-RAY EXAM UNILAT RIBS/CHEST: CPT | Performed by: EMERGENCY MEDICINE

## 2024-08-04 PROCEDURE — 74177 CT ABD & PELVIS W/CONTRAST: CPT | Performed by: EMERGENCY MEDICINE

## 2024-08-04 PROCEDURE — 96374 THER/PROPH/DIAG INJ IV PUSH: CPT

## 2024-08-04 PROCEDURE — 85025 COMPLETE CBC W/AUTO DIFF WBC: CPT | Performed by: EMERGENCY MEDICINE

## 2024-08-04 PROCEDURE — 80053 COMPREHEN METABOLIC PANEL: CPT | Performed by: EMERGENCY MEDICINE

## 2024-08-04 PROCEDURE — 96361 HYDRATE IV INFUSION ADD-ON: CPT

## 2024-08-04 RX ORDER — TAMSULOSIN HYDROCHLORIDE 0.4 MG/1
0.4 CAPSULE ORAL DAILY
Status: DISCONTINUED | OUTPATIENT
Start: 2024-08-05 | End: 2024-08-06

## 2024-08-04 RX ORDER — METOCLOPRAMIDE HYDROCHLORIDE 5 MG/ML
10 INJECTION INTRAMUSCULAR; INTRAVENOUS EVERY 8 HOURS PRN
Status: DISCONTINUED | OUTPATIENT
Start: 2024-08-04 | End: 2024-08-05

## 2024-08-04 RX ORDER — SENNOSIDES 8.6 MG
17.2 TABLET ORAL NIGHTLY PRN
Status: DISCONTINUED | OUTPATIENT
Start: 2024-08-04 | End: 2024-08-06

## 2024-08-04 RX ORDER — SODIUM CHLORIDE 9 MG/ML
INJECTION, SOLUTION INTRAVENOUS CONTINUOUS
Status: DISCONTINUED | OUTPATIENT
Start: 2024-08-04 | End: 2024-08-06

## 2024-08-04 RX ORDER — HYDROMORPHONE HYDROCHLORIDE 1 MG/ML
0.5 INJECTION, SOLUTION INTRAMUSCULAR; INTRAVENOUS; SUBCUTANEOUS EVERY 30 MIN PRN
Status: ACTIVE | OUTPATIENT
Start: 2024-08-04 | End: 2024-08-04

## 2024-08-04 RX ORDER — HYDROCODONE BITARTRATE AND ACETAMINOPHEN 5; 325 MG/1; MG/1
1-2 TABLET ORAL EVERY 4 HOURS PRN
Status: DISCONTINUED | OUTPATIENT
Start: 2024-08-04 | End: 2024-08-05

## 2024-08-04 RX ORDER — MELATONIN
3 NIGHTLY PRN
Status: DISCONTINUED | OUTPATIENT
Start: 2024-08-04 | End: 2024-08-06

## 2024-08-04 RX ORDER — ONDANSETRON 2 MG/ML
4 INJECTION INTRAMUSCULAR; INTRAVENOUS EVERY 6 HOURS PRN
Status: DISCONTINUED | OUTPATIENT
Start: 2024-08-04 | End: 2024-08-06

## 2024-08-04 RX ORDER — MELATONIN
500 2 TIMES DAILY
Status: DISCONTINUED | OUTPATIENT
Start: 2024-08-04 | End: 2024-08-06

## 2024-08-04 RX ORDER — PREGABALIN 50 MG/1
50 CAPSULE ORAL 2 TIMES DAILY
Status: DISCONTINUED | OUTPATIENT
Start: 2024-08-04 | End: 2024-08-06

## 2024-08-04 RX ORDER — PREGABALIN 50 MG/1
50 CAPSULE ORAL 2 TIMES DAILY
COMMUNITY
Start: 2024-07-23

## 2024-08-04 RX ORDER — HYDROXYCHLOROQUINE SULFATE 200 MG/1
200 TABLET, FILM COATED ORAL 2 TIMES DAILY
Status: DISCONTINUED | OUTPATIENT
Start: 2024-08-04 | End: 2024-08-06

## 2024-08-04 RX ORDER — CARBIDOPA AND LEVODOPA 25; 100 MG/1; MG/1
1 TABLET, EXTENDED RELEASE ORAL
Status: DISCONTINUED | OUTPATIENT
Start: 2024-08-04 | End: 2024-08-06

## 2024-08-04 RX ORDER — POLYETHYLENE GLYCOL 3350 17 G/17G
17 POWDER, FOR SOLUTION ORAL DAILY PRN
Status: DISCONTINUED | OUTPATIENT
Start: 2024-08-04 | End: 2024-08-06

## 2024-08-04 RX ORDER — NYSTATIN 100000 U/G
CREAM TOPICAL 2 TIMES DAILY
Status: DISCONTINUED | OUTPATIENT
Start: 2024-08-04 | End: 2024-08-06

## 2024-08-04 RX ORDER — ONDANSETRON 2 MG/ML
4 INJECTION INTRAMUSCULAR; INTRAVENOUS EVERY 4 HOURS PRN
Status: ACTIVE | OUTPATIENT
Start: 2024-08-04 | End: 2024-08-04

## 2024-08-04 RX ORDER — ACETAMINOPHEN 500 MG
500 TABLET ORAL EVERY 4 HOURS PRN
Status: DISCONTINUED | OUTPATIENT
Start: 2024-08-04 | End: 2024-08-06

## 2024-08-04 RX ORDER — HYDROMORPHONE HYDROCHLORIDE 1 MG/ML
0.5 INJECTION, SOLUTION INTRAMUSCULAR; INTRAVENOUS; SUBCUTANEOUS EVERY 30 MIN PRN
Status: DISCONTINUED | OUTPATIENT
Start: 2024-08-04 | End: 2024-08-05

## 2024-08-04 RX ORDER — METOPROLOL SUCCINATE 50 MG/1
50 TABLET, EXTENDED RELEASE ORAL
Status: DISCONTINUED | OUTPATIENT
Start: 2024-08-05 | End: 2024-08-06

## 2024-08-04 RX ORDER — PRAVASTATIN SODIUM 20 MG
20 TABLET ORAL NIGHTLY
Status: DISCONTINUED | OUTPATIENT
Start: 2024-08-04 | End: 2024-08-06

## 2024-08-04 RX ORDER — SODIUM CHLORIDE 9 MG/ML
125 INJECTION, SOLUTION INTRAVENOUS CONTINUOUS
Status: DISCONTINUED | OUTPATIENT
Start: 2024-08-04 | End: 2024-08-04

## 2024-08-04 NOTE — ED PROVIDER NOTES
Patient Seen in: [facility] Emergency Department      History     Chief Complaint   Patient presents with    Fall     Stated Complaint: fell at assisted living, has edmond cath and has blood in output, having right s*    Subjective:   HPI  Patient with a history of coronary artery disease status post bypass surgery, paroxysmal atrial fibrillation on Xarelto, hypertension, and chronically elevated liver enzymes.  Patient was here 2 days ago for evaluation of scrotal redness.  According to that note, while the nurse was changing him today she accidentally scratched the area and it started bleeding.  He had already been treated with topical antifungal and oral Diflucan prior to that visit for candidiasis.  Ultrasound concerning for epididymoorchitis.  Patient also found to have urinary retention and Edmond catheter was placed.  Urinalysis showed pyuria and patient was started on Unasyn antibiotic.  Patient discharged on Cipro.  No culture sent  Patient had a fall about 4 hours prior to arrival at assisted living.  Patient can recall the events fairly well.  Patient reports that his walker slid out from under him.  Patient fell striking the right side of his abdomen and flank on the edge of the air conditioning unit.  He was assisted by another resident.  The family was out of the room for just a moment.  He never lost consciousness.  He did not strike his head.  He denies any neck pain.  He has some pain in the right lower thoracic back and upper flank and pain in the right abdomen since the fall.  Staff is noted blood draining from the Edmond catheter and patient with an abrasion on his right arm.  Patient remembers it felt like his catheter was tugged on at some point.  No substernal chest pain or pressure  Since leaving the hospital, patient has been eating well.  No vomiting.  No diarrhea.  No coughing.      Objective:   Past Medical History:    Anemia    Atherosclerosis of coronary artery    Chronic atrial  fibrillation (HCC)    Esophageal reflux    Heart attack (HCC)    Hyperlipidemia    Hypertension    Lupus (HCC)    Parkinson disease (HCC)              Past Surgical History:   Procedure Laterality Date    Angiogram  1984    Angiogram  1996    Angiogram  2002    [facility], Dr. Scott/ Mikayla: 1.Successful PTCA of the infarct related artery which was the proximal graft of the saphenous vein graft to the right posterior descending/LV branch which was dilated and stented using a 4 x 13 BX Velocity to a maximum diameter of 4.45 mm.  2.Proximal PDA primarily stented using a 3 x 18 Penta stent from about 70 to 80% to 0%, HUGH III flow, no dissection.     Angiogram  2011    [facility], Dr. Steele: Successful stenting with a drug-eluting stent to the graft to the right coronary artery. Severe native CAD. Patent vein graft to LAD. Severe disease in the vein graft to the right coronary artery.    Angiogram  2011    [facility], Dr. Steele: Successful stenting of the circumflex artery in the proximal and mid portions. Relook angiography of the vein graft to the right coronary artery showed that this vessel is widely patent.    Cabg  1984    x2    Cath bare metal stent (bms)      Cath drug eluting stent      Shoulder surg proc unlisted Left     fracture                Social History     Socioeconomic History    Marital status:    Tobacco Use    Smoking status: Former     Current packs/day: 0.00     Types: Cigarettes     Quit date: 1982     Years since quittin.3    Smokeless tobacco: Never   Substance and Sexual Activity    Alcohol use: Yes     Alcohol/week: 0.0 standard drinks of alcohol     Comment: Social    Drug use: No     Social Determinants of Health     Food Insecurity: No Food Insecurity (7/10/2024)    Food Insecurity     Food Insecurity: Never true   Transportation Needs: No Transportation Needs (7/10/2024)    Transportation Needs     Lack of Transportation: No   Housing Stability: Low Risk   (7/10/2024)    Housing Stability     Housing Instability: No              Review of Systems    Positive for stated Chief Complaint: Fall    Other systems are as noted in HPI.  Constitutional and vital signs reviewed.      All other systems reviewed and negative except as noted above.    Physical Exam     ED Triage Vitals [08/04/24 1607]   BP 98/66   Pulse 62   Resp 20   Temp 97.6 °F (36.4 °C)   Temp src Temporal   SpO2 95 %   O2 Device None (Room air)       Current Vitals:   Vital Signs  BP: 118/62  Pulse: 72  Resp: 22  Temp: 97.6 °F (36.4 °C)  Temp src: Temporal  MAP (mmHg): 78    Oxygen Therapy  SpO2: 97 %  O2 Device: None (Room air)            Physical Exam  General: The patient is awake, alert, conversant.  Face is symmetric and atraumatic.  Scalp is atraumatic  Eyes: sclera white, conjunctiva pink and moist.  Lids and lashes are normal.  Pupils are round and reactive to light  Oromucosa lips are dry  Neck: Nontender in the midline paraspinal musculature with good active nontender range of motion  Chest: Tender to the posterior lateral aspect of the right chest wall.  No bony deformity or bruising or crepitus is noted.  Back: The thoracic back is very kyphotic.  No point tenderness in midline or paraspinal musculature.  Lumbar back without point tenderness in midline  Lungs: Clear to auscultation bilaterally.  No rhonchi or rales.  Heart: Normal S1 and S2, without murmur.  Distal pulses are strong and symmetric.  Abdomen: Soft, nondistended.  Mildly tender in the right upper abdomen and right lateral abdomen.  Lower abdomen and left abdomen benign.  No involuntary guarding, rigidity, rebound  Extremities: Right arm with a large skin tear.  Good active range of motion of the wrist, elbow, and shoulder bilaterally.  Hips and knees have good nontender active range of motion.  Calves nonswollen, symmetric, nontender.  No pedal edema.  Skin: Not particularly pale  Neurologic:  Mental status as above.  Patient moves  all extremities with reasonable strength.  There is increased tone         ED Course     Labs Reviewed   URINALYSIS, ROUTINE - Abnormal; Notable for the following components:       Result Value    Clarity Urine Ex.Turbid (*)     Spec Gravity >1.030 (*)     Blood Urine 3+ (*)     Protein Urine 100 (*)     Leukocyte Esterase Urine 75 (*)     RBC Urine >10 (*)     Ca Oxalate Crystals Few (*)     All other components within normal limits   COMP METABOLIC PANEL (14) - Abnormal; Notable for the following components:    Glucose 149 (*)     AST 50 (*)     Alkaline Phosphatase 130 (*)     Globulin  3.7 (*)     All other components within normal limits   CBC W/ DIFFERENTIAL - Abnormal; Notable for the following components:    RBC 3.34 (*)     HGB 10.9 (*)     HCT 32.5 (*)     .0 (*)     Lymphocyte Absolute 0.80 (*)     All other components within normal limits   CBC WITH DIFFERENTIAL WITH PLATELET    Narrative:     The following orders were created for panel order CBC With Differential With Platelet.  Procedure                               Abnormality         Status                     ---------                               -----------         ------                     CBC W/ DIFFERENTIAL[[id number]]          Abnormal            Final result                 Please view results for these tests on the individual orders.   RAINBOW DRAW LAVENDER   RAINBOW DRAW LIGHT GREEN   RAINBOW DRAW BLUE   URINE CULTURE, ROUTINE                      MDM      Patient had a nonsyncopal fall witnessed by another resident after his walker slid out.  He has a contusion to the right lower chest wall and right flank area.  There is some tenderness here.  Underlying rib fracture must be excluded.  There is some right upper quadrant abdominal tenderness and hepatic injury and renal injury must be excluded.  Family noted some blood in the [place] catheter.  This could be related to the tug that patient experienced.  Blood in the urine not  uncommon with urinary tract infections.  Renal injury must also be excluded  Patient does appear dehydrated and was gently hydrated with normal saline considering his history of heart failure.  He seems to be doing well at home after treatment with antibiotic.  No symptoms of sepsis such as fever, shaking chills, or alteration of awareness.    CBC shows normal white count with mild anemia requiring follow-up.  Thrombocytopenia requires follow-up  Metabolic panel shows normal electrolytes and creatinine  Urinalysis concentrated shows blood with no white cells    Right ribs  I personally reviewed the actual radiographs themselves and my individual interpretation shows possible rib fractures on the right  radiologist's formal interpretation which I have reviewed  Severe diffuse osteopenia limits evaluation.  There are likely minimally displaced fractures of the right fifth through seventh ribs.  No significant pleural effusion or pneumothorax is seen.  Postsurgical changes of the chest are noted.  If clinical   symptoms persist then consider follow-up imaging.     CT abdomen pelvis  I personally reviewed the actual radiographs themselves and my individual interpretation shows no retroperitoneal bleed or kidney fracture  radiologist's formal interpretation which I have reviewed  CONCLUSION:  There is prominent circumferential mural thickening of the urinary bladder with areas of trabeculation and possible diverticula.  This may represent cystitis.  A neoplastic process cannot be excluded.  Correlation with direct visualization   is suggested.       Admission disposition: 8/4/2024  7:48 PM           On repeat examination, patient appears more comfortable.  Considering the amount of pain he has and that he currently lives in assisted living situation without skilled nursing, I do believe a more prolonged period of observation would be appropriate,  and physical therapy consultations and even placement in a  rehabilitation hospital may be necessary.    I discussed case with duly hospitalist, Dr. Sanabria.  He will admit and make further recommendations.  Patient and family updated on workup and treatment plan.                           Medical Decision Making      Disposition and Plan     Clinical Impression:  1. Multiple fractures of ribs, right side, initial encounter for closed fracture    2. Gross hematuria    3. Anemia, unspecified type    4. Thrombocytopenia (HCC)         Disposition:  Admit  8/4/2024  7:48 pm    Follow-up:  No follow-up provider specified.        Medications Prescribed:  Current Discharge Medication List                            Hospital Problems       Present on Admission  Date Reviewed: 5/7/2022            ICD-10-CM Noted POA    * (Principal) Multiple fractures of ribs, right side, initial encounter for closed fracture S22.41XA 8/4/2024 Unknown

## 2024-08-04 NOTE — ED INITIAL ASSESSMENT (HPI)
Patient reports fall 4hours PTA at assisted living after walker slid. Patient states he struck his right side of abdomen and flank on edge of A/C unit. C/o pain, hematuria now in edmond and abrasion to right arm   Physical Therapy Evaluation    Visit Type: Initial Evaluation  Visit: 1  Referring Provider: Anthony Roldan DPM  Next referring provider visit: 3/2/2023  Medical Diagnosis (from order): Diagnosis Information    Diagnosis  844.8 (ICD-9-CM) - S86.312A (ICD-10-CM) - Rupture of peroneal tendon of left foot       Treatment Diagnosis: left ankle with increased pain/symptoms, impaired range of motion, impaired mobility, impaired muscle length/flexibility, impaired tissue mobility, increased swelling, impaired gait, impaired activity tolerance and impaired strength.  Onset  - Date of surgery: 1/18/2023  - Procedure: Left lateral ankle joint arthroscopy with debridement, peroneus brevis and longus tendon repair with allograft     Diagnosis Precautions: Rupture of peroneal tendon of left foot  Left lateral ankle joint arthroscopy with debridement, peroneus brevis and longus tendon repair with allograft on 01/18/2023  Lateral ankle ligament repair    PRECAUTIONS: No WB out of boot  Chart reviewed at time of initial evaluation (relevant co-morbidities, allergies, tests and medications listed):   - Diagnostic tests reviewed: X-Ray and MRI studies  unremarkable      SUBJECTIVE                                                                                                               Patient states last summer of 2022 she was walking down stairs and her left ankle rolled under her. She got it checked out and they told her she just sprained her ankle. She still had pain with walking, squatting, and felt like she did not have much mobility. She saw an orthopedic doctor and they did an MRI in December of 2022 and found torn ankle ligaments. She was in a hard cast for about 2 weeks and then last week she got the boot.     Pain / Symptoms  - Pain/symptom is: intermittent  - Pain rating (out of 10): Current: 2 ; Best: 0; Worst: 8  - Location: Left foot  - Quality / Description: burning, tingling  - Alleviating Factors:  ice    Function:   Limitations / Exacerbation Factors:   - Patient reports difficulty and pain with function reported below.  - grooming/hygiene/self-care activities, meal/food prep, house/yard work, work - unable at this time, kneeling, bending/squatting/lifting, standing and walking, all types of transfers and low transfer (toilet/couch), community distances, stairs  Prior Level of Function: worsening pain and function, therefore underwent surgery,    Patient Goals: decreased pain, increased strength and return to work.    Prior treatment  - no therapies  - Discharged from hospital, home health, or skilled nursing facility in last 30 days: no  Home Environment   - Patient lives with: significant other and children  - Type of home: multiple level home  - Assistance available: as needed  - Reported 2 or more falls or an unexplained fall with injury in the last year.  - patient currently being seen in PT  - Feel safe at home / work / school: yes      OBJECTIVE                                                                                                                    Incision/Wound:   - Location: 2 on anterior ankle, closed    - Location: 1 lateral ankle, closed      Observation   Left ankle swelling    Range of Motion (ROM)   (degrees unless noted; active unless noted; norms in ( ); negative=lacking to 0, positive=beyond 0)  WNL: RLE  Ankle:   - Dorsiflexion (20):      • Left:  0       • Right:  10     - Plantar Flexion (45-50):      • Left:  35       • Right:  55    - Inversion (30-35):     • Left: pain 25     • Right: 40   - Eversion (15-20):     • Left: 4     • Right: 15    Strength  (out of 5 unless noted, standard test position unless noted)   5/5: RLE  Comments / Details: Did not assess left ankle strength due to surgery           Palpation  Left  - Lateral Gastrocnemius: no palpable tenderness  - Medial Gastrocnemius: no palpable tenderness  - Peroneus Longus: tenderness  - Peroneus Brevis:  tenderness  - Anterior Tibialis: no palpable tenderness  - Posterior Tibialis: no palpable tenderness  Ankle: [place]/Tendon/Bone  - Achilles Tendon: - Left: no tenderness  - Achilles Insertion: - Left: no tenderness  - Peroneal Tendon: - Left: tenderness  - Anterior Tibofibular Ligament: - Left: tenderness  - Calcaneofibular Ligament: - Left: tenderness  - Deltoid Ligament: - Left: no tenderness  - Anterior Ankle: - Left: tenderness  - Medial Malleolus: - Left: no tenderness  - Lateral malleolus: - Left: no tenderness  - Plantar Fascia: - Left: no tenderness  - Head 1st Metatarsal: - Left: no tenderness  - Head 2nd Metatarsal: - Left: no tenderness  - Head 3rd Metatarsal: - Left: no tenderness  - Head 4th Metatarsal: - Left: no tenderness  - Head 5th Metatarsal: - Left: no tenderness  - Base 5th Metatarsal: - Left: no tenderness    Muscle Flexibility  - Gastroc Soleus: • Left: moderate limitation            Ambulation / Gait  - Assistive device: no assistive device (L boot)  - Description: antalgic and decreased stance time LLE          Outcome/Assessments  Outcome Measures:  Initial evaluation 02/09/2023:    Lower Extremity Functional Scale: LEFS Calculated Total: 23   (0=extreme difficulty; 80=no difficulty)  see flowsheet for additional documentation        Treatment     Therapeutic Exercise  Long sitting:  - Gentle passive left ankle dorsiflexion, inversion/eversion  - ankle inversion/eversion: x 20 reps, left LE  - ankle dorsiflexion/plantarflexion: x 20 reps, left LE  - ankle circles: x 20 reps, left LE, CW and CCW  - plantarflexion stretch: 2 x 30 sec hold, left LE, gentle    Educated patient on HEP.      Skilled input: verbal instruction/cues, tactile instruction/cues, posture correction and facilitation    Writer verbally educated and received verbal consent for hand placement, positioning of patient, and techniques to be performed today from patient for clothing adjustments for techniques, therapist  position for techniques and hand placement and palpation for techniques as described above and how they are pertinent to the patient's plan of care.    Home Exercise Program  Access Code: [email or web address]  URL: [email or web address]  Date: 02/09/2023  Prepared by: Domonique Castellanos    Exercises   Supine Ankle Inversion Eversion AROM - 1 x daily - 7 x weekly - 3 sets - 10 reps   Supine Ankle Pumps - 1 x daily - 7 x weekly - 3 sets - 10 reps   Supine Ankle Circles - 1 x daily - 7 x weekly - 3 sets - 10 reps   Long Sitting Calf Stretch with Strap - 1 x daily - 7 x weekly - 1 sets - 3 reps - 30 sec hold        ASSESSMENT                                                                                                          36 year old patient has reported functional limitations listed above impacted by signs and symptoms consistent with treatment diagnosis below.  Treatment Diagnosis:   - Involved: left ankle.  - Symptoms/impairments: increased pain/symptoms, impaired range of motion, impaired mobility, impaired muscle length/flexibility, impaired tissue mobility, increased swelling, impaired gait, impaired activity tolerance and impaired strength.    Patient is a 35 y/o female who presents to initial evaluation and treatment s/p left lateral ankle joint arthroscopy with debridement, peroneus brevis and longus tendon repair with allograft on 01/18/2023. Patient presents with deficits with left ankle ROM, strength, swelling, and impaired gait upon assessment. She arrived with a boot on her left LE and had impaired gait mechanics. Per MD, patient cannot perform WB activities out of boot. These limitations impact patients ability to perform grooming/hygiene/self-care activities, meal/food prep, house/yard work, work, kneel, bend/squat/lift, stand, perform floor transfers, walk community distances, and negotiate stairs without increased difficulty or pain. Patient will benefit from physical therapy services to  target above deficits in order to return to prior level of function and prevent further limitations.      Prognosis: Patient will benefit from skilled therapy.  Rehabilitative potential is: excellent.  Predicted patient presentation: Low (stable) - Patient comorbidities and complexities, as defined above, will have little effect on progress for prescribed plan of care.    Education:   - Present and ready to learn: patient    PLAN                                                                                                                         The following skilled interventions to be implemented to achieve goals listed below:  Neuromuscular Re-Education (82272)  Therapeutic Activity (73892)  Therapeutic Exercise (89758)  Manual Therapy (88669)  Heat/Cold (34732)  Electrical Stimulation Unattended (21484 or )  Ultrasound/Phonophoresis (71609)  Gait Training (97459)    Frequency / Duration  2 times per week tapering as patient progresses for 8 weeks for an estimated total of 16 visits    Patient involved in and agreed to plan of care and goals.    Suggestions for next session as indicated: Progress per plan of care, gentle soft tissue mobilization to left ankle, passive and active left ankle ROM      Goals  Long Term Goals: to be met by end of plan of care  1. Patient will tolerate standing for 10 minutes with less than 3/10 left ankle pain in order to shower independently.  2. Patient will improve left active ankle dorsiflexion to greater than or equal to 8 degrees in order to be able to improve gait mechanics.  3. Patient will negotiate 10 steps with reciprocal pattern with less than 3/10 left ankle pain in order to get to and from her bedroom.  4. Patient will gutierrez/doff socks and shoes onto left LE with less than 3/10 left ankle pain.  5. Patient will be independent with progressed and modified home exercise program.  6. Lower Extremity Functional Scale: Patient will complete form to reflect an improved  raw score to greater than or equal to 41/80 to indicate patient reported improvement in function/disability/impairment (minimal detectable change: 9 points).      Therapy procedure time and total treatment time can be found documented on the Time Entry flowsheet

## 2024-08-05 LAB
ANION GAP SERPL CALC-SCNC: 3 MMOL/L (ref 0–18)
BUN BLD-MCNC: 13 MG/DL (ref 9–23)
CALCIUM BLD-MCNC: 8.6 MG/DL (ref 8.7–10.4)
CHLORIDE SERPL-SCNC: 109 MMOL/L (ref 98–112)
CO2 SERPL-SCNC: 26 MMOL/L (ref 21–32)
CREAT BLD-MCNC: 0.65 MG/DL
EGFRCR SERPLBLD CKD-EPI 2021: 93 ML/MIN/1.73M2 (ref 60–?)
ERYTHROCYTE [DISTWIDTH] IN BLOOD BY AUTOMATED COUNT: 14.2 %
GLUCOSE BLD-MCNC: 92 MG/DL (ref 70–99)
HCT VFR BLD AUTO: 27.9 %
HGB BLD-MCNC: 9.5 G/DL
HGB BLD-MCNC: 9.5 G/DL
MCH RBC QN AUTO: 32.8 PG (ref 26–34)
MCHC RBC AUTO-ENTMCNC: 34.1 G/DL (ref 31–37)
MCV RBC AUTO: 96.2 FL
OSMOLALITY SERPL CALC.SUM OF ELEC: 286 MOSM/KG (ref 275–295)
PLATELET # BLD AUTO: 84 10(3)UL (ref 150–450)
PLATELETS.RETICULATED NFR BLD AUTO: 1.9 % (ref 0–7)
POTASSIUM SERPL-SCNC: 3.8 MMOL/L (ref 3.5–5.1)
RBC # BLD AUTO: 2.9 X10(6)UL
SODIUM SERPL-SCNC: 138 MMOL/L (ref 136–145)
WBC # BLD AUTO: 3.3 X10(3) UL (ref 4–11)

## 2024-08-05 PROCEDURE — 97530 THERAPEUTIC ACTIVITIES: CPT

## 2024-08-05 PROCEDURE — 85027 COMPLETE CBC AUTOMATED: CPT | Performed by: INTERNAL MEDICINE

## 2024-08-05 PROCEDURE — 80048 BASIC METABOLIC PNL TOTAL CA: CPT | Performed by: INTERNAL MEDICINE

## 2024-08-05 PROCEDURE — 85018 HEMOGLOBIN: CPT | Performed by: INTERNAL MEDICINE

## 2024-08-05 PROCEDURE — 97162 PT EVAL MOD COMPLEX 30 MIN: CPT

## 2024-08-05 PROCEDURE — 97166 OT EVAL MOD COMPLEX 45 MIN: CPT

## 2024-08-05 RX ORDER — HYDROMORPHONE HYDROCHLORIDE 1 MG/ML
0.5 INJECTION, SOLUTION INTRAMUSCULAR; INTRAVENOUS; SUBCUTANEOUS EVERY 2 HOUR PRN
Status: DISCONTINUED | OUTPATIENT
Start: 2024-08-05 | End: 2024-08-05

## 2024-08-05 RX ORDER — HYDROCODONE BITARTRATE AND ACETAMINOPHEN 5; 325 MG/1; MG/1
1 TABLET ORAL EVERY 4 HOURS PRN
Status: DISCONTINUED | OUTPATIENT
Start: 2024-08-05 | End: 2024-08-06

## 2024-08-05 RX ORDER — POTASSIUM CHLORIDE 1.5 G/1.58G
40 POWDER, FOR SOLUTION ORAL ONCE
Status: COMPLETED | OUTPATIENT
Start: 2024-08-05 | End: 2024-08-05

## 2024-08-05 NOTE — PROGRESS NOTES
NURSING ADMISSION NOTE      Patient admitted via Cart from ER, accompanied by his daughter, alert and oriented x 3-4, occ confused  but answer questions appropriately,  came  with edmond catheter , hematuria noted, xarelto currently on hold, MD aware, had a fall today in [facility] , abrasion to right forearm noted, c/o severe  pain r/t rib fracture, pain med given, UTI +, abt initiated. Plan of care reviewed with the patient and his family member,  Safety precautions initiated.  Bed in low position. Call light in reach. Fall prec in place.

## 2024-08-05 NOTE — PLAN OF CARE
Patient today has been a little confused thinking he was at a bank this morning and not sure where he is this evening. Through out the shift has to be reoriented, but pleasantly confused. Patient has worked with therapy today and was up with 1-2 staff and his gait belt and walker into his chair this morning. Patient was having some pain and did request some medication after sitting up for a while. Stat lock was replaced this morning as well. Patient has been calm and taken naps off and on today. Eats his meals well. Patient belongings at bedside, frequent rounding for patient needs. Call light in reach.       Problem: MUSCULOSKELETAL - ADULT  Goal: Return mobility to safest level of function  Description: INTERVENTIONS:  - Assess patient stability and activity tolerance for standing, transferring and ambulating w/ or w/o assistive devices  - Assist with transfers and ambulation using safe patient handling equipment as needed  - Ensure adequate protection for wounds/incisions during mobilization  - Obtain PT/OT consults as needed  - Advance activity as appropriate  - Communicate ordered activity level and limitations with patient/family  Outcome: Progressing     Problem: SAFETY ADULT - FALL  Goal: Free from fall injury  Description: INTERVENTIONS:  - Assess pt frequently for physical needs  - Identify cognitive and physical deficits and behaviors that affect risk of falls.  - [place] fall precautions as indicated by assessment.  - Educate pt/family on patient safety including physical limitations  - Instruct pt to call for assistance with activity based on assessment  - Modify environment to reduce risk of injury  - Provide assistive devices as appropriate  - Consider OT/PT consult to assist with strengthening/mobility  - Encourage toileting schedule  Outcome: Progressing     Problem: NEUROLOGICAL - ADULT  Goal: Achieves stable or improved neurological status  Description: INTERVENTIONS  - Assess for and report  changes in neurological status  - Initiate measures to prevent increased intracranial pressure  - Maintain blood pressure and fluid volume within ordered parameters to optimize cerebral perfusion and minimize risk of hemorrhage  - Monitor temperature, glucose, and sodium. Initiate appropriate interventions as ordered  Outcome: Progressing  Goal: Achieves maximal functionality and self care  Description: INTERVENTIONS  - Monitor swallowing and airway patency with patient fatigue and changes in neurological status  - Encourage and assist patient to increase activity and self care with guidance from PT/OT  - Encourage visually impaired, hearing impaired and aphasic patients to use assistive/communication devices  Outcome: Progressing

## 2024-08-05 NOTE — PHYSICAL THERAPY NOTE
PHYSICAL THERAPY EVALUATION - INPATIENT     Room Number: 402/402-A  Evaluation Date: 8/5/2024  Type of Evaluation: Initial  Physician Order: PT Eval and Treat    Presenting Problem: multiple rib fractures  Co-Morbidities : h/o parkinson's dementia  Reason for Therapy: Mobility Dysfunction and Discharge Planning    PHYSICAL THERAPY ASSESSMENT   Patient is currently functioning below baseline with transfers and gait.  Prior to admission, patient's baseline is ambulatory with assist.  Patient is requiring moderate assist and maximum assist as a result of the following impairments: decreased functional strength, decreased endurance/aerobic capacity, and cognitive deficits (decreased orientation and safety).  Physical Therapy will continue to follow for duration of hospitalization.    Patient will benefit from continued skilled PT Services at discharge to promote prior level of function and safety with additional support and return home with home health PT.  If ongoing cognitive deficits persist, pt require more supportive care.    PLAN  PT Treatment Plan: Body mechanics;Bed mobility;Endurance;Energy conservation;Patient education;Family education;Gait training;Range of motion;Strengthening;Stair training;Transfer training;Balance training  Rehab Potential : Guarded  Frequency (Obs):  (2-3x/week)         CURRENT GOALS    Goal #1 Patient is able to demonstrate supine - sit EOB @ level: supervision     Goal #2 Patient is able to demonstrate transfers Sit to/from Stand at assistance level: minimum assistance     Goal #3 Patient is able to ambulate 50 feet with assist device: walker - rolling at assistance level: minimum assistance     Goal #4    Goal #5    Goal #6    Goal Comments: Goals established on 8/5/2024      PHYSICAL THERAPY MEDICAL/SOCIAL HISTORY  History related to current admission: Patient is a 83 year old male admitted on 8/4/2024 from [facility] for fall.  Pt diagnosed with multiple rib fractures.    Recent  admits  8/2: ED visit for urinary retention  7/9-7/14: admit for pleural effusion with dc to home  7/1: ED visit for T8 compression fracture- fitted for TLSO brace and dc'ed home  5/19: ED visit for cystitis with dc home  4/20-4/23: Admit for leg pain, with dc to home    HOME SITUATION  Type of Home: Assisted living facility ([facility])   Home Layout: One level                Lives With: Spouse;Staff 24 hours     Patient Owned Equipment: Rolling walker       Prior Level of [place]: Since last admit, pt and spouse moved into [facility].  Pt previously was ambulatory with RW, had assist for showers and ADLs.  Pt would sleep in lift recliner at home.     SUBJECTIVE  \"You'll have to ask my daughter!  She's taken over everything.\"        OBJECTIVE  Precautions: Bed/chair alarm  Fall Risk: High fall risk    WEIGHT BEARING RESTRICTION                   PAIN ASSESSMENT  Rating: Unable to rate  Location: low back  Management Techniques: Activity promotion;Body mechanics;Relaxation;Repositioning    COGNITION  Arousal/Alertness:  appropriate responses to stimuli  Attention Span:  attends with cues to redirect  Orientation Level:  oriented to person, disoriented to place, disoriented to time, and disoriented to situation  Memory:  impaired working memory  Following Commands:  follows one step commands with repetition  Problem Solving:  assistance required to identify errors made, assistance required to generate solutions, and assistance required to implement solutions    RANGE OF MOTION AND STRENGTH ASSESSMENT  Upper extremity ROM and strength are within functional limits     Lower extremity ROM is within functional limits     Lower extremity strength is within functional limits       BALANCE  Static Sitting: Good  Dynamic Sitting: Fair -  Static Standing: Poor  Dynamic Standing: Poor    ADDITIONAL TESTS                                    ACTIVITY TOLERANCE                         O2 WALK       NEUROLOGICAL  FINDINGS                        AM-PAC '6-Clicks' INPATIENT SHORT FORM - BASIC MOBILITY  How much difficulty does the patient currently have...  Patient Difficulty: Turning over in bed (including adjusting bedclothes, sheets and blankets)?: A Little   Patient Difficulty: Sitting down on and standing up from a chair with arms (e.g., wheelchair, bedside commode, etc.): A Lot   Patient Difficulty: Moving from lying on back to sitting on the side of the bed?: A Little   How much help from another person does the patient currently need...   Help from Another: Moving to and from a bed to a chair (including a wheelchair)?: A Lot   Help from Another: Need to walk in hospital room?: A Lot   Help from Another: Climbing 3-5 steps with a railing?: A Lot       AM-PAC Score:  Raw Score: 14   Approx Degree of Impairment: 61.29%   Standardized Score (AM-PAC Scale): 38.1   CMS Modifier (G-Code): CL    FUNCTIONAL ABILITY STATUS  Gait Assessment   Functional Mobility/Gait Assessment  Gait Assistance: Moderate assistance  Distance (ft): 2  Assistive Device: Rolling walker  Pattern: Shuffle    Skilled Therapy Provided         Transfer Mobility:  Sit to stand: mod A with RW with posterior lean   Stand to sit: mod A with poor eccentric control.  Gait = bed to chair only.      Therapist's Comments: Pt received seated at eob after finishing breakfast.  Lumbar brace noted in bed, assisted with donning in seated positioning.  Per chart review, noted to be rec by nsgy during ED visit in July.  Pt highly anxious throughout, stating thinking he is at a bank and deferring any further questions to dtr.  RN aware of confusion this date.    Exercise/Education Provided:  Body mechanics  Functional activity tolerated  Gait training    Patient End of Session: Up in chair;Needs met;Call light within reach;RN aware of session/findings;All patient questions and concerns addressed;Alarm set;Discussed recommendations with /social  worker      Patient Evaluation Complexity Level:  History Moderate - 1 or 2 personal factors and/or co-morbidities   Examination of body systems Moderate - addressing a total of 3 or more elements   Clinical Presentation Moderate - Evolving   Clinical Decision Making Moderate - Evolving       PT Session Time: 20 minutes    Therapeutic Activity: 10 minutes

## 2024-08-05 NOTE — H&P
[facility] Hospitalist History and Physical      Chief Complaint   Patient presents with    Fall        PCP: Olvin Dinh MD    Chief Complaint: Mechanical Fall    History of Present Illness: This is the story of Mr. Reginaldo Hdz who is a 82 y/o M w/ PMHx of CAD s/p CABG in the distant past, HLD, PAF on OAC, Parkinson's Disease, SLE, Chronic Daily Opioid Dependence who presents to the hospital from [facility] after suffering a mechanical fall. Patient was recently seen in the [facility] ED for chief complaint of scrotal pain, swelling, and burning w/ urination. Patient was started on Fluconazole and Cipro and discharged back to [facility]. During his brief stay at [facility] he states that his walker slipped out from underneath him and he fell on his R side. He denies antecedent CP, SOB, dizziness, lightheadedness, vision changes, n/v/d. He does still admit to some urinary burning and suprapubic pain.    Of note patient is on OAC for AF and he did take his dose yesterday.  Recent Rheum/GI/Neurology notes reviewed  After discontinuation of Sulfasalazine, LFTs have drastically improved, US abdomen w/ no concerning findings.    Patient appears slightly confused on exam however is not having overt pain, informed about results of edmond/hematuria, agreeable to Urology consultation.    Past Medical History:    Anemia    Atherosclerosis of coronary artery    Chronic atrial fibrillation (HCC)    Esophageal reflux    Heart attack (HCC)    Hyperlipidemia    Hypertension    Lupus (HCC)    Parkinson disease (HCC)      Past Surgical History:   Procedure Laterality Date    Angiogram  1984    Angiogram  1996    Angiogram  03/27/2002    [facility], Dr. Scott/ Mikayla: 1.Successful PTCA of the infarct related artery which was the proximal graft of the saphenous vein graft to the right posterior descending/LV branch which was dilated and stented using a 4 x 13 BX Velocity to a maximum diameter of 4.45 mm.  2.Proximal PDA primarily stented using a 3 x 18  Penta stent from about 70 to 80% to 0%, HUGH III flow, no dissection.     Angiogram  2011    [facility], Dr. Steele: Successful stenting with a drug-eluting stent to the graft to the right coronary artery. Severe native CAD. Patent vein graft to LAD. Severe disease in the vein graft to the right coronary artery.    Angiogram  2011    [facility], Dr. Steele: Successful stenting of the circumflex artery in the proximal and mid portions. Relook angiography of the vein graft to the right coronary artery showed that this vessel is widely patent.    Cabg  1984    x2    Cath bare metal stent (bms)      Cath drug eluting stent      Shoulder surg proc unlisted Left     fracture        Social History     Tobacco Use    Smoking status: Former     Current packs/day: 0.00     Types: Cigarettes     Quit date: 1982     Years since quittin.3    Smokeless tobacco: Never   Substance Use Topics    Alcohol use: Yes     Alcohol/week: 0.0 standard drinks of alcohol     Comment: Social        Fam Hx  Family History   Problem Relation Age of Onset    Cancer Father         Liver    Other (Other) Father         Heart Attack    Cancer Mother         uterine    Other (Other) Daughter         Hosimotos    Other (Other) Son         Heart Problem       Review of Systems  Comprehensive ROS reviewed and negative except for what is stated in HPI.      OBJECTIVE:  /61   Pulse 72   Temp 98.7 °F (37.1 °C) (Oral)   Resp 16   Ht 5' 6\" (1.676 m)   Wt 160 lb 6.4 oz (72.8 kg)   SpO2 95%   BMI 25.89 kg/m²   Gen: No acute distress, AAOx2 (unclear baseline)  HEENT:  EOMI, PERRLA, OP clear, MMM  Pulm: Lungs clear bilaterally, normal respiratory effort  CV: Heart with regular rate and rhythm, no murmur.  Normal PMI.    Abd: Abdomen soft, nontender, nondistended, no organomegaly, bowel sounds present  MSK: Full range of motion in extremities, no clubbing, no cyanosis, no overt swelling noted in bilateral LE  Uro: edmond catheter in place  with some evidence of trauma at urethral meatus, no overt scrotal swelling noted, suprapubic tenderness appreciated  Skin: no rashes or lesions  Neuro:  Grossly intact, no focal deficits      Data Review:    Labs/Imaging/Previous ECHO/US Abdomen reviewed independently      Assessment/Plan:   Outpatient records or previous hospital records reviewed.   [facility] hospitalist to continue to follow patient while in house  A total of 35 minutes taken with patient and coordinating care.  Greater than 50% face to face encounter.      A/P:    Mechanical Fall  Acute 5-7 R Rib Fractures  Unlikely to be cardiac/neurologic in nature  ECHO reviewed  Plan:  - Continue NS at current rate for now  - Will conduct orthostatic vitals later this afternoon  - No indication at this time for stress testing/further cardiac workup  - PRN pain medications  - PT/OT evaluations pending    Hematuria - New  Epididymal Orchitis - POA  Recent ER evaluation for scrotal swelling  Likely has not taken enough doses of Cipro prior to admission  Recent US Scrotum noted  Ucx not obtained during previous ER visit   Plan:  - Follow Ucx  - Continue CTX for now w/ persistent urinary frequency/dysuria  - Urology consulted for consideration of CBI  - Maintain edmond  - No need for continuing Fluconazole at this time  - Continue Flomax    Acute on Chronic Anemia  Hx of PAF on OAC  Plan:  - Will repeat H/H at 1300  - Holding Xarelto for now    Hx of CAD s/p CABG  Last ECHO/Cards note reviewed  Plan:  - Continue Toprol  - Continue Statin  - Hold Lasix for now while giving mIVF    Hx of Parkinson' Dementia  Plan:  - Continue Sinemet    Hx of SLE  Hx of Chronic Daily Opioid Dependence  Recent Rheum note reviewed  Hx of Transaminitis from Sulfasalazine - Improved  Plan:  - Continue Hydroxychloroquine  - Continue Norco, Pregabalin    PHYSICIAN Certification of Need for Inpatient Hospitalization - Initial Certification    Patient will require inpatient services that will  reasonably be expected to span two midnight's based on the clinical documentation in H+P.   Based on patients current state of illness, I anticipate that, after discharge, patient will require TBD.  Hugo Garcia D.O.  Hospitalist Physician  [place]

## 2024-08-05 NOTE — PLAN OF CARE
Repeat H/H stable at 9.5, will continue holding Xarelto dose tonight and consider re-initiation tomorrow morning

## 2024-08-05 NOTE — CM/SW NOTE
08/05/24 1300   CM/SW Referral Data   Referral Source Social Work (self-referral)   Reason for Referral Discharge planning   Informant Daughter   Patient Info   Patient's Home Environment Assisted Living   Patient lives with Spouse/Significant other   Patient Status Prior to Admission   Independent with ADLs and Mobility No   Pt. requires assistance with Housework;Meals;Bathing;Ambulating;Dressing;Medications;Feeding   Services in place prior to admission Home Health Care;DME/Supplies at home;half-way Home Care     NAHUM called patient's daughter to complete intake assessment and screen for possible DC planning needs. She confirmed that patient lives at [facility] in the assisted living and has been living there for about 3 weeks. Patient lives there with his wife who is also in poor health and is mostly blind per daughter. She reported that he has a private duty caregiver who has been staying with him about 20 hours a day while he has been at [facility] and those services will continue when he returns to [facility].     She reported that he is current with [facility] services for home RN, PT, OT. NAHUM sent resume of care orders to [facility] via [employer].     Zari also had questions regarding if caregiver or medical transport are able to be covered by Medicare. NAHUM informed her that Private Duty caregivers are not covered by Medicare and that some supplemental insurance plans have transportation benefits but that she would need to call [facility] to verify.     NAHUM will continue to follow for plan of care changes and remain available for any additional DC needs or concerns.     Tara Corado MSW, LSW  Discharge Planner   [id number]

## 2024-08-05 NOTE — CONSULTS
[facility]   part of [facility]    Report of Consultation    Reginaldo Hdz Patient Status:  Inpatient    8/10/1940 MRN [id number]   Location [facility] 4NW-A Attending Hugo Garcia, DO   Hosp Day # 1 PCP Olvin Dinh MD     Date of Admission:  2024  Date of Consult:  2024  Reason for Consultation:   Urine retention    History of Present Illness:   Patient is a 83 year old male who was recently hospitalized for covid pneumonia found to have UTI, Epididymoorchitis/ scrotal cellulitis during that admission .  Urine was not sent for culture. But a edmond was placed and he was sent home on PO cipro (for uti), oral diflucan + topical antifungals.  Returns to hospital s/p fall, rib fracture. Catheter noted with gross hematuria. Xarelto has been held   Urine has been sent for culture.     CAD, bypass, afib, lupus, parkinsons     Scrotal ultrasound 2024 :  Bilateral hyperemic right greater than left epididymis.  Heterogeneous echotexture to both testicles with color flow.  Findings suspicious for epididymo-orchitis.  No discrete drainable abscess appreciated.  Bilateral scrotal skin thickening.      CT ABDOMEN+PELVIS 2024 :  There is prominent circumferential mural thickening of the urinary bladder with areas of trabeculation and possible diverticula.  This may represent cystitis.  A neoplastic process cannot be excluded.        Past Medical History  Past Medical History:    Anemia    Atherosclerosis of coronary artery    Chronic atrial fibrillation (HCC)    Esophageal reflux    Heart attack (HCC)    Hyperlipidemia    Hypertension    Lupus (HCC)    Parkinson disease (HCC)       Past Surgical History  Past Surgical History:   Procedure Laterality Date    Angiogram      Angiogram      Angiogram  2002    [facility], Dr. Scott/ Mikayla: 1.Successful PTCA of the infarct related artery which was the proximal graft of the saphenous vein graft to the right posterior descending/LV branch  which was dilated and stented using a 4 x 13 BX Velocity to a maximum diameter of 4.45 mm.  2.Proximal PDA primarily stented using a 3 x 18 Penta stent from about 70 to 80% to 0%, HUGH III flow, no dissection.     Angiogram  2011    [facility], Dr. Steele: Successful stenting with a drug-eluting stent to the graft to the right coronary artery. Severe native CAD. Patent vein graft to LAD. Severe disease in the vein graft to the right coronary artery.    Angiogram  2011    [facility], Dr. Steele: Successful stenting of the circumflex artery in the proximal and mid portions. Relook angiography of the vein graft to the right coronary artery showed that this vessel is widely patent.    Cabg  1984    x2    Cath bare metal stent (bms)      Cath drug eluting stent      Shoulder surg proc unlisted Left     fracture       Family History  Family History   Problem Relation Age of Onset    Cancer Father         Liver    Other (Other) Father         Heart Attack    Cancer Mother         uterine    Other (Other) Daughter         Hosimotos    Other (Other) Son         Heart Problem       Social History  Social History     Socioeconomic History    Marital status:    Tobacco Use    Smoking status: Former     Current packs/day: 0.00     Types: Cigarettes     Quit date: 1982     Years since quittin.3    Smokeless tobacco: Never   Substance and Sexual Activity    Alcohol use: Yes     Alcohol/week: 0.0 standard drinks of alcohol     Comment: Social    Drug use: No     Social Determinants of Health     Food Insecurity: No Food Insecurity (2024)    Food Insecurity     Food Insecurity: Never true   Transportation Needs: No Transportation Needs (2024)    Transportation Needs     Lack of Transportation: No   Housing Stability: Low Risk  (2024)    Housing Stability     Housing Instability: No        Current Medications:  Current Facility-Administered Medications   Medication Dose Route Frequency     HYDROcodone-acetaminophen (Norco) 5-325 MG per tab 1 tablet  1 tablet Oral Q4H PRN    carbidopa-levodopa (SINEMET)  MG per tab 1.5 tablet  1.5 tablet Oral QID    carbidopa-levodopa ER (SINEMET CR)  MG per tab 1 tablet  1 tablet Oral 2 times per day    hydroxychloroquine (Plaquenil) tab 200 mg  200 mg Oral BID    metoprolol succinate ER (Toprol XL) 24 hr tab 50 mg  50 mg Oral Daily Beta Blocker    pregabalin (Lyrica) cap 50 mg  50 mg Oral BID    pravastatin (Pravachol) tab 20 mg  20 mg Oral Nightly    tamsulosin (Flomax) cap 0.4 mg  0.4 mg Oral Daily    [Held by provider] rivaroxaban (Xarelto) tab 20 mg  20 mg Oral Daily with food    niacin ER (Niaspan) tab 500 mg  500 mg Oral BID    acetaminophen (Tylenol Extra Strength) tab 500 mg  500 mg Oral Q4H PRN    melatonin tab 3 mg  3 mg Oral Nightly PRN    polyethylene glycol (PEG 3350) (Miralax) 17 g oral packet 17 g  17 g Oral Daily PRN    sennosides (Senokot) tab 17.2 mg  17.2 mg Oral Nightly PRN    ondansetron (Zofran) 4 MG/2ML injection 4 mg  4 mg Intravenous Q6H PRN    guaiFENesin (Robitussin) 100 MG/5 ML oral liquid 200 mg  200 mg Oral Q4H PRN    sodium chloride 0.9% infusion   Intravenous Continuous    lidocaine-menthol 4-1 % patch 1 patch  1 patch Transdermal Daily    cefTRIAXone (Rocephin) 1 g in sodium chloride 0.9% 100 mL IVPB-ADDV  1 g Intravenous Q24H    nystatin (Mycostatin) 100,000 Units/g cream   Topical BID     Medications Prior to Admission   Medication Sig    pregabalin 50 MG Oral Cap Take 1 capsule (50 mg total) by mouth 2 (two) times daily.    nystatin 100,000 Units/g External Cream Apply topically 2 (two) times daily.    fluconazole 150 MG Oral Tab Take 1 tablet (150 mg total) by mouth once.    ciprofloxacin 500 MG Oral Tab Take 1 tablet (500 mg total) by mouth 2 (two) times daily for 10 days.    tamsulosin 0.4 MG Oral Cap Take 1 capsule (0.4 mg total) by mouth daily for 10 days.    furosemide 20 MG Oral Tab Take 1 tablet (20 mg total) by  mouth daily.    HYDROcodone-acetaminophen 5-325 MG Oral Tab Take 1-2 tablets by mouth every 4 (four) hours as needed for Pain.    carbidopa-levodopa ER  MG Oral Tab CR Take 1 tablet by mouth See Admin Instructions. @0800. And 1600    hydroxychloroquine 200 MG Oral Tab Take 1 tablet (200 mg total) by mouth 2 (two) times daily.    Niacin  MG Oral Tab CR Take 1 tablet (500 mg total) by mouth 2 (two) times daily. (Patient taking differently: Take 1 tablet (500 mg total) by mouth nightly.)    EZETIMIBE 10 MG Oral Tab TAKE ONE TABLET BY MOUTH ONE TIME DAILY    carbidopa-levodopa  MG Oral Tab Take 1.5 tablets by mouth every 6 (six) hours.    XARELTO 20 MG Oral Tab TAKE ONE TABLET BY MOUTH ONE TIME DAILY WITH FOOD    Multivitamin Chewtab, ADULT, Oral Chew Tab Chew 1 tablet by mouth daily.    METOPROLOL SUCCINATE 50 MG Oral Tablet 24 Hr TAKE ONE TABLET BY MOUTH ONE TIME DAILY    PRAVASTATIN SODIUM 80 MG Oral Tab TAKE 1 TABLET BY MOUTH NIGHTLY (Patient taking differently: Take 1 tablet (80 mg total) by mouth daily.)    cetirizine 10 MG Oral Tab Take 1 tablet (10 mg total) by mouth daily.    Omega-3 Fatty Acids (SALMON OIL OR) Take 1 capsule by mouth 3 (three) times daily.      FIBER COMPLETE OR Take by mouth daily.      Acidophilus/Pectin Oral Cap Take 1 capsule by mouth daily.       Allergies  Allergies   Allergen Reactions    Inderal [Propranolol] NAUSEA AND VOMITING and UNKNOWN    Isosorbide NAUSEA AND VOMITING       Review of Systems:      A comprehensive 10 point review of systems was completed.  Pertinent positives and negatives noted in the the HPI.    Physical Exam:   Blood pressure 119/61, pulse 72, temperature 98.7 °F (37.1 °C), temperature source Oral, resp. rate 16, height 5' 6\" (1.676 m), weight 160 lb 6.4 oz (72.8 kg), SpO2 95%.    GENERAL APPEARANCE: a well-developed, well-nourished, in no apparent distress.   PSYCH: A&O x 3  LUNGS: non labored breathing  HEENT: NC/AT, EOMI, trachea midline,  normal external ears, nares patent  ABDOMEN: soft, no masses/HSM, no tenderness  CVA: no CVA tenderness   exam IFC draining cola colored urine, no clots, draining well. Mild scrotal erythema, no warmth, no fluctuance, non tender  LE: No clubbing/cyanosis/edema      Results:     Laboratory Data:  Lab Results   Component Value Date    WBC 3.3 (L) 08/05/2024    HGB 9.5 (L) 08/05/2024    HCT 27.9 (L) 08/05/2024    PLT 84.0 (L) 08/05/2024    CREATSERUM 0.65 (L) 08/05/2024    BUN 13 08/05/2024     08/05/2024    K 3.8 08/05/2024     08/05/2024    CO2 26.0 08/05/2024    GLU 92 08/05/2024    CA 8.6 (L) 08/05/2024    ALB 3.7 08/04/2024    ALKPHO 130 (H) 08/04/2024    TP 7.4 08/04/2024    AST 50 (H) 08/04/2024    ALT 12 08/04/2024    PTT 28.6 12/06/2019    INR 1.57 (H) 07/14/2024    PTP 18.9 (H) 07/14/2024    TSH 1.670 12/06/2019    MG 2.0 07/14/2024    TROP <0.045 12/06/2019    TROPHS 41 07/11/2024         Imaging:  CT ABDOMEN+PELVIS(CONTRAST ONLY)(CPT=74177)    Result Date: 8/4/2024  CONCLUSION:  There is prominent circumferential mural thickening of the urinary bladder with areas of trabeculation and possible diverticula.  This may represent cystitis.  A neoplastic process cannot be excluded.  Correlation with direct visualization is suggested.   LOCATION:  [facility]   Dictated by (CST): Jackson Lopez MD on 8/04/2024 at 7:32 PM     Finalized by (CST): Jackson Lopez MD on 8/04/2024 at 7:35 PM       XR RIBS WITH CHEST (3 VIEWS), RIGHT  (CPT=71101)    Result Date: 8/4/2024  CONCLUSION:  See above.   LOCATION:  [facility]     Dictated by (CST): Jackson Lopez MD on 8/04/2024 at 5:45 PM     Finalized by (CST): Jackson Lopez MD on 8/04/2024 at 5:46 PM           Impression:   S/P FALL    GROSS HEMATURIA  UTI  EPIDIDYMO-ORCHITIS   URINE RETENTION      Recommendations:  Outpatient cystoscopy with urology once urine cultures clear  Continue edmond  Elevate scrotum  Ucx pending     Thank you for allowing me to participate in the care of your  patient.    Dilma Johnson PA-C  8/5/2024

## 2024-08-05 NOTE — ED QUICK NOTES
Orders for admission, patient is aware of plan and ready to go upstairs. Any questions, please call ED RN Hernandez RN at extension [phone].     Patient Covid vaccination status: Fully vaccinated     COVID Test Ordered in ED: None    COVID Suspicion at Admission: N/A    Running Infusions:    sodium chloride 125 mL/hr (08/04/24 1646)        Mental Status/LOC at time of transport: a/o x 4    Other pertinent information:   CIWA score: N/A   NIH score:  N/A

## 2024-08-06 VITALS
BODY MASS INDEX: 25.78 KG/M2 | HEIGHT: 66 IN | DIASTOLIC BLOOD PRESSURE: 72 MMHG | HEART RATE: 44 BPM | TEMPERATURE: 98 F | SYSTOLIC BLOOD PRESSURE: 133 MMHG | OXYGEN SATURATION: 95 % | WEIGHT: 160.38 LBS | RESPIRATION RATE: 26 BRPM

## 2024-08-06 PROBLEM — R31.0 GROSS HEMATURIA: Status: RESOLVED | Noted: 2024-08-04 | Resolved: 2024-08-06

## 2024-08-06 PROBLEM — D69.6 THROMBOCYTOPENIA (HCC): Status: RESOLVED | Noted: 2019-12-05 | Resolved: 2024-08-06

## 2024-08-06 PROBLEM — D69.6 THROMBOCYTOPENIA: Status: RESOLVED | Noted: 2019-12-05 | Resolved: 2024-08-06

## 2024-08-06 LAB
ANION GAP SERPL CALC-SCNC: 3 MMOL/L (ref 0–18)
BUN BLD-MCNC: 12 MG/DL (ref 9–23)
CALCIUM BLD-MCNC: 8.6 MG/DL (ref 8.7–10.4)
CHLORIDE SERPL-SCNC: 107 MMOL/L (ref 98–112)
CO2 SERPL-SCNC: 26 MMOL/L (ref 21–32)
CREAT BLD-MCNC: 0.62 MG/DL
EGFRCR SERPLBLD CKD-EPI 2021: 94 ML/MIN/1.73M2 (ref 60–?)
ERYTHROCYTE [DISTWIDTH] IN BLOOD BY AUTOMATED COUNT: 14 %
GLUCOSE BLD-MCNC: 85 MG/DL (ref 70–99)
HCT VFR BLD AUTO: 27 %
HGB BLD-MCNC: 9.1 G/DL
MCH RBC QN AUTO: 32.3 PG (ref 26–34)
MCHC RBC AUTO-ENTMCNC: 33.7 G/DL (ref 31–37)
MCV RBC AUTO: 95.7 FL
OSMOLALITY SERPL CALC.SUM OF ELEC: 281 MOSM/KG (ref 275–295)
PLATELET # BLD AUTO: 80 10(3)UL (ref 150–450)
POTASSIUM SERPL-SCNC: 3.7 MMOL/L (ref 3.5–5.1)
POTASSIUM SERPL-SCNC: 3.7 MMOL/L (ref 3.5–5.1)
RBC # BLD AUTO: 2.82 X10(6)UL
SODIUM SERPL-SCNC: 136 MMOL/L (ref 136–145)
WBC # BLD AUTO: 3.9 X10(3) UL (ref 4–11)

## 2024-08-06 PROCEDURE — 84132 ASSAY OF SERUM POTASSIUM: CPT | Performed by: INTERNAL MEDICINE

## 2024-08-06 PROCEDURE — 85027 COMPLETE CBC AUTOMATED: CPT | Performed by: INTERNAL MEDICINE

## 2024-08-06 PROCEDURE — 80048 BASIC METABOLIC PNL TOTAL CA: CPT | Performed by: INTERNAL MEDICINE

## 2024-08-06 RX ORDER — SULFAMETHOXAZOLE AND TRIMETHOPRIM 800; 160 MG/1; MG/1
1 TABLET ORAL 2 TIMES DAILY
Qty: 12 TABLET | Refills: 0 | Status: SHIPPED | OUTPATIENT
Start: 2024-08-06 | End: 2024-08-14 | Stop reason: ALTCHOICE

## 2024-08-06 RX ORDER — POTASSIUM CHLORIDE 20 MEQ/1
40 TABLET, EXTENDED RELEASE ORAL ONCE
Status: COMPLETED | OUTPATIENT
Start: 2024-08-06 | End: 2024-08-06

## 2024-08-06 NOTE — DISCHARGE SUMMARY
Discharge Summary     Reginaldo Hdz Patient Status:  Inpatient    8/10/1940 MRN [id number]   [facility] 4NW-A Attending Hugo Garcia, DO   Hosp Day # 2 PCP Olvin Dinh MD     Date of Admission: 2024  Date of Discharge: 2024  Discharge Disposition: Home or Self Care    Discharge Diagnosis: Gross Hematuria, Rib Fractures, UTI, Transient Panncytopenia    History of Present Illness:       Brief Synopsis: 84 y/o M on OAC who presented for fall, pain in the ribs, gross hematuria, and dysuria. Diagnosed with rib fractures 5-7 on the L side, and a UTI. Xarelto was held for 2 days w/ subsequent stabilization of HgB and improvement of pancytopenia after resolution of hematuria. No hematuria noted on day of discharge, no blood from anywhere else. Discharge HgB 9.1. Will need follow up labs and completion of abx. Urology cleared for discharge w/ the following recommendations: keep edmond in, follow up in office. Continue abx on discharge    Lace+ Score: 63  59-90 High Risk  29-58 Medium Risk  0-28   Low Risk       TCM Follow-Up Recommendation:  LACE > 58: High Risk of readmission after discharge from the hospital.    Procedures during hospitalization:   None    Incidental or significant findings and recommendations (brief descriptions):  None    Lab/Test results pending at Discharge:   Will need HgB check in 1 week    Consultants:  Urology    Discharge Medication List:     Discharge Medications        START taking these medications        Instructions Prescription details   sulfamethoxazole-trimethoprim -160 MG Tabs per tablet  Commonly known as: Bactrim DS      Take 1 tablet by mouth 2 (two) times daily for 6 days.   Stop taking on: 2024  Quantity: 12 tablet  Refills: 0            CHANGE how you take these medications        Instructions Prescription details   Pravastatin Sodium 80 MG Tabs  Commonly known as: PRAVACHOL  What changed: when to take this      TAKE 1 TABLET BY MOUTH  NIGHTLY   Quantity: 90 tablet  Refills: 3            CONTINUE taking these medications        Instructions Prescription details   acidophilus-pectin Caps  Commonly known as: Probiotic      Take 1 capsule by mouth daily.   Refills: 0     carbidopa-levodopa  MG Tabs  Commonly known as: SINEMET      Take 1.5 tablets by mouth every 6 (six) hours.   Refills: 0     carbidopa-levodopa ER  MG Tbcr  Commonly known as: SINEMET CR      Take 1 tablet by mouth See Admin Instructions. @0800. And 1600   Refills: 0     cetirizine 10 MG Tabs  Commonly known as: ZyrTEC      Take 1 tablet (10 mg total) by mouth daily.   Refills: 0     ezetimibe 10 MG Tabs  Commonly known as: Zetia      TAKE ONE TABLET BY MOUTH ONE TIME DAILY   Quantity: 90 tablet  Refills: 0     FIBER COMPLETE OR      Take by mouth daily.   Refills: 0     furosemide 20 MG Tabs  Commonly known as: Lasix      Take 1 tablet (20 mg total) by mouth daily.   Quantity: 7 tablet  Refills: 0     HYDROcodone-acetaminophen 5-325 MG Tabs  Commonly known as: Norco      Take 1-2 tablets by mouth every 4 (four) hours as needed for Pain.   Quantity: 20 tablet  Refills: 0     hydroxychloroquine 200 MG Tabs  Commonly known as: Plaquenil      Take 1 tablet (200 mg total) by mouth 2 (two) times daily.   Refills: 0     metoprolol succinate ER 50 MG Tb24  Commonly known as: Toprol XL      TAKE ONE TABLET BY MOUTH ONE TIME DAILY   Quantity: 90 tablet  Refills: 3     multivitamin Chew      Chew 1 tablet by mouth daily.   Refills: 0     Niacin  MG Tbcr  Commonly known as: SLO-NIACIN      Take 1 tablet (500 mg total) by mouth 2 (two) times daily.   Quantity: 180 tablet  Refills: 1     nystatin 100,000 Units/g Crea  Commonly known as: Mycostatin      Apply topically 2 (two) times daily.   Refills: 0     pregabalin 50 MG Caps  Commonly known as: Lyrica      Take 1 capsule (50 mg total) by mouth 2 (two) times daily.   Refills: 0     SALMON OIL OR      Take 1 capsule by mouth 3  (three) times daily.   Refills: 0     tamsulosin 0.4 MG Caps  Commonly known as: Flomax      Take 1 capsule (0.4 mg total) by mouth daily for 10 days.   Stop taking on: August 12, 2024  Quantity: 10 capsule  Refills: 0     Xarelto 20 MG Tabs  Generic drug: rivaroxaban      TAKE ONE TABLET BY MOUTH ONE TIME DAILY WITH FOOD   Quantity: 90 tablet  Refills: 0            STOP taking these medications      ciprofloxacin 500 MG Tabs  Commonly known as: Cipro        fluconazole 150 MG Tabs  Commonly known as: Diflucan                  Where to Get Your Medications        These medications were sent to [id number] - [place] [id number] [place] [address] [phone], [phone]  [address] [facility] [id number]      Hours: 24-hours Phone: [phone]   sulfamethoxazole-trimethoprim -160 MG Tabs per tablet         Follow-up appointment:   No follow-up provider specified.  Appointments for Next 30 Days 8/6/2024 - 9/5/2024      None            Supplementary Documentation:   [place]    Vital signs:  Temp:  [98.3 °F (36.8 °C)-98.8 °F (37.1 °C)] 98.8 °F (37.1 °C)  Pulse:  [50-68] 68  Resp:  [22-24] 22  BP: (134-138)/(65-67) 134/67  SpO2:  [93 %-95 %] 95 %    Physical Exam:    Gen: No acute distress, AAOx2 (unclear baseline)  HEENT:  EOMI, PERRLA, OP clear, MMM  Pulm: Lungs clear bilaterally, normal respiratory effort  CV: Heart with regular rate and rhythm, no murmur.  Normal PMI.    Abd: Abdomen soft, nontender, nondistended, no organomegaly, bowel sounds present  MSK: Full range of motion in extremities, no clubbing, no cyanosis, no overt swelling noted in bilateral LE  Uro: edmond catheter in place with some evidence of trauma at urethral meatus, no overt scrotal swelling noted, suprapubic tenderness appreciated  Skin: no rashes or lesions  Neuro:  Grossly intact, no focal deficits    -----------------------------------------------------------------------------------------------  PATIENT DISCHARGE  INSTRUCTIONS: See electronic chart    Tip: Documentation requirements: For split shared discharge, BOTH providers need to document specific floor, unit, and time spent on the discharge.  The note needs to be signed by the provider with > 50% of time and bill under their NPI.   Time spent:  42 min         Hugo Garcia DO

## 2024-08-06 NOTE — OCCUPATIONAL THERAPY NOTE
OCCUPATIONAL THERAPY EVALUATION - INPATIENT     Room Number: 402/402-A  Evaluation Date: 8/5/2024  Type of Evaluation: Initial  Presenting Problem: hematuria, anemia, thrombocytopenia, R rib fxs    Physician Order: IP Consult to Occupational Therapy  Reason for Therapy: ADL/IADL Dysfunction and Discharge Planning    OCCUPATIONAL THERAPY ASSESSMENT   Patient is currently functioning below baseline with toileting, bathing, upper body dressing, lower body dressing, bed mobility, and transfers. Prior to admission, patient's baseline is using a RW and having assist with LB dressing and supervision for showers.  Patient is requiring moderate assistance as a result of the following impairments: decreased functional strength, decreased functional reach, decreased endurance, and impaired standing balance. Occupational Therapy will continue to follow for duration of hospitalization.    Patient will benefit from continued skilled OT Services at discharge to promote prior level of function.  Anticipate patient will return home with home health OT      History Related to Current Admission: Patient is a 83 year old male admitted on 8/4/2024 with Presenting Problem: hematuria, anemia, thrombocytopenia, R rib fxs. Co-Morbidities : PD, CABG x2, a-fib, HTN, CAD    WEIGHT BEARING RESTRICTION  Weight Bearing Restriction: None                Recommendations for nursing staff:   Transfers: min-mod A with RW   Toileting location: bedside commode     EVALUATION SESSION:  Patient Start of Session: Pt was found in his bed.       FUNCTIONAL TRANSFER ASSESSMENT  Sit to Stand: Edge of Bed  Edge of Bed: Maximum Assist    BED MOBILITY  Supine to Sit : Moderate Assist    BALANCE ASSESSMENT     FUNCTIONAL ADL ASSESSMENT  LB Dressing Seated: Dependent      ACTIVITY TOLERANCE:                          O2 SATURATIONS       COGNITION  Overall Cognitive Status:  Impaired  Memory:  decreased recall of recent events and decreased short term memory  Pt  oriented to self only     Upper Extremity   ROM: within functional limits   Strength: within functional limits       EDUCATION PROVIDED  Patient: Role of Occupational Therapy; Plan of Care; Discharge Recommendations; Functional Transfer Techniques; Fall Prevention; Compensatory ADL Techniques  Patient's Response to Education: Verbalized Understanding    Equipment used: RW, gait belt   Demonstrates functional use, Would benefit from additional trial      Therapist comments: supine>sit EOB>pt requested back brace in room to be lightly applied>stand to RW>walk to take steps toward chair to pt's L side>sitting reclined in chair     Patient End of Session: Up in chair;Needs met;Call light within reach;All patient questions and concerns addressed;Alarm set    OCCUPATIONAL PROFILE    HOME SITUATION  Type of Home: Assisted living facility ([facility])  Home Layout: One level  Lives With: Spouse;Staff 24 hours    Toilet and Equipment: Comfort height toilet  Shower/Tub and Equipment: Walk-in shower;Shower chair  Other Equipment: Other (Comment) (RW)    Occupation/Status: Retired        Patient Regularly Uses: Reading glasses    Prior Level of Function: Pt uses a RW. Pt typically has assist with LB dressing and supervision for showers.     SUBJECTIVE   \"I'm at the bank.\"     PAIN ASSESSMENT  Rating: Unable to rate  Location: ribs  Management Techniques: Activity promotion;Relaxation;Repositioning    OBJECTIVE  Precautions: Bed/chair alarm  Fall Risk: High fall risk      ASSESSMENTS    AM-PAC ‘6-Clicks’ Inpatient Daily Activity Short Form  -   Putting on and taking off regular lower body clothing?: A Lot  -   Bathing (including washing, rinsing, drying)?: A Lot  -   Toileting, which includes using toilet, bedpan or urinal? : A Lot  -   Putting on and taking off regular upper body clothing?: A Lot  -   Taking care of personal grooming such as brushing teeth?: A Little  -   Eating meals?: A Little    AM-PAC Score:  Score:  14  Approx Degree of Impairment: 59.67%  Standardized Score (AM-PAC Scale): 33.39    ADDITIONAL TESTS     NEUROLOGICAL FINDINGS      COGNITION ASSESSMENTS       PLAN  OT Treatment Plan: Balance activities;Energy conservation/work simplification techniques;ADL training;Functional transfer training;Endurance training;Patient/Family education;Patient/Family training;Equipment eval/education;Compensatory technique education;Continued evaluation  Rehab Potential : Fair  Frequency: 3-5x/week  Number of Visits to Meet Established Goals: 5    ADL Goals   Patient will perform lower body dressing:  with min assist  Patient will perform toileting: with min assist    Functional Transfer Goals  Patient will transfer from bed to chair:  with min assist  Patient will transfer from supine to sit:  with min assist    UE Exercise Program Goal  Patient will be supervision with bilateral AROM HEP (home exercise program).    Patient Evaluation Complexity Level:   Occupational Profile/Medical History MODERATE - Expanded review of history including review of medical or therapy record   Specific performance deficits impacting engagement in ADL/IADL MODERATE  3 - 5 performance deficits   Client Assessment/Performance Deficits MODERATE - Comorbidities and min to mod modifications of tasks    Clinical Decision Making MODERATE - Analysis of occupational profile, detailed assessments, several treatment options    Overall Complexity MODERATE     OT Session Time: 30 minutes  Therapeutic Activity: 15 minutes

## 2024-08-06 NOTE — PLAN OF CARE
Pt received A&Ox2-3. VSS. Afebrile. C/o moderate-severe back/side pain. PRN norco given with relief. All meds given per MAR. IVF infusing @83mL/h. Milan patent, draining yellow UOP. No hematuria noted. Frequent rounds made. Slept all night. Fall precautions in place. Call light within reach.    Problem: CARDIOVASCULAR - ADULT  Goal: Maintains optimal cardiac output and hemodynamic stability  Description: INTERVENTIONS:  - Monitor vital signs, rhythm, and trends  - Monitor for bleeding, hypotension and signs of decreased cardiac output  - Evaluate effectiveness of vasoactive medications to optimize hemodynamic stability  - Monitor arterial and/or venous puncture sites for bleeding and/or hematoma  - Assess quality of pulses, skin color and temperature  - Assess for signs of decreased coronary artery perfusion - ex. Angina  - Evaluate fluid balance, assess for edema, trend weights  Outcome: Progressing     Problem: GASTROINTESTINAL - ADULT  Goal: Maintains adequate nutritional intake (undernourished)  Description: INTERVENTIONS:  - Monitor percentage of each meal consumed  - Identify factors contributing to decreased intake, treat as appropriate  - Assist with meals as needed  - Monitor I&O, WT and lab values  - Obtain nutritional consult as needed  - Optimize oral hygiene and moisture  - Encourage food from home; allow for food preferences  - Enhance eating environment  Outcome: Progressing     Problem: HEMATOLOGIC - ADULT  Goal: Maintains hematologic stability  Description: INTERVENTIONS  - Assess for signs and symptoms of bleeding or hemorrhage  - Monitor labs and vital signs for trends  - Administer supportive blood products/factors, fluids and medications as ordered and appropriate  - Administer supportive blood products/factors as ordered and appropriate  Outcome: Progressing     Problem: SAFETY ADULT - FALL  Goal: Free from fall injury  Description: INTERVENTIONS:  - Assess pt frequently for physical needs  -  Identify cognitive and physical deficits and behaviors that affect risk of falls.  - [place] fall precautions as indicated by assessment.  - Educate pt/family on patient safety including physical limitations  - Instruct pt to call for assistance with activity based on assessment  - Modify environment to reduce risk of injury  - Provide assistive devices as appropriate  - Consider OT/PT consult to assist with strengthening/mobility  - Encourage toileting schedule  Outcome: Progressing

## 2024-08-06 NOTE — PLAN OF CARE
NURSING DISCHARGE NOTE    Discharged Nursing home via Ambulance.  Accompanied by Support staff  Belongings Taken by patient/family.    Pt a/o x2, forgetful. VSS, remained afebrile. Meds given per MAR. Tylenol given for rib pain. Milan intact and patent. Updated Chloé (daughter) on POC. AVS reviewed with her. Report given to RN at [facility]. PIV removed prior to discharge.     Problem: GENITOURINARY - ADULT  Goal: Absence of urinary retention  Description: INTERVENTIONS:  - Assess patient’s ability to void and empty bladder  - Monitor intake/output and perform bladder scan as needed  - Follow urinary retention protocol/standard of care  - Consider collaborating with pharmacy to review patient's medication profile  - Implement strategies to promote bladder emptying  Outcome: Adequate for Discharge     Problem: HEMATOLOGIC - ADULT  Goal: Maintains hematologic stability  Description: INTERVENTIONS  - Assess for signs and symptoms of bleeding or hemorrhage  - Monitor labs and vital signs for trends  - Administer supportive blood products/factors, fluids and medications as ordered and appropriate  - Administer supportive blood products/factors as ordered and appropriate  Outcome: Adequate for Discharge

## 2024-08-06 NOTE — CM/SW NOTE
Pt is ready for dc today.  Pt will return to [facility]  RN to call report to [phone].  [facility] is scheduled to  pt at 5:30pm today.  PCS form completed.      Dtr inquiring about pt having a hospital bed; however, pt does not have a qualifying diagnosis for a hospital bed at this time.  Dtr can rent one if she chooses to.      Pt is current with [facility] - they are aware of pt's dc today.  They will resume HH services with pt.

## 2024-08-06 NOTE — DISCHARGE INSTRUCTIONS
Thank you for allowing us to take care of your health during your admission at [facility]. You are stable for discharge at this time. Your diagnoses and specific instructions for your medications are listed below. Please ensure you follow up with your PCP in 1-2 weeks on discharge and all other follow up appointments listed below.    Diagnoses: Hematuria (Blood in urine), UTI, Rib Fractures    Changes to Medications:   - Hold Xarelto until Thursday  - Bactrim 1 tablet twice daily for 6 more days  - Stop the Ciprofloxacin and Fluconazole you were taking    Follow Ups: Repeat labs in 1 week to check Hemoglobin    Best Wishes and Good Hugo Delgado D.O.  [facility] Hospitalist

## 2024-08-07 ENCOUNTER — PATIENT OUTREACH (OUTPATIENT)
Dept: CASE MANAGEMENT | Age: 84
End: 2024-08-07

## 2024-08-07 NOTE — CDS QUERY
Dear Dr. Garcia:    Please clarify urinary tract infection:     (  ) Urinary tract infection is due to chronic catheter.   (  ) Urinary tract infection is not due to chronic catheter  (  ) Unable to Determine         _________________________________________    CLINICAL INFORMATION FROM THE MEDICAL RECORD      __ed impression: multiple fractures of ribs, right side; gross hematuria; anemia; thrombocytopenia     __h&p: mechanical fall; acute 5-7 rib fractures; hematuria; epidydymal orchitis- poa; acute on chornic anemia;     __8/5 consult: s/p fall; gross hematuria; uti; epididymo-orchitis; urine retention  recently hospitalized for covid pneumonia found to have UTI, Epididymo-orchitis/ scrotal cellulitis during that admission . Urine was not sent for culture. But a edmond was placed and he was sent home on PO cipro (for uti), oral diflucan + topical antifungals.   __d/c summary: Synopsis: 84 y/o M on OAC who presented for fall, pain in the ribs, gross hematuria, and dysuria. Diagnosed with rib fractures 5-7 on the L side, and a UTI. Xarelto was held for 2 days w/ subsequent stabilization of HgB. No hematuria noted on day of discharge, no blood from anywhere else. Discharge HgB 9.1. Will need follow up labs and completion of abx. Urology cleared for discharge w/ the following recommendations: keep edmond in, follow up in office. Continue abx on discharge    Treatments: Rocephin    If you have any questions, please contact Clinical  Una Chapin RN, CDS  at #[phone]. Thank You!    THIS FORM IS A PERMANENT PART OF THE MEDICAL RECORD

## 2024-08-07 NOTE — PROGRESS NOTES
Specialist-Urology apt request (discharged 08/06)    Dr Duglas Ojeda  [facility], Urology  [address]  [address]  [place] [address] [phone]  Follow up 1 week  Dr Ojeda's office will pt daughterChloé, due to no availability in needed time frame  Pt daughterChloé verbalized understanding  Closing encounter

## 2024-08-07 NOTE — CDS QUERY
Dear Dr. Garcia,   Please specify diagnosis corresponding with labs: wbc 3.3,  rbc 2.9,  plt 84    (  ) Pancytopenia   (  ) Other (please specify):               8/6 8/5  PLATELETS (10(3)uL) 80.0L 84.0L   [facility] RED BLOOD COUNT (x10(6)uL) 2.82L 2.90L   [facility] WBC (x10(3) uL) 3.9L 3.3L       ed impression: multiple fractures of ribs, right side; gross hematuria; anemia; thrombocytopenia    h&p: mechanical fall; acute 5-7 rib fractures; hematuria; epidydymal orchitis- poa; acute on chronic anemia;     8/5 consult: s/p fall; gross hematuria; uti; epididymo-orchitis; urine retention          Use of terms such as suspected, possible, or probable (associated with a specific diagnosis that is being evaluated, monitored, or treated as if it exists) are acceptable and can be coded in the inpatient setting, when documented at the time of discharge.   Please add any additional documentation to your progress note and continue to document this through discharge.  If you have any questions, please contact Clinical : Una Chapin RN, CDS @ [phone]  Thank You!                            THIS FORM IS A PERMANENT PART OF THE MEDICAL RECORD

## 2024-08-14 ENCOUNTER — HOSPITAL ENCOUNTER (EMERGENCY)
Facility: HOSPITAL | Age: 84
Discharge: HOME | End: 2024-08-14
Attending: EMERGENCY MEDICINE
Payer: MEDICARE

## 2024-08-14 VITALS
HEART RATE: 77 BPM | SYSTOLIC BLOOD PRESSURE: 100 MMHG | DIASTOLIC BLOOD PRESSURE: 62 MMHG | TEMPERATURE: 98 F | OXYGEN SATURATION: 96 % | RESPIRATION RATE: 23 BRPM | HEIGHT: 66 IN | BODY MASS INDEX: 25.79 KG/M2 | WEIGHT: 160.5 LBS

## 2024-08-14 DIAGNOSIS — R31.0 GROSS HEMATURIA: Primary | ICD-10-CM

## 2024-08-14 LAB
ALBUMIN SERPL-MCNC: 3.6 G/DL (ref 3.2–4.8)
ALBUMIN/GLOB SERPL: 0.9 {RATIO} (ref 1–2)
ALP LIVER SERPL-CCNC: 155 U/L
ALT SERPL-CCNC: 14 U/L
ANION GAP SERPL CALC-SCNC: 5 MMOL/L (ref 0–18)
APTT PPP: 38.7 SECONDS (ref 23–36)
AST SERPL-CCNC: 99 U/L (ref ?–34)
BASOPHILS # BLD AUTO: 0.02 X10(3) UL (ref 0–0.2)
BASOPHILS NFR BLD AUTO: 0.4 %
BILIRUB SERPL-MCNC: 0.5 MG/DL (ref 0.2–1.1)
BILIRUB UR QL CFM: NEGATIVE
BUN BLD-MCNC: 19 MG/DL (ref 9–23)
CALCIUM BLD-MCNC: 9.6 MG/DL (ref 8.7–10.4)
CHLORIDE SERPL-SCNC: 105 MMOL/L (ref 98–112)
CO2 SERPL-SCNC: 25 MMOL/L (ref 21–32)
CREAT BLD-MCNC: 1.12 MG/DL
EGFRCR SERPLBLD CKD-EPI 2021: 64 ML/MIN/1.73M2 (ref 60–?)
EOSINOPHIL # BLD AUTO: 0.06 X10(3) UL (ref 0–0.7)
EOSINOPHIL NFR BLD AUTO: 1.1 %
ERYTHROCYTE [DISTWIDTH] IN BLOOD BY AUTOMATED COUNT: 14.4 %
GLOBULIN PLAS-MCNC: 4 G/DL (ref 2–3.5)
GLUCOSE BLD-MCNC: 165 MG/DL (ref 70–99)
GLUCOSE UR STRIP.AUTO-MCNC: 100 MG/DL
HCT VFR BLD AUTO: 31 %
HGB BLD-MCNC: 10.4 G/DL
HYALINE CASTS #/AREA URNS AUTO: PRESENT /LPF
HYALINE CASTS #/AREA URNS AUTO: PRESENT /LPF
IMM GRANULOCYTES # BLD AUTO: 0.02 X10(3) UL (ref 0–1)
IMM GRANULOCYTES NFR BLD: 0.4 %
INR BLD: 3.57 (ref 0.8–1.2)
KETONES UR STRIP.AUTO-MCNC: 40 MG/DL
LYMPHOCYTES # BLD AUTO: 0.59 X10(3) UL (ref 1–4)
LYMPHOCYTES NFR BLD AUTO: 10.6 %
MCH RBC QN AUTO: 32.8 PG (ref 26–34)
MCHC RBC AUTO-ENTMCNC: 33.5 G/DL (ref 31–37)
MCV RBC AUTO: 97.8 FL
MONOCYTES # BLD AUTO: 0.52 X10(3) UL (ref 0.1–1)
MONOCYTES NFR BLD AUTO: 9.4 %
NEUTROPHILS # BLD AUTO: 4.35 X10 (3) UL (ref 1.5–7.7)
NEUTROPHILS # BLD AUTO: 4.35 X10(3) UL (ref 1.5–7.7)
NEUTROPHILS NFR BLD AUTO: 78.1 %
NITRITE UR QL STRIP.AUTO: NEGATIVE
OSMOLALITY SERPL CALC.SUM OF ELEC: 286 MOSM/KG (ref 275–295)
PH UR STRIP.AUTO: 5.5 [PH] (ref 5–8)
PLATELET # BLD AUTO: 155 10(3)UL (ref 150–450)
POTASSIUM SERPL-SCNC: 4.4 MMOL/L (ref 3.5–5.1)
PROT SERPL-MCNC: 7.6 G/DL (ref 5.7–8.2)
PROT UR STRIP.AUTO-MCNC: >=300 MG/DL
PROTHROMBIN TIME: 36.2 SECONDS (ref 11.6–14.8)
RBC # BLD AUTO: 3.17 X10(6)UL
RBC #/AREA URNS AUTO: >10 /HPF
RBC #/AREA URNS AUTO: >10 /HPF
SODIUM SERPL-SCNC: 135 MMOL/L (ref 136–145)
SP GR UR STRIP.AUTO: >=1.03 (ref 1–1.03)
UROBILINOGEN UR STRIP.AUTO-MCNC: 2 MG/DL
WBC # BLD AUTO: 5.6 X10(3) UL (ref 4–11)

## 2024-08-14 PROCEDURE — 99285 EMERGENCY DEPT VISIT HI MDM: CPT

## 2024-08-14 PROCEDURE — 80053 COMPREHEN METABOLIC PANEL: CPT | Performed by: EMERGENCY MEDICINE

## 2024-08-14 PROCEDURE — 85025 COMPLETE CBC W/AUTO DIFF WBC: CPT | Performed by: EMERGENCY MEDICINE

## 2024-08-14 PROCEDURE — 85730 THROMBOPLASTIN TIME PARTIAL: CPT | Performed by: EMERGENCY MEDICINE

## 2024-08-14 PROCEDURE — 87086 URINE CULTURE/COLONY COUNT: CPT | Performed by: EMERGENCY MEDICINE

## 2024-08-14 PROCEDURE — 36415 COLL VENOUS BLD VENIPUNCTURE: CPT

## 2024-08-14 PROCEDURE — 85610 PROTHROMBIN TIME: CPT | Performed by: EMERGENCY MEDICINE

## 2024-08-14 PROCEDURE — 81001 URINALYSIS AUTO W/SCOPE: CPT | Performed by: EMERGENCY MEDICINE

## 2024-08-14 PROCEDURE — 99284 EMERGENCY DEPT VISIT MOD MDM: CPT

## 2024-08-14 PROCEDURE — 81015 MICROSCOPIC EXAM OF URINE: CPT | Performed by: EMERGENCY MEDICINE

## 2024-08-14 RX ORDER — POLYETHYLENE GLYCOL 3350 17 G/17G
17 POWDER, FOR SOLUTION ORAL DAILY
COMMUNITY

## 2024-08-14 RX ORDER — ACETAMINOPHEN 500 MG
1000 TABLET ORAL ONCE
Status: COMPLETED | OUTPATIENT
Start: 2024-08-14 | End: 2024-08-14

## 2024-08-14 RX ORDER — ACETAMINOPHEN 500 MG
500 TABLET ORAL EVERY 6 HOURS PRN
COMMUNITY

## 2024-08-14 RX ORDER — DOCUSATE SODIUM 100 MG/1
100 CAPSULE, LIQUID FILLED ORAL DAILY PRN
COMMUNITY

## 2024-08-14 RX ORDER — PANTOPRAZOLE SODIUM 40 MG/1
40 TABLET, DELAYED RELEASE ORAL
COMMUNITY

## 2024-08-14 NOTE — CDS QUERY
Dear Dr. Garcia:     Please clarify urinary tract infection:      (  ) Urinary tract infection is due to chronic catheter.   (  ) Urinary tract infection is not due to chronic catheter  (X) Unable to Determine            _________________________________________     CLINICAL INFORMATION FROM THE MEDICAL RECORD        __ed impression: multiple fractures of ribs, right side; gross hematuria; anemia; thrombocytopenia      __h&p: mechanical fall; acute 5-7 rib fractures; hematuria; epidydymal orchitis- poa; acute on chornic anemia;      __8/5 consult: s/p fall; gross hematuria; uti; epididymo-orchitis; urine retention  recently hospitalized for covid pneumonia found to have UTI, Epididymo-orchitis/ scrotal cellulitis during that admission . Urine was not sent for culture. But a edmond was placed and he was sent home on PO cipro (for uti), oral diflucan + topical antifungals.   __d/c summary: Synopsis: 84 y/o M on OAC who presented for fall, pain in the ribs, gross hematuria, and dysuria. Diagnosed with rib fractures 5-7 on the L side, and a UTI. Xarelto was held for 2 days w/ subsequent stabilization of HgB. No hematuria noted on day of discharge, no blood from anywhere else. Discharge HgB 9.1. Will need follow up labs and completion of abx. Urology cleared for discharge w/ the following recommendations: keep edmond in, follow up in office. Continue abx on discharge     Treatments: Rocephin     If you have any questions, please contact Clinical  Una Chapin RN, CDS  at #[phone]. Thank You!     THIS FORM IS A PERMANENT PART OF THE MEDICAL RECORD

## 2024-08-14 NOTE — ED INITIAL ASSESSMENT (HPI)
PT from the [facility] for hematuria. PT has edmond catheter in place since last week and noticed blood in urine a couple days ago. He has recent hx of falls with broken ribs on R side. Denies any fever. PT on [place]

## 2024-08-14 NOTE — ED PROVIDER NOTES
Patient Seen in: [facility] Emergency Department      History     Chief Complaint   Patient presents with    Urinary Symptoms     Stated Complaint:     Subjective:   HPI    84-year-old male with a past medical history as below including Parkinson's disease, A-fib on Eliquis, hypertension, hyperlipidemia, CAD lupus brought by EMS from rehab facility due to hematuria.  Son states he has had intermittent hematuria previously but bleeding restarted about 2 days ago.  He had a Milan catheter placed about a month ago due to urinary retention.  He was recently discharged from  after fall with rib fractures.  Patient states he otherwise feels well.  Denies abdominal pain, vomiting or diarrhea.  Denies fever or chills.  Denies feeling dizzy or lightheaded.    Objective:   Past Medical History:    Anemia    Atherosclerosis of coronary artery    Chronic atrial fibrillation (HCC)    Esophageal reflux    Heart attack (HCC)    Hyperlipidemia    Hypertension    Lupus (HCC)    Parkinson disease (HCC)              Past Surgical History:   Procedure Laterality Date    Angiogram  1984    Angiogram  1996    Angiogram  03/27/2002    [facility], Dr. Scott/ Mikayla: 1.Successful PTCA of the infarct related artery which was the proximal graft of the saphenous vein graft to the right posterior descending/LV branch which was dilated and stented using a 4 x 13 BX Velocity to a maximum diameter of 4.45 mm.  2.Proximal PDA primarily stented using a 3 x 18 Penta stent from about 70 to 80% to 0%, HUGH III flow, no dissection.     [facility] with a drug-eluting stent to the graft to the right coronary artery. Severe native CAD. Patent vein graft to LAD. Severe disease in the vein graft to the right coronary artery.    [facility] Success[facility] of the circumflex artery in the proximal and mid portions. Relook angiography of the vein graft to the  right coronary artery showed that this vessel is widely patent.    Cabg  1984    x2    Cath bare metal stent (bms)      Cath drug eluting stent      Shoulder surg proc unlisted Left     fracture                Social History     Socioeconomic History    Marital status:    Tobacco Use    Smoking status: Former     Current packs/day: 0.00     Types: Cigarettes     Quit date: 1982     Years since quittin.3    Smokeless tobacco: Never   Vaping Use    Vaping status: Never Used   Substance and Sexual Activity    Alcohol use: Yes     Alcohol/week: 0.0 standard drinks of alcohol     Comment: Social    Drug use: No     Social Determinants of Health     Food Insecurity: No Food Insecurity (2024)    Food Insecurity     Food Insecurity: Never true   Transportation Needs: No Transportation Needs (2024)    Transportation Needs     Lack of Transportation: No   Housing Stability: Low Risk  (2024)    Housing Stability     Housing Instability: No              Review of Systems    Positive for stated Chief Complaint: Urinary Symptoms    Other systems are as noted in HPI.  Constitutional and vital signs reviewed.      All other systems reviewed and negative except as noted above.    Physical Exam     ED Triage Vitals [24 1758]   /74   Pulse 61   Resp 24   Temp 98.3 °F (36.8 °C)   Temp src Temporal   SpO2 99 %   O2 Device None (Room air)       Current Vitals:   Vital Signs  BP: 100/62  Pulse: (!) 45  Resp: 24  Temp: 98.3 °F (36.8 °C)  Temp src: Temporal  MAP (mmHg): 75    Oxygen Therapy  SpO2: 97 %  O2 Device: None (Room air)            Physical Exam  Vitals and nursing note reviewed.   Constitutional:       General: He is not in acute distress.     Appearance: He is well-developed. He is not ill-appearing.   HENT:      Head: Normocephalic and atraumatic.      Mouth/Throat:      Mouth: Mucous membranes are moist.   Eyes:      General: No scleral icterus.     Extraocular Movements: Extraocular  movements intact.   Cardiovascular:      Rate and Rhythm: Normal rate. Rhythm irregular.   Pulmonary:      Effort: Pulmonary effort is normal.      Breath sounds: Normal breath sounds.   Abdominal:      Palpations: Abdomen is soft.      Tenderness: There is no abdominal tenderness.   Genitourinary:     Comments: Milan catheter in place with dark red color blood in the bag, no clots  Musculoskeletal:      Cervical back: Neck supple.   Skin:     General: Skin is warm and dry.      Capillary Refill: Capillary refill takes less than 2 seconds.   Neurological:      Mental Status: He is alert and oriented to person, place, and time.      GCS: GCS eye subscore is 4. GCS verbal subscore is 5. GCS motor subscore is 6.   Psychiatric:         Mood and Affect: Mood normal.         Behavior: Behavior normal.               ED Course     Labs Reviewed   URINALYSIS WITH CULTURE REFLEX - Abnormal; Notable for the following components:       Result Value    Urine Color Loida (*)     Clarity Urine Turbid (*)     Glucose Urine 100 (*)     Ketones Urine 40 (*)     Blood Urine Large (*)     Protein Urine >=300 (*)     Urobilinogen Urine 2.0 (*)     Leukocyte Esterase Urine Large (*)     RBC Urine >10 (*)     Hyaline Casts Present (*)     All other components within normal limits   COMP METABOLIC PANEL (14) - Abnormal; Notable for the following components:    Glucose 165 (*)     Sodium 135 (*)     AST 99 (*)     Alkaline Phosphatase 155 (*)     Globulin  4.0 (*)     A/G Ratio 0.9 (*)     All other components within normal limits   CBC WITH DIFFERENTIAL WITH PLATELET - Abnormal; Notable for the following components:    RBC 3.17 (*)     HGB 10.4 (*)     HCT 31.0 (*)     Lymphocyte Absolute 0.59 (*)     All other components within normal limits   PROTHROMBIN TIME (PT) - Abnormal; Notable for the following components:    PT 36.2 (*)     INR 3.57 (*)     All other components within normal limits   PTT, ACTIVATED - Abnormal; Notable for the  following components:    PTT 38.7 (*)     All other components within normal limits   UA MICROSCOPIC ONLY, URINE - Abnormal; Notable for the following components:    RBC Urine >10 (*)     Hyaline Casts Present (*)     All other components within normal limits   ICTOTEST - Normal   RAINBOW DRAW LAVENDER   RAINBOW DRAW LIGHT GREEN   RAINBOW DRAW BLUE   URINE CULTURE, ROUTINE                      MDM        84-year-old male with a past medical history as below including Parkinson's disease, A-fib on Eliquis, hypertension, hyperlipidemia, CAD lupus brought by EMS from rehab facility due to hematuria.     Chart review shows patient was discharged to SNF on 8/6/2024 after admission for fall with rib fractures.  He had gross hematuria during hospital stay of which resolved after anticoagulation was held for 2 days.  He was evaluated by urology recommended outpatient cystoscopy.  CT abdomen/pelvis showed evidence of cystitis.  Labs showed anemia with hemoglobin of 9.1 on discharge.    Differential includes but is not limited to hemorrhagic cystitis, bleeding due to anticoagulation/coagulopathy, anemia    UA shows hematuria but no evidence of UTI.  Sodium is minimally low at 135 with otherwise normal electrolytes and renal function.  Hemoglobin is 10.4 which is improved from previous of 9.1 on 8/6/2024.    Discussed with urology Dr. Epps who states patient can be discharged home for outpatient follow-up/cystoscopy and can continue Xarelto.  Patient and son are agreeable.  Return precaution discussed                                       Medical Decision Making  Amount and/or Complexity of Data Reviewed  Independent Historian: caregiver     Details: Son, see HPI  External Data Reviewed: labs, radiology and notes.     Details: See [facility]  Labs: ordered. Decision-making details documented in ED Course.  Discussion of management or test interpretation with external provider(s): Urology    Risk  Decision regarding  hospitalization.        Disposition and Plan     Clinical Impression:  1. Gross hematuria         Disposition:  Discharge  8/14/2024  8:17 pm    Follow-up:  Duglas Ojeda MD  [address]  [place] [address]  [phone]    Schedule an appointment as soon as possible for a visit      Olvin Dinh MD  [address]  [facility] [address]  [phone]    Schedule an appointment as soon as possible for a visit            Medications Prescribed:  Current Discharge Medication List

## 2024-08-21 ENCOUNTER — TELEPHONE (OUTPATIENT)
Dept: SURGERY | Facility: CLINIC | Age: 84
End: 2024-08-21

## 2024-08-21 ENCOUNTER — OFFICE VISIT (OUTPATIENT)
Dept: SURGERY | Facility: CLINIC | Age: 84
End: 2024-08-21

## 2024-08-21 DIAGNOSIS — R33.9 URINARY RETENTION: Primary | ICD-10-CM

## 2024-08-21 DIAGNOSIS — R31.0 GROSS HEMATURIA: ICD-10-CM

## 2024-08-21 DIAGNOSIS — N45.3 EPIDIDYMO-ORCHITIS: ICD-10-CM

## 2024-08-21 PROCEDURE — 99204 OFFICE O/P NEW MOD 45 MIN: CPT

## 2024-08-21 RX ORDER — SULFASALAZINE 500 MG/1
500 TABLET, DELAYED RELEASE ORAL 2 TIMES DAILY
Status: ON HOLD | COMMUNITY
Start: 2024-06-24 | End: 2024-08-23

## 2024-08-21 RX ORDER — METOPROLOL TARTRATE 50 MG
TABLET ORAL
Status: ON HOLD | COMMUNITY
Start: 2024-07-31

## 2024-08-21 NOTE — TELEPHONE ENCOUNTER
RN relayed/faxed the information to Kaitlin Capellan, 8/21.     Urinary retention   - begin flomax 0.4 daily   - begin miralax daily with goals of every day or every other day soft BM. Can take up to 2 doses a day to reach goals   - discussed attempting VT today but given not on flomax and very constipated, would recommend holding off and he was agreeable   - repeat void trial in 1 wk once BM regular. If BM still constipated at one wk, then continue working on BM before attempting VT   - will change catheter today     Epididymo-orchitis   - will get scrotal US to ensure resolved     Gross hematuria   - RTC for cysto given gross hematuria and CT finding

## 2024-08-21 NOTE — PROGRESS NOTES
[facility], [facility]    Urology Consult Note    History of Present Illness:   Patient is a(n) 84 year old male with hx of CAD, bypass, afib (on xarelto), lupus and parkinsons who presents for urinary retention.    Initially had UTI, epididymoorchitis/scrotal cellulitis during 7/9/24 admission. No urine culture was done but discharged with edmond and po cipro for presumed UTI, po diflucan and topical antifungal.     He returned to hospital 8/5/24 s/p fall, rib fx, and gross hematuria in catheter. He was seen by Dilma Velasquez urology PA in hospital. Urine culture was neg. Xarelto was held. Was recommended to continue edmond and conservative mgmt of scrotum.     Scrotal US 8/2/24 showed bilateral hyperemic right greater than left epididymis.  Heterogeneous echotexture to potential testicles with color flow.  Findings suspicious for epididymoorchitis.  No discrete drainable abscess appreciated.  Bilateral scrotal skin thickening.    CT A/P 8/2/24 showed prominent circumferential mural thickening of the urinary bladder with areas of trabeculation and possible diverticula.  This may represent cystitis versus neoplastic process.    He presents with caregiver today. Resides at [facility] long term. Pt reports scrotal pain resolved and has finished cipro 1wk ago. Caregiver states scrotal rash is largely improved, almost back to baseline. Currently has catheter draining medium yellow urine. Tolerating and no complaints. Poor water intake since rib fx.     Ambulates occasionally in the day but mostly WC bound these days due to recent fall. Normally takes miralax daily but has been held since most recent hospital visit. Now having BM q7-8 days.     At baseline, no urinary complaints. Strong urinary stream without hesitancy.     Hx of smoking.     HISTORY:  Past Medical History:    Anemia    Atherosclerosis of coronary artery    Chronic atrial fibrillation (HCC)    Esophageal reflux     Heart attack (HCC)    Hyperlipidemia    Hypertension    Lupus (HCC)    Parkinson disease (HCC)      Past Surgical History:   Procedure Laterality Date    Angiogram  1984    Angiogram      Angiogram  2002    [facility], Dr. Scott/ Mikayla: 1.Successful PTCA of the infarct related artery which was the proximal graft of the saphenous vein graft to the right posterior descending/LV branch which was dilated and stented using a 4 x 13 BX Velocity to a maximum diameter of 4.45 mm.  2.Proximal PDA primarily stented using a 3 x 18 Penta stent from about 70 to 80% to 0%, HUGH III flow, no dissection.     Angiogram  2011    [facility], Dr. Steele: Successful stenting with a drug-eluting stent to the graft to the right coronary artery. Severe native CAD. Patent vein graft to LAD. Severe disease in the vein graft to the right coronary artery.    Angiogram  2011    [facility], Dr. Steele: Successful stenting of the circumflex artery in the proximal and mid portions. Relook angiography of the vein graft to the right coronary artery showed that this vessel is widely patent.    Cabg  1984    x2    Cath bare metal stent (bms)      Cath drug eluting stent      Shoulder surg proc unlisted Left     fracture      Family History   Problem Relation Age of Onset    Cancer Father         Liver    Other (Other) Father         Heart Attack    Cancer Mother         uterine    Other (Other) Daughter         Hosimotos    Other (Other) Son         Heart Problem      Social History:   Social History     Socioeconomic History    Marital status:    Tobacco Use    Smoking status: Former     Current packs/day: 0.00     Types: Cigarettes     Quit date: 1982     Years since quittin.4    Smokeless tobacco: Never   Vaping Use    Vaping status: Never Used   Substance and Sexual Activity    Alcohol use: Yes     Alcohol/week: 0.0 standard drinks of alcohol     Comment: Social    Drug use: No     Social Determinants of Health      Food Insecurity: No Food Insecurity (8/4/2024)    Food Insecurity     Food Insecurity: Never true   Transportation Needs: No Transportation Needs (8/4/2024)    Transportation Needs     Lack of Transportation: No   Housing Stability: Low Risk  (8/4/2024)    Housing Stability     Housing Instability: No        Allergies  Allergies   Allergen Reactions    Inderal [Propranolol] NAUSEA AND VOMITING and UNKNOWN    Isosorbide NAUSEA AND VOMITING    Other OTHER (SEE COMMENTS)       Review of Systems:   A 10-point review of systems was completed and is negative other than as noted above.    Physical Exam:   There were no vitals taken for this visit.    GENERAL APPEARANCE: no acute distress  NEUROLOGIC: converses appropriately  HEAD: atraumatic, normocephalic  LUNGS: non-labored breathing  ABDOMEN: soft, nontender, non-distended  BACK: no CVA tenderness  PSYCH: appropriate affect and mood  INGUINAL CANALS: large right sided hernia that is non-reducible; per pt, has been worked up many yrs ago and is unchanged, not bothersome  PENILE MEATUS: open and in normal location  PENIS: normal  SCROTUM: mild erythema to the scrotum L>R   TESTES: normal anatomy  EPIDIDYMIS: normal anatomy      Results:     Laboratory Data:  Lab Results   Component Value Date    WBC 5.6 08/14/2024    HGB 10.4 (L) 08/14/2024    .0 08/14/2024     Lab Results   Component Value Date     (L) 08/14/2024    K 4.4 08/14/2024     08/14/2024    CO2 25.0 08/14/2024    BUN 19 08/14/2024     (H) 08/14/2024    GFRAA 84 12/06/2019    AST 99 (H) 08/14/2024    ALT 14 08/14/2024    TP 7.6 08/14/2024    ALB 3.6 08/14/2024    CA 9.6 08/14/2024    MG 2.0 07/14/2024       Urinalysis Results (last 3 years):  Recent Labs     05/19/24  1030 07/10/24  0026 08/02/24  1758 08/04/24  1629 08/14/24  1808   COLORUR Yellow Yellow Yellow  --  [place]*   CLARITY Ex.Turbid* Clear Clear Ex.Turbid* Turbid*   SPECGRAVITY 1.016 1.015 1.022 >1.030* >=1.030   PHURINE  7.0 6.5 6.0 6.0 5.5   PROUR 50* Trace* 20* 100* >=300*   GLUUR Normal Normal Normal Normal 100*   KETUR Negative Negative Negative Negative 40*   BILUR Negative Negative Negative Negative  --    BLOODURINE 1+* Negative Negative 3+* Large*   NITRITE Negative Negative Negative Negative Negative   UROBILINOGEN Normal Normal Normal Normal 2.0*   LEUUR 500* Negative 25* 75* Large*   WBCUR >50*  --  6-10* None Seen None Seen  None Seen   RBCUR >10*  --  >10* >10* >10*  >10*   BACUR 1+*  --  None Seen None Seen None Seen  None Seen       Urine Culture Results (last 3 years):  Lab Results   Component Value Date    URINECUL No Growth at 18-24 hrs. 08/14/2024    URINECUL No Growth at 18-24 hrs. 08/04/2024    URINECUL No Growth at 18-24 hrs. 05/19/2024       Imaging  CT ABDOMEN+PELVIS(CONTRAST ONLY)(CPT=74177)    Result Date: 8/4/2024  PROCEDURE:  CT ABDOMEN+PELVIS (CONTRAST ONLY) (CPT=74177)  COMPARISON:  None.  INDICATIONS:  fell at assisted living, has edmond cath and has blood in output, having right sided pain from fall  TECHNIQUE:  CT scanning was performed from the dome of the diaphragm to the pubic symphysis with non-ionic intravenous contrast material. Post contrast coronal MPR imaging was performed.  Dose reduction techniques were used. Dose information is transmitted to the ACR (American College of Radiology) NRDR (National Radiology Data Registry) which includes the Dose Index Registry.  PATIENT STATED HISTORY:(As transcribed by Technologist)  Patient fell at assisted living, has edmond cath and has blood in output. Having right sided pain from fall.   CONTRAST USED:  100cc of Isovue 370  FINDINGS:  LIVER:  No enlargement, atrophy, abnormal density, or significant focal lesion.  BILIARY:  No visible dilatation or calcification.  PANCREAS:  No lesion, fluid collection, ductal dilatation, or atrophy.  SPLEEN:  Scattered calcifications are noted. KIDNEYS:  Prominent simple cyst of the left kidney noted.  This  measures up to 6.7 x 5.2 cm.  No specific further follow-up is recommended. ADRENALS:  No mass or enlargement.  AORTA/VASCULAR:  No aneurysm or dissection.  RETROPERITONEUM:  No mass or adenopathy.  BOWEL/MESENTERY:  No dilated loops of small bowel are seen.  Portions of the bowel are decompressed, limiting evaluation.  No free fluid is seen.  Lack of oral contrast limits assessment of the bowel.  Moderate amount of fecal material present within the  colon. ABDOMINAL WALL:  There is a right inguinal hernia containing fat as well as loops of small bowel without evidence of obstruction.  This is incompletely visualized. URINARY BLADDER:  Prominent circumferential mural thickening of the urinary bladder with areas of trabeculation.  There are probable diverticula present with several scattered foci of air as well as a Edmond catheter. PELVIC NODES:  No adenopathy.  PELVIC ORGANS:  No visible mass.  Pelvic organs appropriate for patient age.  BONES:  No bony lesion or fracture.  LUNG BASES:  Trace bilateral pleural effusions with associated basilar atelectasis. OTHER:  Negative.             CONCLUSION:  There is prominent circumferential mural thickening of the urinary bladder with areas of trabeculation and possible diverticula.  This may represent cystitis.  A neoplastic process cannot be excluded.  Correlation with direct visualization is suggested.   LOCATION:  [facility]   Dictated by (CST): Jackson Lopez MD on 8/04/2024 at 7:32 PM     Finalized by (CST): Jackson Lopez MD on 8/04/2024 at 7:35 PM       XR RIBS WITH CHEST (3 VIEWS), RIGHT  (CPT=71101)    Result Date: 8/4/2024  PROCEDURE:  XR RIBS WITH CHEST (3 VIEWS), RIGHT  (CPT=71101)  TECHNIQUE:  PA Chest and three views of the ribs were obtained  COMPARISON:  [facility] , XR, XR CHEST AP PORTABLE  (CPT=71045), 7/09/2024, 11:03 PM.  INDICATIONS:  fell at assisted living, has edmond cath and has blood in output, having right sided pain from fall  PATIENT STATED HISTORY: (As  transcribed by Technologist)  Patient fell at nursing home and landed on right posterior ribs..Patient also was in the ED last week unable to urinate and now has blood in his urine bag.  Family states he resides in an assisted  living facility.    FINDINGS:  Severe diffuse osteopenia limits evaluation.  There are likely minimally displaced fractures of the right fifth through seventh ribs.  No significant pleural effusion or pneumothorax is seen.  Postsurgical changes of the chest are noted.  If clinical symptoms persist then consider follow-up imaging.            CONCLUSION:  See above.   LOCATION:  [facility]     Dictated by (CST): Jackson Lopez MD on 8/04/2024 at 5:45 PM     Finalized by (CST): Jackson Lopez MD on 8/04/2024 at 5:46 PM       US SCROTUM W/ DOPPLER (CPT=93975/55180)    Result Date: 8/2/2024  PROCEDURE:  US SCROTUM W/ DOPPLER (CPT=93975/12732)  COMPARISON:  None.  INDICATIONS:  scrotal pain, erythema eval abscess  TECHNIQUE:  Real time grey scale ultrasound was performed of the scrotal contents including the testicles, epididymis, spermatic cord, and scrotal wall. A duplex scan with B-mode, Doppler color flow, and spectral analysis were also performed.  PATIENT STATED HISTORY: (As transcribed by Technologist)     FINDINGS:  TESTES:  Right testis measures 2.7 x 2.8 x 2.0 cm.  Left testis measures 3.8 x 2.3 x 2.7 cm.  There is Doppler flow to both testicles.  There is heterogeneous echotexture involving both testicles which may represent edema.  EPIDIDYMIS:  Hyperemic color flow to the right greater than left epididymis.  This is concerning for epididymitis.. HYDROCELE:  Small bilateral hydroceles VARICOCELE:  Negative OTHER:  Bilateral scrotal skin thickening.            CONCLUSION:  Bilateral hyperemic right greater than left epididymis.  Heterogeneous echotexture to both testicles with color flow.  Findings suspicious for epididymo-orchitis.  No discrete drainable abscess appreciated.  Bilateral scrotal skin  thickening.  LOCATION:  [place]    Dictated by (CST): Pia Connell MD on 8/02/2024 at 7:23 PM     Finalized by (CST): Pia Connell MD on 8/02/2024 at 7:26 PM           Impression:   Recommendations:  Urinary retention  - begin flomax 0.4 daily  - begin miralax daily with goals of every day or every other day soft BM. Can take up to 2 doses a day to reach goals  - discussed attempting VT today but given not on flomax and very constipated, would recommend holding off and he was agreeable  - repeat void trial in 1 wk once BM regular. If BM still constipated at one wk, then continue working on BM before attempting VT  - will change catheter today    Epididymo-orchitis   - will get scrotal US to ensure resolved    Gross hematuria  - RTC for cysto given gross hematuria and CT findings    Thank you very much for this consult. Please call if there are any questions or concerns.     Dhara Madison PA-C  Urology  [place]  Phone: [phone]    Date: 8/21/2024  Time: 11:28 AM

## 2024-08-23 ENCOUNTER — APPOINTMENT (OUTPATIENT)
Dept: GENERAL RADIOLOGY | Facility: HOSPITAL | Age: 84
End: 2024-08-23
Payer: MEDICARE

## 2024-08-23 ENCOUNTER — APPOINTMENT (OUTPATIENT)
Dept: CT IMAGING | Facility: HOSPITAL | Age: 84
End: 2024-08-23
Attending: EMERGENCY MEDICINE
Payer: MEDICARE

## 2024-08-23 ENCOUNTER — HOSPITAL ENCOUNTER (INPATIENT)
Facility: HOSPITAL | Age: 84
LOS: 9 days | Discharge: HOME HEALTH | End: 2024-09-01
Attending: EMERGENCY MEDICINE | Admitting: HOSPITALIST
Payer: MEDICARE

## 2024-08-23 ENCOUNTER — HOSPITAL ENCOUNTER (INPATIENT)
Facility: HOSPITAL | Age: 84
LOS: 9 days | Discharge: NURSING, REHAB OR LONG-TERM CARE | End: 2024-09-01
Attending: EMERGENCY MEDICINE | Admitting: HOSPITALIST
Payer: MEDICARE

## 2024-08-23 DIAGNOSIS — R31.9 HEMATURIA, UNSPECIFIED TYPE: ICD-10-CM

## 2024-08-23 DIAGNOSIS — R06.00 DYSPNEA, UNSPECIFIED TYPE: ICD-10-CM

## 2024-08-23 DIAGNOSIS — S22.41XD CLOSED FRACTURE OF MULTIPLE RIBS OF RIGHT SIDE WITH ROUTINE HEALING, SUBSEQUENT ENCOUNTER: ICD-10-CM

## 2024-08-23 DIAGNOSIS — J90 PLEURAL EFFUSION: Primary | ICD-10-CM

## 2024-08-23 PROBLEM — R79.89 AZOTEMIA: Status: ACTIVE | Noted: 2024-08-23

## 2024-08-23 LAB
ALBUMIN SERPL-MCNC: 3.7 G/DL (ref 3.2–4.8)
ALBUMIN/GLOB SERPL: 0.9 {RATIO} (ref 1–2)
ALP LIVER SERPL-CCNC: 186 U/L
ALT SERPL-CCNC: 13 U/L
ANION GAP SERPL CALC-SCNC: 1 MMOL/L (ref 0–18)
APTT PPP: 38.1 SECONDS (ref 23–36)
AST SERPL-CCNC: 58 U/L (ref ?–34)
BASOPHILS # BLD AUTO: 0.02 X10(3) UL (ref 0–0.2)
BASOPHILS NFR BLD AUTO: 0.4 %
BILIRUB SERPL-MCNC: 0.5 MG/DL (ref 0.2–1.1)
BILIRUB UR QL CFM: NEGATIVE
BUN BLD-MCNC: 20 MG/DL (ref 9–23)
CALCIUM BLD-MCNC: 9.4 MG/DL (ref 8.7–10.4)
CHLORIDE SERPL-SCNC: 107 MMOL/L (ref 98–112)
CO2 SERPL-SCNC: 31 MMOL/L (ref 21–32)
CREAT BLD-MCNC: 0.85 MG/DL
EGFRCR SERPLBLD CKD-EPI 2021: 85 ML/MIN/1.73M2 (ref 60–?)
EOSINOPHIL # BLD AUTO: 0.01 X10(3) UL (ref 0–0.7)
EOSINOPHIL NFR BLD AUTO: 0.2 %
ERYTHROCYTE [DISTWIDTH] IN BLOOD BY AUTOMATED COUNT: 14 %
GLOBULIN PLAS-MCNC: 4 G/DL (ref 2–3.5)
GLUCOSE BLD-MCNC: 113 MG/DL (ref 70–99)
GLUCOSE UR STRIP.AUTO-MCNC: 100 MG/DL
HCT VFR BLD AUTO: 31.1 %
HGB BLD-MCNC: 10.2 G/DL
IMM GRANULOCYTES # BLD AUTO: 0.01 X10(3) UL (ref 0–1)
IMM GRANULOCYTES NFR BLD: 0.2 %
INR BLD: 3.56 (ref 0.8–1.2)
KETONES UR STRIP.AUTO-MCNC: 40 MG/DL
LACTATE SERPL-SCNC: 1 MMOL/L (ref 0.5–2)
LYMPHOCYTES # BLD AUTO: 0.81 X10(3) UL (ref 1–4)
LYMPHOCYTES NFR BLD AUTO: 16 %
MCH RBC QN AUTO: 31.3 PG (ref 26–34)
MCHC RBC AUTO-ENTMCNC: 32.8 G/DL (ref 31–37)
MCV RBC AUTO: 95.4 FL
MONOCYTES # BLD AUTO: 0.46 X10(3) UL (ref 0.1–1)
MONOCYTES NFR BLD AUTO: 9.1 %
NEUTROPHILS # BLD AUTO: 3.75 X10 (3) UL (ref 1.5–7.7)
NEUTROPHILS # BLD AUTO: 3.75 X10(3) UL (ref 1.5–7.7)
NEUTROPHILS NFR BLD AUTO: 74.1 %
NITRITE UR QL STRIP.AUTO: POSITIVE
NT-PROBNP SERPL-MCNC: 554 PG/ML (ref ?–450)
OSMOLALITY SERPL CALC.SUM OF ELEC: 291 MOSM/KG (ref 275–295)
PH UR STRIP.AUTO: 5.5 [PH] (ref 5–8)
PLATELET # BLD AUTO: 156 10(3)UL (ref 150–450)
POTASSIUM SERPL-SCNC: 4.3 MMOL/L (ref 3.5–5.1)
PROT SERPL-MCNC: 7.7 G/DL (ref 5.7–8.2)
PROT UR STRIP.AUTO-MCNC: >=300 MG/DL
PROTHROMBIN TIME: 36.1 SECONDS (ref 11.6–14.8)
Q-T INTERVAL: 436 MS
QRS DURATION: 86 MS
QTC CALCULATION (BEZET): 413 MS
R AXIS: -24 DEGREES
RBC # BLD AUTO: 3.26 X10(6)UL
RBC #/AREA URNS AUTO: >10 /HPF
RBC #/AREA URNS AUTO: >10 /HPF
SODIUM SERPL-SCNC: 139 MMOL/L (ref 136–145)
SP GR UR STRIP.AUTO: 1.02 (ref 1–1.03)
T AXIS: 32 DEGREES
TROPONIN I SERPL HS-MCNC: 18 NG/L
UROBILINOGEN UR STRIP.AUTO-MCNC: >=8 MG/DL
VENTRICULAR RATE: 54 BPM
WBC # BLD AUTO: 5.1 X10(3) UL (ref 4–11)
WBC #/AREA URNS AUTO: >50 /HPF
WBC #/AREA URNS AUTO: >50 /HPF

## 2024-08-23 PROCEDURE — 71045 X-RAY EXAM CHEST 1 VIEW: CPT | Performed by: EMERGENCY MEDICINE

## 2024-08-23 PROCEDURE — 71250 CT THORAX DX C-: CPT | Performed by: EMERGENCY MEDICINE

## 2024-08-23 RX ORDER — METOPROLOL TARTRATE 50 MG
50 TABLET ORAL
Status: DISCONTINUED | OUTPATIENT
Start: 2024-08-24 | End: 2024-09-01

## 2024-08-23 RX ORDER — CARBIDOPA AND LEVODOPA 25; 100 MG/1; MG/1
1 TABLET, EXTENDED RELEASE ORAL ONCE
Status: COMPLETED | OUTPATIENT
Start: 2024-08-23 | End: 2024-08-23

## 2024-08-23 RX ORDER — BENZONATATE 200 MG/1
200 CAPSULE ORAL 3 TIMES DAILY PRN
Status: DISCONTINUED | OUTPATIENT
Start: 2024-08-23 | End: 2024-09-01

## 2024-08-23 RX ORDER — MORPHINE SULFATE 2 MG/ML
2 INJECTION, SOLUTION INTRAMUSCULAR; INTRAVENOUS EVERY 2 HOUR PRN
Status: DISCONTINUED | OUTPATIENT
Start: 2024-08-23 | End: 2024-09-01

## 2024-08-23 RX ORDER — PANTOPRAZOLE SODIUM 40 MG/1
40 TABLET, DELAYED RELEASE ORAL
Status: DISCONTINUED | OUTPATIENT
Start: 2024-08-24 | End: 2024-09-01

## 2024-08-23 RX ORDER — METOPROLOL SUCCINATE 50 MG/1
50 TABLET, EXTENDED RELEASE ORAL
Status: DISCONTINUED | OUTPATIENT
Start: 2024-08-24 | End: 2024-08-23

## 2024-08-23 RX ORDER — PREGABALIN 50 MG/1
50 CAPSULE ORAL 2 TIMES DAILY
Status: DISCONTINUED | OUTPATIENT
Start: 2024-08-23 | End: 2024-09-01

## 2024-08-23 RX ORDER — ONDANSETRON 2 MG/ML
4 INJECTION INTRAMUSCULAR; INTRAVENOUS ONCE
Status: COMPLETED | OUTPATIENT
Start: 2024-08-23 | End: 2024-08-23

## 2024-08-23 RX ORDER — MORPHINE SULFATE 4 MG/ML
4 INJECTION, SOLUTION INTRAMUSCULAR; INTRAVENOUS EVERY 2 HOUR PRN
Status: DISCONTINUED | OUTPATIENT
Start: 2024-08-23 | End: 2024-09-01

## 2024-08-23 RX ORDER — MORPHINE SULFATE 2 MG/ML
1 INJECTION, SOLUTION INTRAMUSCULAR; INTRAVENOUS EVERY 2 HOUR PRN
Status: DISCONTINUED | OUTPATIENT
Start: 2024-08-23 | End: 2024-09-01

## 2024-08-23 RX ORDER — METOCLOPRAMIDE HYDROCHLORIDE 5 MG/ML
5 INJECTION INTRAMUSCULAR; INTRAVENOUS EVERY 8 HOURS PRN
Status: DISCONTINUED | OUTPATIENT
Start: 2024-08-23 | End: 2024-09-01

## 2024-08-23 RX ORDER — ONDANSETRON 2 MG/ML
4 INJECTION INTRAMUSCULAR; INTRAVENOUS EVERY 6 HOURS PRN
Status: DISCONTINUED | OUTPATIENT
Start: 2024-08-23 | End: 2024-09-01

## 2024-08-23 RX ORDER — CARBIDOPA AND LEVODOPA 25; 100 MG/1; MG/1
1.5 TABLET ORAL 4 TIMES DAILY
Status: DISCONTINUED | OUTPATIENT
Start: 2024-08-24 | End: 2024-09-01

## 2024-08-23 RX ORDER — ACETAMINOPHEN 500 MG
500 TABLET ORAL EVERY 4 HOURS PRN
Status: DISCONTINUED | OUTPATIENT
Start: 2024-08-23 | End: 2024-08-26

## 2024-08-23 RX ORDER — GUAIFENESIN 600 MG/1
600 TABLET, EXTENDED RELEASE ORAL 2 TIMES DAILY
Status: DISCONTINUED | OUTPATIENT
Start: 2024-08-23 | End: 2024-09-01

## 2024-08-23 NOTE — ED INITIAL ASSESSMENT (HPI)
Shortness of breath   and right  lower rib pain since 1 month . Patient had history of fall  on July  and fractured rib . Hematuria since morning . Patient is on urinary catheter . Patient was seen  in cardiology office  and sent over here because of fluid in the lungs.  No fever

## 2024-08-23 NOTE — ED PROVIDER NOTES
Patient Seen in: [facility] Emergency Department      History     Chief Complaint   Patient presents with    Shortness Of Breath     Stated Complaint: SOB and CP since July    Subjective:   HPI    Gentleman with a history of lupus Parkinson's disease hypertension hyperlipidemia chronic A-fib and coronary artery disease who was hospitalized in July after he fell had a rib fracture and had COVID he is sent over from the cardiology office for chest pain that is been going on since July and dyspnea particularly with exertion.  Noted to have bilateral pleural effusions per cardiology.    Objective:   Past Medical History:    Anemia    Atherosclerosis of coronary artery    Chronic atrial fibrillation (HCC)    Esophageal reflux    Heart attack (HCC)    Hyperlipidemia    Hypertension    Lupus (HCC)    Parkinson disease (HCC)              Past Surgical History:   Procedure Laterality Date    Angiogram  1984    Angiogram  1996    Angiogram  03/27/2002    [facility] Dr. Scott/ [place]: 1.Successful PTCA of the infarct related artery which was the proximal graft of the saphenous vein graft to the right posterior descending/LV branch which was dilated and stented using a 4 x 13 BX Velocity to a maximum diameter of 4.45 mm.  2.Proximal PDA primarily stented using a 3 x 18 Penta stent from about 70 to 80% to 0%, HUGH III flow, no dissection.     Angiogram  06/24/2011    [facility]: Successful stenting with a drug-eluting stent to the graft to the right coronary artery. Severe native CAD. Patent vein graft to LAD. Severe disease in the vein graft to the right coronary artery.    Angiogram  07/26/2011    [facility]: Successful stenting of the circumflex artery in the proximal and mid portions. Relook angiography of the vein graft to the right coronary artery showed that this vessel is widely patent.    Cabg  1984    x2    Cath bare metal stent (bms)      Cath drug eluting stent      Shoulder surg proc unlisted  Left     fracture                Social History     Socioeconomic History    Marital status:    Tobacco Use    Smoking status: Former     Current packs/day: 0.00     Types: Cigarettes     Quit date: 1982     Years since quittin.4    Smokeless tobacco: Never   Vaping Use    Vaping status: Never Used   Substance and Sexual Activity    Alcohol use: Yes     Alcohol/week: 0.0 standard drinks of alcohol     Comment: Social    Drug use: No     Social Determinants of Health     Food Insecurity: No Food Insecurity (2024)    Food Insecurity     Food Insecurity: Never true   Transportation Needs: No Transportation Needs (2024)    Transportation Needs     Lack of Transportation: No   Housing Stability: Low Risk  (2024)    Housing Stability     Housing Instability: No              Review of Systems    Positive for stated Chief Complaint: Shortness Of Breath    Other systems are as noted in HPI.  Constitutional and vital signs reviewed.      All other systems reviewed and negative except as noted above.    Physical Exam     ED Triage Vitals [24 1612]   BP (!) 134/91   Pulse 82   Resp 24   Temp 98.3 °F (36.8 °C)   Temp src Temporal   SpO2 94 %   O2 Device None (Room air)       Current Vitals:   Vital Signs  BP: 137/82  Pulse: 83  Resp: 20  Temp: 98 °F (36.7 °C)  Temp src: Oral  MAP (mmHg): 98    Oxygen Therapy  SpO2: 99 %  O2 Device: Nasal cannula  O2 Flow Rate (L/min): 2 L/min  Pulse Oximetry Type: Continuous  Oximetry Probe Site Changed: Yes  Pulse Ox Probe Location: Right hand            Physical Exam    Very uncomfortable tachypneic elderly gentleman lying on emergency department bed he appears to be in pain.  His daughter is at bedside.  His pupils are equal round reactive to light his extraocular wounds are intact his oropharynx is moist his neck is supple his heart has a regular rate and rhythm with his right lung fields are incredibly diminished left lung has normal breath sounds he has  tenderness to palpation over his entire right chest wall abdomen is soft nontender nondistended bilateral lower extremities do not have any significant edema.  He is a frail-appearing elderly gentleman    ED Course     Labs Reviewed   COMP METABOLIC PANEL (14) - Abnormal; Notable for the following components:       Result Value    Glucose 113 (*)     AST 58 (*)     Alkaline Phosphatase 186 (*)     Globulin  4.0 (*)     A/G Ratio 0.9 (*)     All other components within normal limits   CBC WITH DIFFERENTIAL WITH PLATELET - Abnormal; Notable for the following components:    RBC 3.26 (*)     HGB 10.2 (*)     HCT 31.1 (*)     Lymphocyte Absolute 0.81 (*)     All other components within normal limits   PRO BETA NATRIURETIC PEPTIDE - Abnormal; Notable for the following components:    Pro-Beta Natriuretic Peptide 554 (*)     All other components within normal limits   PROTHROMBIN TIME (PT) - Abnormal; Notable for the following components:    PT 36.1 (*)     INR 3.56 (*)     All other components within normal limits   PTT, ACTIVATED - Abnormal; Notable for the following components:    PTT 38.1 (*)     All other components within normal limits   URINALYSIS WITH CULTURE REFLEX - Abnormal; Notable for the following components:    Urine Color Red (*)     Clarity Urine Turbid (*)     Glucose Urine 100 (*)     Ketones Urine 40 (*)     Blood Urine Large (*)     Protein Urine >=300 (*)     Urobilinogen Urine >=8.0 (*)     Nitrite Urine Positive (*)     Leukocyte Esterase Urine Large (*)     WBC Urine >50 (*)     RBC Urine >10 (*)     All other components within normal limits   UA MICROSCOPIC ONLY, URINE - Abnormal; Notable for the following components:    WBC Urine >50 (*)     RBC Urine >10 (*)     All other components within normal limits   TROPONIN I HIGH SENSITIVITY - Normal   LACTIC ACID, PLASMA - Normal   ICTOTEST - Normal   COMP METABOLIC PANEL (14)   MAGNESIUM   CBC WITH DIFFERENTIAL WITH PLATELET   RAINBOW DRAW GOLD   BLOOD  CULTURE   BLOOD CULTURE   URINE CULTURE, ROUTINE     EKG    Rate, intervals and axes as noted on EKG Report.  Rate: 54  Rhythm: Atrial Fibrillation  Readin slow atrial fibrillation with no acute ST elevation or significant ST depression he does have have generally low voltage              ED Course as of 249  ------------------------------------------------------------  Time: 1719  Value: INR(!): 3.56  Comment: (Reviewed)  ------------------------------------------------------------  Time: 1719  Value: WBC: 5.1  Comment: (Reviewed)  ------------------------------------------------------------  Time: 1719  Value: Hemoglobin(!): 10.2  Comment: Cxr independently interpreted by myself at bedside is consistent with a very large right-sided pleural effusion but apparently the rib fractures were actually on the left.  Patient is tachypneic but not hypoxic significantly diminished breath sounds on the right on physical exam  ------------------------------------------------------------  Time: 1800  Value: WBC Urine(!): >50  Comment: (Reviewed)  ------------------------------------------------------------  Time:  1800  Value: RBC Urine(!): >10  Comment: Also seems to have urinary tract infection  ------------------------------------------------------------  Time:  087  Comment: The case with Dr. Stromberg with the question being do think it is possible that this is a hemothorax the patient is anticoagulated with an INR of nearly 3-1/2, he has new blood in his urine, and given that the patient is breathing 37 times a minute there is no way that he could have tolerated that for sustained period of time.     Please note that after patient had reasonable pain control his tachypnea significantly improved he clinically improved.  I do think patient will need better pain control.  Chest x-ray did have a large pleural effusion but I was worried about a possible hemothorax and the  blood in the urine and the elevated INR so I discussed the case with Dr. Stromberg from radiology and we did think that the best modality to image that would be a CT of the chest to determine if it is in the pleural space or possibly in the lung fields itself.  He evaluated that it does seem to be more of a pleural effusion and not hemothorax however there is 6 broken ribs and 3 levels of compression fractures.         MDM      Patient presents to the emergency department significant right-sided chest pain difficulty breathing he has diminished breath sounds on the right differential diagnosis includes but is not limited to pneumonia, pleural effusion, hemothorax, pneumothorax, patient with recent 3 rib fractures diagnosed last medical the patient and COVID COVID-pneumonia will also be up on the differential patient also with known spinal compression fracture of 2 levels but that is apparently in the lumbar spine.  Admission disposition: 8/23/2024  7:05 PM         Given that the chest x-ray is not definitive, CT of the chest was done, this does show a large fluid collection in the right lung field, but it does not appear to be hemothorax though it is difficult to tell the INR is 3.5.      Case with Dr. Croft, patient will be admitted to the hospital will get pain control we will get a plan together possibly for evacuation of that pleural effusion patient significantly improved with pain medication also seems to have a UTI will treat with antibiotics.                     Medical Decision Making      Disposition and Plan     Clinical Impression:  1. Pleural effusion    2. Closed fracture of multiple ribs of right side with routine healing, subsequent encounter    3. Dyspnea, unspecified type    4. Hematuria, unspecified type         Disposition:  Admit  8/23/2024  7:05 pm    Follow-up:  No follow-up provider specified.        Medications Prescribed:  Current Discharge Medication List                             Hospital Problems       Present on Admission  Date Reviewed: 8/21/2024            ICD-10-CM Noted POA    * (Principal) Pleural effusion J90 7/9/2024 Unknown    Azotemia R79.89 8/23/2024 Yes    Closed fracture of multiple ribs of right side with routine healing, subsequent encounter S22.41XD 8/23/2024 Unknown    Dyspnea, unspecified type R06.00 7/10/2024 Unknown    Hematuria, unspecified type R31.9 8/23/2024 Unknown

## 2024-08-24 PROBLEM — Z97.8 INDWELLING FOLEY CATHETER PRESENT: Status: ACTIVE | Noted: 2024-08-24

## 2024-08-24 PROBLEM — N39.0 URINARY TRACT INFECTION ASSOCIATED WITH INDWELLING URETHRAL CATHETER: Status: ACTIVE | Noted: 2024-08-24

## 2024-08-24 PROBLEM — T83.511A URINARY TRACT INFECTION ASSOCIATED WITH INDWELLING URETHRAL CATHETER (HCC): Status: ACTIVE | Noted: 2024-08-24

## 2024-08-24 PROBLEM — T83.511A URINARY TRACT INFECTION ASSOCIATED WITH INDWELLING URETHRAL CATHETER: Status: ACTIVE | Noted: 2024-08-24

## 2024-08-24 PROBLEM — N39.0 URINARY TRACT INFECTION ASSOCIATED WITH INDWELLING URETHRAL CATHETER (HCC): Status: ACTIVE | Noted: 2024-08-24

## 2024-08-24 LAB
ALBUMIN SERPL-MCNC: 2.9 G/DL (ref 3.2–4.8)
ALBUMIN/GLOB SERPL: 0.8 {RATIO} (ref 1–2)
ALP LIVER SERPL-CCNC: 152 U/L
ALT SERPL-CCNC: 7 U/L
ANION GAP SERPL CALC-SCNC: 1 MMOL/L (ref 0–18)
AST SERPL-CCNC: 42 U/L (ref ?–34)
BASOPHILS # BLD AUTO: 0.02 X10(3) UL (ref 0–0.2)
BASOPHILS NFR BLD AUTO: 0.6 %
BILIRUB SERPL-MCNC: 0.7 MG/DL (ref 0.2–1.1)
BUN BLD-MCNC: 14 MG/DL (ref 9–23)
CALCIUM BLD-MCNC: 8.8 MG/DL (ref 8.7–10.4)
CHLORIDE SERPL-SCNC: 107 MMOL/L (ref 98–112)
CO2 SERPL-SCNC: 32 MMOL/L (ref 21–32)
CREAT BLD-MCNC: 0.69 MG/DL
EGFRCR SERPLBLD CKD-EPI 2021: 91 ML/MIN/1.73M2 (ref 60–?)
EOSINOPHIL # BLD AUTO: 0.02 X10(3) UL (ref 0–0.7)
EOSINOPHIL NFR BLD AUTO: 0.6 %
ERYTHROCYTE [DISTWIDTH] IN BLOOD BY AUTOMATED COUNT: 13.8 %
GLOBULIN PLAS-MCNC: 3.5 G/DL (ref 2–3.5)
GLUCOSE BLD-MCNC: 81 MG/DL (ref 70–99)
HCT VFR BLD AUTO: 29.1 %
HGB BLD-MCNC: 9.2 G/DL
IMM GRANULOCYTES # BLD AUTO: 0.01 X10(3) UL (ref 0–1)
IMM GRANULOCYTES NFR BLD: 0.3 %
LYMPHOCYTES # BLD AUTO: 0.77 X10(3) UL (ref 1–4)
LYMPHOCYTES NFR BLD AUTO: 23.5 %
MAGNESIUM SERPL-MCNC: 1.8 MG/DL (ref 1.6–2.6)
MCH RBC QN AUTO: 31.3 PG (ref 26–34)
MCHC RBC AUTO-ENTMCNC: 31.6 G/DL (ref 31–37)
MCV RBC AUTO: 99 FL
MONOCYTES # BLD AUTO: 0.45 X10(3) UL (ref 0.1–1)
MONOCYTES NFR BLD AUTO: 13.7 %
NEUTROPHILS # BLD AUTO: 2.01 X10 (3) UL (ref 1.5–7.7)
NEUTROPHILS # BLD AUTO: 2.01 X10(3) UL (ref 1.5–7.7)
NEUTROPHILS NFR BLD AUTO: 61.3 %
OSMOLALITY SERPL CALC.SUM OF ELEC: 290 MOSM/KG (ref 275–295)
PLATELET # BLD AUTO: 127 10(3)UL (ref 150–450)
POTASSIUM SERPL-SCNC: 3.7 MMOL/L (ref 3.5–5.1)
PROT SERPL-MCNC: 6.4 G/DL (ref 5.7–8.2)
RBC # BLD AUTO: 2.94 X10(6)UL
SODIUM SERPL-SCNC: 140 MMOL/L (ref 136–145)
WBC # BLD AUTO: 3.3 X10(3) UL (ref 4–11)

## 2024-08-24 PROCEDURE — 99222 1ST HOSP IP/OBS MODERATE 55: CPT | Performed by: UROLOGY

## 2024-08-24 RX ORDER — MAGNESIUM OXIDE 400 MG/1
400 TABLET ORAL ONCE
Status: COMPLETED | OUTPATIENT
Start: 2024-08-24 | End: 2024-08-24

## 2024-08-24 RX ORDER — MELATONIN
500 2 TIMES DAILY WITH MEALS
Status: DISCONTINUED | OUTPATIENT
Start: 2024-08-24 | End: 2024-09-01

## 2024-08-24 RX ORDER — DOCUSATE SODIUM 100 MG/1
100 CAPSULE, LIQUID FILLED ORAL 2 TIMES DAILY
Status: DISCONTINUED | OUTPATIENT
Start: 2024-08-24 | End: 2024-08-26

## 2024-08-24 RX ORDER — ATORVASTATIN CALCIUM 20 MG/1
20 TABLET, FILM COATED ORAL NIGHTLY
Status: DISCONTINUED | OUTPATIENT
Start: 2024-08-24 | End: 2024-09-01

## 2024-08-24 RX ORDER — CARBIDOPA AND LEVODOPA 25; 100 MG/1; MG/1
2 TABLET, EXTENDED RELEASE ORAL 2 TIMES DAILY
Status: DISCONTINUED | OUTPATIENT
Start: 2024-08-24 | End: 2024-09-01

## 2024-08-24 RX ORDER — SENNOSIDES 8.6 MG
8.6 TABLET ORAL 2 TIMES DAILY
Status: DISCONTINUED | OUTPATIENT
Start: 2024-08-24 | End: 2024-09-01

## 2024-08-24 RX ORDER — POTASSIUM CHLORIDE 1500 MG/1
40 TABLET, EXTENDED RELEASE ORAL ONCE
Status: COMPLETED | OUTPATIENT
Start: 2024-08-24 | End: 2024-08-24

## 2024-08-24 RX ORDER — TAMSULOSIN HYDROCHLORIDE 0.4 MG/1
0.4 CAPSULE ORAL DAILY
Status: ON HOLD | COMMUNITY
Start: 2024-08-02 | End: 2024-08-24

## 2024-08-24 RX ORDER — EZETIMIBE 10 MG/1
10 TABLET ORAL DAILY
Status: DISCONTINUED | OUTPATIENT
Start: 2024-08-24 | End: 2024-09-01

## 2024-08-24 RX ORDER — POLYETHYLENE GLYCOL 3350 17 G/17G
17 POWDER, FOR SOLUTION ORAL DAILY PRN
Status: DISCONTINUED | OUTPATIENT
Start: 2024-08-24 | End: 2024-08-26

## 2024-08-24 NOTE — PROGRESS NOTES
NURSING ADMISSION NOTE      Patient admitted via Cart  Oriented to room.  Safety precautions initiated.  Bed in low position.  Call light in reach.      Pt Oriented to room. Call light in reach. Menu in room. Tele monitor on. VS taken. Head to Toe complete. Admission navigators complete including PTA medications. Consults aware of proper medications and of new patient. Patient's needs met by staff.     Assumed patient at 0023. Alert and oriented x 4 forgetful at times. Lung sounds diminished bilaterally with crackles at the bases. Skin assessment done with RN and PTC generalized bruising on arms. Sacrum intact. Incontinent of bladder edmond in place- hematuria, Continent of bowel. Up standby with a walker. Patient updated on plan of care and verbalized understanding.     Plan of care: IV antibiotics.

## 2024-08-24 NOTE — CONSULTS
Pulmonary H&P/Consult       NAME: Reginaldo Hdz - ROOM: 24/8624-A - MRN: [id number] - Age: 84 year old - :  8/10/1940    Date of Admission: 2024  4:34 PM  Admission Diagnosis: Pleural effusion [J90]  Dyspnea, unspecified type [R06.00]  Closed fracture of multiple ribs of right side with routine healing, subsequent encounter [S22.41XD]  Hematuria, unspecified type [R31.9]  Reason for consult: pleural effusion    History of Present Illness: 83 y/o w/ h/o Anemia, CAD, A-fib who presented to [facility] w/ c/o dyspnea.  Pt has been seen mult times this month.  Initially presented w/ urinary retention and then came back 2 days later after a fall w/ injury to his right chest.  He was noted to have mult rib fractures.  At that time he had small arlen effusions.  He was monitored in house and sent home.  He presented back w/ gross hematuria and was sent home from the ED.  Yesterday he was in his cardiologist's office and sent to the ED for chest pain and dyspnea.  In the ED he had a chest x-ray and then a CT that showed a large right sided effusion w/ compressive atelectasis of his lower and middle lobes.  He was given a dose of abx and admitted for further care.  Currently  he denies complaints.    Past Medical History:    Anemia    Atherosclerosis of coronary artery    Chronic atrial fibrillation (HCC)    Esophageal reflux    Heart attack (HCC)    Hyperlipidemia    Hypertension    Lupus (HCC)    Parkinson disease (HCC)      Past Surgical History:   Procedure Laterality Date    Angiogram      Angiogram      Angiogram  2002    [facility], Dr. Scott/ Mikayla: 1.Successful PTCA of the infarct related artery which was the proximal graft of the saphenous vein graft to the right posterior descending/LV branch which was dilated and stented using a 4 x 13 BX Velocity to a maximum diameter of 4.45 mm.  2.Proximal PDA primarily stented using a 3 x 18 Penta stent from about 70 to 80% to 0%, HUGH III flow, no dissection.      Angiogram  06/24/2011    [facility], Dr. Steele: Successful stenting with a drug-eluting stent to the graft to the right coronary artery. Severe native CAD. Patent vein graft to LAD. Severe disease in the vein graft to the right coronary artery.    Angiogram  07/26/2011    [facility], Dr. Steele: Successful stenting of the circumflex artery in the proximal and mid portions. Relook angiography of the vein graft to the right coronary artery showed that this vessel is widely patent.    Cabg  1984    x2    Cath bare metal stent (bms)      Cath drug eluting stent      Shoulder surg proc unlisted Left     fracture      Medications Prior to Admission   Medication Sig Dispense Refill Last Dose    metoprolol tartrate 50 MG Oral Tab    8/23/2024 at 0900    pantoprazole 40 MG Oral Tab EC Take 1 tablet (40 mg total) by mouth every morning before breakfast.   8/23/2024 at 0600    Polyethylene Glycol 3350 17 g Oral Powd Pack Take 17 g by mouth daily.   8/23/2024 at 0900    docusate sodium 100 MG Oral Cap Take 1 capsule (100 mg total) by mouth daily as needed for constipation.   Past Week    melatonin 5 MG Oral Cap Take 1 capsule (5 mg total) by mouth nightly.   Past Week at 2100    rivaroxaban (XARELTO) 20 MG Oral Tab Take 1 tablet (20 mg total) by mouth daily with food. 90 tablet 0 8/23/2024 at 0900    pregabalin 50 MG Oral Cap Take 1 capsule (50 mg total) by mouth 2 (two) times daily.   8/23/2024 at 0900    carbidopa-levodopa ER  MG Oral Tab CR Take 2 tablets by mouth See Admin Instructions. Takes two tablets by mouth twice daily at 6am and 11pm   8/23/2024 at 0600    hydroxychloroquine 200 MG Oral Tab Take 1 tablet (200 mg total) by mouth 2 (two) times daily.   8/23/2024 at 0900    Niacin  MG Oral Tab CR Take 1 tablet (500 mg total) by mouth 2 (two) times daily. (Patient taking differently: Take 1 tablet (500 mg total) by mouth 2 (two) times daily with meals.) 180 tablet 1 8/23/2024 at 0900    EZETIMIBE 10 MG Oral Tab TAKE  ONE TABLET BY MOUTH ONE TIME DAILY 90 tablet 0 2024 at 0900    carbidopa-levodopa  MG Oral Tab Take 1.5 tablets by mouth 4 (four) times daily. Takes four times a day 9am, 1pm, 5pm, 9pm   2024 at 1600    Multivitamin Chewtab, ADULT, Oral Chew Tab Chew 1 tablet by mouth daily.   2024 at 0900    PRAVASTATIN SODIUM 80 MG Oral Tab TAKE 1 TABLET BY MOUTH NIGHTLY (Patient taking differently: Take 1 tablet (80 mg total) by mouth daily.) 90 tablet 3 Past Week at 2100    cetirizine 10 MG Oral Tab Take 1 tablet (10 mg total) by mouth daily.   2024 at 0900    Acidophilus/Pectin Oral Cap Take 1 capsule by mouth daily.   2024 at 0900    acetaminophen 500 MG Oral Tab Take 1 tablet (500 mg total) by mouth every 6 (six) hours as needed for Pain.   More than a month    nystatin 100,000 Units/g External Cream Apply topically 2 (two) times daily.   More than a month     Allergies   Allergen Reactions    Inderal [Propranolol] NAUSEA AND VOMITING and UNKNOWN    Isosorbide NAUSEA AND VOMITING    Other OTHER (SEE COMMENTS)       Social History:  Social History     Socioeconomic History    Marital status:      Spouse name: Not on file    Number of children: Not on file    Years of education: Not on file    Highest education level: Not on file   Occupational History    Not on file   Tobacco Use    Smoking status: Former     Current packs/day: 0.00     Types: Cigarettes     Quit date: 1982     Years since quittin.4    Smokeless tobacco: Never   Vaping Use    Vaping status: Never Used   Substance and Sexual Activity    Alcohol use: Yes     Alcohol/week: 0.0 standard drinks of alcohol     Comment: Social    Drug use: No    Sexual activity: Not on file   Other Topics Concern    Caffeine Concern Not Asked    Exercise Not Asked    Seat Belt Not Asked    Special Diet Not Asked    Stress Concern Not Asked    Weight Concern Not Asked   Social History Narrative    Not on file     Social Determinants of  Health     Financial Resource Strain: Not on file   Food Insecurity: No Food Insecurity (8/24/2024)    Food Insecurity     Food Insecurity: Never true   Transportation Needs: No Transportation Needs (8/24/2024)    Transportation Needs     Lack of Transportation: No     Car Seat: Not on file   Physical Activity: Not on file   Stress: Not on file   Social Connections: Not on file   Housing Stability: Low Risk  (8/24/2024)    Housing Stability     Housing Instability: No     Housing Instability Emergency: Not on file     Crib or Bassinette: Not on file          Family History:  Family History   Problem Relation Age of Onset    Cancer Father         Liver    Other (Other) Father         Heart Attack    Cancer Mother         uterine    Other (Other) Daughter         Hosimotos    Other (Other) Son         Heart Problem        Home Medications:  Outpatient Medications Marked as Taking for the 8/23/24 encounter (Hospital Encounter)   Medication Sig Dispense Refill    metoprolol tartrate 50 MG Oral Tab       pantoprazole 40 MG Oral Tab EC Take 1 tablet (40 mg total) by mouth every morning before breakfast.      Polyethylene Glycol 3350 17 g Oral Powd Pack Take 17 g by mouth daily.      docusate sodium 100 MG Oral Cap Take 1 capsule (100 mg total) by mouth daily as needed for constipation.      melatonin 5 MG Oral Cap Take 1 capsule (5 mg total) by mouth nightly.      rivaroxaban (XARELTO) 20 MG Oral Tab Take 1 tablet (20 mg total) by mouth daily with food. 90 tablet 0    pregabalin 50 MG Oral Cap Take 1 capsule (50 mg total) by mouth 2 (two) times daily.      carbidopa-levodopa ER  MG Oral Tab CR Take 2 tablets by mouth See Admin Instructions. Takes two tablets by mouth twice daily at 6am and 11pm      hydroxychloroquine 200 MG Oral Tab Take 1 tablet (200 mg total) by mouth 2 (two) times daily.      Niacin  MG Oral Tab CR Take 1 tablet (500 mg total) by mouth 2 (two) times daily. (Patient taking differently: Take  1 tablet (500 mg total) by mouth 2 (two) times daily with meals.) 180 tablet 1    EZETIMIBE 10 MG Oral Tab TAKE ONE TABLET BY MOUTH ONE TIME DAILY 90 tablet 0    carbidopa-levodopa  MG Oral Tab Take 1.5 tablets by mouth 4 (four) times daily. Takes four times a day 9am, 1pm, 5pm, 9pm      Multivitamin Chewtab, ADULT, Oral Chew Tab Chew 1 tablet by mouth daily.      PRAVASTATIN SODIUM 80 MG Oral Tab TAKE 1 TABLET BY MOUTH NIGHTLY (Patient taking differently: Take 1 tablet (80 mg total) by mouth daily.) 90 tablet 3    cetirizine 10 MG Oral Tab Take 1 tablet (10 mg total) by mouth daily.      Acidophilus/Pectin Oral Cap Take 1 capsule by mouth daily.         Scheduled Medication:   melatonin  5 mg Oral Nightly    carbidopa-levodopa ER  2 tablet Oral BID    carbidopa-levodopa  1.5 tablet Oral 4x daily    pantoprazole  40 mg Oral QAM AC    pregabalin  50 mg Oral BID    guaiFENesin ER  600 mg Oral BID    piperacillin-tazobactam  3.375 g Intravenous Q8H    metoprolol tartrate  50 mg Oral Daily Beta Blocker     Continuous Infusing Medication:    PRN Medication:  fentaNYL    acetaminophen    morphINE **OR** morphINE **OR** morphINE    ondansetron    metoclopramide    benzonatate    guaiFENesin     REVIEW OF SYSTEMS:   14 point ROS conducted, negative save for above       OBJECTIVE:  Vitals:    08/23/24 2351 08/24/24 0550 08/24/24 0817 08/24/24 0830   BP:  138/80 133/82    BP Location:  Right arm Right arm    Pulse: 83 76 66    Resp:  20 17    Temp:  97.3 °F (36.3 °C) 98.1 °F (36.7 °C)    TempSrc:  Axillary Oral    SpO2: 99% 99% 99% 99%   Weight: 143 lb (64.9 kg)      Height:           Oxygen Therapy  SpO2: 99 %  O2 Device: Nasal cannula  O2 Flow Rate (L/min): 1 L/min  Pulse Oximetry Type: Continuous  Oximetry Probe Site Changed: No  Pulse Ox Probe Location: Right hand                  Wt Readings from Last 3 Encounters:   08/23/24 143 lb (64.9 kg)   08/14/24 160 lb 7.9 oz (72.8 kg)   08/04/24 160 lb 6.4 oz (72.8 kg)          Intake/Output Summary (Last 24 hours) at 8/24/2024 0919  Last data filed at 8/24/2024 0817  Gross per 24 hour   Intake 450 ml   Output 0 ml   Net 450 ml       /82 (BP Location: Right arm)   Pulse 66   Temp 98.1 °F (36.7 °C) (Oral)   Resp 17   Ht 5' 7\" (1.702 m)   Wt 143 lb (64.9 kg)   SpO2 99%   BMI 22.40 kg/m²     General Appearance:    Alert, cooperative, no distress, appears stated age   Head:    Normocephalic, without obvious abnormality, atraumatic   Eyes:    PERRL, conjunctiva/corneas clear, EOM's intact, both eyes   Throat:   Lips, mucosa, and tongue normal; teeth and gums normal   Neck:   Supple, symmetrical, trachea midline, no adenopathy;        thyroid:  No enlargement/tenderness/nodules; no carotid    bruit or JVD   Back:     Symmetric, no curvature, ROM normal, no CVA tenderness   Lungs:     Diminished to absent on right, respirations unlabored   Chest wall:    No tenderness or deformity   Heart:    Regular rate and rhythm, S1 and S2 normal, no murmur, rub   or gallop   Abdomen:     Soft, non-tender, bowel sounds active all four quadrants,     no masses, no organomegaly   Extremities:   Extremities normal, atraumatic, no cyanosis or edema   Pulses:   2+ and symmetric all extremities   Skin:   Skin color, texture, turgor normal, no rashes or lesions   Neurologic:   CNII-XII intact. Normal strength, sensation and reflexes       throughout       Labs reviewed as noted below    Imaging: CT chest and chest x-ray reviewed as noted above    ASSESSMENT/PLAN:    Pleural Effusion  -most likely due to heart failure, hemothorax is in the differential given fall w/ rib fractures and anticoagulation but his Hgb has been relatively stable over the last 3 weeks which speaks against a large bleed into the chest  -would hold xarelto in prep for thoracentesis on Monday  Atelectasis vs infiltrate  -has low WBC  -afebrile  -also w/ concern for UTI- abx per hospitalist  Rib fractures  -pain  control  A-fib  -hold xarelto as above                   Jefferson Perea  [facility]  Pulmonary and Critical Care

## 2024-08-24 NOTE — PROGRESS NOTES
08/24/24 0024   Urethral Catheter   Placement Date (Enter date of admission if placed prior to admission)/Placement Time (Enter time of admission if placed prior to admission): 08/24/24 0030   In Place Prior to Admission: Milan in place prior to admission/arrival and remains in place   Mandie-Urethral Assessment Intact   Collection Container Standard drainage bag   Securement Method Securing device   Securement changed? No   Reason for Continuing Urethral Catheter Urinary retention not managed w/ interm straight cath

## 2024-08-24 NOTE — CONSULTS
[facility]   part of [facility] Urology  Report of Initial Consultation    Reginaldo Hdz Patient Status:  Inpatient    8/10/1940 MRN [id number]   Location [facility] 8NE-A Attending Delvin Arevalo MD   Hosp Day # 1 PCP Olvin Dinh MD     Date of Admission: 2024  Date of Consult: 24   Reason for Consultation:   Gross Hematuria    History of Present Illness:   Patient is a 84 year old male with a past medical history of hypertension, hyperlipidemia, atrial fibrillation, CAD.    He currently has an indwelling urethral catheter since ER visit on 2024.  It was inserted at the time of an episode of epididymoorchitis.  He was found to be in retention however not really symptomatic.  He was started on Flomax.    Returned and hospitalized 2024 following a mechanical fall where he suffered rib fractures.  He had gross hematuria at the time.  His blood thinners were held and his hematuria resolved.    Seen in the office by physician assistant Dhara Madison on 2024.  His Milan catheter was changed and the plan was for him to have a voiding trial once his constipation is managed.  He continues to be on tamsulosin daily.    He was brought into the ER yesterday with tachypnea.  Found to have a large pleural effusion.  Plan for thoracentesis on Monday after blood thinners are being held.    He was reportedly noted to have gross hematuria through the Milan catheter.  Urology was this consulted.    Patient denies any fevers or chills.  He denies any testicular pain.        Past Medical History:  Past Medical History:    Anemia    Atherosclerosis of coronary artery    Chronic atrial fibrillation (HCC)    Esophageal reflux    Heart attack (HCC)    Hyperlipidemia    Hypertension    Lupus (HCC)    Parkinson disease (HCC)       Past Surgical History:  Past Surgical History:   Procedure Laterality Date    Angiogram      Angiogram      Angiogram  2002    [facility], Dr. Scott/  Mikayla: 1.Successful PTCA of the infarct related artery which was the proximal graft of the saphenous vein graft to the right posterior descending/LV branch which was dilated and stented using a 4 x 13 BX Velocity to a maximum diameter of 4.45 mm.  2.Proximal PDA primarily stented using a 3 x 18 Penta stent from about 70 to 80% to 0%, HUGH III flow, no dissection.     Angiogram  2011    [facility], Dr. Steele: Successful stenting with a drug-eluting stent to the graft to the right coronary artery. Severe native CAD. Patent vein graft to LAD. Severe disease in the vein graft to the right coronary artery.    Angiogram  2011    [facility], Dr. Steele: Successful stenting of the circumflex artery in the proximal and mid portions. Relook angiography of the vein graft to the right coronary artery showed that this vessel is widely patent.    Cabg  1984    x2    Cath bare metal stent (bms)      Cath drug eluting stent      Shoulder surg proc unlisted Left     fracture       Family History:  Family History   Problem Relation Age of Onset    Cancer Father         Liver    Other (Other) Father         Heart Attack    Cancer Mother         uterine    Other (Other) Daughter         Hosimotos    Other (Other) Son         Heart Problem       Social History:  Social History     Socioeconomic History    Marital status:    Tobacco Use    Smoking status: Former     Current packs/day: 0.00     Types: Cigarettes     Quit date: 1982     Years since quittin.4    Smokeless tobacco: Never   Vaping Use    Vaping status: Never Used   Substance and Sexual Activity    Alcohol use: Yes     Alcohol/week: 0.0 standard drinks of alcohol     Comment: Social    Drug use: No     Social Determinants of Health     Food Insecurity: No Food Insecurity (2024)    Food Insecurity     Food Insecurity: Never true   Transportation Needs: No Transportation Needs (2024)    Transportation Needs     Lack of Transportation: No    Housing Stability: Low Risk  (8/24/2024)    Housing Stability     Housing Instability: No           Current Medications:  Current Facility-Administered Medications   Medication Dose Route Frequency    melatonin cap/tab 5 mg  5 mg Oral Nightly    carbidopa-levodopa ER (SINEMET CR)  MG per tab 2 tablet  2 tablet Oral BID    fentaNYL (Sublimaze) 50 mcg/mL injection 50 mcg  50 mcg Intravenous Q1H PRN    carbidopa-levodopa (SINEMET)  MG per tab 1.5 tablet  1.5 tablet Oral 4x daily    pantoprazole (Protonix) DR tab 40 mg  40 mg Oral QAM AC    pregabalin (Lyrica) cap 50 mg  50 mg Oral BID    acetaminophen (Tylenol Extra Strength) tab 500 mg  500 mg Oral Q4H PRN    morphINE PF 2 MG/ML injection 1 mg  1 mg Intravenous Q2H PRN    Or    morphINE PF 2 MG/ML injection 2 mg  2 mg Intravenous Q2H PRN    Or    morphINE PF 4 MG/ML injection 4 mg  4 mg Intravenous Q2H PRN    ondansetron (Zofran) 4 MG/2ML injection 4 mg  4 mg Intravenous Q6H PRN    metoclopramide (Reglan) 5 mg/mL injection 5 mg  5 mg Intravenous Q8H PRN    benzonatate (Tessalon) cap 200 mg  200 mg Oral TID PRN    guaiFENesin (Robitussin) 100 MG/5 ML oral liquid 200 mg  200 mg Oral Q4H PRN    guaiFENesin ER (Mucinex) 12 hr tab 600 mg  600 mg Oral BID    piperacillin-tazobactam (Zosyn) 3.375 g in dextrose 5% 100 mL IVPB-ADDV  3.375 g Intravenous Q8H    metoprolol tartrate (Lopressor) tab 50 mg  50 mg Oral Daily Beta Blocker       Allergies:   Allergies   Allergen Reactions    Inderal [Propranolol] NAUSEA AND VOMITING and UNKNOWN    Isosorbide NAUSEA AND VOMITING    Other OTHER (SEE COMMENTS)         Review of Systems:   Pertinent positives and negatives per HPI. A 12 point ROS was performed and is otherwise negative.       Physical Exam:   Vital Signs: Blood pressure 105/48, pulse 52, temperature 98.3 °F (36.8 °C), temperature source Oral, resp. rate 17, height 5' 7\" (1.702 m), weight 143 lb (64.9 kg), SpO2 100%.  General: No acute distress. Alert and  oriented x 3  HEENT: Moist mucous membranes  Respiratory: On nasal cannula.  Mildly tachypneic.  Cardiovascular: S1, S2  Abdomen: Soft, nontender, nondistended  Genitourinary: 16 Amharic to a urethral Milan catheter in place.  Urine yellow in the tubing.  Neurologic: No acute focal neurological deficits.  Integument: No lesions. No erythema.  Psychiatric: Appropriate mood and affect.    Results:     Laboratory Data:  Recent Labs   Lab 08/23/24  1619 08/24/24  0501   WBC 5.1 3.3*   HGB 10.2* 9.2*   HCT 31.1* 29.1*   .0 127.0*     Recent Labs   Lab 08/23/24  1619 08/24/24  0501    140   K 4.3 3.7    107   CO2 31.0 32.0   BUN 20 14   CREATSERUM 0.85 0.69*   * 81   CA 9.4 8.8     Recent Labs   Lab 08/23/24  1724   COLORUR Red*   CLARITY Turbid*   SPECGRAVITY 1.020   GLUUR 100*   KETUR 40*   BLOODURINE Large*   PHURINE 5.5   PROUR >=300*   UROBILINOGEN >=8.0*   NITRITE Positive*   LEUUR Large*   WBCUR >50*  >50*   RBCUR >10*  >10*   BACUR None Seen  None Seen   EPIUR None Seen  None Seen     Hospital Encounter on 08/23/24   1. Urine Culture, Routine     Status: Abnormal (Preliminary result)    Collection Time: 08/23/24  5:24 PM    Specimen: Urine, clean catch   Result Value Ref Range    Urine Culture >100,000 CFU/ML Enterococcus faecalis (A) N/A        Imaging:  CT CHEST (CPT=71250)    Result Date: 8/23/2024  CONCLUSION:   1. Large right-sided pleural effusion.  No definitive layering acute blood products are seen within this pleural fluid as clinically questioned.  2. Extensive consolidation is seen throughout the right lower lobe and portions of the right middle lobe and right upper lobe which may represent compressive atelectasis and/or pneumonia.  3. There are fractures of the right 6th, 7th, 8th, 9th, 10th, and 11th ribs again noted.   4. There is a severe compression fracture of T8.  There is a moderate compression fracture of T9.  There is mild anterior wedging of T1.   LOCATION:   [facility]   Dictated by (CST): Stromberg, LeRoy, MD on 8/23/2024 at 7:16 PM     Finalized by (CST): Stromberg, LeRoy, MD on 8/23/2024 at 7:29 PM       XR CHEST AP PORTABLE  (CPT=71045)    Result Date: 8/23/2024  CONCLUSION:  See above.   LOCATION:  [facility]      Dictated by (CST): Stromberg, LeRoy, MD on 8/23/2024 at 5:32 PM     Finalized by (CST): Stromberg, LeRoy, MD on 8/23/2024 at 5:34 PM         Impression:   84 year old male with urinary retention and an indwelling Milan catheter since 8-24.  Catheter changed 8/21/2024 during office visit.    Reportedly had gross hematuria upon admission yesterday for pleural effusion and shortness of breath.    On exam and evaluation today, urine is yellow in the tubing without evidence of gross hematuria.    Urinalysis upon admission is positive and urine cultures growing E. coli, sensitivities pending.      Recommendations:  1.  No urgent urologic intervention.  Maintain Milan catheter in place.    2.  Continue tamsulosin 0.4 mg daily.    3.  Bowel regimen and aggressive management of constipation to optimize conditions for successful voiding trial.    4.  Currently on Zosyn.  Recommend continuing antibiotics and following up urine culture results.  Tailor antibiotics according to culture sensitivities.    Outpatient follow-up was scheduled with Dr. Leon on 9/18/2024 for cystoscopy and voiding trial.    Thank you for allowing me to participate in the care of your patient.  Signing off.  Please call with questions.    Matteo Covington MD  8/24/2024

## 2024-08-24 NOTE — PLAN OF CARE
Received Pt this morning, A&O X3, forgetful on 02 @ 2lpm/nc,  SOB with minimal exertion. SB with 2nd degree, A-fib. F/C intact draining to maroon colored urine. Poc updated, hold Xarelto, plan for thoracentesis on Mon, consult Urology for hematuria. F/C irrigated with 60cc saline with clear return, no bld clots noted. IV Abx. Cpm.  Problem: RESPIRATORY - ADULT  Goal: Achieves optimal ventilation and oxygenation  Description: INTERVENTIONS:  - Assess for changes in respiratory status  - Assess for changes in mentation and behavior  - Position to facilitate oxygenation and minimize respiratory effort  - Oxygen supplementation based on oxygen saturation or ABGs  - Provide Smoking Cessation handout, if applicable  - Encourage broncho-pulmonary hygiene including cough, deep breathe, Incentive Spirometry  - Assess the need for suctioning and perform as needed  - Assess and instruct to report SOB or any respiratory difficulty  - Respiratory Therapy support as indicated  - Manage/alleviate anxiety  - Monitor for signs/symptoms of CO2 retention  Outcome: Progressing     Problem: MUSCULOSKELETAL - ADULT  Goal: Return mobility to safest level of function  Description: INTERVENTIONS:  - Assess patient stability and activity tolerance for standing, transferring and ambulating w/ or w/o assistive devices  - Assist with transfers and ambulation using safe patient handling equipment as needed  - Ensure adequate protection for wounds/incisions during mobilization  - Obtain PT/OT consults as needed  - Advance activity as appropriate  - Communicate ordered activity level and limitations with patient/family  Outcome: Progressing  Goal: Maintain proper alignment of affected body part  Description: INTERVENTIONS:  - Support and protect limb and body alignment per provider's orders  - Instruct and reinforce with patient and family use of appropriate assistive device and precautions (e.g. spinal or hip dislocation precautions)  Outcome:  Progressing     Problem: Impaired Functional Mobility  Goal: Achieve highest/safest level of mobility/gait  Description: Interventions:  - Assess patient's functional ability and stability  - Promote increasing activity/tolerance for mobility and gait  - Educate and engage patient/family in tolerated activity level and precautions    Outcome: Progressing

## 2024-08-24 NOTE — ED QUICK NOTES
Orders for admission, patient is aware of plan and ready to go upstairs. Any questions, please call ED RN William Yuen at extension [phone].     Patient Covid vaccination status: Unvaccinated     COVID Test Ordered in ED: None    COVID Suspicion at Admission: N/A    Running Infusions:  None    Mental Status/LOC at time of transport: Alert    Other pertinent information:   CIWA score: N/A   NIH score:  N/A

## 2024-08-24 NOTE — H&P
[facility]   part of [facility]    History and Physical     Reginaldo Hdz Patient Status:  Inpatient    8/10/1940 MRN [id number]   Location [facility] 8NE-A Attending Delvin Arevalo MD   Hosp Day # 1 PCP Olvin Dinh MD     Chief Complaint: Progressive SOB    History of Present Illness: Reginaldo Hdz is a 84 year old male with history of cad, chronic atrial fibrillation, gerd, htn, hld, hx lupus and hx parkinsons disease presenting with progressive sob. Patient says that for the last 2-3 weeks he has been having worsening sob. Patient was recently admitted and tx for covid 19 and acute chf in July as well. Patient was not improving so his family convinced him to come to the ER for further evaluation and treatment. Patient has had a cough but not very productive. Patient denies fevers or chills. Patient denies any other positive review of system.     Past Medical History:  Past Medical History:    Anemia    Atherosclerosis of coronary artery    Chronic atrial fibrillation (HCC)    Esophageal reflux    Heart attack (HCC)    Hyperlipidemia    Hypertension    Lupus (HCC)    Parkinson disease (HCC)        Past Surgical History:   Past Surgical History:   Procedure Laterality Date    Angiogram      Angiogram      [facility] 1.Successful PTCA of the infarct related artery which was the proximal graft of the saphenous vein graft to the right posterior descending/LV branch which was dilated and stented using a 4 x 13 BX Velocity to a maximum diameter of 4.45 mm.  2.Proximal PDA primarily stented using a 3 x 18 Penta stent from about 70 to 80% to 0%, HUGH III flow, no dissection.     Angiogram  2011    [facility]: Successful stenting with a drug-eluting stent to the graft to the right coronary artery. Severe native CAD. Patent vein graft to LAD. Severe disease in the vein graft to the right coronary artery.    Angiogram  2011    [facility]  Dr. Steele: Successful stenting of the circumflex artery in the proximal and mid portions. Relook angiography of the vein graft to the right coronary artery showed that this vessel is widely patent.    Cabg  1984    x2    Cath bare metal stent (bms)      Cath drug eluting stent      Shoulder surg proc unlisted Left     fracture       Social History:  reports that he quit smoking about 42 years ago. His smoking use included cigarettes. He has never used smokeless tobacco. He reports current alcohol use. He reports that he does not use drugs.no illegal drugs, , 2 children, use a walker, not working    Family History:   Family History   Problem Relation Age of Onset    Cancer Father         Liver    Other (Other) Father         Heart Attack    Cancer Mother         uterine    Other (Other) Daughter         Hosimotos    Other (Other) Son         Heart Problem   Mother passed  Father passed  5 of 13 siblings living    Allergies:   Allergies   Allergen Reactions    Inderal [Propranolol] NAUSEA AND VOMITING and UNKNOWN    Isosorbide NAUSEA AND VOMITING    Other OTHER (SEE COMMENTS)       Medications:    Current Facility-Administered Medications on File Prior to Encounter   Medication Dose Route Frequency Provider Last Rate Last Admin    [COMPLETED] acetaminophen (Tylenol Extra Strength) tab 1,000 mg  1,000 mg Oral Once Cliff Lai MD   1,000 mg at 24    [COMPLETED] potassium chloride (Klor-Con M20) tab 40 mEq  40 mEq Oral Once Garcia, Hugo, DO   40 mEq at 24 0935    [COMPLETED] potassium chloride (Klor-Con) 20 MEQ oral powder 40 mEq  40 mEq Oral Once Garcia, Hugo, DO   40 mEq at 24 1153    [COMPLETED] iopamidol 76% (ISOVUE-370) injection for power injector  100 mL Intravenous ONCE PRN Olvin Elaine MD   100 mL at 24 191    [] HYDROmorphone (Dilaudid) 1 MG/ML injection 0.5 mg  0.5 mg Intravenous Q30 Min PRN Olvin Elaine MD        [] ondansetron (Zofran) 4 MG/2ML  injection 4 mg  4 mg Intravenous Q4H PRN Olvin Elaine MD        [COMPLETED] HYDROcodone-acetaminophen (Norco) 5-325 MG per tab 1 tablet  1 tablet Oral Once Quique Hickey MD   1 tablet at 08/02/24 1711    [COMPLETED] ciprofloxacin (Cipro) tab 500 mg  500 mg Oral Once Quiuqe Hickey MD   500 mg at 08/02/24 1951    [COMPLETED] lidocaine (Urojet) 2 % urethral jelly 10 mL  10 mL Urethral Once Quique Hickey MD   10 mL at 08/02/24 1956    [COMPLETED] potassium chloride (Klor-Con M20) tab 40 mEq  40 mEq Oral Once Delvin Arevalo MD   40 mEq at 07/14/24 1313    [COMPLETED] potassium chloride (Klor-Con M20) tab 40 mEq  40 mEq Oral Once Delvin Arevalo MD   40 mEq at 07/13/24 0957    [COMPLETED] potassium chloride (Klor-Con M20) tab 40 mEq  40 mEq Oral Once Stevenson Dowell MD   40 mEq at 07/12/24 0552    [COMPLETED] potassium chloride (Klor-Con M20) tab 40 mEq  40 mEq Oral Once Stevenson Dowell MD   40 mEq at 07/11/24 1043    [COMPLETED] furosemide (Lasix) 10 mg/mL injection 40 mg  40 mg Intravenous Once Mateo Flores DO   40 mg at 07/10/24 0014    [COMPLETED] potassium chloride (Klor-Con M20) tab 40 mEq  40 mEq Oral Once Jose R Parekh MD   40 mEq at 07/10/24 0516    [COMPLETED] remdesivir (Veklury) 200 mg in sodium chloride 0.9% 100 mL IVPB  200 mg Intravenous Once Tosin Mercado  mL/hr at 07/10/24 1244 200 mg at 07/10/24 1244    [COMPLETED] potassium chloride (Klor-Con M20) tab 40 mEq  40 mEq Oral Q4H Stevenson Dowell MD   40 mEq at 07/10/24 2346    [COMPLETED] magnesium oxide (Mag-Ox) tab 400 mg  400 mg Oral Once Stevenson Dowell MD   400 mg at 07/10/24 2105    [COMPLETED] sodium chloride 0.9 % IV bolus 1,000 mL  1,000 mL Intravenous Once Mateo Flores DO 1,000 mL/hr at 07/09/24 2322 1,000 mL at 07/09/24 2322     Current Outpatient Medications on File Prior to Encounter   Medication Sig Dispense Refill    metoprolol tartrate 50 MG Oral Tab       pantoprazole 40 MG Oral Tab EC Take  1 tablet (40 mg total) by mouth every morning before breakfast.      Polyethylene Glycol 3350 17 g Oral Powd Pack Take 17 g by mouth daily.      docusate sodium 100 MG Oral Cap Take 1 capsule (100 mg total) by mouth daily as needed for constipation.      melatonin 5 MG Oral Cap Take 1 capsule (5 mg total) by mouth nightly.      rivaroxaban (XARELTO) 20 MG Oral Tab Take 1 tablet (20 mg total) by mouth daily with food. 90 tablet 0    pregabalin 50 MG Oral Cap Take 1 capsule (50 mg total) by mouth 2 (two) times daily.      carbidopa-levodopa ER  MG Oral Tab CR Take 2 tablets by mouth See Admin Instructions. Takes two tablets by mouth twice daily at 6am and 11pm      hydroxychloroquine 200 MG Oral Tab Take 1 tablet (200 mg total) by mouth 2 (two) times daily.      Niacin  MG Oral Tab CR Take 1 tablet (500 mg total) by mouth 2 (two) times daily. (Patient taking differently: Take 1 tablet (500 mg total) by mouth 2 (two) times daily with meals.) 180 tablet 1    EZETIMIBE 10 MG Oral Tab TAKE ONE TABLET BY MOUTH ONE TIME DAILY 90 tablet 0    carbidopa-levodopa  MG Oral Tab Take 1.5 tablets by mouth 4 (four) times daily. Takes four times a day 9am, 1pm, 5pm, 9pm      Multivitamin Chewtab, ADULT, Oral Chew Tab Chew 1 tablet by mouth daily.      PRAVASTATIN SODIUM 80 MG Oral Tab TAKE 1 TABLET BY MOUTH NIGHTLY (Patient taking differently: Take 1 tablet (80 mg total) by mouth daily.) 90 tablet 3    cetirizine 10 MG Oral Tab Take 1 tablet (10 mg total) by mouth daily.      Acidophilus/Pectin Oral Cap Take 1 capsule by mouth daily.      acetaminophen 500 MG Oral Tab Take 1 tablet (500 mg total) by mouth every 6 (six) hours as needed for Pain.      nystatin 100,000 Units/g External Cream Apply topically 2 (two) times daily.         Review of Systems:   A comprehensive 14 point review of systems was completed.    Pertinent positives and negatives noted in the HPI.    Physical Exam:    /80 (BP Location: Right  arm)   Pulse 76   Temp 97.3 °F (36.3 °C) (Axillary)   Resp 20   Ht 5' 7\" (1.702 m)   Wt 143 lb (64.9 kg)   SpO2 99%   BMI 22.40 kg/m²   General: No acute distress. Alert and oriented x 3.  HEENT: Normocephalic atraumatic. Moist mucous membranes. EOM-I.   Neck: No JVD. No carotid bruits.  Respiratory: Decreased BS right lung field, No wheezes. No crackles  Cardiovascular: S1, S2. Regular rate and rhythm. No murmurs  Chest and Back: No tenderness or deformity.  Abdomen: Soft, nontender, nondistended.  Positive bowel sounds. No rebound, guarding  Neurologic: No focal neurological deficits. CNII-XII grossly intact. Sensation and strength intacgt  Musculoskeletal: Moves all extremities.  Extremities: No edema or tenderness of the LE  Integument: No new rashes or lesions.   Psychiatric: Appropriate mood and affect.      Diagnostic Data:      Labs:  Recent Labs   Lab 08/23/24  1619 08/24/24  0501   WBC 5.1 3.3*   HGB 10.2* 9.2*   MCV 95.4 99.0   .0 127.0*   INR 3.56*  --        Recent Labs   Lab 08/23/24  1619   *   BUN 20   CREATSERUM 0.85   CA 9.4   ALB 3.7      K 4.3      CO2 31.0   ALKPHO 186*   AST 58*   ALT 13   BILT 0.5   TP 7.7       Estimated Creatinine Clearance: 58.3 mL/min (based on SCr of 0.85 mg/dL).    Recent Labs   Lab 08/23/24  1619   PTP 36.1*   INR 3.56*       No results for input(s): \"TROP\", \"CK\" in the last 168 hours.    Imaging: Imaging data reviewed in Epic.      ASSESSMENT / PLAN:   84 year old male with history of cad, chronic atrial fibrillation, gerd, htn, hld, hx lupus and hx parkinsons disease presenting with progressive sob.    SOB  -cxr with large right effusion  -infection present as well??  -iv abx  -pulmonary consulted  -will likely need thoracentesis, on xarelto (held now)  -supp o2 as needed    Hematuria  -etiology uncertain  -ua abn but pt with chronic edmond for retention recently  -urology consulted    CAD  -metoprolol   -atorvastatin     Chronic Atrial  fibrillation  -xarelto on hold  -metoprolol     HTN  -sbp stable  -metoprolol     HLD  -zetia  -niacin  -atorvastatin     Parkinson's Disease  -continue home meds    Quality:  DVT Prophylaxis: scd  CODE status: Full per chart  [place]: yes    Plan of care discussed with patient and staff    Dispo: no discharge    Delvin Arevalo MD  [facility] Hospitalist  [phone]

## 2024-08-25 LAB
MAGNESIUM SERPL-MCNC: 2 MG/DL (ref 1.6–2.6)
POTASSIUM SERPL-SCNC: 3.9 MMOL/L (ref 3.5–5.1)

## 2024-08-25 NOTE — PLAN OF CARE
Assumed patient at 1930. Alert and oriented x 4 forgetful at times. Lung sounds diminished bilaterally with crackles at the bases. Normal sinus with a 1st degree block and a 2nd degree at times. Incontinent of bladder edmond in place - hematuria. Continent of bowel. Up standby with a walker. Patient updated on plan of care and verbalized understanding.     Plan of care: IV antibiotics, thoracentesis on Monday.     Problem: RESPIRATORY - ADULT  Goal: Achieves optimal ventilation and oxygenation  Description: INTERVENTIONS:  - Assess for changes in respiratory status  - Assess for changes in mentation and behavior  - Position to facilitate oxygenation and minimize respiratory effort  - Oxygen supplementation based on oxygen saturation or ABGs  - Provide Smoking Cessation handout, if applicable  - Encourage broncho-pulmonary hygiene including cough, deep breathe, Incentive Spirometry  - Assess the need for suctioning and perform as needed  - Assess and instruct to report SOB or any respiratory difficulty  - Respiratory Therapy support as indicated  - Manage/alleviate anxiety  - Monitor for signs/symptoms of CO2 retention  Outcome: Progressing     Problem: MUSCULOSKELETAL - ADULT  Goal: Return mobility to safest level of function  Description: INTERVENTIONS:  - Assess patient stability and activity tolerance for standing, transferring and ambulating w/ or w/o assistive devices  - Assist with transfers and ambulation using safe patient handling equipment as needed  - Ensure adequate protection for wounds/incisions during mobilization  - Obtain PT/OT consults as needed  - Advance activity as appropriate  - Communicate ordered activity level and limitations with patient/family  Outcome: Progressing  Goal: Maintain proper alignment of affected body part  Description: INTERVENTIONS:  - Support and protect limb and body alignment per provider's orders  - Instruct and reinforce with patient and family use of appropriate  assistive device and precautions (e.g. spinal or hip dislocation precautions)  Outcome: Progressing     Problem: Impaired Functional Mobility  Goal: Achieve highest/safest level of mobility/gait  Description: Interventions:  - Assess patient's functional ability and stability  - Promote increasing activity/tolerance for mobility and gait  - Educate and engage patient/family in tolerated activity level and precautions    Outcome: Progressing

## 2024-08-25 NOTE — PROGRESS NOTES
[facility] Hospitalist Progress Note                                                                     [facility]   part of [facility]      Reginaldo Hdz  8/10/1940    SUBJECTIVE: No chest pain, palpitations, nausea, vomiting, abdominal pain. Sob improved. Pt still with cough.     OBJECTIVE:  Temp:  [98 °F (36.7 °C)-98.8 °F (37.1 °C)] 98.8 °F (37.1 °C)  Pulse:  [52-73] 73  Resp:  [17-20] 20  BP: (103-148)/(48-82) 148/82  SpO2:  [98 %-100 %] 98 %  Exam  Gen: No acute distress, alert and oriented   Pulm:normal respiratory effort, no crackles, no wheezing, Decreased BS right lung field   CV: Heart with regular rate and rhythm, no murmur.   Abd: Abdomen soft, nontender, nondistended, bowel sounds present  MSK: No significant pitting edema or tenderness of the LE  Skin: no new rashes or lesions    Labs:   Recent Labs   Lab 08/23/24  1619 08/24/24  0501   WBC 5.1 3.3*   HGB 10.2* 9.2*   MCV 95.4 99.0   .0 127.0*   INR 3.56*  --        Recent Labs   Lab 08/23/24  1619 08/24/24  0501 08/25/24  0610    140  --    K 4.3 3.7 3.9    107  --    CO2 31.0 32.0  --    BUN 20 14  --    CREATSERUM 0.85 0.69*  --    CA 9.4 8.8  --    MG  --  1.8 2.0   * 81  --        Recent Labs   Lab 08/23/24  1619 08/24/24  0501   ALT 13 7*   AST 58* 42*   ALB 3.7 2.9*       No results for input(s): \"PGLU\" in the last 168 hours.    Meds:   Scheduled:    melatonin  5 mg Oral Nightly    carbidopa-levodopa ER  2 tablet Oral BID    docusate sodium  100 mg Oral BID    sennosides  8.6 mg Oral BID    ezetimibe  10 mg Oral Daily    atorvastatin  20 mg Oral Nightly    niacin ER  500 mg Oral BID with meals    carbidopa-levodopa  1.5 tablet Oral 4x daily    pantoprazole  40 mg Oral QAM AC    pregabalin  50 mg Oral BID    guaiFENesin ER  600 mg Oral BID    piperacillin-tazobactam  3.375 g Intravenous Q8H    metoprolol tartrate  50 mg Oral Daily Beta Blocker     Continuous Infusions:    PRN:   polyethylene glycol (PEG 3350)    fentaNYL    acetaminophen    morphINE **OR** morphINE **OR** morphINE    ondansetron    metoclopramide    benzonatate    guaiFENesin    ASSESSMENT / PLAN:   84 year old male with history of cad, chronic atrial fibrillation, gerd, htn, hld, hx lupus and hx parkinsons disease presenting with progressive sob.     SOB  -cxr with large right effusion  -infection present as well??  -iv abx  -pulmonary consulted--> thora tomorrow   -will likely need thoracentesis, on xarelto (held now)  -supp o2 as needed     Hematuria--> resolved  -etiology uncertain  -ua abn but pt with chronic edmond for retention recently  -urology consulted--> continue current tx, tx constipation      CAD  -metoprolol   -atorvastatin      Chronic Atrial fibrillation  -xarelto on hold  -metoprolol      HTN  -sbp stable  -metoprolol      HLD  -zetia  -niacin  -atorvastatin      Parkinson's Disease  -continue home meds     Quality:  DVT Prophylaxis: scd  CODE status: Full per chart  Ed[place]: yes     Plan of care discussed with patient and staff     Dispo: no discharge     Delvin Arevalo MD  [facility] Hospitalist  [phone]

## 2024-08-25 NOTE — CM/SW NOTE
Pt admitted to [facility] from [facility] sent to [facility].    Peggy Andres, TAMIKAW  /Discharge Planner

## 2024-08-25 NOTE — PROGRESS NOTES
Pulmonary Progress Note        NAME: Reginaldo Hdz - ROOM: [facility]8624-A - MRN: [id number] - Age: 84 year old - : 8/10/1940        Last 24hrs: No events overnight, feels a little better than yesterday    OBJECTIVE:  Vitals:    24 2310 24 0610 24 0630 24 0824   BP: 148/82  156/79 135/67   BP Location: Right arm  Right arm Right arm   Pulse: 69 73 73 66   Resp: 20  20 20   Temp: 98.8 °F (37.1 °C)  97.8 °F (36.6 °C) 97.7 °F (36.5 °C)   TempSrc: Oral  Oral Oral   SpO2: 99% 98%  98%   Weight:       Height:           Oxygen Therapy  SpO2: 98 %  O2 Device: Nasal cannula  O2 Flow Rate (L/min): 2 L/min  Pulse Oximetry Type: Continuous  Oximetry Probe Site Changed: No  Pulse Ox Probe Location: Right hand                  Intake/Output Summary (Last 24 hours) at 2024 0838  Last data filed at 2024 0610  Gross per 24 hour   Intake 240 ml   Output 2825 ml   Net -2585 ml       Scheduled Medication:   melatonin  5 mg Oral Nightly    carbidopa-levodopa ER  2 tablet Oral BID    docusate sodium  100 mg Oral BID    sennosides  8.6 mg Oral BID    ezetimibe  10 mg Oral Daily    atorvastatin  20 mg Oral Nightly    niacin ER  500 mg Oral BID with meals    carbidopa-levodopa  1.5 tablet Oral 4x daily    pantoprazole  40 mg Oral QAM AC    pregabalin  50 mg Oral BID    guaiFENesin ER  600 mg Oral BID    piperacillin-tazobactam  3.375 g Intravenous Q8H    metoprolol tartrate  50 mg Oral Daily Beta Blocker     Continuous Infusing Medication:    Lungs: diminished breath sounds posterior - right  Heart: irregularly irregular rhythm  Abdomen: soft, non-tender; bowel sounds normal; no masses,  no organomegaly  Extremities: extremities normal, atraumatic, no cyanosis or edema    Labs reviewed as noted below        ASSESSMENT/PLAN:    Pleural Effusion  -most likely due to heart failure, hemothorax is in the differential given fall w/ rib fractures and anticoagulation but his Hgb has been relatively stable over the last  3 weeks which speaks against a large bleed into the chest  -would hold xarelto in prep for thoracentesis Tomorrow  Atelectasis vs infiltrate  -has low WBC  -afebrile  -also w/ UTI- abx per hospitalist  Rib fractures  -pain control  A-fib  -hold xarelto as above        Jefferson Perea  [facility]  Pulmonary and Critical Care

## 2024-08-25 NOTE — PHYSICAL THERAPY NOTE
PHYSICAL THERAPY EVALUATION - INPATIENT     Room Number: 8624/8624-A  Evaluation Date: 8/25/2024  Type of Evaluation: Initial  Physician Order: PT Eval and Treat    Presenting Problem: progressive SOB  Co-Morbidities : CAD, Chronic Afib, GERD, HTN, HLD, hx Lupus, hx Parkinson's disease, recent rib fractures  Reason for Therapy: Mobility Dysfunction and Discharge Planning    PHYSICAL THERAPY ASSESSMENT   Patient is currently functioning near baseline with bed mobility, transfers, and gait.  Prior to admission, patient's baseline is ambulatory with RW/rollator; assistance with transfers.  Patient is requiring contact guard assist and minimal assist as a result of the following impairments: decreased functional strength, decreased endurance/aerobic capacity, impaired standing balance, decreased muscular endurance, and increased O2 needs from baseline.  Physical Therapy will continue to follow for duration of hospitalization.    Patient will benefit from continued skilled PT Services at discharge to promote prior level of function and safety with additional support and return home with home health PT.    PLAN  PT Treatment Plan: Bed mobility;Body mechanics;Endurance;Energy conservation;Patient education;Family education;Gait training;Range of motion;Strengthening;Transfer training;Balance training  Rehab Potential : Fair  Frequency (Obs): 3-5x/week  Number of Visits to Meet Established Goals: 4      CURRENT GOALS    Goal #1 Patient is able to demonstrate supine - sit EOB @ level: supervision     Goal #2 Patient is able to demonstrate transfers Sit to/from Stand at assistance level: minimum assistance     Goal #3 Patient is able to ambulate 150 feet with assist device: walker - rolling at assistance level: supervision     Goal #4    Goal #5    Goal #6    Goal Comments: Goals established on 8/25/2024      PHYSICAL THERAPY MEDICAL/SOCIAL HISTORY  History related to current admission: Patient is a 84 year old male  admitted on 2024 from [facility] for progressive SOB.  Pt diagnosed with pleural effusion. Plan for thoracentesis Monday per chart.    Recent admits  -24: multiple rib fractures due to fall > dc home ([facility])  : ED visit for urinary retention  -: admit for pleural effusion with dc to home  : ED visit for T8 compression fracture- fitted for TLSO brace and dc'ed home  : ED visit for cystitis with dc home  -: Admit for leg pain, with dc to home    HOME SITUATION  Type of Home: Assisted living facility   Home Layout: One level                Lives With: Spouse;Caregiver 24 hours;Staff 24 hours  Drives: No  Patient Owned Equipment: Rolling walker;Rollator;Wheelchair       Prior Level of [place]: Pt currently lives with spouse and 24-hr caregiver at [facility]; pt typically ambulates with rollator (but also has RW) to dining [employer] for meals. Pt does require assistance at time for transfers.    SUBJECTIVE  Pt states his leg gets better the more he walks.      OBJECTIVE  Precautions: Bed/chair alarm  Fall Risk: High fall risk    WEIGHT BEARING RESTRICTION  Weight Bearing Restriction: None                PAIN ASSESSMENT  Ratin          COGNITION  Following Commands:  follows one step commands consistently  Initiation: cues to initiate tasks  Awareness of Errors:  assistance required to identify errors made and assistance required to correct errors made    RANGE OF MOTION AND STRENGTH ASSESSMENT  Upper extremity ROM and strength are within functional limits     Lower extremity ROM is within functional limits except for the following: unable to actively DF left ankle; PROM left ankle - just to neutral DF     Lower extremity strength is grossly within functional limits, except left ankle DF      BALANCE  Static Sitting: Good  Dynamic Sitting: Fair +  Static Standing: Fair -  Dynamic Standing: Poor +    ADDITIONAL TESTS                                    ACTIVITY TOLERANCE           BP: 105/57  BP  Location: Right arm  BP Method: Automatic  Patient Position: Sitting    O2 WALK  Oxygen Therapy  SPO2% on Room Air at Rest: 97  SPO2% on Oxygen at Rest: 100  At rest oxygen flow (liters per minute): 1.5    NEUROLOGICAL FINDINGS                        AM-PAC '6-Clicks' INPATIENT SHORT FORM - BASIC MOBILITY  How much difficulty does the patient currently have...  Patient Difficulty: Turning over in bed (including adjusting bedclothes, sheets and blankets)?: A Little   Patient Difficulty: Sitting down on and standing up from a chair with arms (e.g., wheelchair, bedside commode, etc.): A Little   Patient Difficulty: Moving from lying on back to sitting on the side of the bed?: A Little   How much help from another person does the patient currently need...   Help from Another: Moving to and from a bed to a chair (including a wheelchair)?: A Little   Help from Another: Need to walk in hospital room?: A Little   Help from Another: Climbing 3-5 steps with a railing?: A Lot       AM-PAC Score:  Raw Score: 17   Approx Degree of Impairment: 50.57%   Standardized Score (AM-PAC Scale): 42.13   CMS Modifier (G-Code): CK    FUNCTIONAL ABILITY STATUS  Gait Assessment   Functional Mobility/Gait Assessment  Gait Assistance: Minimum assistance;Contact guard assist  Distance (ft): 110  Assistive Device: Rolling walker  Pattern: Shuffle;L Foot drop    Skilled Therapy Provided     Bed Mobility:  Supine to sit: min A   Sit to supine: NT     Transfer Mobility:  Sit to stand: mod A to RW with cues for hand placement   Stand to sit: CGA  Gait = Pt ambulated 110' with RW and CGA/min A; pt with increased left knee flexion on left during swing due to foot drop; cued for hand  on walker and on walker positioning.     Therapist's Comments: Pt agreeable to PT. Dtr present during session. Pt with kyphotic posture; discussed 360 breathing - pt demonstrates appropriately. SpO2 100% on 1.5 L in sitting; 97% on RA in sitting and standing. After  ambulation, SpO2 low 90s on RA with slightly increased SOB; placed 1.5 L/min O2 back on pt at end of session and SpO2 back to baseline 100%. RN aware.    Exercise/Education Provided:  Functional activity tolerated  Gait training    Patient End of Session: Up in chair;Needs met;Call light within reach;RN aware of session/findings;All patient questions and concerns addressed;Alarm set;Family present      Patient Evaluation Complexity Level:  History High - 3 or more personal factors and/or co-morbidities   Examination of body systems Low - addressing 1-2 elements   Clinical Presentation Low - Stable   Clinical Decision Making Low - Stable       PT Session Time: 25 minutes  Gait Training: 10 minutes

## 2024-08-25 NOTE — PLAN OF CARE
Assumed pt care at approximately 0730. A&Ox3-4. 2L NC, pt denies any pain or shortness of breath at rest, vital signs stable, sinus rhythm on tele. Milan catheter in place, continent of bowel. Up with x1/walker.     Plan of care: xarelto on hold, plan for thora tomorrow      Plan of care updated with patient and daughter at bedside. Questions answered, pt verbalized understanding. Call light is within reach, all needs met at this time.    Problem: RESPIRATORY - ADULT  Goal: Achieves optimal ventilation and oxygenation  Description: INTERVENTIONS:  - Assess for changes in respiratory status  - Assess for changes in mentation and behavior  - Position to facilitate oxygenation and minimize respiratory effort  - Oxygen supplementation based on oxygen saturation or ABGs  - Provide Smoking Cessation handout, if applicable  - Encourage broncho-pulmonary hygiene including cough, deep breathe, Incentive Spirometry  - Assess the need for suctioning and perform as needed  - Assess and instruct to report SOB or any respiratory difficulty  - Respiratory Therapy support as indicated  - Manage/alleviate anxiety  - Monitor for signs/symptoms of CO2 retention  Outcome: Progressing     Problem: MUSCULOSKELETAL - ADULT  Goal: Return mobility to safest level of function  Description: INTERVENTIONS:  - Assess patient stability and activity tolerance for standing, transferring and ambulating w/ or w/o assistive devices  - Assist with transfers and ambulation using safe patient handling equipment as needed  - Ensure adequate protection for wounds/incisions during mobilization  - Obtain PT/OT consults as needed  - Advance activity as appropriate  - Communicate ordered activity level and limitations with patient/family  Outcome: Progressing  Goal: Maintain proper alignment of affected body part  Description: INTERVENTIONS:  - Support and protect limb and body alignment per provider's orders  - Instruct and reinforce with patient and family  use of appropriate assistive device and precautions (e.g. spinal or hip dislocation precautions)  Outcome: Progressing

## 2024-08-26 ENCOUNTER — APPOINTMENT (OUTPATIENT)
Dept: ULTRASOUND IMAGING | Facility: HOSPITAL | Age: 84
End: 2024-08-26
Attending: INTERNAL MEDICINE
Payer: MEDICARE

## 2024-08-26 ENCOUNTER — APPOINTMENT (OUTPATIENT)
Dept: GENERAL RADIOLOGY | Facility: HOSPITAL | Age: 84
End: 2024-08-26
Attending: RADIOLOGY
Payer: MEDICARE

## 2024-08-26 LAB
ANION GAP SERPL CALC-SCNC: 2 MMOL/L (ref 0–18)
BASOPHILS # BLD AUTO: 0.02 X10(3) UL (ref 0–0.2)
BASOPHILS NFR BLD AUTO: 0.6 %
BASOPHILS NFR PLR: 0 %
BUN BLD-MCNC: 14 MG/DL (ref 9–23)
CALCIUM BLD-MCNC: 8.6 MG/DL (ref 8.7–10.4)
CHLORIDE SERPL-SCNC: 105 MMOL/L (ref 98–112)
CO2 SERPL-SCNC: 29 MMOL/L (ref 21–32)
CREAT BLD-MCNC: 0.73 MG/DL
EGFRCR SERPLBLD CKD-EPI 2021: 89 ML/MIN/1.73M2 (ref 60–?)
EOSINOPHIL # BLD AUTO: 0.08 X10(3) UL (ref 0–0.7)
EOSINOPHIL NFR BLD AUTO: 2.3 %
EOSINOPHIL NFR PLR: 0 %
ERYTHROCYTE [DISTWIDTH] IN BLOOD BY AUTOMATED COUNT: 13.8 %
GLUCOSE BLD-MCNC: 106 MG/DL (ref 70–99)
GLUCOSE PLR-MCNC: 88 MG/DL
HCT VFR BLD AUTO: 27.3 %
HGB BLD-MCNC: 8.9 G/DL
IMM GRANULOCYTES # BLD AUTO: 0 X10(3) UL (ref 0–1)
IMM GRANULOCYTES NFR BLD: 0 %
INR BLD: 1.33 (ref 0.8–1.2)
LDH FLD L TO P-CCNC: 284 U/L
LYMPHOCYTES # BLD AUTO: 1 X10(3) UL (ref 1–4)
LYMPHOCYTES NFR BLD AUTO: 29.2 %
LYMPHOCYTES NFR PLR: 48 %
MAGNESIUM SERPL-MCNC: 2 MG/DL (ref 1.6–2.6)
MCH RBC QN AUTO: 31.1 PG (ref 26–34)
MCHC RBC AUTO-ENTMCNC: 32.6 G/DL (ref 31–37)
MCV RBC AUTO: 95.5 FL
MONOCYTES # BLD AUTO: 0.45 X10(3) UL (ref 0.1–1)
MONOCYTES NFR BLD AUTO: 13.1 %
MONOS+MACROS NFR PLR: 48 %
NEUTROPHILS # BLD AUTO: 1.88 X10 (3) UL (ref 1.5–7.7)
NEUTROPHILS # BLD AUTO: 1.88 X10(3) UL (ref 1.5–7.7)
NEUTROPHILS NFR BLD AUTO: 54.8 %
NEUTROPHILS NFR PLR: 4 %
OSMOLALITY SERPL CALC.SUM OF ELEC: 283 MOSM/KG (ref 275–295)
PH PLR: 7.37 [PH] (ref 7.2–7.5)
PLATELET # BLD AUTO: 124 10(3)UL (ref 150–450)
POTASSIUM SERPL-SCNC: 3.8 MMOL/L (ref 3.5–5.1)
PROT PLR-MCNC: 4 G/DL
PROTHROMBIN TIME: 16.5 SECONDS (ref 11.6–14.8)
RBC # BLD AUTO: 2.86 X10(6)UL
RBC PLEURAL FLUID: NORMAL /MM3
SODIUM SERPL-SCNC: 136 MMOL/L (ref 136–145)
TOTAL CELLS COUNTED FLD: 25
WBC # BLD AUTO: 3.4 X10(3) UL (ref 4–11)
WBC # PLR: 692 /MM3

## 2024-08-26 PROCEDURE — 0W993ZZ DRAINAGE OF RIGHT PLEURAL CAVITY, PERCUTANEOUS APPROACH: ICD-10-PCS | Performed by: RADIOLOGY

## 2024-08-26 PROCEDURE — 32555 ASPIRATE PLEURA W/ IMAGING: CPT | Performed by: INTERNAL MEDICINE

## 2024-08-26 PROCEDURE — 71045 X-RAY EXAM CHEST 1 VIEW: CPT | Performed by: RADIOLOGY

## 2024-08-26 RX ORDER — BISACODYL 10 MG
10 SUPPOSITORY, RECTAL RECTAL
Status: DISCONTINUED | OUTPATIENT
Start: 2024-08-26 | End: 2024-09-01

## 2024-08-26 RX ORDER — POLYETHYLENE GLYCOL 3350 17 G/17G
17 POWDER, FOR SOLUTION ORAL DAILY PRN
Status: DISCONTINUED | OUTPATIENT
Start: 2024-08-26 | End: 2024-09-01

## 2024-08-26 RX ORDER — ACETAMINOPHEN 500 MG
TABLET ORAL EVERY 4 HOURS PRN
Status: DISCONTINUED | OUTPATIENT
Start: 2024-08-26 | End: 2024-08-26 | Stop reason: SDUPTHER

## 2024-08-26 RX ORDER — ACETAMINOPHEN 500 MG
500 TABLET ORAL EVERY 4 HOURS PRN
Status: DISCONTINUED | OUTPATIENT
Start: 2024-08-26 | End: 2024-09-01

## 2024-08-26 RX ORDER — POTASSIUM CHLORIDE 1500 MG/1
40 TABLET, EXTENDED RELEASE ORAL ONCE
Status: COMPLETED | OUTPATIENT
Start: 2024-08-26 | End: 2024-08-26

## 2024-08-26 RX ORDER — DOCUSATE SODIUM 100 MG/1
100 CAPSULE, LIQUID FILLED ORAL 2 TIMES DAILY
Status: DISCONTINUED | OUTPATIENT
Start: 2024-08-26 | End: 2024-09-01

## 2024-08-26 RX ORDER — ACETAMINOPHEN 500 MG
1000 TABLET ORAL EVERY 4 HOURS PRN
Status: DISCONTINUED | OUTPATIENT
Start: 2024-08-26 | End: 2024-09-01

## 2024-08-26 NOTE — CDS QUERY
Dear Dr. Dowell,     Please clarify the relationship, if any, between the diagnosis of URINARY TRACT INFECTION (UTI) and indwelling catheter placed on 08/02/2024 and exchanged on 08/21/2024:     [  x  ] Urinary tract infection is related to indwelling catheter  [   ] Urinary tract infection is not related to indwelling catheter  [   ] Unable to Determine       CLINICAL INFORMATION FROM THE MEDICAL RECORD    Risk Factors:  Indwelling catheter was placed on 08/02/2024 for urinary retention. Catheter was exchanged on 08/21/2024 during an office visit.  Clinical Indicators:   UA with Urine Color Red, Clarity Urine Turbid, Ketones Urine 40, Blood Urine Large, Protein Urine >=300, Nitrite Urine Positive, Leukocyte Esterase Urine Large, WBC Urine >50, RBC Urine >10.   Urine cx (+) E.coli  Treatment:   Urology consult  IV Zosyn  Urine cultures  Outpatient cystoscopy and voiding trial     Use of terms such as suspected, possible, or probable (associated with a specific diagnosis that is being evaluated, monitored, or treated as if it exists) are acceptable and can be coded in the inpatient setting, when documented at the time of discharge.    Please add any additional documentation to your progress note and continue to document this through discharge. For questions regarding this query, please contact Clinical : Rosaline Reed RN at #[phone].    THIS FORM IS A PERMANENT PART OF THE MEDICAL RECORD

## 2024-08-26 NOTE — PROGRESS NOTES
[facility] Hospitalist Progress Note                                                                     [facility]   part of Located within [facility]      Reginaldo Hdz  8/10/1940    SUBJECTIVE: on 3L. Feeling little better c breathing. Dry mouth. Hematuria improving. No cough/f/c. Reports last BM 1.5 weeks ago.     OBJECTIVE:  Temp:  [97.7 °F (36.5 °C)] 97.7 °F (36.5 °C)  Pulse:  [63-74] 73  Resp:  [20-23] 20  BP: (114-141)/(59-80) 119/79  SpO2:  [98 %-100 %] 98 %  Exam  Gen: No acute distress, alert and oriented   Pulm:normal respiratory effort, no crackles, no wheezing, Decreased BS right lung field   CV: Heart with regular rate and rhythm, no murmur.   Abd: Abdomen soft, nontender, nondistended, bowel sounds present  MSK: No significant pitting edema or tenderness of the LE  Skin: no new rashes or lesions    Labs:   Recent Labs   Lab 08/23/24 1619 08/24/24  0501 08/26/24  0440   WBC 5.1 3.3* 3.4*   HGB 10.2* 9.2* 8.9*   MCV 95.4 99.0 95.5   .0 127.0* 124.0*   INR 3.56*  --  1.33*       Recent Labs   Lab 08/23/24 1619 08/24/24  0501 08/25/24  0610 08/26/24  0440    140  --  136   K 4.3 3.7 3.9 3.8    107  --  105   CO2 31.0 32.0  --  29.0   BUN 20 14  --  14   CREATSERUM 0.85 0.69*  --  0.73   CA 9.4 8.8  --  8.6*   MG  --  1.8 2.0 2.0   * 81  --  106*       Recent Labs   Lab 08/23/24 1619 08/24/24  0501   ALT 13 7*   AST 58* 42*   ALB 3.7 2.9*       No results for input(s): \"PGLU\" in the last 168 hours.    Meds:   Scheduled:    melatonin  5 mg Oral Nightly    carbidopa-levodopa ER  2 tablet Oral BID    docusate sodium  100 mg Oral BID    sennosides  8.6 mg Oral BID    ezetimibe  10 mg Oral Daily    atorvastatin  20 mg Oral Nightly    niacin ER  500 mg Oral BID with meals    carbidopa-levodopa  1.5 tablet Oral 4x daily    pantoprazole  40 mg Oral QAM AC    pregabalin  50 mg Oral BID    guaiFENesin ER  600 mg Oral BID    piperacillin-tazobactam  3.375  g Intravenous Q8H    metoprolol tartrate  50 mg Oral Daily Beta Blocker     Continuous Infusions:   PRN:   acetaminophen    polyethylene glycol (PEG 3350)    fentaNYL    morphINE **OR** morphINE **OR** morphINE    ondansetron    metoclopramide    benzonatate    guaiFENesin    ASSESSMENT / PLAN:   84 year old male with history of cad, chronic atrial fibrillation, gerd, htn, hld, hx lupus and hx parkinsons disease presenting with progressive sob.     SOB  -cxr with large right effusion  -infection present as well??  -iv abx  -pulmonary consulted--> thora tomorrow   -thoracentesis today, on xarelto (held now); tylenol prn pain   -supp o2 as needed, on 3L     Hematuria--> resolved  -etiology uncertain  -ua abn but pt with chronic edmond for retention recently  -urology consulted--> continue current tx, tx constipation      CAD  -metoprolol   -atorvastatin      Chronic Atrial fibrillation  -xarelto on hold  -metoprolol      HTN  -sbp stable  -metoprolol      HLD  -zetia  -niacin  -atorvastatin      Parkinson's Disease  -continue home meds     Enterococcus UTI   - on zosyn     Quality:  DVT Prophylaxis: scd  CODE status: Full per chart  Edmond: yes     Plan of care discussed with patient and staff     Dispo: no discharge     Reviewed chart including previous progress notes. D/w pt/dtr, RN    Stevenson Dowell MD  [facility] Center[facility]  [phone]  8/26/2024  6:48 PM

## 2024-08-26 NOTE — PLAN OF CARE
Assumed patient at 1930. Alert and oriented x 4 forgetful at times. Lung sounds diminished bilaterally with crackles at the bases. Normal sinus on tele with a 1st degree bloc and a 2nd degree at times. Incontinent of bladder edmond in place. Continent of bowel. Up standby with a walker. Patient updated on plan of care and verbalized understanding.     Plan of care: IV antibiotics, thoracentesis on Monday.     Problem: RESPIRATORY - ADULT  Goal: Achieves optimal ventilation and oxygenation  Description: INTERVENTIONS:  - Assess for changes in respiratory status  - Assess for changes in mentation and behavior  - Position to facilitate oxygenation and minimize respiratory effort  - Oxygen supplementation based on oxygen saturation or ABGs  - Provide Smoking Cessation handout, if applicable  - Encourage broncho-pulmonary hygiene including cough, deep breathe, Incentive Spirometry  - Assess the need for suctioning and perform as needed  - Assess and instruct to report SOB or any respiratory difficulty  - Respiratory Therapy support as indicated  - Manage/alleviate anxiety  - Monitor for signs/symptoms of CO2 retention  Outcome: Progressing     Problem: MUSCULOSKELETAL - ADULT  Goal: Return mobility to safest level of function  Description: INTERVENTIONS:  - Assess patient stability and activity tolerance for standing, transferring and ambulating w/ or w/o assistive devices  - Assist with transfers and ambulation using safe patient handling equipment as needed  - Ensure adequate protection for wounds/incisions during mobilization  - Obtain PT/OT consults as needed  - Advance activity as appropriate  - Communicate ordered activity level and limitations with patient/family  Outcome: Progressing  Goal: Maintain proper alignment of affected body part  Description: INTERVENTIONS:  - Support and protect limb and body alignment per provider's orders  - Instruct and reinforce with patient and family use of appropriate assistive  device and precautions (e.g. spinal or hip dislocation precautions)  Outcome: Progressing     Problem: Impaired Functional Mobility  Goal: Achieve highest/safest level of mobility/gait  Description: Interventions:  - Assess patient's functional ability and stability  - Promote increasing activity/tolerance for mobility and gait  - Educate and engage patient/family in tolerated activity level and precautions  Outcome: Progressing

## 2024-08-26 NOTE — PLAN OF CARE
Assumed pt care at approximately 0730. A&Ox4.  2L NC, pt denies any pain or shortness of breath, vital signs stable, SR on tele. Milan in place. Up with x1/walker.     Plan of care: US serge powell this afternoon      Plan of care updated with patient. Questions answered, pt verbalized understanding. Call light is within reach, all needs met at this time.    Problem: RESPIRATORY - ADULT  Goal: Achieves optimal ventilation and oxygenation  Description: INTERVENTIONS:  - Assess for changes in respiratory status  - Assess for changes in mentation and behavior  - Position to facilitate oxygenation and minimize respiratory effort  - Oxygen supplementation based on oxygen saturation or ABGs  - Provide Smoking Cessation handout, if applicable  - Encourage broncho-pulmonary hygiene including cough, deep breathe, Incentive Spirometry  - Assess the need for suctioning and perform as needed  - Assess and instruct to report SOB or any respiratory difficulty  - Respiratory Therapy support as indicated  - Manage/alleviate anxiety  - Monitor for signs/symptoms of CO2 retention  Outcome: Progressing     Problem: MUSCULOSKELETAL - ADULT  Goal: Return mobility to safest level of function  Description: INTERVENTIONS:  - Assess patient stability and activity tolerance for standing, transferring and ambulating w/ or w/o assistive devices  - Assist with transfers and ambulation using safe patient handling equipment as needed  - Ensure adequate protection for wounds/incisions during mobilization  - Obtain PT/OT consults as needed  - Advance activity as appropriate  - Communicate ordered activity level and limitations with patient/family  Outcome: Progressing  Goal: Maintain proper alignment of affected body part  Description: INTERVENTIONS:  - Support and protect limb and body alignment per provider's orders  - Instruct and reinforce with patient and family use of appropriate assistive device and precautions (e.g. spinal or hip  dislocation precautions)  Outcome: Progressing

## 2024-08-26 NOTE — IMAGING NOTE
1550- Pt completed R thoracentesis with Dr. Butler; 1.5L drained.  Scant amount of blood on dressing outlined.  Vital signs stable on 2L NC, pt reporting pain near right ribs.  Ice pack provided.  Report called to JARRED Davenport, informed that pt is experiencing pain.  Chest Xray completed in US.  Pt to be accompanied back to room 8624 by transport.

## 2024-08-26 NOTE — PROGRESS NOTES
[facility]    Reginaldo Hdz Patient Status:  Inpatient    8/10/1940 MRN [id number]   Location [facility] 8NE-A Attending Stevenson Dowell MD   Hosp Day # 3 PCP Olvin Dinh MD     SUBJECTIVE: Pt states that he is ready for the thoracentesis, wants to feel better    OBJECTIVE:  /71 (BP Location: Right arm)   Pulse 63   Temp 97.7 °F (36.5 °C) (Oral)   Resp 20   Ht 67\"   Wt 152 lb 12.5 oz (69.3 kg)   SpO2 100%   BMI 23.93 kg/m²   O2 requirement: 2L     I/O last 3 completed shifts:  In: 120 [P.O.:120]  Out: 2775 [Urine:2775]  No intake/output data recorded.     Current Medications:   Current Facility-Administered Medications:     melatonin cap/tab 5 mg, 5 mg, Oral, Nightly    carbidopa-levodopa ER (SINEMET CR)  MG per tab 2 tablet, 2 tablet, Oral, BID    docusate sodium (Colace) cap 100 mg, 100 mg, Oral, BID    sennosides (Senokot) tab 8.6 mg, 8.6 mg, Oral, BID    polyethylene glycol (PEG 3350) (Miralax) 17 g oral packet 17 g, 17 g, Oral, Daily PRN    ezetimibe (Zetia) tab 10 mg, 10 mg, Oral, Daily    atorvastatin (Lipitor) tab 20 mg, 20 mg, Oral, Nightly    niacin ER (Niaspan) tab 500 mg, 500 mg, Oral, BID with meals    fentaNYL (Sublimaze) 50 mcg/mL injection 50 mcg, 50 mcg, Intravenous, Q1H PRN    carbidopa-levodopa (SINEMET)  MG per tab 1.5 tablet, 1.5 tablet, Oral, 4x daily    pantoprazole (Protonix) DR tab 40 mg, 40 mg, Oral, QAM AC    pregabalin (Lyrica) cap 50 mg, 50 mg, Oral, BID    acetaminophen (Tylenol Extra Strength) tab 500 mg, 500 mg, Oral, Q4H PRN    morphINE PF 2 MG/ML injection 1 mg, 1 mg, Intravenous, Q2H PRN **OR** morphINE PF 2 MG/ML injection 2 mg, 2 mg, Intravenous, Q2H PRN **OR** morphINE PF 4 MG/ML injection 4 mg, 4 mg, Intravenous, Q2H PRN    ondansetron (Zofran) 4 MG/2ML injection 4 mg, 4 mg, Intravenous, Q6H PRN    metoclopramide (Reglan) 5 mg/mL injection 5 mg, 5 mg, Intravenous, Q8H PRN    benzonatate (Tessalon) cap 200 mg, 200 mg, Oral, TID PRN     guaiFENesin (Robitussin) 100 MG/5 ML oral liquid 200 mg, 200 mg, Oral, Q4H PRN    guaiFENesin ER (Mucinex) 12 hr tab 600 mg, 600 mg, Oral, BID    piperacillin-tazobactam (Zosyn) 3.375 g in dextrose 5% 100 mL IVPB-ADDV, 3.375 g, Intravenous, Q8H    metoprolol tartrate (Lopressor) tab 50 mg, 50 mg, Oral, Daily Beta Blocker      Physical Exam:                          General: alert, cooperative, in NAD                          HEENT: oropharynx clear without erythema or exudates, moist mucous membranes                          Lungs: diminished on the right                           Chest wall: No tenderness or deformity.                          Heart: irregularly irregular                          Abdomen: soft, non-tender, non-distended, positive BS.                          Extremity: No clubbing or cyanosis. no edema                          Skin: No rashes or lesions.         Lab Results   Component Value Date    WBC 3.4 08/26/2024    RBC 2.86 08/26/2024    HGB 8.9 08/26/2024    HCT 27.3 08/26/2024    MCV 95.5 08/26/2024    MCH 31.1 08/26/2024    MCHC 32.6 08/26/2024    RDW 13.8 08/26/2024    .0 08/26/2024     Lab Results   Component Value Date     08/26/2024    K 3.8 08/26/2024     08/26/2024    CO2 29.0 08/26/2024    BUN 14 08/26/2024    CREATSERUM 0.73 08/26/2024     08/26/2024    CA 8.6 08/26/2024     Lab Results   Component Value Date    INR 1.33 (H) 08/26/2024    INR 3.56 (H) 08/23/2024    INR 3.57 (H) 08/14/2024          Imaging: I have independently visualized all relevant chest imaging in PACS.  I agree with the radiology interpretation except where noted.       ASSESSMENT/PLAN:  Pleural Effusion  -most likely due to heart failure, hemothorax is in the differential given fall w/ rib fractures and anticoagulation but his Hgb has been relatively stable over the last 3 weeks which speaks against a large bleed into the chest  -recent echo with preserved EF, PASP:  35-40mmHg  -xarelto on hold for thoracentesis  -will send pleural fluid for studies   Atelectasis vs infiltrate  -has low WBC  -afebrile  -also w/ Enterococcus UTI- abx per hospitalist  Rib fractures  -pain control  A-fib  -hold xarelto as above    Dora Valencia MD  8/26/2024  12:07 PM

## 2024-08-27 ENCOUNTER — APPOINTMENT (OUTPATIENT)
Dept: GENERAL RADIOLOGY | Facility: HOSPITAL | Age: 84
End: 2024-08-27
Attending: INTERNAL MEDICINE
Payer: MEDICARE

## 2024-08-27 LAB — POTASSIUM SERPL-SCNC: 3.9 MMOL/L (ref 3.5–5.1)

## 2024-08-27 PROCEDURE — 71045 X-RAY EXAM CHEST 1 VIEW: CPT | Performed by: INTERNAL MEDICINE

## 2024-08-27 NOTE — PLAN OF CARE
Assumed care of pt @2330. Pt axox4, forgetful at times. Denies pain or sob at present time. Sinus rhythm with 1st degree avb on tele. Lungs diminished with crackles on tight. O2 sat on 2lnc >92%, pt is sob with exertion. Abdomen soft, bs +4. Pt has edmond draining shad/tea colored urine. Pt given ice pack to ribs for comfort. Declines need for pain medication at this time.  Plan  -continue to monitor pt on tele  -labs in am  -fall precautions  -pain control,. Ice packs prn  -iv antibiotics  -updated pt on poc for the night and am, instructed pt to call for any pain or needs. Pt verbalized understanding. Denies needs at this time. Call light within reach. Bed alarm on. Frequent bedside checks to assess for safety and needs.   Problem: RESPIRATORY - ADULT  Goal: Achieves optimal ventilation and oxygenation  Description: INTERVENTIONS:  - Assess for changes in respiratory status  - Assess for changes in mentation and behavior  - Position to facilitate oxygenation and minimize respiratory effort  - Oxygen supplementation based on oxygen saturation or ABGs  - Provide Smoking Cessation handout, if applicable  - Encourage broncho-pulmonary hygiene including cough, deep breathe, Incentive Spirometry  - Assess the need for suctioning and perform as needed  - Assess and instruct to report SOB or any respiratory difficulty  - Respiratory Therapy support as indicated  - Manage/alleviate anxiety  - Monitor for signs/symptoms of CO2 retention  Outcome: Progressing     Problem: MUSCULOSKELETAL - ADULT  Goal: Return mobility to safest level of function  Description: INTERVENTIONS:  - Assess patient stability and activity tolerance for standing, transferring and ambulating w/ or w/o assistive devices  - Assist with transfers and ambulation using safe patient handling equipment as needed  - Ensure adequate protection for wounds/incisions during mobilization  - Obtain PT/OT consults as needed  - Advance activity as appropriate  -  Communicate ordered activity level and limitations with patient/family  Outcome: Progressing  Goal: Maintain proper alignment of affected body part  Description: INTERVENTIONS:  - Support and protect limb and body alignment per provider's orders  - Instruct and reinforce with patient and family use of appropriate assistive device and precautions (e.g. spinal or hip dislocation precautions)  Outcome: Progressing

## 2024-08-27 NOTE — DISCHARGE INSTRUCTIONS
Resume care with [facility] at discharge    [facility]  P:[phone]  F:[phone]

## 2024-08-27 NOTE — PROGRESS NOTES
[facility]    Reginaldo Hdz Patient Status:  Inpatient    8/10/1940 MRN [id number]   Location [facility] 8NE-A Attending Stevenson Dowell MD   Hosp Day # 4 PCP Olvin Dinh MD     SUBJECTIVE: Pt s/p US guided right thoracentesis yesterday with 1.5L thin dark blood removed, pt states that breathing feels better post thora.      OBJECTIVE:  /62 (BP Location: Right arm)   Pulse 71   Temp 97.9 °F (36.6 °C) (Oral)   Resp 20   Ht 67\"   Wt 141 lb 15.6 oz (64.4 kg)   SpO2 100%   BMI 22.24 kg/m²   O2 requirement: 2L     I/O last 3 completed shifts:  In: 200 [P.O.:100; IV PIGGYBACK:100]  Out: 1450 [Urine:1450]  I/O this shift:  In: 240 [P.O.:240]  Out: 100 [Urine:100]     Current Medications:   Current Facility-Administered Medications:     docusate sodium (Colace) cap 100 mg, 100 mg, Oral, BID    polyethylene glycol (PEG 3350) (Miralax) 17 g oral packet 17 g, 17 g, Oral, Daily PRN    bisacodyl (Dulcolax) 10 MG rectal suppository 10 mg, 10 mg, Rectal, Daily PRN    acetaminophen (Tylenol Extra Strength) tab 500 mg, 500 mg, Oral, Q4H PRN **OR** acetaminophen (Tylenol Extra Strength) tab 1,000 mg, 1,000 mg, Oral, Q4H PRN    melatonin cap/tab 5 mg, 5 mg, Oral, Nightly    carbidopa-levodopa ER (SINEMET CR)  MG per tab 2 tablet, 2 tablet, Oral, BID    sennosides (Senokot) tab 8.6 mg, 8.6 mg, Oral, BID    ezetimibe (Zetia) tab 10 mg, 10 mg, Oral, Daily    atorvastatin (Lipitor) tab 20 mg, 20 mg, Oral, Nightly    niacin ER (Niaspan) tab 500 mg, 500 mg, Oral, BID with meals    fentaNYL (Sublimaze) 50 mcg/mL injection 50 mcg, 50 mcg, Intravenous, Q1H PRN    carbidopa-levodopa (SINEMET)  MG per tab 1.5 tablet, 1.5 tablet, Oral, 4x daily    pantoprazole (Protonix) DR tab 40 mg, 40 mg, Oral, QAM AC    pregabalin (Lyrica) cap 50 mg, 50 mg, Oral, BID    morphINE PF 2 MG/ML injection 1 mg, 1 mg, Intravenous, Q2H PRN **OR** morphINE PF 2 MG/ML injection 2 mg, 2 mg, Intravenous, Q2H PRN **OR** morphINE  PF 4 MG/ML injection 4 mg, 4 mg, Intravenous, Q2H PRN    ondansetron (Zofran) 4 MG/2ML injection 4 mg, 4 mg, Intravenous, Q6H PRN    metoclopramide (Reglan) 5 mg/mL injection 5 mg, 5 mg, Intravenous, Q8H PRN    benzonatate (Tessalon) cap 200 mg, 200 mg, Oral, TID PRN    guaiFENesin (Robitussin) 100 MG/5 ML oral liquid 200 mg, 200 mg, Oral, Q4H PRN    guaiFENesin ER (Mucinex) 12 hr tab 600 mg, 600 mg, Oral, BID    piperacillin-tazobactam (Zosyn) 3.375 g in dextrose 5% 100 mL IVPB-ADDV, 3.375 g, Intravenous, Q8H    metoprolol tartrate (Lopressor) tab 50 mg, 50 mg, Oral, Daily Beta Blocker      Physical Exam:                          General: alert, cooperative, in NAD                          HEENT: oropharynx clear without erythema or exudates, moist mucous membranes                          Lungs: diminished over right base                          Chest wall: No tenderness or deformity.                          Heart: irregularly irregular                          Abdomen: soft, non-tender, non-distended, positive BS.                          Extremity: No clubbing or cyanosis. no edema                          Skin: No rashes or lesions.          Lab Results   Component Value Date    K 3.9 08/27/2024     Lab Results   Component Value Date    INR 1.33 (H) 08/26/2024    INR 3.56 (H) 08/23/2024    INR 3.57 (H) 08/14/2024          Imaging: I have independently visualized all relevant chest imaging in PACS.  I agree with the radiology interpretation except where noted.       ASSESSMENT/PLAN:  Pleural Effusion: 2/2 hemothorax following fall with rib fractures   -s/p right thora with 1.5L thin dark blood removed  -recent echo with preserved EF, PASP: 35-40mmHg  -xarelto on hold for thoracentesis  -will follow up on cultures and cytology   -post thora CXR with ?tiny PTX, repeat CXR today without evidence of PTX  Atelectasis vs infiltrate  -has low WBC  -afebrile  -also w/ Enterococcus UTI- abx per hospitalist  Rib  fractures  -pain control  A-fib  -hold xarelto as above  Dispo:  -will follow     Dora Valencia MD

## 2024-08-27 NOTE — PLAN OF CARE
Assumed care of pt around 0730  AxO x4 -forgetful at times, up x1-2 walker  NSR on tele, no cardiac symptoms  Pt endorses some soreness at right ribs, managing with prn tylenol  Continent of bowel, edmond catheter in place  Fall precautions in place, pt updated on plan of care              Problem: RESPIRATORY - ADULT  Goal: Achieves optimal ventilation and oxygenation  Description: INTERVENTIONS:  - Assess for changes in respiratory status  - Assess for changes in mentation and behavior  - Position to facilitate oxygenation and minimize respiratory effort  - Oxygen supplementation based on oxygen saturation or ABGs  - Provide Smoking Cessation handout, if applicable  - Encourage broncho-pulmonary hygiene including cough, deep breathe, Incentive Spirometry  - Assess the need for suctioning and perform as needed  - Assess and instruct to report SOB or any respiratory difficulty  - Respiratory Therapy support as indicated  - Manage/alleviate anxiety  - Monitor for signs/symptoms of CO2 retention  Outcome: Progressing     Problem: MUSCULOSKELETAL - ADULT  Goal: Return mobility to safest level of function  Description: INTERVENTIONS:  - Assess patient stability and activity tolerance for standing, transferring and ambulating w/ or w/o assistive devices  - Assist with transfers and ambulation using safe patient handling equipment as needed  - Ensure adequate protection for wounds/incisions during mobilization  - Obtain PT/OT consults as needed  - Advance activity as appropriate  - Communicate ordered activity level and limitations with patient/family  Outcome: Progressing  Goal: Maintain proper alignment of affected body part  Description: INTERVENTIONS:  - Support and protect limb and body alignment per provider's orders  - Instruct and reinforce with patient and family use of appropriate assistive device and precautions (e.g. spinal or hip dislocation precautions)  Outcome: Progressing     Problem: Impaired Functional  Mobility  Goal: Achieve highest/safest level of mobility/gait  Description: Interventions:  - Assess patient's functional ability and stability  - Promote increasing activity/tolerance for mobility and gait  - Educate and engage patient/family in tolerated activity level and precautions  Outcome: Progressing

## 2024-08-27 NOTE — PROGRESS NOTES
[facility] Hospitalist Progress Note                                                                     [facility]   part of [facility]      Reginaldo Hdz  8/10/1940    SUBJECTIVE: on RA. No pain. No cough/f/c. Breathing a lot better. Some pain in chest last night.     OBJECTIVE:  Temp:  [97.6 °F (36.4 °C)-98 °F (36.7 °C)] 98 °F (36.7 °C)  Pulse:  [67-74] 74  Resp:  [18-23] 20  BP: (111-129)/(59-80) 117/61  SpO2:  [97 %-100 %] 97 %  Exam  Gen: No acute distress, alert and oriented   Pulm:normal respiratory effort, no crackles, no wheezing, Decreased BS right lung field   CV: Heart with irregular rate and rhythm, no murmur.   Abd: Abdomen soft, nontender, nondistended, bowel sounds present  MSK: No significant pitting edema or tenderness of the LE  Skin: no new rashes or lesions    Labs:   Recent Labs   Lab 08/23/24 1619 08/24/24  0501 08/26/24  0440   WBC 5.1 3.3* 3.4*   HGB 10.2* 9.2* 8.9*   MCV 95.4 99.0 95.5   .0 127.0* 124.0*   INR 3.56*  --  1.33*       Recent Labs   Lab 08/23/24 1619 08/24/24  0501 08/25/24  0610 08/26/24  0440 08/27/24  0545    140  --  136  --    K 4.3 3.7 3.9 3.8 3.9    107  --  105  --    CO2 31.0 32.0  --  29.0  --    BUN 20 14  --  14  --    CREATSERUM 0.85 0.69*  --  0.73  --    CA 9.4 8.8  --  8.6*  --    MG  --  1.8 2.0 2.0  --    * 81  --  106*  --        Recent Labs   Lab 08/23/24 1619 08/24/24  0501   ALT 13 7*   AST 58* 42*   ALB 3.7 2.9*       No results for input(s): \"PGLU\" in the last 168 hours.    Meds:   Scheduled:    docusate sodium  100 mg Oral BID    melatonin  5 mg Oral Nightly    carbidopa-levodopa ER  2 tablet Oral BID    sennosides  8.6 mg Oral BID    ezetimibe  10 mg Oral Daily    atorvastatin  20 mg Oral Nightly    niacin ER  500 mg Oral BID with meals    carbidopa-levodopa  1.5 tablet Oral 4x daily    pantoprazole  40 mg Oral QAM AC    pregabalin  50 mg Oral BID    guaiFENesin ER  600 mg  Oral BID    piperacillin-tazobactam  3.375 g Intravenous Q8H    metoprolol tartrate  50 mg Oral Daily Beta Blocker     Continuous Infusions:   PRN:   polyethylene glycol (PEG 3350)    bisacodyl    acetaminophen **OR** acetaminophen    fentaNYL    morphINE **OR** morphINE **OR** morphINE    ondansetron    metoclopramide    benzonatate    guaiFENesin    ASSESSMENT / PLAN:   84 year old male with history of cad, chronic atrial fibrillation, gerd, htn, hld, hx lupus and hx parkinsons disease presenting with progressive sob.     SOB  -cxr with large right effusion  -infection present as well??  -iv abx  -pulmonary consulted--> s/p ultrasound thora 8/26 c 1.5L thin blood colored fluid femoved   -xarelto was on hold   - tylenol prn pain   -supp o2 as needed, was on 3L now RA  - f/u cultures and cytology   - CXR 8/27 no sig PNTX    Hematuria--> resolved  -etiology uncertain  -ua abn but pt with chronic edmond for retention recently  -urology consulted--> continue current tx, tx constipation      Rib fxs  - pain control     CAD  -metoprolol   -atorvastatin      Chronic Atrial fibrillation  -xarelto on hold  -metoprolol      HTN  -sbp stable  -metoprolol      HLD  -zetia  -niacin  -atorvastatin      Parkinson's Disease  -continue home meds     Enterococcus UTI   - on zosyn     Quality:  DVT Prophylaxis: scd  CODE status: Full per chart  Edmond: yes     Plan of care discussed with patient and staff     Dispo: no discharge, follow resp sxs     [facility] hospitalist to resume care in AM, will discuss plan with them       Stevenson Dowell MD  [facility] Hospitalist  [phone]  8/27/2024  3:42 PM

## 2024-08-28 LAB
ANION GAP SERPL CALC-SCNC: 11 MMOL/L (ref 0–18)
BUN BLD-MCNC: 19 MG/DL (ref 9–23)
CALCIUM BLD-MCNC: 9.1 MG/DL (ref 8.7–10.4)
CHLORIDE SERPL-SCNC: 104 MMOL/L (ref 98–112)
CO2 SERPL-SCNC: 20 MMOL/L (ref 21–32)
CREAT BLD-MCNC: 0.81 MG/DL
EGFRCR SERPLBLD CKD-EPI 2021: 86 ML/MIN/1.73M2 (ref 60–?)
ERYTHROCYTE [DISTWIDTH] IN BLOOD BY AUTOMATED COUNT: 13.8 %
GLUCOSE BLD-MCNC: 114 MG/DL (ref 70–99)
HCT VFR BLD AUTO: 36 %
HGB BLD-MCNC: 11.3 G/DL
MAGNESIUM SERPL-MCNC: 2 MG/DL (ref 1.6–2.6)
MCH RBC QN AUTO: 31 PG (ref 26–34)
MCHC RBC AUTO-ENTMCNC: 31.4 G/DL (ref 31–37)
MCV RBC AUTO: 98.9 FL
OSMOLALITY SERPL CALC.SUM OF ELEC: 283 MOSM/KG (ref 275–295)
PLATELET # BLD AUTO: 172 10(3)UL (ref 150–450)
POTASSIUM SERPL-SCNC: 3.9 MMOL/L (ref 3.5–5.1)
RBC # BLD AUTO: 3.64 X10(6)UL
SODIUM SERPL-SCNC: 135 MMOL/L (ref 136–145)
WBC # BLD AUTO: 3.8 X10(3) UL (ref 4–11)

## 2024-08-28 RX ORDER — SODIUM CHLORIDE 9 MG/ML
INJECTION, SOLUTION INTRAVENOUS ONCE
Status: COMPLETED | OUTPATIENT
Start: 2024-08-28 | End: 2024-08-28

## 2024-08-28 NOTE — PROGRESS NOTES
[facility] Pulmonary, Critical Care and Sleep    Regnialdo Hdz Patient Status:  Inpatient    8/10/1940 MRN [id number]   [facility] 8NE-A Attending Stevenson Dowell MD   Hosp Day # 5 PCP Olvin Dinh MD     Date of Admission: 2024  4:34 PM    Admission Diagnosis: Pleural effusion [J90]  Dyspnea, unspecified type [R06.00]  Closed fracture of multiple ribs of right side with routine healing, subsequent encounter [S22.41XD]  Hematuria, unspecified type [R31.9]    S: Feeling OK. Dizzy with standing.     Scheduled Medications:     sodium chloride  500 mL Intravenous Once    Followed by    sodium chloride   Intravenous Once    docusate sodium  100 mg Oral BID    melatonin  5 mg Oral Nightly    carbidopa-levodopa ER  2 tablet Oral BID    sennosides  8.6 mg Oral BID    ezetimibe  10 mg Oral Daily    atorvastatin  20 mg Oral Nightly    niacin ER  500 mg Oral BID with meals    carbidopa-levodopa  1.5 tablet Oral 4x daily    pantoprazole  40 mg Oral QAM AC    pregabalin  50 mg Oral BID    guaiFENesin ER  600 mg Oral BID    piperacillin-tazobactam  3.375 g Intravenous Q8H    metoprolol tartrate  50 mg Oral Daily Beta Blocker       Infusing Medications:      PRN Medications:    polyethylene glycol (PEG 3350)    bisacodyl    acetaminophen **OR** acetaminophen    fentaNYL    morphINE **OR** morphINE **OR** morphINE    ondansetron    metoclopramide    benzonatate    guaiFENesin    OBJECTIVE:  BP 92/61 (BP Location: Right arm)   Pulse 74   Temp 97.8 °F (36.6 °C) (Oral)   Resp 20   Ht 170.2 cm (5' 7\")   Wt 142 lb 10.2 oz (64.7 kg)   SpO2 100%   BMI 22.34 kg/m²    Temp (24hrs), Av °F (36.7 °C), Min:97.8 °F (36.6 °C), Max:98.4 °F (36.9 °C)             Wt Readings from Last 3 Encounters:   24 142 lb 10.2 oz (64.7 kg)   24 160 lb 7.9 oz (72.8 kg)   24 160 lb 6.4 oz (72.8 kg)       I/O last 3 completed shifts:  In: 440 [P.O.:340; IV PIGGYBACK:100]  Out: 950 [Urine:950]  I/O this  shift:  In: 120 [P.O.:120]  Out: 0      O2: RA  General: NAD.   Neuro: Alert, no focal deficits.    HEENT: PERRL  Neck : No LAD  CV: RRR, nl S1, S2, no S4, S3 or murmur.   Lungs: Clear bilaterally.   Abd: Nontender, non distended.   Ext: No edema.   Skin: No rashes.     Recent Labs   Lab 08/24/24  0501 08/26/24  0440 08/28/24  0940   WBC 3.3* 3.4* 3.8*   HGB 9.2* 8.9* 11.3*   HCT 29.1* 27.3* 36.0*   .0* 124.0* 172.0     Recent Labs   Lab 08/23/24  1619 08/24/24  0501 08/25/24  0610 08/26/24  0440 08/27/24  0545   * 81  --  106*  --    BUN 20 14  --  14  --    CREATSERUM 0.85 0.69*  --  0.73  --    CA 9.4 8.8  --  8.6*  --     140  --  136  --    K 4.3 3.7 3.9 3.8 3.9    107  --  105  --    CO2 31.0 32.0  --  29.0  --    AST 58* 42*  --   --   --    ALT 13 7*  --   --   --    ALB 3.7 2.9*  --   --   --      Recent Labs   Lab 08/23/24 1619 08/26/24  0440   INR 3.56* 1.33*   PTT 38.1*  --      No results for input(s): \"ABGPHT\", \"YHQMBZ5X\", \"FGKEE7P\", \"ABGHCO3\", \"SITE\", \"DEV\", \"THGB\" in the last 168 hours.    COVID-19 Lab Results    COVID-19  Lab Results   Component Value Date    COVID19 Detected (A) 07/09/2024       Pro-Calcitonin  No results for input(s): \"PCT\" in the last 168 hours.    Cardiac  Recent Labs   Lab 08/23/24 1619   PBNP 554*     Component      Latest Ref Rng 8/26/2024   Neutrophil Pleural      % 4    LYMPHOCYTE PLEURAL FLUID      % 48    MONO/MAC/HISTIO PLEURAL FLUID      % 48    EOSINOPHIL PLEURAL FLUID      % 0    BASOPHIL PLEURAL FLUID      % 0    TOTAL CELLS COUNTED 25    Source Pleural FLD Pleural Fluid, Right    BODY FLUID COLOR Red    BODY FLUID CLARITY Bloody    WBC, Pleural Fluid      /mm3 692    RBC PLEURAL FLUID      /mm3 1,756,000    PLEURAL FLUID pH      7.20 - 7.50  7.37    Sample Type Pleural Fluid    LDH PLEURAL FLUID      U/L 284    TOTAL PROTEIN PLEURAL FLUID      g/dL 4.0    GLUCOSE PLEURAL FLUID      mg/dL 88      Body Fluid Culture Result: No Growth 1 Day                Body Fluid Smear WBCs seen      No organisms seen      This is a cytocentrifuged smear.           Cytology pending.     Creatinine Kinase  No results for input(s): \"CK\" in the last 168 hours.    Inflammatory Markers  No results for input(s): \"CRP\", \"CALDERON\", \"LDH\", \"DDIMER\" in the last 168 hours.    Imaging:   CXR 8/26    CXR 8/27:    Chest images personally reviewed.   Assessment/Plan   Pleural Effusion: 2/2 hemothorax following fall with rib fractures   -s/p right thora with 1.5L thin dark blood removed 8/26  -recent echo with preserved EF, PASP: 35-40mmHg  -xarelto on hold for thoracentesis and hematuria x 1.   -Cultures NGTD and cytology pending  -post thora CXR with ?tiny PTX, repeat CXR today without evidence of PTX  Atelectasis vs infiltrate  -has low WBC  -afebrile  -also w/ Enterococcus UTI- abx per hospitalist  Adolfo 8/24-  Anemia  Improved without transufsion. ?volume depleted. IVF today and monitor h/h.   Hold xarelto for now and if stabilizes could resume.   Rib fractures  -pain control  A-fib  -hold xarelto as above  Dispo:  -will follow   My best regards,         Tank Kilpatrick MD  [facility] Pulmonary, Critical Care and Sleep Medicine

## 2024-08-28 NOTE — PROGRESS NOTES
08/28/24 1202 08/28/24 1206   Vital Signs   /70 (!) 84/56   MAP (mmHg) 80 65   BP Location Right arm Right arm   BP Method Automatic Automatic   Patient Position Sitting Standing

## 2024-08-28 NOTE — PHYSICAL THERAPY NOTE
PHYSICAL THERAPY TREATMENT NOTE - INPATIENT    Room Number: 8624/8624-A     Session: 1     Number of Visits to Meet Established Goals: 4    Presenting Problem: progressive SOB  Co-Morbidities : CAD, Chronic Afib, GERD, HTN, HLD, hx Lupus, hx Parkinson's disease, recent rib fractures    History related to current admission: Patient is a 84 year old male admitted on 8/23/2024 from [facility] for progressive SOB.  Pt diagnosed with pleural effusion. Plan for thoracentesis Monday per chart.     Recent admits  8/4-8/6/24: multiple rib fractures due to fall > dc home ([facility])  8/2: ED visit for urinary retention  7/9-7/14: admit for pleural effusion with dc to home  7/1: ED visit for T8 compression fracture- fitted for TLSO brace and dc'ed home  5/19: ED visit for cystitis with dc home  4/20-4/23: Admit for leg pain, with dc to home     HOME SITUATION  Type of Home: Assisted living facility   Home Layout: One level     Lives With: Spouse;Caregiver 24 hours;Staff 24 hours  Drives: No  Patient Owned Equipment: Rolling walker;Rollator;Wheelchair     Prior Level of [place]: Pt currently lives with spouse and 24-hr caregiver at [facility]; pt typically ambulates with rollator (but also has RW) to dining douglas for meals. Pt does require assistance at time for transfers.    ASSESSMENT   Patient demonstrates limited progress this session, goals remain in progress.    Patient continues to function below baseline with bed mobility, transfers, gait, and standing prolonged periods. Contributing factors to remaining limitations include decreased functional strength, decreased endurance/aerobic capacity, impaired   balance, and decreased muscular endurance.  Next session anticipate patient to progress bed mobility, transfers, and gait.  Physical Therapy will continue to follow patient for duration of hospitalization.    Patient continues to benefit from continued skilled PT services: at discharge to promote prior level of function and safety with  additional support and return home with home health PT.    PLAN  PT Treatment Plan: Bed mobility;Body mechanics;Endurance;Energy conservation;Patient education;Family education;Gait training;Range of motion;Strengthening;Transfer training;Balance training  Rehab Potential : Fair  Frequency (Obs): 3-5x/week    CURRENT GOALS   Goal #1 Patient is able to demonstrate supine - sit EOB @ level: supervision      Goal #2 Patient is able to demonstrate transfers Sit to/from Stand at assistance level: minimum assistance      Goal #3 Patient is able to ambulate 150 feet with assist device: walker - rolling at assistance level: supervision      Goal #4     Goal #5     Goal #6     Goal Comments: Goals established on 8/25/2024 8/28/2024 all goals ongoing     SUBJECTIVE  \"Oh my head\"    OBJECTIVE  Precautions: Bed/chair alarm    WEIGHT BEARING RESTRICTION  Weight Bearing Restriction: None                PAIN ASSESSMENT   Rating:  (did not c/o pain this session)          BALANCE                                                                                                                       Static Sitting: Fair +  Dynamic Sitting: Fair -           Static Standing: Fair -  Dynamic Standing: Poor +    ACTIVITY TOLERANCE   Pt felt lightheaded and became diaphoretic in standing, BP cuff would not read, pt could not tolerate standing long enough for BP cuff to retry. RN notified.         BP: 91/61  BP Location: Right arm   BP Method: Automatic   Patient Position: sitting in chair feet down    BP: 92/61  BP Location: Right arm  BP Method: Automatic  Patient Position: Sitting (with feet elevated. Attempted BP in standing but pt lightheaded/diaphoretic and unable to tolerate)    O2 WALK         AM-PAC '6-Clicks' INPATIENT SHORT FORM - BASIC MOBILITY  How much difficulty does the patient currently have...  Patient Difficulty: Turning over in bed (including adjusting bedclothes, sheets and blankets)?: A Little   Patient Difficulty:  Sitting down on and standing up from a chair with arms (e.g., wheelchair, bedside commode, etc.): A Little   Patient Difficulty: Moving from lying on back to sitting on the side of the bed?: A Little   How much help from another person does the patient currently need...   Help from Another: Moving to and from a bed to a chair (including a wheelchair)?: A Little   Help from Another: Need to walk in hospital room?: A Little   Help from Another: Climbing 3-5 steps with a railing?: Total       AM-PAC Score:  Raw Score: 16   Approx Degree of Impairment: 54.16%   Standardized Score (AM-PAC Scale): 40.78   CMS Modifier (G-Code): CK    FUNCTIONAL ABILITY STATUS  Gait Assessment   Functional Mobility/Gait Assessment  Gait Assistance: Not tested  Distance (ft): 110  Assistive Device: Rolling walker  Pattern: Shuffle;L Foot drop    Skilled Therapy Provided: Per RN okay to work with pt. Pt received in chair and was agreeable to PT session.     Bed Mobility:  Rolling: NT   Supine<>Sit: NT   Sit<>Supine: NT     Transfer Mobility:  Sit<>Stand: CGA    Stand<>Sit: CGA   Gait: NT    Activity limited by lightheadedness in standing and low BP. RN notified.     Therapist's Comments: Pt educated on role of therapy, goals for session, safety, fall prevention, and activity recommendations.     Patient End of Session: Up in chair;Call light within reach;Needs met;RN aware of session/findings;All patient questions and concerns addressed;Alarm set    PT Session Time: 10 minutes  Therapeutic Activity: 10 minutes

## 2024-08-28 NOTE — PROGRESS NOTES
CC: follow-up hospital admission sob, hematuria    SUBJECTIVE:  Interval History:     Feels improved today  Pain is better  No nv  Denied any dizziness  Dw RN, this am when pt stood up had drop in bp  Also po intake is ppor.   Labs pending    OBJECTIVE:  Scheduled Meds:    docusate sodium  100 mg Oral BID    melatonin  5 mg Oral Nightly    carbidopa-levodopa ER  2 tablet Oral BID    sennosides  8.6 mg Oral BID    ezetimibe  10 mg Oral Daily    atorvastatin  20 mg Oral Nightly    niacin ER  500 mg Oral BID with meals    carbidopa-levodopa  1.5 tablet Oral 4x daily    pantoprazole  40 mg Oral QAM AC    pregabalin  50 mg Oral BID    guaiFENesin ER  600 mg Oral BID    piperacillin-tazobactam  3.375 g Intravenous Q8H    metoprolol tartrate  50 mg Oral Daily Beta Blocker     Continuous Infusions:   PRN Meds:   polyethylene glycol (PEG 3350)    bisacodyl    acetaminophen **OR** acetaminophen    fentaNYL    morphINE **OR** morphINE **OR** morphINE    ondansetron    metoclopramide    benzonatate    guaiFENesin    PHYSICAL EXAM  Vital signs: Temp:  [97.8 °F (36.6 °C)-98.4 °F (36.9 °C)] 97.8 °F (36.6 °C)  Pulse:  [71-80] 74  Resp:  [16-20] 20  BP: ()/(61-78) 105/71  SpO2:  [92 %-100 %] 100 %      GENERAL - NAD, AAO  EYES- sclera anicteric,   HENT- normocephalic, OP - dry  NECK - supple  CV- RRR  RESP - CTAB, normal resp effort  ABDOMEN- soft, NT/ND   EXT- no LE edema   PSYCH - normal mentation/ normal affect    Data Review:   Labs:   Recent Labs   Lab 08/23/24  1619 08/24/24  0501 08/26/24  0440 08/28/24  0940   WBC 5.1 3.3* 3.4* 3.8*   HGB 10.2* 9.2* 8.9* 11.3*   MCV 95.4 99.0 95.5 98.9   .0 127.0* 124.0* 172.0   INR 3.56*  --  1.33*  --        Recent Labs   Lab 08/23/24  1619 08/24/24  0501 08/25/24  0610 08/26/24  0440 08/27/24  0545    140  --  136  --    K 4.3 3.7 3.9 3.8 3.9    107  --  105  --    CO2 31.0 32.0  --  29.0  --    BUN 20 14  --  14  --    CREATSERUM 0.85 0.69*  --  0.73  --    CA  9.4 8.8  --  8.6*  --    MG  --  1.8 2.0 2.0  --    * 81  --  106*  --        Recent Labs   Lab 08/23/24  1619 08/24/24  0501   ALT 13 7*   AST 58* 42*   ALB 3.7 2.9*       No results for input(s): \"PGLU\" in the last 168 hours.        ASSESSMENT/PLAN:    84 year old male with history of cad, chronic atrial fibrillation, gerd, htn, hld, hx lupus and hx parkinsons disease presenting with progressive sob.     SOB  Hemothorax due to fall and rib fx    -cxr with large right effusion  -infection present as well??  -iv abx  -pulmonary consulted--> s/p ultrasound thora 8/26 c 1.5L thin blood colored fluid femoved   -xarelto was on hold   - tylenol prn pain   -supp o2 as needed, was on 3L now RA  - f/u cultures and cytology  - ngtd  - CXR 8/27 no sig PNTX     Hematuria--> resolved  -etiology uncertain  -ua abn but pt with chronic edmond for retention recently  -urology consulted--> continue current tx, tx constipation      Rib fxs  - pain control     Poor po intake, possible orthostasis / low uop  -possibly orthostatic pos  Will bolud 500 ml and give 1L ivfs       CAD  -metoprolol   -atorvastatin      Chronic Atrial fibrillation  -xarelto on hold  -metoprolol      HTN  -sbp stable  -metoprolol      HLD  -zetia  -niacin  -atorvastatin      Parkinson's Disease  -continue home meds     Enterococcus UTI   - on zosyn      Scds      Will continue to follow while hospitalized. Please page me or the on-call hospitalist with questions or concerns.    Aroldo Mcnulty Hospitalist  [phone]  Answering Service: [phone]

## 2024-08-28 NOTE — CM/SW NOTE
NAHUM spoke with Marie at [facility] [phone] to provide brief update on pt progress. Confirmed pt is able to return at discharge. Informed Marie that pt is set up with [facility].     RN to call report at discharge: [phone]    Sharon Cabrera MSW, LSW  Discharge Planner

## 2024-08-28 NOTE — PLAN OF CARE
Assumed care of pt around 0730  AxO x2-3 -forgetful, up x1-2 walker  NSR on tele, no cardiac symptoms  IVF today  IV abx  Pt denies pain  Continent of bowel, edmond catheter in place  Fall precautions in place, pt updated on plan of care        Problem: RESPIRATORY - ADULT  Goal: Achieves optimal ventilation and oxygenation  Description: INTERVENTIONS:  - Assess for changes in respiratory status  - Assess for changes in mentation and behavior  - Position to facilitate oxygenation and minimize respiratory effort  - Oxygen supplementation based on oxygen saturation or ABGs  - Provide Smoking Cessation handout, if applicable  - Encourage broncho-pulmonary hygiene including cough, deep breathe, Incentive Spirometry  - Assess the need for suctioning and perform as needed  - Assess and instruct to report SOB or any respiratory difficulty  - Respiratory Therapy support as indicated  - Manage/alleviate anxiety  - Monitor for signs/symptoms of CO2 retention  Outcome: Progressing     Problem: MUSCULOSKELETAL - ADULT  Goal: Return mobility to safest level of function  Description: INTERVENTIONS:  - Assess patient stability and activity tolerance for standing, transferring and ambulating w/ or w/o assistive devices  - Assist with transfers and ambulation using safe patient handling equipment as needed  - Ensure adequate protection for wounds/incisions during mobilization  - Obtain PT/OT consults as needed  - Advance activity as appropriate  - Communicate ordered activity level and limitations with patient/family  Outcome: Progressing  Goal: Maintain proper alignment of affected body part  Description: INTERVENTIONS:  - Support and protect limb and body alignment per provider's orders  - Instruct and reinforce with patient and family use of appropriate assistive device and precautions (e.g. spinal or hip dislocation precautions)  Outcome: Progressing     Problem: Impaired Functional Mobility  Goal: Achieve highest/safest level of  mobility/gait  Description: Interventions:  - Assess patient's functional ability and stability  - Promote increasing activity/tolerance for mobility and gait  - Educate and engage patient/family in tolerated activity level and precautions  Outcome: Progressing

## 2024-08-28 NOTE — PLAN OF CARE
Patient received at 1930. A&Ox2-3, forgetful. Tolerating Room Air. NSR w 1AVB on tele monitor, xarelto on hold. Milan catheter in place, small bm this evening. Ambulating with walker and x1-2 person assist. Frequent rounding and fall precautions in place.       Problem: RESPIRATORY - ADULT  Goal: Achieves optimal ventilation and oxygenation  Description: INTERVENTIONS:  - Assess for changes in respiratory status  - Assess for changes in mentation and behavior  - Position to facilitate oxygenation and minimize respiratory effort  - Oxygen supplementation based on oxygen saturation or ABGs  - Provide Smoking Cessation handout, if applicable  - Encourage broncho-pulmonary hygiene including cough, deep breathe, Incentive Spirometry  - Assess the need for suctioning and perform as needed  - Assess and instruct to report SOB or any respiratory difficulty  - Respiratory Therapy support as indicated  - Manage/alleviate anxiety  - Monitor for signs/symptoms of CO2 retention  Outcome: Progressing

## 2024-08-29 ENCOUNTER — APPOINTMENT (OUTPATIENT)
Dept: GENERAL RADIOLOGY | Facility: HOSPITAL | Age: 84
End: 2024-08-29
Attending: INTERNAL MEDICINE
Payer: MEDICARE

## 2024-08-29 LAB
ANION GAP SERPL CALC-SCNC: 5 MMOL/L (ref 0–18)
BUN BLD-MCNC: 17 MG/DL (ref 9–23)
CALCIUM BLD-MCNC: 8.5 MG/DL (ref 8.7–10.4)
CHLORIDE SERPL-SCNC: 106 MMOL/L (ref 98–112)
CO2 SERPL-SCNC: 26 MMOL/L (ref 21–32)
CREAT BLD-MCNC: 0.72 MG/DL
EGFRCR SERPLBLD CKD-EPI 2021: 90 ML/MIN/1.73M2 (ref 60–?)
ERYTHROCYTE [DISTWIDTH] IN BLOOD BY AUTOMATED COUNT: 13.9 %
GLUCOSE BLD-MCNC: 97 MG/DL (ref 70–99)
HCT VFR BLD AUTO: 31.5 %
HGB BLD-MCNC: 10 G/DL
MAGNESIUM SERPL-MCNC: 1.8 MG/DL (ref 1.6–2.6)
MCH RBC QN AUTO: 31.7 PG (ref 26–34)
MCHC RBC AUTO-ENTMCNC: 31.7 G/DL (ref 31–37)
MCV RBC AUTO: 100 FL
NON GYNE INTERPRETATION: NEGATIVE
OSMOLALITY SERPL CALC.SUM OF ELEC: 285 MOSM/KG (ref 275–295)
PLATELET # BLD AUTO: 143 10(3)UL (ref 150–450)
PLATELETS.RETICULATED NFR BLD AUTO: 1.4 % (ref 0–7)
POTASSIUM SERPL-SCNC: 3.8 MMOL/L (ref 3.5–5.1)
RBC # BLD AUTO: 3.15 X10(6)UL
SODIUM SERPL-SCNC: 137 MMOL/L (ref 136–145)
WBC # BLD AUTO: 4.7 X10(3) UL (ref 4–11)

## 2024-08-29 PROCEDURE — 71045 X-RAY EXAM CHEST 1 VIEW: CPT | Performed by: INTERNAL MEDICINE

## 2024-08-29 RX ORDER — POTASSIUM CHLORIDE 1.5 G/1.58G
40 POWDER, FOR SOLUTION ORAL ONCE
Status: COMPLETED | OUTPATIENT
Start: 2024-08-29 | End: 2024-08-29

## 2024-08-29 RX ORDER — MAGNESIUM OXIDE 400 MG/1
400 TABLET ORAL ONCE
Status: COMPLETED | OUTPATIENT
Start: 2024-08-29 | End: 2024-08-29

## 2024-08-29 RX ORDER — SODIUM CHLORIDE 9 MG/ML
INJECTION, SOLUTION INTRAVENOUS ONCE
Status: COMPLETED | OUTPATIENT
Start: 2024-08-29 | End: 2024-08-29

## 2024-08-29 NOTE — PROGRESS NOTES
[facility]    Reginaldo Hdz Patient Status:  Inpatient    8/10/1940 MRN [id number]   Location [facility] 8NE-A Attending Stevenson Dowell MD   Hosp Day # 6 PCP Olvin Dinh MD     SUBJECTIVE: Pt states that breathing feels comfortable, notes that he will intermittently have \"hard breathing.\"  No chest pain.     OBJECTIVE:  /77 (BP Location: Right arm)   Pulse 72   Temp 97.6 °F (36.4 °C) (Oral)   Resp 20   Ht 67\"   Wt 143 lb (64.9 kg)   SpO2 93%   BMI 22.40 kg/m²   O2 requirement: RA     I/O last 3 completed shifts:  In: 360 [P.O.:360]  Out: 1450 [Urine:1450]  No intake/output data recorded.     Current Medications:   Current Facility-Administered Medications:     potassium chloride (Klor-Con) 20 MEQ oral powder 40 mEq, 40 mEq, Oral, Once    magnesium oxide (Mag-Ox) tab 400 mg, 400 mg, Oral, Once    docusate sodium (Colace) cap 100 mg, 100 mg, Oral, BID    polyethylene glycol (PEG 3350) (Miralax) 17 g oral packet 17 g, 17 g, Oral, Daily PRN    bisacodyl (Dulcolax) 10 MG rectal suppository 10 mg, 10 mg, Rectal, Daily PRN    acetaminophen (Tylenol Extra Strength) tab 500 mg, 500 mg, Oral, Q4H PRN **OR** acetaminophen (Tylenol Extra Strength) tab 1,000 mg, 1,000 mg, Oral, Q4H PRN    melatonin cap/tab 5 mg, 5 mg, Oral, Nightly    carbidopa-levodopa ER (SINEMET CR)  MG per tab 2 tablet, 2 tablet, Oral, BID    sennosides (Senokot) tab 8.6 mg, 8.6 mg, Oral, BID    ezetimibe (Zetia) tab 10 mg, 10 mg, Oral, Daily    atorvastatin (Lipitor) tab 20 mg, 20 mg, Oral, Nightly    niacin ER (Niaspan) tab 500 mg, 500 mg, Oral, BID with meals    fentaNYL (Sublimaze) 50 mcg/mL injection 50 mcg, 50 mcg, Intravenous, Q1H PRN    carbidopa-levodopa (SINEMET)  MG per tab 1.5 tablet, 1.5 tablet, Oral, 4x daily    pantoprazole (Protonix) DR tab 40 mg, 40 mg, Oral, QAM AC    pregabalin (Lyrica) cap 50 mg, 50 mg, Oral, BID    morphINE PF 2 MG/ML injection 1 mg, 1 mg, Intravenous, Q2H PRN **OR** morphINE  PF 2 MG/ML injection 2 mg, 2 mg, Intravenous, Q2H PRN **OR** morphINE PF 4 MG/ML injection 4 mg, 4 mg, Intravenous, Q2H PRN    ondansetron (Zofran) 4 MG/2ML injection 4 mg, 4 mg, Intravenous, Q6H PRN    metoclopramide (Reglan) 5 mg/mL injection 5 mg, 5 mg, Intravenous, Q8H PRN    benzonatate (Tessalon) cap 200 mg, 200 mg, Oral, TID PRN    guaiFENesin (Robitussin) 100 MG/5 ML oral liquid 200 mg, 200 mg, Oral, Q4H PRN    guaiFENesin ER (Mucinex) 12 hr tab 600 mg, 600 mg, Oral, BID    piperacillin-tazobactam (Zosyn) 3.375 g in dextrose 5% 100 mL IVPB-ADDV, 3.375 g, Intravenous, Q8H    metoprolol tartrate (Lopressor) tab 50 mg, 50 mg, Oral, Daily Beta Blocker      Physical Exam:                          General: alert, cooperative, in NAD                          HEENT: oropharynx clear without erythema or exudates, moist mucous membranes                          Lungs: diminished over right base                          Chest wall: No tenderness or deformity.                          Heart: irregular                          Abdomen: soft, non-tender, non-distended, positive BS.                          Extremity: No clubbing or cyanosis. trace edema                          Skin: No rashes or lesions.       Lab Results   Component Value Date    WBC 4.7 08/29/2024    RBC 3.15 08/29/2024    HGB 10.0 08/29/2024    HCT 31.5 08/29/2024    .0 08/29/2024    MCH 31.7 08/29/2024    MCHC 31.7 08/29/2024    RDW 13.9 08/29/2024    .0 08/29/2024     Lab Results   Component Value Date     08/29/2024    K 3.8 08/29/2024     08/29/2024    CO2 26.0 08/29/2024    BUN 17 08/29/2024    CREATSERUM 0.72 08/29/2024    GLU 97 08/29/2024    CA 8.5 08/29/2024     Lab Results   Component Value Date    INR 1.33 (H) 08/26/2024    INR 3.56 (H) 08/23/2024    INR 3.57 (H) 08/14/2024          Imaging: I have independently visualized all relevant chest imaging in PACS.  I agree with the radiology interpretation except where  noted.     ASSESSMENT/PLAN:  Pleural Effusion: 2/2 hemothorax following fall with rib fractures   -s/p right thora with 1.5L thin dark blood removed 8/26  -recent echo with preserved EF, PASP: 35-40mmHg  -xarelto had been on hold, ok to resume from my perspective   -Cultures NGTD and cytology without evidence of malignancy  -post thora CXR with ?tiny PTX, repeat CXR 8/27 without evidence of PTX  Atelectasis vs infiltrate  -has low WBC  -afebrile  -also w/ Enterococcus UTI- abx per hospitalist  - Zosyn 8/24-  Anemia  -improved without transufsion. ?volume depleted.   -monitor H/H  -ok from pulm perspective to resume xarelto  Rib fractures  -pain control  A-fib  -xarelto  Dispo:  -will follow       Dora Valencia MD  8/29/2024  8:30 AM

## 2024-08-29 NOTE — PLAN OF CARE
Acquired patient around 0730. Alert to person, place, time, situation, at this time. Episodes of confusion. On Ra. SpO2 within normal limits. SB on tele. Pt denies cp. Pt JONAS. Continent of bowel. Milan. IV abx. Call light within reach. Continue plan of care.     1221: Updated family member Chloé on patient care.    1310: Pt has little output. IVF started. Xeralto started as well.      Problem: RESPIRATORY - ADULT  Goal: Achieves optimal ventilation and oxygenation  Description: INTERVENTIONS:  - Assess for changes in respiratory status  - Assess for changes in mentation and behavior  - Position to facilitate oxygenation and minimize respiratory effort  - Oxygen supplementation based on oxygen saturation or ABGs  - Provide Smoking Cessation handout, if applicable  - Encourage broncho-pulmonary hygiene including cough, deep breathe, Incentive Spirometry  - Assess the need for suctioning and perform as needed  - Assess and instruct to report SOB or any respiratory difficulty  - Respiratory Therapy support as indicated  - Manage/alleviate anxiety  - Monitor for signs/symptoms of CO2 retention  Outcome: Progressing     Problem: MUSCULOSKELETAL - ADULT  Goal: Return mobility to safest level of function  Description: INTERVENTIONS:  - Assess patient stability and activity tolerance for standing, transferring and ambulating w/ or w/o assistive devices  - Assist with transfers and ambulation using safe patient handling equipment as needed  - Ensure adequate protection for wounds/incisions during mobilization  - Obtain PT/OT consults as needed  - Advance activity as appropriate  - Communicate ordered activity level and limitations with patient/family  Outcome: Progressing  Goal: Maintain proper alignment of affected body part  Description: INTERVENTIONS:  - Support and protect limb and body alignment per provider's orders  - Instruct and reinforce with patient and family use of appropriate assistive device and precautions  (e.g. spinal or hip dislocation precautions)  Outcome: Progressing     Problem: Impaired Functional Mobility  Goal: Achieve highest/safest level of mobility/gait  Description: Interventions:  - Assess patient's functional ability and stability  - Promote increasing activity/tolerance for mobility and gait  - Educate and engage patient/family in tolerated activity level and precautions  - Recommend use of chair position in bed 3 times per day  Outcome: Progressing     Problem: PAIN - ADULT  Goal: Verbalizes/displays adequate comfort level or patient's stated pain goal  Description: INTERVENTIONS:  - Encourage pt to monitor pain and request assistance  - Assess pain using appropriate pain scale  - Administer analgesics based on type and severity of pain and evaluate response  - Implement non-pharmacological measures as appropriate and evaluate response  - Consider cultural and social influences on pain and pain management  - Manage/alleviate anxiety  - Utilize distraction and/or relaxation techniques  - Monitor for opioid side effects  - Notify MD/LIP if interventions unsuccessful or patient reports new pain  - Anticipate increased pain with activity and pre-medicate as appropriate  Outcome: Progressing     Problem: RISK FOR INFECTION - ADULT  Goal: Absence of fever/infection during anticipated neutropenic period  Description: INTERVENTIONS  - Monitor WBC  - Administer growth factors as ordered  - Implement neutropenic guidelines  Outcome: Progressing        Problem: DISCHARGE PLANNING  Goal: Discharge to home or other facility with appropriate resources  Description: INTERVENTIONS:  - Identify barriers to discharge w/pt and caregiver  - Include patient/family/discharge partner in discharge planning  - Arrange for needed discharge resources and transportation as appropriate  - Identify discharge learning needs (meds, wound care, etc)  - Arrange for interpreters to assist at discharge as needed  - Consider  post-discharge preferences of patient/family/discharge partner  - Complete POLST form as appropriate  - Assess patient's ability to be responsible for managing their own health  - Refer to Case Management Department for coordinating discharge planning if the patient needs post-hospital services based on physician/LIP order or complex needs related to functional status, cognitive ability or social support system  Outcome: Progressing

## 2024-08-29 NOTE — PROGRESS NOTES
CC: follow-up hospital admission sob, hematuria    SUBJECTIVE:  Interval History:     States po itnake is better  No nv  No sob    OBJECTIVE:  Scheduled Meds:    docusate sodium  100 mg Oral BID    melatonin  5 mg Oral Nightly    carbidopa-levodopa ER  2 tablet Oral BID    sennosides  8.6 mg Oral BID    ezetimibe  10 mg Oral Daily    atorvastatin  20 mg Oral Nightly    niacin ER  500 mg Oral BID with meals    carbidopa-levodopa  1.5 tablet Oral 4x daily    pantoprazole  40 mg Oral QAM AC    pregabalin  50 mg Oral BID    guaiFENesin ER  600 mg Oral BID    piperacillin-tazobactam  3.375 g Intravenous Q8H    metoprolol tartrate  50 mg Oral Daily Beta Blocker     Continuous Infusions:   PRN Meds:   polyethylene glycol (PEG 3350)    bisacodyl    acetaminophen **OR** acetaminophen    fentaNYL    morphINE **OR** morphINE **OR** morphINE    ondansetron    metoclopramide    benzonatate    guaiFENesin    PHYSICAL EXAM  Vital signs: Temp:  [97.4 °F (36.3 °C)-98.5 °F (36.9 °C)] 97.4 °F (36.3 °C)  Pulse:  [62-72] 72  Resp:  [16-20] 16  BP: (104-138)/(62-77) 138/73  SpO2:  [90 %-97 %] 97 %      GENERAL - NAD, AAO  EYES- sclera anicteric,   HENT- normocephalic, OP - dry  NECK - supple  CV- RRR  RESP - decreased at bases, normal resp effort  ABDOMEN- soft, NT/ND   EXT- no LE edema   PSYCH - normal mentation/ normal affect    Data Review:   Labs:   Recent Labs   Lab 08/23/24  1619 08/24/24  0501 08/26/24  0440 08/28/24  0940 08/29/24  0446   WBC 5.1 3.3* 3.4* 3.8* 4.7   HGB 10.2* 9.2* 8.9* 11.3* 10.0*   MCV 95.4 99.0 95.5 98.9 100.0   .0 127.0* 124.0* 172.0 143.0*   INR 3.56*  --  1.33*  --   --        Recent Labs   Lab 08/23/24  1619 08/24/24  0501 08/25/24  0610 08/26/24  0440 08/27/24  0545 08/28/24  0940 08/29/24  0446    140  --  136  --  135* 137   K 4.3 3.7 3.9 3.8 3.9 3.9 3.8    107  --  105  --  104 106   CO2 31.0 32.0  --  29.0  --  20.0* 26.0   BUN 20 14  --  14  --  19 17   CREATSERUM 0.85 0.69*  --   0.73  --  0.81 0.72   CA 9.4 8.8  --  8.6*  --  9.1 8.5*   MG  --  1.8 2.0 2.0  --  2.0 1.8   * 81  --  106*  --  114* 97       Recent Labs   Lab 08/23/24  1619 08/24/24  0501   ALT 13 7*   AST 58* 42*   ALB 3.7 2.9*       No results for input(s): \"PGLU\" in the last 168 hours.        ASSESSMENT/PLAN:    84 year old male with history of cad, chronic atrial fibrillation, gerd, htn, hld, hx lupus and hx parkinsons disease presenting with progressive sob.     SOB  Hemothorax due to fall and rib fx    -cxr with large right effusion  -infection present as well??  -iv abx  -pulmonary consulted--> s/p ultrasound thora 8/26 c 1.5L thin blood colored fluid removed   - tylenol prn pain   -supp o2 as needed, was on 3L now RA  - f/u cultures and cytology  - ngtd  Resume xarelto  - CXR 8/27 no sig PNTX     Hematuria--> resolved  -etiology uncertain  -ua abn but pt with chronic edmond for retention recently  -urology consulted--> continue current tx, tx constipation. Oupt fu for void tiral     Rib fxs  - pain control     Poor po intake, possible orthostasis / low uop  -possibly orthostatic  - improved with IVFs       CAD  -metoprolol   -atorvastatin      Chronic Atrial fibrillation  -resume xarelto  -metoprolol      HTN  -sbp stable  -metoprolol      HLD  -zetia  -niacin  -atorvastatin      Parkinson's Disease  -continue home meds     Enterococcus UTI   - on zosyn      Scds      Will continue to follow while hospitalized. Please page me or the on-call hospitalist with questions or concerns.    Aroldo Mcnulty Hospitalist  [phone]  Answering Service: [phone]

## 2024-08-29 NOTE — PLAN OF CARE
Patient alert and oriented x 3 with period of confusion . On room air. sinus on cardiac monitor. Patient denies any chest pain or chest discomfort at this time. Patient voids via edmond, last BM 8/28. Ivabt continued, sinment on , no acute distress noted. Plan of care updated, all questions answered. Safety precautions in place. Bed alarm on. Will continue to monitor tele/labs/vital signs closely.     Plan: edmond care, discharge plan.[email or web address]    : RESPIRATORY - ADULT  Goal: Achieves optimal ventilation and oxygenation  Description: INTERVENTIONS:  - Assess for changes in respiratory status  - Assess for changes in mentation and behavior  - Position to facilitate oxygenation and minimize respiratory effort  - Oxygen supplementation based on oxygen saturation or ABGs  - Provide Smoking Cessation handout, if applicable  - Encourage broncho-pulmonary hygiene including cough, deep breathe, Incentive Spirometry  - Assess the need for suctioning and perform as needed  - Assess and instruct to report SOB or any respiratory difficulty  - Respiratory Therapy support as indicated  - Manage/alleviate anxiety  - Monitor for signs/symptoms of CO2 retention  Outcome: Progressing     Problem: MUSCULOSKELETAL - ADULT  Goal: Return mobility to safest level of function  Description: INTERVENTIONS:  - Assess patient stability and activity tolerance for standing, transferring and ambulating w/ or w/o assistive devices  - Assist with transfers and ambulation using safe patient handling equipment as needed  - Ensure adequate protection for wounds/incisions during mobilization  - Obtain PT/OT consults as needed  - Advance activity as appropriate  - Communicate ordered activity level and limitations with patient/family  Outcome: Progressing     Problem: RISK FOR INFECTION - ADULT  Goal: Absence of fever/infection during anticipated neutropenic period  Description: INTERVENTIONS  - Monitor WBC  - Administer growth factors as ordered  - Implement  neutropenic guidelines  Outcome: Progressing     Problem: DISCHARGE PLANNING  Goal: Discharge to home or other facility with appropriate resources  Description: INTERVENTIONS:  - Identify barriers to discharge w/pt and caregiver  - Include patient/family/discharge partner in discharge planning  - Arrange for needed discharge resources and transportation as appropriate  - Identify discharge learning needs (meds, wound care, etc)  - Arrange for interpreters to assist at discharge as needed  - Consider post-discharge preferences of patient/family/discharge partner  - Complete POLST form as appropriate  - Assess patient's ability to be responsible for managing their own health  - Refer to Case Management Department for coordinating discharge planning if the patient needs post-hospital services based on physician/LIP order or complex needs related to functional status, cognitive ability or social support system  Outcome: Progressing     Problem: RISK FOR INFECTION - ADULT  Goal: Absence of fever/infection during anticipated neutropenic period  Description: INTERVENTIONS  - Monitor WBC  - Administer growth factors as ordered  - Implement neutropenic guidelines  Outcome: Progressing

## 2024-08-29 NOTE — OCCUPATIONAL THERAPY NOTE
OCCUPATIONAL THERAPY EVALUATION - INPATIENT    Room Number: 8624/8624-A  Evaluation Date: 8/29/2024     Type of Evaluation: Initial  Presenting Problem: shortness of breath, Pleural Effusion: 2/2 hemothorax following fall with rib fractures (earlier this month)    Physician Order: IP Consult to Occupational Therapy  Reason for Therapy:  ADL/IADL Dysfunction and Discharge Planning      OCCUPATIONAL THERAPY ASSESSMENT   Patient is a 84 year old male admitted on 8/23/2024 with Presenting Problem: shortness of breath, Pleural Effusion: 2/2 hemothorax following fall with rib fractures (earlier this month). Co-Morbidities : CAD, Chronic Afib, GERD, HTN, HLD, hx Lupus, hx Parkinson's disease, recent rib fractures  Patient is currently functioning near baseline with  all ADL .  Prior to admission, patient's baseline is supervision UB dressing and showering, min A with LB dressing, and modified independent with RW for ambulation. Pt lives at [facility] assisted living with his wife and 24 hr caregiver.  Patient met all OT goals at baseline level.  Patient reports no further questions/concerns at this time.     Recent admits  8/4-8/6/24: multiple rib fractures due to fall > dc home ([facility])  8/2: ED visit for urinary retention  7/9-7/14: admit for pleural effusion with dc to home  7/1: ED visit for T8 compression fracture- fitted for TLSO brace and dc'ed home  5/19: ED visit for cystitis with dc home  4/20-4/23: Admit for leg pain, with dc to home    WEIGHT BEARING RESTRICTION  Weight Bearing Restriction: None                Recommendations for nursing staff:   Transfers: supervision  Toileting location: Toilet    EVALUATION SESSION:  Patient at start of session: supine  FUNCTIONAL TRANSFER ASSESSMENT  Sit to Stand: Edge of Bed  Edge of Bed: Supervision    BED MOBILITY  Supine to Sit : Supervision    COGNITION  Overall Cognitive Status:  WFL - within functional limits  COGNITION ASSESSMENTS       Upper Extremity:   ROM: within  functional limits   Strength: is within functional limits       EDUCATION PROVIDED  Patient: Role of Occupational Therapy; Plan of Care; Functional Transfer Techniques; Energy Conservation  Patient's Response to Education: Verbalized Understanding    Equipment used: rw  Demonstrates functional use    Therapist comments: pleasant, motivated.  Supervision supine to sit.   Seated /65.  No dizziness.  Supervision to stand and to maintain dynamic standing with RW.   Ambulated in hallway with RW and PT. Standing rest break, HR 56 after sitting down, at rest.   Pt verbalized understanding about energy conservation. Pt mentioned his caregiver assists with LB ADL.   Pt is looking forward to leaving the hospital. Chair alarm on.     Patient End of Session: Up in chair;Needs met;Call light within reach;RN aware of session/findings;All patient questions and concerns addressed;Alarm set    OCCUPATIONAL PROFILE    HOME SITUATION  Type of Home: Assisted living facility ([facility])  Home Layout: One level  Lives With: Spouse;Caregiver 24 hours    Toilet and Equipment: Comfort height toilet  Shower/Tub and Equipment: Walk-in shower;Shower chair  Other Equipment:  (rollator, rw)    Occupation/Status: retired, construction     Drives: No  Patient Regularly Uses: Glasses    Prior Level of Function: Patient's baseline is supervision UB dressing and showering, min A with LB dressing, and modified independent with RW for ambulation. Pt lives at [facility] assisted living with his wife and 24 hr caregiver.       PAIN ASSESSMENT  Ratin          OBJECTIVE  Precautions: Bed/chair alarm  Fall Risk: Standard fall risk    WEIGHT BEARING RESTRICTION  Weight Bearing Restriction: None                AM-PAC ‘6-Clicks’ Inpatient Daily Activity Short Form  -   Putting on and taking off regular lower body clothing?: A Little  -   Bathing (including washing, rinsing, drying)?: A Little  -   Toileting, which includes using toilet, bedpan  or urinal? : A Little (supervision for safety)  -   Putting on and taking off regular upper body clothing?: None  -   Taking care of personal grooming such as brushing teeth?: None  -   Eating meals?: None    AM-PAC Score:  Score: 21  Approx Degree of Impairment: 32.79%  Standardized Score (AM-PAC Scale): 44.27      ADDITIONAL TESTS     NEUROLOGICAL FINDINGS        PLAN   Patient has been evaluated and presents with no skilled Occupational Therapy needs at this time.  Patient discharged from Occupational Therapy services.  Please re-order if a new functional limitation presents during this admission.      Patient Evaluation Complexity Level:   Occupational Profile/Medical History LOW - Brief history including review of medical or therapy records    Specific performance deficits impacting engagement in ADL/IADL LOW  1 - 3 performance deficits    Client Assessment/Performance Deficits LOW - No comorbidities nor modifications of tasks    Clinical Decision Making LOW - Analysis of occupational profile, problem-focused assessments, limited treatment options    Overall Complexity LOW     OT Session Time: 20 minutes  Self-Care Home Management: 3 minutes  Therapeutic Activity: 5 minutes

## 2024-08-30 ENCOUNTER — APPOINTMENT (OUTPATIENT)
Dept: CT IMAGING | Facility: HOSPITAL | Age: 84
End: 2024-08-30
Attending: HOSPITALIST
Payer: MEDICARE

## 2024-08-30 ENCOUNTER — APPOINTMENT (OUTPATIENT)
Dept: GENERAL RADIOLOGY | Facility: HOSPITAL | Age: 84
End: 2024-08-30
Attending: INTERNAL MEDICINE
Payer: MEDICARE

## 2024-08-30 LAB
ANION GAP SERPL CALC-SCNC: 2 MMOL/L (ref 0–18)
BUN BLD-MCNC: 16 MG/DL (ref 9–23)
CALCIUM BLD-MCNC: 8.5 MG/DL (ref 8.7–10.4)
CHLORIDE SERPL-SCNC: 105 MMOL/L (ref 98–112)
CO2 SERPL-SCNC: 30 MMOL/L (ref 21–32)
CREAT BLD-MCNC: 0.75 MG/DL
EGFRCR SERPLBLD CKD-EPI 2021: 88 ML/MIN/1.73M2 (ref 60–?)
ERYTHROCYTE [DISTWIDTH] IN BLOOD BY AUTOMATED COUNT: 13.8 %
GLUCOSE BLD-MCNC: 104 MG/DL (ref 70–99)
HCT VFR BLD AUTO: 30.2 %
HGB BLD-MCNC: 10 G/DL
MAGNESIUM SERPL-MCNC: 1.8 MG/DL (ref 1.6–2.6)
MCH RBC QN AUTO: 31.3 PG (ref 26–34)
MCHC RBC AUTO-ENTMCNC: 33.1 G/DL (ref 31–37)
MCV RBC AUTO: 94.4 FL
OSMOLALITY SERPL CALC.SUM OF ELEC: 285 MOSM/KG (ref 275–295)
PLATELET # BLD AUTO: 112 10(3)UL (ref 150–450)
POTASSIUM SERPL-SCNC: 3.7 MMOL/L (ref 3.5–5.1)
POTASSIUM SERPL-SCNC: 3.7 MMOL/L (ref 3.5–5.1)
RBC # BLD AUTO: 3.2 X10(6)UL
SODIUM SERPL-SCNC: 137 MMOL/L (ref 136–145)
WBC # BLD AUTO: 3.3 X10(3) UL (ref 4–11)

## 2024-08-30 PROCEDURE — 71045 X-RAY EXAM CHEST 1 VIEW: CPT | Performed by: INTERNAL MEDICINE

## 2024-08-30 PROCEDURE — 70450 CT HEAD/BRAIN W/O DYE: CPT | Performed by: HOSPITALIST

## 2024-08-30 RX ORDER — POTASSIUM CHLORIDE 1500 MG/1
40 TABLET, EXTENDED RELEASE ORAL ONCE
Status: COMPLETED | OUTPATIENT
Start: 2024-08-30 | End: 2024-08-30

## 2024-08-30 RX ORDER — MAGNESIUM OXIDE 400 MG/1
400 TABLET ORAL ONCE
Status: COMPLETED | OUTPATIENT
Start: 2024-08-30 | End: 2024-08-30

## 2024-08-30 NOTE — PROGRESS NOTES
[facility]    Reginaldo Hdz Patient Status:  Inpatient    8/10/1940 MRN [id number]   Location [facility] 8NE-A Attending Stevenson Dowell MD   Hosp Day # 7 PCP Olvin Dinh MD     SUBJECTIVE: Pt did not sleep well overnight, denies respiratory complaints.     OBJECTIVE:  /67 (BP Location: Right arm)   Pulse 71   Temp 97.7 °F (36.5 °C) (Axillary)   Resp 20   Ht 67\"   Wt 143 lb (64.9 kg)   SpO2 94%   BMI 22.40 kg/m²   O2 requirement: RA     I/O last 3 completed shifts:  In: 336 [P.O.:336]  Out: 0 [Urine:0]  No intake/output data recorded.     Current Medications:   Current Facility-Administered Medications:     rivaroxaban (Xarelto) tab 20 mg, 20 mg, Oral, Daily with food    docusate sodium (Colace) cap 100 mg, 100 mg, Oral, BID    polyethylene glycol (PEG 3350) (Miralax) 17 g oral packet 17 g, 17 g, Oral, Daily PRN    bisacodyl (Dulcolax) 10 MG rectal suppository 10 mg, 10 mg, Rectal, Daily PRN    acetaminophen (Tylenol Extra Strength) tab 500 mg, 500 mg, Oral, Q4H PRN **OR** acetaminophen (Tylenol Extra Strength) tab 1,000 mg, 1,000 mg, Oral, Q4H PRN    melatonin cap/tab 5 mg, 5 mg, Oral, Nightly    carbidopa-levodopa ER (SINEMET CR)  MG per tab 2 tablet, 2 tablet, Oral, BID    sennosides (Senokot) tab 8.6 mg, 8.6 mg, Oral, BID    ezetimibe (Zetia) tab 10 mg, 10 mg, Oral, Daily    atorvastatin (Lipitor) tab 20 mg, 20 mg, Oral, Nightly    niacin ER (Niaspan) tab 500 mg, 500 mg, Oral, BID with meals    fentaNYL (Sublimaze) 50 mcg/mL injection 50 mcg, 50 mcg, Intravenous, Q1H PRN    carbidopa-levodopa (SINEMET)  MG per tab 1.5 tablet, 1.5 tablet, Oral, 4x daily    pantoprazole (Protonix) DR tab 40 mg, 40 mg, Oral, QAM AC    pregabalin (Lyrica) cap 50 mg, 50 mg, Oral, BID    morphINE PF 2 MG/ML injection 1 mg, 1 mg, Intravenous, Q2H PRN **OR** morphINE PF 2 MG/ML injection 2 mg, 2 mg, Intravenous, Q2H PRN **OR** morphINE PF 4 MG/ML injection 4 mg, 4 mg, Intravenous, Q2H  PRN    ondansetron (Zofran) 4 MG/2ML injection 4 mg, 4 mg, Intravenous, Q6H PRN    metoclopramide (Reglan) 5 mg/mL injection 5 mg, 5 mg, Intravenous, Q8H PRN    benzonatate (Tessalon) cap 200 mg, 200 mg, Oral, TID PRN    guaiFENesin (Robitussin) 100 MG/5 ML oral liquid 200 mg, 200 mg, Oral, Q4H PRN    guaiFENesin ER (Mucinex) 12 hr tab 600 mg, 600 mg, Oral, BID    piperacillin-tazobactam (Zosyn) 3.375 g in dextrose 5% 100 mL IVPB-ADDV, 3.375 g, Intravenous, Q8H    metoprolol tartrate (Lopressor) tab 50 mg, 50 mg, Oral, Daily Beta Blocker      Physical Exam:                          General: alert, cooperative, in NAD                          HEENT: oropharynx clear without erythema or exudates, moist mucous membranes                          Lungs: diminished over right base                          Chest wall: No tenderness or deformity.                          Heart: irregular                          Abdomen: soft, non-tender, non-distended, positive BS.                          Extremity: No clubbing or cyanosis. trace edema                          Skin: No rashes or lesions.       Lab Results   Component Value Date    WBC 3.3 08/30/2024    RBC 3.20 08/30/2024    HGB 10.0 08/30/2024    HCT 30.2 08/30/2024    MCV 94.4 08/30/2024    MCH 31.3 08/30/2024    MCHC 33.1 08/30/2024    RDW 13.8 08/30/2024    .0 08/30/2024     Lab Results   Component Value Date     08/30/2024    K 3.7 08/30/2024    K 3.7 08/30/2024     08/30/2024    CO2 30.0 08/30/2024    BUN 16 08/30/2024    CREATSERUM 0.75 08/30/2024     08/30/2024    CA 8.5 08/30/2024     Lab Results   Component Value Date    INR 1.33 (H) 08/26/2024    INR 3.56 (H) 08/23/2024    INR 3.57 (H) 08/14/2024          Imaging: I have independently visualized all relevant chest imaging in PACS.  I agree with the radiology interpretation except where noted.     ASSESSMENT/PLAN:  Pleural Effusion: 2/2 hemothorax following fall with rib fractures    -s/p right thora with 1.5L thin dark blood removed 8/26  -recent echo with preserved EF, PASP: 35-40mmHg  -xarelto had been on hold, resumed 8/29  -Cultures NGTD and cytology without evidence of malignancy  -post thora CXR with ?tiny PTX, repeat CXR 8/27 without evidence of PTX  Atelectasis vs infiltrate  -has low WBC  -afebrile  -also w/ Enterococcus UTI- abx per hospitalist  - Zosyn 8/24-  Anemia  -improved without transufsion. ?volume depleted.   -monitor H/H  -xarelto resumed 8/29 ,hemoglobin stable today   Rib fractures  -pain control  A-fib  -xarelto  Dispo:  -as long as chest xray is stable today, would be stable from pulm perspective for discharge  -will follow peripherally  -pt to follow up with Dr Eliazar MAK in 2 weeks       Dora Valencia MD

## 2024-08-30 NOTE — PHYSICAL THERAPY NOTE
Attempted to see patient for PT this afternoon. Rn requested to hold due to  r/o CVA.     Addendum: CT (-) of acute abnormality. RN approved session.

## 2024-08-30 NOTE — CM/SW NOTE
[facility]     Sharon Cabrera, MSW, LSW  Discharge Planner

## 2024-08-30 NOTE — PROGRESS NOTES
CC: follow-up hospital admission sob, hematuria    SUBJECTIVE:  Interval History:     Pt having episodes of confusion  Talking about farmers   He is bradycardic, down to 30s when in room  No dizziness, cp     OBJECTIVE:  Scheduled Meds:    rivaroxaban  20 mg Oral Daily with food    docusate sodium  100 mg Oral BID    melatonin  5 mg Oral Nightly    carbidopa-levodopa ER  2 tablet Oral BID    sennosides  8.6 mg Oral BID    ezetimibe  10 mg Oral Daily    atorvastatin  20 mg Oral Nightly    niacin ER  500 mg Oral BID with meals    carbidopa-levodopa  1.5 tablet Oral 4x daily    pantoprazole  40 mg Oral QAM AC    pregabalin  50 mg Oral BID    guaiFENesin ER  600 mg Oral BID    piperacillin-tazobactam  3.375 g Intravenous Q8H    [Held by provider] metoprolol tartrate  50 mg Oral Daily Beta Blocker     Continuous Infusions:   PRN Meds:   polyethylene glycol (PEG 3350)    bisacodyl    acetaminophen **OR** acetaminophen    fentaNYL    morphINE **OR** morphINE **OR** morphINE    ondansetron    metoclopramide    benzonatate    guaiFENesin    PHYSICAL EXAM  Vital signs: Temp:  [97.7 °F (36.5 °C)-98.4 °F (36.9 °C)] 98.4 °F (36.9 °C)  Pulse:  [64-77] 64  Resp:  [14-24] 18  BP: (117-147)/(51-76) 147/76  SpO2:  [90 %-99 %] 99 %      GENERAL - NAD, AAO  EYES- sclera anicteric,   HENT- normocephalic, OP - dry  NECK - supple  CV- hyacinth  RESP - decreased at bases, normal resp effort  ABDOMEN- soft, NT/ND   EXT- no LE edema   PSYCH - normal mentation/ normal affect    Data Review:   Labs:   Recent Labs   Lab 08/23/24  1619 08/24/24  0501 08/26/24  0440 08/28/24  0940 08/29/24  0446 08/30/24  0530   WBC 5.1 3.3* 3.4* 3.8* 4.7 3.3*   HGB 10.2* 9.2* 8.9* 11.3* 10.0* 10.0*   MCV 95.4 99.0 95.5 98.9 100.0 94.4   .0 127.0* 124.0* 172.0 143.0* 112.0*   INR 3.56*  --  1.33*  --   --   --        Recent Labs   Lab 08/24/24  0501 08/25/24  0610 08/26/24  0440 08/27/24  0545 08/28/24  0940 08/29/24  0446 08/30/24  0531     --  136  --   135* 137 137   K 3.7 3.9 3.8 3.9 3.9 3.8 3.7  3.7     --  105  --  104 106 105   CO2 32.0  --  29.0  --  20.0* 26.0 30.0   BUN 14  --  14  --  19 17 16   CREATSERUM 0.69*  --  0.73  --  0.81 0.72 0.75   CA 8.8  --  8.6*  --  9.1 8.5* 8.5*   MG 1.8 2.0 2.0  --  2.0 1.8 1.8   GLU 81  --  106*  --  114* 97 104*       Recent Labs   Lab 08/23/24  1619 08/24/24  0501   ALT 13 7*   AST 58* 42*   ALB 3.7 2.9*       No results for input(s): \"PGLU\" in the last 168 hours.        ASSESSMENT/PLAN:    84 year old male with history of cad, chronic atrial fibrillation, gerd, htn, hld, hx lupus and hx parkinsons disease presenting with progressive sob.     SOB  Hemothorax due to fall and rib fx    -cxr with large right effusion  -infection present as well??  -iv abx  -pulmonary consulted--> s/p ultrasound thora 8/26 c 1.5L thin blood colored fluid removed   - tylenol prn pain   -supp o2 as needed, was on 3L now RA  - f/u cultures and cytology  - ngtd  Resume xarelto  - CXR 8/27 no sig PNTX     Hematuria--> resolved  -etiology uncertain  -ua abn but pt with chronic edmond for retention recently  -urology consulted--> continue current tx, tx constipation. Oupt fu for void tiral     Rib fxs  - pain control     Poor po intake, possible orthostasis / low uop  -possibly orthostatic  - improved with IVFs     CAD  -metoprolol   -atorvastatin      Chronic Atrial fibrillation  -resume xarelto  -metoprolol -> hold given bradycardia. Consult cards  -EKG with sinus hyacinth initially then slow AF     HTN  -sbp stable  -metoprolol on hold as above     HLD  -zetia  -niacin  -atorvastatin      Parkinson's Disease  -continue home meds  -check CT head today given confusion     Enterococcus UTI   - on zosyn -> finish today    TCP  -trend, likely reactive     Scds        Will continue to follow while hospitalized. Please page me or the on-call hospitalist with questions or concerns.    Aroldo Mcnulty Hospitalist  [phone]  Answering  Service: [phone]

## 2024-08-30 NOTE — PLAN OF CARE
Received patient at 1930.  Alert and oriented x 3, NOC shift Pt is forgetful. Tele Rhythm NSR, oxygen maintained on room air. Breath sounds are diminished. Skin is intact. Last bowel movement 08/29. Patient reports no cardiac symptoms, No chest pain or shortness of breath. Bed is locked and in low position. Call light and personal items within reach. Pt up with assist and walker, gaitbelt.  Reviewed plan of care and patient verbalizes understanding.

## 2024-08-30 NOTE — PHYSICAL THERAPY NOTE
PHYSICAL THERAPY TREATMENT NOTE - INPATIENT    Room Number: 8624/8624-A     Session: 2    Number of Visits to Meet Established Goals: 4    Presenting Problem: progressive SOB  Co-Morbidities : CAD, Chronic Afib, GERD, HTN, HLD, hx Lupus, hx Parkinson's disease, recent rib fractures    History related to current admission: Patient is a 84 year old male admitted on 8/23/2024 from [facility] for progressive SOB.  Pt diagnosed with pleural effusion. Plan for thoracentesis Monday per chart.     Recent admits  8/4-8/6/24: multiple rib fractures due to fall > dc home ([facility])  8/2: ED visit for urinary retention  7/9-7/14: admit for pleural effusion with dc to home  7/1: ED visit for T8 compression fracture- fitted for TLSO brace and dc'ed home  5/19: ED visit for cystitis with dc home  4/20-4/23: Admit for leg pain, with dc to home     HOME SITUATION  Type of Home: Assisted living facility   Home Layout: One level     Lives With: Spouse;Caregiver 24 hours;Staff 24 hours  Drives: No  Patient Owned Equipment: Rolling walker;Rollator;Wheelchair     Prior Level of [place]: Pt currently lives with spouse and 24-hr caregiver at [facility]; pt typically ambulates with rollator (but also has RW) to dining d[employer]las for meals. Pt does require assistance at time for transfers.    ASSESSMENT   Patient demonstrates good  progress this session, goals remain in progress.    Patient continues to function below baseline with bed mobility, transfers, gait, and standing prolonged periods. Contributing factors to remaining limitations include decreased functional strength, decreased endurance/aerobic capacity, impaired   balance, and decreased muscular endurance.  Next session anticipate patient to progress bed mobility, transfers, and gait.  Physical Therapy will continue to follow patient for duration of hospitalization.    Patient continues to benefit from continued skilled PT services: at discharge to promote prior level of function and safety with  additional support and return home with home health PT.    PLAN  PT Treatment Plan: Bed mobility;Body mechanics;Endurance;Energy conservation;Patient education;Family education;Gait training;Range of motion;Strengthening;Transfer training;Balance training  Rehab Potential : Fair  Frequency (Obs): 3-5x/week    CURRENT GOALS   Goal #1 Patient is able to demonstrate supine - sit EOB @ level: supervision   ongoing   Goal #2 Patient is able to demonstrate transfers Sit to/from Stand at assistance level: minimum assistance   MET   Goal #3 Patient is able to ambulate 150 feet with assist device: walker - rolling at assistance level: supervision   ongoing   Goal #4     Goal #5     Goal #6     Goal Comments: Goals established on 2024 see above    SUBJECTIVE      \" I don't know why they don't just let me walk alone.\"  OBJECTIVE  Precautions: Bed/chair alarm    WEIGHT BEARING RESTRICTION  Weight Bearing Restriction: None                PAIN ASSESSMENT   Ratin  Location: DENIED       BALANCE                                                                                                                       Static Sitting: Fair +  Dynamic Sitting: Fair -           Static Standing: Fair -  Dynamic Standing: Poor +    ACTIVITY TOLERANCE  Sitting 106/69  After standing, in sitting 102/62      Reported of lightheadedness      O2 WALK         AM-PAC '6-Clicks' INPATIENT SHORT FORM - BASIC MOBILITY  How much difficulty does the patient currently have...  Patient Difficulty: Turning over in bed (including adjusting bedclothes, sheets and blankets)?: None   Patient Difficulty: Sitting down on and standing up from a chair with arms (e.g., wheelchair, bedside commode, etc.): A Little   Patient Difficulty: Moving from lying on back to sitting on the side of the bed?: A Little   How much help from another person does the patient currently need...   Help from Another: Moving to and from a bed to a chair (including a  wheelchair)?: A Little   Help from Another: Need to walk in hospital room?: A Little   Help from Another: Climbing 3-5 steps with a railing?: Total       AM-PAC Score:  Raw Score: 17   Approx Degree of Impairment: 50.57%   Standardized Score (AM-PAC Scale): 42.13   CMS Modifier (G-Code): CK    FUNCTIONAL ABILITY STATUS  Gait Assessment   Functional Mobility/Gait Assessment  Gait Assistance: Contact guard assist  Distance (ft): 130  Assistive Device: Rolling walker  Pattern: Shuffle    Skilled Therapy Provided: CT negative of acute process.  Bed Mobility:  Rolling: NT   Supine<>Sit: min A   Sit<>Supine: sup     Transfer Mobility:  Sit<>Stand: CGA    Stand<>Sit: CGA   Gait: RW and cga. Chair follow due to lightheadedness. Symptoms remained but didn't worsen. Patient needs assist to clear objects in both sides, pt attributes it to malfunctioning of wheels.     Patient performed exercises in sitting, standing and supine.   LAQ, heel slides, SLR and standing marching.   OH reach, elbow flex x 10 reps.     Therapist's Comments: Pt educated on role of therapy, goals for session, safety, fall prevention, and activity recommendations.     Patient End of Session: In bed;Needs met;Call light within reach;RN aware of session/findings;All patient questions and concerns addressed;Alarm set    PT Session Time: 25 minutes  Therapeutic Activity: 10 minutes  Gait training 10 min  Exercises 6 min.

## 2024-08-30 NOTE — PROGRESS NOTES
Acquired patient around 0730. Alert to person, place, time, situation, at this time. Episodes of confusion. On Ra. SpO2 within normal limits. SB w 1st AVB on tele. Pt denies cp. Pt JONAS. Continent of bowel. [place]. IV abx. Call light within reach. Continue plan of care.

## 2024-08-31 LAB
ANION GAP SERPL CALC-SCNC: <0 MMOL/L (ref 0–18)
ATRIAL RATE: 64 BPM
BUN BLD-MCNC: 16 MG/DL (ref 9–23)
CALCIUM BLD-MCNC: 8.6 MG/DL (ref 8.7–10.4)
CHLORIDE SERPL-SCNC: 106 MMOL/L (ref 98–112)
CO2 SERPL-SCNC: 35 MMOL/L (ref 21–32)
CREAT BLD-MCNC: 0.82 MG/DL
EGFRCR SERPLBLD CKD-EPI 2021: 86 ML/MIN/1.73M2 (ref 60–?)
ERYTHROCYTE [DISTWIDTH] IN BLOOD BY AUTOMATED COUNT: 13.7 %
GLUCOSE BLD-MCNC: 108 MG/DL (ref 70–99)
HCT VFR BLD AUTO: 29.1 %
HGB BLD-MCNC: 9.6 G/DL
MAGNESIUM SERPL-MCNC: 1.9 MG/DL (ref 1.6–2.6)
MCH RBC QN AUTO: 31.1 PG (ref 26–34)
MCHC RBC AUTO-ENTMCNC: 33 G/DL (ref 31–37)
MCV RBC AUTO: 94.2 FL
OSMOLALITY SERPL CALC.SUM OF ELEC: 286 MOSM/KG (ref 275–295)
P AXIS: 56 DEGREES
P-R INTERVAL: 276 MS
PLATELET # BLD AUTO: 102 10(3)UL (ref 150–450)
POTASSIUM SERPL-SCNC: 4 MMOL/L (ref 3.5–5.1)
POTASSIUM SERPL-SCNC: 4 MMOL/L (ref 3.5–5.1)
Q-T INTERVAL: 454 MS
QRS DURATION: 82 MS
QTC CALCULATION (BEZET): 434 MS
R AXIS: -38 DEGREES
RBC # BLD AUTO: 3.09 X10(6)UL
SODIUM SERPL-SCNC: 137 MMOL/L (ref 136–145)
T AXIS: -14 DEGREES
VENTRICULAR RATE: 55 BPM
WBC # BLD AUTO: 3.4 X10(3) UL (ref 4–11)

## 2024-08-31 NOTE — PLAN OF CARE
Received patient at 0730. Alert and oriented x3. Tele rhythm NSR. O2 saturation 94%. Breath sounds diminished. Bed is locked and in low position. Call light and personal belongings in reach. No c/o chest pain or shortness of breath. Pt voiding with no issue. Pt ambulated in room with no issues. Skin dry and intact. Care plan reviewed, pt verbalizes understanding.

## 2024-08-31 NOTE — PROGRESS NOTES
CC: follow-up hospital admission sob, hematuria    SUBJECTIVE:  Interval History:     Sleepy, no pain, breathing ok. Once in while, takes a deep breath and better     Says in Hospital in [facility], yr 2020     OBJECTIVE:  Scheduled Meds:    rivaroxaban  20 mg Oral Daily with food    docusate sodium  100 mg Oral BID    melatonin  5 mg Oral Nightly    carbidopa-levodopa ER  2 tablet Oral BID    sennosides  8.6 mg Oral BID    ezetimibe  10 mg Oral Daily    atorvastatin  20 mg Oral Nightly    niacin ER  500 mg Oral BID with meals    carbidopa-levodopa  1.5 tablet Oral 4x daily    pantoprazole  40 mg Oral QAM AC    pregabalin  50 mg Oral BID    guaiFENesin ER  600 mg Oral BID    [Held by provider] metoprolol tartrate  50 mg Oral Daily Beta Blocker     Continuous Infusions:   PRN Meds:   polyethylene glycol (PEG 3350)    bisacodyl    acetaminophen **OR** acetaminophen    fentaNYL    morphINE **OR** morphINE **OR** morphINE    ondansetron    metoclopramide    benzonatate    guaiFENesin    PHYSICAL EXAM  Vital signs: Temp:  [97.3 °F (36.3 °C)-98.4 °F (36.9 °C)] 98.1 °F (36.7 °C)  Pulse:  [51-81] 81  Resp:  [18-20] 20  BP: (102-147)/(55-81) 124/62  SpO2:  [94 %-99 %] 95 %      GENERAL - NAD, AAO  EYES- sclera anicteric,   HENT- normocephalic, OP - dry  NECK - supple  CV- hyacinth  RESP - decreased at bases, normal resp effort  ABDOMEN- soft, NT/ND   EXT- no LE edema   PSYCH - normal mentation/ normal affect    Data Review:   Labs:   Recent Labs   Lab 08/26/24  0440 08/28/24  0940 08/29/24  0446 08/30/24  0530 08/31/24  0440   WBC 3.4* 3.8* 4.7 3.3* 3.4*   HGB 8.9* 11.3* 10.0* 10.0* 9.6*   MCV 95.5 98.9 100.0 94.4 94.2   .0* 172.0 143.0* 112.0* 102.0*   INR 1.33*  --   --   --   --        Recent Labs   Lab 08/26/24  0440 08/27/24  0545 08/28/24  0940 08/29/24  0446 08/30/24  0531 08/31/24 0440     --  135* 137 137 137   K 3.8 3.9 3.9 3.8 3.7  3.7 4.0  4.0     --  104 106 105 106   CO2 29.0  --  20.0* 26.0  30.0 35.0*   BUN 14  --  19 17 16 16   CREATSERUM 0.73  --  0.81 0.72 0.75 0.82   CA 8.6*  --  9.1 8.5* 8.5* 8.6*   MG 2.0  --  2.0 1.8 1.8 1.9   *  --  114* 97 104* 108*       No results for input(s): \"ALT\", \"AST\", \"ALB\", \"AMYLASE\", \"LIPASE\", \"LDH\" in the last 168 hours.    Invalid input(s): \"ALPHOS\", \"TBIL\", \"DBIL\", \"TPROT\"      No results for input(s): \"PGLU\" in the last 168 hours.        ASSESSMENT/PLAN:    84 year old male with history of cad, chronic atrial fibrillation, gerd, htn, hld, hx lupus and hx parkinsons disease presenting with progressive sob.     SOB  Hemothorax due to fall and rib fx    -cxr with large right effusion  -infection present as well??  -iv abx  -pulmonary consulted--> s/p ultrasound thora 8/26 c 1.5L thin blood colored fluid removed   - tylenol prn pain   -supp o2 as needed, was on 3L now RA  - f/u cultures and cytology  - ngtd  Resume xarelto  - CXR 8/27 no sig PNTX     Hematuria--> resolved  -etiology uncertain  -ua abn but pt with chronic edmond for retention recently  -urology consulted--> continue current tx, tx constipation. Oupt fu for void tiral     Rib fxs  - pain control     Poor po intake, possible orthostasis / low uop  -possibly orthostatic  - improved with IVFs     CAD  -metoprolol   -atorvastatin      Chronic Atrial fibrillation  -resume xarelto  -metoprolol -> hold given bradycardia. Consult cards  -EKG with sinus hyacinth initially then slow AF     HTN  -sbp stable  -metoprolol on hold as above     HLD  -zetia  -niacin  -atorvastatin      Parkinson's Disease  Delirium, encephalopathy   -continue home meds  -CT head no acute abnormality      Enterococcus UTI   - on zosyn -> finish     TCP  -trend, likely reactive     Scds        Will continue to follow while hospitalized. Please page me or the on-call hospitalist with questions or concerns. Reviewed chart including previous progress notes. D/w RN     Stevenson Dowell MD  [facility]  Hospitalist  [phone]  8/31/2024  5:24 PM

## 2024-08-31 NOTE — PLAN OF CARE
Assumed care of patient at 19:30,   Alert and oriented to person, place, and situation. Disoriented to time.   Sinus rhythm with 1st degree AVB on tele,   Room air, lung sounds diminished bilaterally, no cough noted. Continuous pulse oximetry maintained. Denies shortness of breath and dizziness.    Complaints of left flank pain. PRN pain med given.   Continent of bowel and bladder.   Glasses present at bedside.  Safety precautions maintained.    Discussed plan of care with patient. All questions answered.    23:00 Pt refused carbidopa-levodopa ER.    Problem: CARDIOVASCULAR - ADULT  Goal: Maintains optimal cardiac output and hemodynamic stability  Description: INTERVENTIONS:  - Monitor vital signs, rhythm, and trends  - Monitor for bleeding, hypotension and signs of decreased cardiac output  - Evaluate effectiveness of vasoactive medications to optimize hemodynamic stability  - Monitor arterial and/or venous puncture sites for bleeding and/or hematoma  - Assess quality of pulses, skin color and temperature  - Assess for signs of decreased coronary artery perfusion - ex. Angina  - Evaluate fluid balance, assess for edema, trend weights  Outcome: Progressing  Goal: Absence of cardiac arrhythmias or at baseline  Description: INTERVENTIONS:  - Continuous cardiac monitoring, monitor vital signs, obtain 12 lead EKG if indicated  - Evaluate effectiveness of antiarrhythmic and heart rate control medications as ordered  - Initiate emergency measures for life threatening arrhythmias  - Monitor electrolytes and administer replacement therapy as ordered  Outcome: Progressing     Problem: SAFETY ADULT - FALL  Goal: Free from fall injury  Description: INTERVENTIONS:  - Assess pt frequently for physical needs  - Identify cognitive and physical deficits and behaviors that affect risk of falls.  - [place] fall precautions as indicated by assessment.  - Educate pt/family on patient safety including physical limitations  - Instruct  pt to call for assistance with activity based on assessment  - Modify environment to reduce risk of injury  - Provide assistive devices as appropriate  - Consider OT/PT consult to assist with strengthening/mobility  - Encourage toileting schedule  Outcome: Progressing

## 2024-09-01 VITALS
RESPIRATION RATE: 18 BRPM | HEIGHT: 67 IN | BODY MASS INDEX: 23.49 KG/M2 | DIASTOLIC BLOOD PRESSURE: 84 MMHG | WEIGHT: 149.69 LBS | TEMPERATURE: 99 F | OXYGEN SATURATION: 98 % | SYSTOLIC BLOOD PRESSURE: 149 MMHG | HEART RATE: 78 BPM

## 2024-09-01 LAB
ANION GAP SERPL CALC-SCNC: 5 MMOL/L (ref 0–18)
BUN BLD-MCNC: 15 MG/DL (ref 9–23)
CALCIUM BLD-MCNC: 8.6 MG/DL (ref 8.7–10.4)
CHLORIDE SERPL-SCNC: 105 MMOL/L (ref 98–112)
CO2 SERPL-SCNC: 26 MMOL/L (ref 21–32)
CREAT BLD-MCNC: 0.79 MG/DL
EGFRCR SERPLBLD CKD-EPI 2021: 87 ML/MIN/1.73M2 (ref 60–?)
ERYTHROCYTE [DISTWIDTH] IN BLOOD BY AUTOMATED COUNT: 13.8 %
GLUCOSE BLD-MCNC: 101 MG/DL (ref 70–99)
HCT VFR BLD AUTO: 32.4 %
HGB BLD-MCNC: 10.3 G/DL
MAGNESIUM SERPL-MCNC: 2 MG/DL (ref 1.6–2.6)
MCH RBC QN AUTO: 31 PG (ref 26–34)
MCHC RBC AUTO-ENTMCNC: 31.8 G/DL (ref 31–37)
MCV RBC AUTO: 97.6 FL
OSMOLALITY SERPL CALC.SUM OF ELEC: 283 MOSM/KG (ref 275–295)
PLATELET # BLD AUTO: 107 10(3)UL (ref 150–450)
POTASSIUM SERPL-SCNC: 4.2 MMOL/L (ref 3.5–5.1)
RBC # BLD AUTO: 3.32 X10(6)UL
SODIUM SERPL-SCNC: 136 MMOL/L (ref 136–145)
WBC # BLD AUTO: 3.2 X10(3) UL (ref 4–11)

## 2024-09-01 NOTE — PLAN OF CARE
Acquired patient around 0730. Alert to person, situation, time, disoriented to place. On Ra. Spo2 within normal limits. Pt does not appear short of breath. SR with 1st AVB on tele. Holding BB at this time. Pt denies chest pain. HR maintain 60s. Continent of bowel. [place]. Call light within reach. Continue plan of care.     Problem: CARDIOVASCULAR - ADULT  Goal: Maintains optimal cardiac output and hemodynamic stability  Description: INTERVENTIONS:  - Monitor vital signs, rhythm, and trends  - Monitor for bleeding, hypotension and signs of decreased cardiac output  - Evaluate effectiveness of vasoactive medications to optimize hemodynamic stability  - Monitor arterial and/or venous puncture sites for bleeding and/or hematoma  - Assess quality of pulses, skin color and temperature  - Assess for signs of decreased coronary artery perfusion - ex. Angina  - Evaluate fluid balance, assess for edema, trend weights  Outcome: Progressing  Goal: Absence of cardiac arrhythmias or at baseline  Description: INTERVENTIONS:  - Continuous cardiac monitoring, monitor vital signs, obtain 12 lead EKG if indicated  - Evaluate effectiveness of antiarrhythmic and heart rate control medications as ordered  - Initiate emergency measures for life threatening arrhythmias  - Monitor electrolytes and administer replacement therapy as ordered  Outcome: Progressing     Problem: SAFETY ADULT - FALL  Goal: Free from fall injury  Description: INTERVENTIONS:  - Assess pt frequently for physical needs  - Identify cognitive and physical deficits and behaviors that affect risk of falls.  - [place] fall precautions as indicated by assessment.  - Educate pt/family on patient safety including physical limitations  - Instruct pt to call for assistance with activity based on assessment  - Modify environment to reduce risk of injury  - Provide assistive devices as appropriate  - Consider OT/PT consult to assist with strengthening/mobility  - Encourage  toileting schedule  Outcome: Progressing

## 2024-09-01 NOTE — CM/SW NOTE
08/30/24 1000   Discharge disposition   Expected discharge disposition [facility]   Post Acute Care Provider   (Ciel)   Discharge transportation Jono Rushing       Notified by RN that pt ready for discharge back to his Assisted Living facility, [facility].    Per chart review, SW spoke w/Marie at [facility] and confirmed he's able to return.    Jono Rushing [id number] on will call  PCS completed in Epic    RN to call report at discharge: [phone]     / to remain available for support and/or discharge planning.     Esther Wood MBA MSN, RN CTL/  [id number]

## 2024-09-01 NOTE — CONSULTS
G Cardiology Consultation    Reginaldo Hdz Patient Status:  Inpatient    8/10/1940 MRN [id number]   [facility] 8NE-A Attending Stevenson Dowell MD   Hosp Day # 9 PCP Olvin Dinh MD     Reason for Consultation:  bradycardia      History of Present Illness:  Reginaldo Hdz is a a(n) 84 year old male. He has CAD, s/p CABG, , Paroxysmal Atrial Fibrillation (on DOAC), Hypertension , PD, SLE, admitted 23 due to chest pain, dyspnea and large right pleural effusion.  He had a fall with rib fx's, 2024.  Underwent right thora.  Feels much better thereafter. Patient denies chest pain and dyspnea..  he is on metoprolol for Hypertension  and Atrial Fibrillation hx.  His HR is historically at low end of normal, in 60 range.  He is  asymptomatic. With this he denies lightheadedness, syncope.  Per RN he was noted to dip down to 30's 2 days ago and metoprolol is now being held.  More recently his HR trends at 65/min.       1. Chest pain, dyspnea. Suspect large right effusion. Possible hemothorax given falls , rib fx, and xarelto use    2. CHF, 2024 during covid PNA    3. CAD, s/p CABG       4. Paroxysmal Atrial Fibrillation. NSR today. On DOAC  5. Hypertension   6. PD  7. SLE      History:  Past Medical History:    Anemia    Atherosclerosis of coronary artery    Chronic atrial fibrillation (HCC)    Esophageal reflux    Heart attack (HCC)    Hyperlipidemia    Hypertension    Lupus (HCC)    Parkinson disease (HCC)     Past Surgical History:   Procedure Laterality Date    Angiogram      Angiogram      Angiogram  2002    [facility], Dr. Scott/ Mikayla: 1.Successful PTCA of the infarct related artery which was the proximal graft of the saphenous vein graft to the right posterior descending/LV branch which was dilated and stented using a 4 x 13 BX Velocity to a maximum diameter of 4.45 mm.  2.Proximal PDA primarily stented using a 3 x 18 Penta stent from about 70 to 80% to 0%, HUGH III  flow, no dissection.     Angiogram  06/24/2011    [facility], Dr. Steele: Successful stenting with a drug-eluting stent to the graft to the right coronary artery. Severe native CAD. Patent vein graft to LAD. Severe disease in the vein graft to the right coronary artery.    Angiogram  07/26/2011    [facility], Dr. Steele: Successful stenting of the circumflex artery in the proximal and mid portions. Relook angiography of the vein graft to the right coronary artery showed that this vessel is widely patent.    Cabg  1984    x2    Cath bare metal stent (bms)      Cath drug eluting stent      Shoulder surg proc unlisted Left     fracture     Family History   Problem Relation Age of Onset    Cancer Father         Liver    Other (Other) Father         Heart Attack    Cancer Mother         uterine    Other (Other) Daughter         Hosimotos    Other (Other) Son         Heart Problem         Allergies:  Allergies   Allergen Reactions    Inderal [Propranolol] NAUSEA AND VOMITING and UNKNOWN    Isosorbide NAUSEA AND VOMITING    Other OTHER (SEE COMMENTS)       Medications:   rivaroxaban  20 mg Oral Daily with food    docusate sodium  100 mg Oral BID    melatonin  5 mg Oral Nightly    carbidopa-levodopa ER  2 tablet Oral BID    sennosides  8.6 mg Oral BID    ezetimibe  10 mg Oral Daily    atorvastatin  20 mg Oral Nightly    niacin ER  500 mg Oral BID with meals    carbidopa-levodopa  1.5 tablet Oral 4x daily    pantoprazole  40 mg Oral QAM AC    pregabalin  50 mg Oral BID    guaiFENesin ER  600 mg Oral BID    [Held by provider] metoprolol tartrate  50 mg Oral Daily Beta Blocker       Continuous Infusions:      Social History:   reports that he quit smoking about 42 years ago. His smoking use included cigarettes. He has never used smokeless tobacco. He reports current alcohol use. He reports that he does not use drugs.    Review of Systems:  All systems were reviewed and are negative except as described above in HPI.    Physical  Exam:      Wt Readings from Last 3 Encounters:   09/01/24 149 lb 11.1 oz (67.9 kg)   08/14/24 160 lb 7.9 oz (72.8 kg)   08/04/24 160 lb 6.4 oz (72.8 kg)       Vitals:    09/01/24 0340 09/01/24 0500 09/01/24 0537 09/01/24 0806   BP: 110/62      BP Location: Left arm      Pulse: 74 62 68    Resp: 16   20   Temp: 98.3 °F (36.8 °C)   97.9 °F (36.6 °C)   TempSrc: Oral   Oral   SpO2: 95% 95% 95%    Weight:  149 lb 11.1 oz (67.9 kg)     Height:           Temp:  [97.5 °F (36.4 °C)-98.3 °F (36.8 °C)] 97.9 °F (36.6 °C)  Pulse:  [62-81] 68  Resp:  [16-20] 20  BP: ()/(62-69) 110/62  SpO2:  [95 %] 95 %    Temp: 97.9 °F (36.6 °C)  Pulse: 68  Resp: 20  BP: 110/62      General:  Appears comfortable  HEENT: No focal deficits.  Neck: No JVD, carotids 2+ no bruits.  Cardiac: Regular S1S2.  No S3, S4, rub, click.  No murmur.  Lungs: diminished at bases  Abdomen: Soft, non-tender.   Extremities: No LE edema.  No clubbing or cyanosis  Neurologic: Alert and oriented, normal affect.  Skin: Warm and dry.     Labs:      HEM:  Recent Labs   Lab 08/28/24  0940 08/29/24 0446 08/30/24  0530 08/31/24  0440 09/01/24  0524   WBC 3.8* 4.7 3.3* 3.4* 3.2*   HGB 11.3* 10.0* 10.0* 9.6* 10.3*   HCT 36.0* 31.5* 30.2* 29.1* 32.4*   .0 143.0* 112.0* 102.0* 107.0*       Chem:  Recent Labs   Lab 08/28/24  0940 08/29/24  0446 08/30/24  0531 08/31/24 0440 09/01/24 0524   * 137 137 137 136   K 3.9 3.8 3.7  3.7 4.0  4.0 4.2    106 105 106 105   CO2 20.0* 26.0 30.0 35.0* 26.0   BUN 19 17 16 16 15   CREATSERUM 0.81 0.72 0.75 0.82 0.79   MG 2.0 1.8 1.8 1.9 2.0       Recent Labs   Lab 08/26/24  0440   INR 1.33*                     Lab Results   Component Value Date    TROP <0.045 12/06/2019    TROP <0.045 12/05/2019    TROP <0.045 12/05/2019         Invalid input(s): \"PBNPML\"                 Telemetry:     Laboratories and Data:  Diagnostics:    EKG, 9/1/2024:      CXR, 9/1/2024:            Impression:    1.  Bradycardia.  Likely  related to combination of his intrinsic braycardia physiology, the beta blocker medicine and possibly blocked PAC's which he has also exhibited.  Off metoprolol the HR's are 60-75 today .  His bp's are low normal, thus, probably does not need HTN medicine for now.    2. Chest pain, dyspnea 2/2  large right effusion. Possible hemothorax given falls , rib fx, and xarelto use---> thora, 8/26/24. Feels better    2. CHF, 7/2024 during covid PNA. compensated    3. CAD, s/p CABG 1984.  asymptomatic.       4. Paroxysmal Atrial Fibrillation. NSR today. On DOAC  6. PD  7. SLE      Recommend:    1.  Agree with holding metoprolol.   2. Stable CV status for dc  3. Will re-introduce HTN medicine as op as needed.  4. Continue DOAC          Magdaleno Do MD  9/1/2024  8:24 AM

## 2024-09-01 NOTE — DISCHARGE SUMMARY
[facility] [place] [facility] Discharge Summary   Patient ID:  Reginaldo Hdz  [id number]  84 year old  8/10/1940    Admit date: 8/23/2024    Discharge date and time: 9/1/2024     Attending Physician: Stevenson Dowell MD     Primary Care Physician: Olvin Dinh MD     Admit Dx: Pleural effusion [J90]  Dyspnea, unspecified type [R06.00]  Closed fracture of multiple ribs of right side with routine healing, subsequent encounter [S22.41XD]  Hematuria, unspecified type [R31.9]    Hospital Discharge Diagnoses:  Pleural effusion    Disposition: home    Important follow up:  -PCP in [x] within 7 days [] within 14 days [] other    Olvin Dinh MD  [address]  [facility] [address]  [phone]    Schedule an appointment as soon as possible for a visit in 1 week(s)      Noah Perea IV, MD  [address] [facility]  [address]  [facility] [id number] [phone]    Follow up in 2 week(s)        Hospital Course:   84 year old male with history of cad, chronic atrial fibrillation, gerd, htn, hld, hx lupus and hx parkinsons disease presenting with progressive sob.     SOB  Hemothorax due to fall and rib fx    -cxr with large right effusion  -infection present as well??  -iv abx  -pulmonary consulted--> s/p ultrasound thora 8/26 c 1.5L thin blood colored fluid removed   - tylenol prn pain   -supp o2 as needed, was on 3L now RA  - f/u cultures and cytology  - ngtd  Resume xarelto  - CXR 8/27 no sig PNTX     Hematuria--> resolved  -etiology uncertain  -ua abn but pt with chronic edmond for retention recently  -urology consulted--> continue current tx, tx constipation. Oupt fu for void tiral     Rib fxs  - pain control      Poor po intake, possible orthostasis / low uop  -possibly orthostatic  - improved with IVFs     CAD  -metoprolol   -atorvastatin      Chronic Atrial fibrillation  -resume xarelto  -metoprolol -> hold given bradycardia. Consult cards  -EKG with sinus hyacinth initially then slow AF     HTN  -sbp  stable  -metoprolol on hold as above  Will re-introduce HTN medicine as op as needed.      HLD  -zetia  -niacin  -atorvastatin      Parkinson's Disease  Delirium, encephalopathy   -continue home meds  -CT head no acute abnormality  - MS alteration manjit 2/2 underlying PD also         Enterococcus UTI   - on zosyn -> finish      TCP  -trend, likely reactive     Scds    Day of discharge Exam   Vitals:    09/01/24 1157   BP: 108/71   Pulse:    Resp: 20   Temp: 97.7 °F (36.5 °C)       Exam on day of discharge:  Oriented to 9/1/24, states month Sept. Feels ok     Gen: No acute distress, alert and oriented  CV: RRR, +s1/s2  Lungs: CTAB, good respiratory effort  Abdomen: s/nt/nd  Ext: Moves all 4 extremities, no c/c/e  Neuro: CN Intact, no focal deficits      Discharge meds     Medication List        CHANGE how you take these medications      Niacin  MG Tbcr  Commonly known as: SLO-NIACIN  Take 1 tablet (500 mg total) by mouth 2 (two) times daily.  What changed: when to take this     Pravastatin Sodium 80 MG Tabs  Commonly known as: PRAVACHOL  TAKE 1 TABLET BY MOUTH NIGHTLY  What changed: when to take this            CONTINUE taking these medications      acetaminophen 500 MG Tabs  Commonly known as: Tylenol Extra Strength     acidophilus-pectin Caps  Commonly known as: Probiotic     * carbidopa-levodopa  MG Tabs  Commonly known as: SINEMET     * carbidopa-levodopa ER  MG Tbcr  Commonly known as: SINEMET CR     cetirizine 10 MG Tabs  Commonly known as: ZyrTEC     docusate sodium 100 MG Caps  Commonly known as: Colace     ezetimibe 10 MG Tabs  Commonly known as: Zetia  TAKE ONE TABLET BY MOUTH ONE TIME DAILY     hydroxychloroquine 200 MG Tabs  Commonly known as: Plaquenil     melatonin 5 MG Caps     multivitamin Chew     nystatin 100,000 Units/g Crea  Commonly known as: Mycostatin     pantoprazole 40 MG Tbec  Commonly known as: Protonix     Polyethylene Glycol 3350 17 g Pack  Commonly known as: MIRALAX      pregabalin 50 MG Caps  Commonly known as: Lyrica     rivaroxaban 20 MG Tabs  Commonly known as: Xarelto  Take 1 tablet (20 mg total) by mouth daily with food.           * This list has 2 medication(s) that are the same as other medications prescribed for you. Read the directions carefully, and ask your doctor or other care provider to review them with you.                STOP taking these medications      metoprolol tartrate 50 MG Tabs  Commonly known as: Lopressor              Consults: IP CONSULT TO HOSPITALIST  IP CONSULT TO PULMONOLOGY  IP CONSULT TO UROLOGY  IP CONSULT TO UROLOGY  IP CONSULT TO SOCIAL WORK  IP CONSULT TO CARDIOLOGY    Radiology: CT BRAIN OR HEAD (CPT=70450)    Result Date: 8/30/2024  PROCEDURE:  CT BRAIN OR HEAD (93802)  COMPARISON:  [facility], CT, CT BRAIN OR HEAD (26596), 7/08/2021, 4:11 PM.  INDICATIONS:  confusion  TECHNIQUE:  Noncontrast CT scanning is performed through the brain. Dose reduction techniques were used. Dose information is transmitted to the ACR (American College of Radiology) NRDR (National Radiology Data Registry) which includes the Dose Index Registry.  PATIENT STATED HISTORY: (As transcribed by Technologist)  Confusion    FINDINGS: Volume loss is noted.  Ventricles are within normal.  There is no midline shift or mass-effect.  The basal cisterns are patent.  The gray-white matter differentiation is intact.  There is no acute intracranial hemorrhage or extra-axial fluid collection.  Hypodensities in the periventricular and subcortical white matter is compatible with chronic small vessel disease  There is no evident fracture.  The visualized paranasal sinuses and mastoid air cells are unremarkable.             CONCLUSION:  No acute intracranial abnormality.    LOCATION:  [id number]   Dictated by (CST): Ivan Rosado MD on 8/30/2024 at 1:56 PM     Finalized by (CST): Ivan Rosado MD on 8/30/2024 at 1:58 PM       XR CHEST AP PORTABLE  (CPT=71045)    Result Date:  8/30/2024  PROCEDURE:  XR CHEST AP PORTABLE  (CPT=71045)  TECHNIQUE:  AP chest radiograph was obtained.  COMPARISON:  [facility] , XR, XR CHEST AP PORTABLE  (CPT=71045), 8/29/2024, 9:22 AM.  [facility] , XR, XR CHEST AP PORTABLE  (CPT=71045), 8/27/2024, 10:46 AM.  INDICATIONS:  eval effusion, xarelto restarted  PATIENT STATED HISTORY: (As transcribed by Technologist)  Patient offered no additional history at this time.    FINDINGS:  Stable cardiomegaly.  Mild interstitial opacities.  Small bilateral pleural effusion.  Right-sided rib fracture deformities are again noted.  Median sternotomy approximated by wire sutures.            CONCLUSION:  1. Stable cardiomegaly with mild interstitial opacities likely representing edema. 2. Small bilateral pleural effusions with bilateral basilar atelectasis/infiltrates. 3. Stable right sided rib fracture deformities.    LOCATION:  [facility]      Dictated by (CST): Dave Ceja MD on 8/30/2024 at 11:30 AM     Finalized by (CST): Dave Ceja MD on 8/30/2024 at 11:31 AM       XR CHEST AP PORTABLE  (CPT=71045)    Result Date: 8/29/2024  PROCEDURE:  XR CHEST AP PORTABLE  (CPT=71045)  TECHNIQUE:  AP chest radiograph was obtained.  COMPARISON:  [facility] , XR, XR CHEST AP PORTABLE  (CPT=71045), 8/27/2024, 10:46 AM.  INDICATIONS:  eval effusion  PATIENT STATED HISTORY: (As transcribed by Technologist)  Patient offered no additional history at this time.     FINDINGS:  The patient is partially rotated to the right of midline.  Stable calcified granuloma at the left lung base.  Median sternotomy changes noted.  Stable medium-sized right pleural effusion.  Stable trace left pleural effusion.  Stable right rib fractures.  No significant pneumothorax identified.  Stable atelectasis/consolidation in the lower right lung.  Cardiomegaly with normal pulmonary vascularity.  Degenerative changes in the shoulders and spine            CONCLUSION:  No significant interval change.   LOCATION:  [facility]       Dictated by (CST): Сергей Jerez MD on 8/29/2024 at 10:34 AM     Finalized by (CST): Сергей Jerez MD on 8/29/2024 at 10:35 AM       XR CHEST AP PORTABLE  (CPT=71045)    Result Date: 8/27/2024  PROCEDURE:  XR CHEST AP PORTABLE  (CPT=71045)  TECHNIQUE:  AP chest radiograph was obtained.  COMPARISON:  [facility] , XR, XR CHEST AP PORTABLE  (CPT=71045), 8/26/2024, 3:53 PM.  INDICATIONS:  eval for ptx  PATIENT STATED HISTORY: (As transcribed by Technologist)  Patient offered no additional history at this time.     FINDINGS:  There is no pneumothorax detected within the limits of this study.  Opacification right lung base is probably a combination of effusion and atelectasis.  There is a small left pleural effusion noted as well.  Heart size is within normal limits.  Mediastinum and tanvir are unchanged.  Deformity multiple right ribs is unchanged.            CONCLUSION:  1. There is no significant pneumothorax. 2. There is confluent opacity lung bases bilaterally consistent with effusion and atelectasis. 3. Multiple right rib deformities are consistent with rib fractures.  These are not changed.    LOCATION:  [facility]      Dictated by (CST): Enrico Ordonez MD on 8/27/2024 at 11:36 AM     Finalized by (CST): Enrico Ordonez MD on 8/27/2024 at 11:38 AM       US THORACENTESIS GUIDED RIGHT (CPT=32555)    Result Date: 8/26/2024  PROCEDURE:  US THORACENTESIS GUIDED RIGHT (CPT=32555)  COMPARISON:  [facility] , CT, CT CHEST (CPT=71250), 8/23/2024, 6:50 PM.  [facility] , XR, XR CHEST AP PORTABLE  (CPT=71045), 8/26/2024, 3:53 PM.  INDICATIONS:  large right pleural effusion - IR to do  DESCRIPTION OF PROCEDURE:  The patient was referred for ultrasound-guided right thoracentesis.  The procedure was discussed in detail with the patient.  Review of benefits, alternatives, and risks.  Discussion of risks included, but was not limited to infection, bleeding, organ/nerve injury.  All questions were answered.  He voiced understanding wished to proceed.   Witnessed verbal and written informed consent were obtained.  Time-out was performed by the staff.  He was positioned seated upright.  Preliminary ultrasound was performed by the technologist and then myself.  Access site was chosen.  The overlying skin was prepped in the usual sterile manner.  1% lidocaine was used locally.  Using realtime ultrasound an 8 [place] of [place] multi side hole sheath needle was incrementally advanced.  Once in position fluid was removed using a closed hand syringe-bag system.  Endpoint reached when the patient had severe coughing.  1.5 L of thin dark blood tinged fluid was removed.  Samples to RT and the laboratory for testing as requested.  Sterile dressings were placed.  He tolerated the procedure.  No immediate complication.              CONCLUSION:  Ultrasound-guided right thoracentesis.   LOCATION:  [facility]    Dictated by (CST): Domingo Butler MD on 8/26/2024 at 4:12 PM     Finalized by (CST): Domingo Butler MD on 8/26/2024 at 4:15 PM       XR CHEST AP PORTABLE  (CPT=71045)    Result Date: 8/26/2024  PROCEDURE:  XR CHEST AP PORTABLE  (CPT=71045)  TECHNIQUE:  AP chest radiograph was obtained.  COMPARISON:  [facility] , XR, XR CHEST AP PORTABLE  (CPT=71045), 8/23/2024, 5:26 PM.  INDICATIONS:  Post Thoracentesis  PATIENT STATED HISTORY: (As transcribed by Technologist)  Patient offered no additional history at this time.               CONCLUSION:  Significant decrease in size of right pleural effusion.  There is minimal lucency at the right lung base and medial right lung/cardiac border which may represent a tiny pneumothorax.  Stable heart size and pulmonary vascularity with postsurgical changes median sternotomy.   LOCATION:  [id number]      Dictated by (CST): Pia Connell MD on 8/26/2024 at 4:06 PM     Finalized by (CST): Pia Connell MD on 8/26/2024 at 4:07 PM       CT CHEST (RVV=99711)    Result Date: 8/23/2024  PROCEDURE:  CT CHEST (SUD=55528)  COMPARISON:  [facility], XR, XR THORACIC SPINE (3  VIEWS) (CPT=72072), 7/01/2024, 11:08 AM.  [facility] , CT, CT ABDOMEN+PELVIS(CONTRAST ONLY)(CPT=74177), 8/04/2024, 6:53 PM.  [facility] , XR, XR CHEST AP PORTABLE  (CPT=71045), 8/23/2024, 5:26 PM.  INDICATIONS:  SOB and CP since July  TECHNIQUE:  Unenhanced multislice CT scanning is performed through the chest.  Dose reduction techniques were used. Dose information is transmitted to the ACR (American College of Radiology) NRDR (National Radiology Data Registry) which includes the Dose  Index Registry.  PATIENT STATED HISTORY: (As transcribed by Technologist)  Patient has KENIA    FINDINGS:  LUNGS:  Extensive consolidation is seen throughout the right lower lobe and portions of the right middle lobe and right upper lobe which may represent compressive atelectasis and/or pneumonia.  Scattered atelectasis and/or scarring within the left lung. VASCULATURE:  Unremarkable. THORACIC AORTA:  Unremarkable. MEDIASTINUM/BRE:  There is a calcified left hilar lymph node which likely represents the sequelae of granulomatous disease. CARDIAC:  Calcified coronary artery disease.  CABG changes. PLEURA:  Large right-sided pleural effusion.  Trace left pleural effusion. CHEST WALL:  Sternotomy changes. LIMITED ABDOMEN:  Partially visualized large left renal cyst.  Calcified granulomas within the spleen. BONES:  There are fractures of the right 6th, 7th, 8th, 9th, 10th, and 11th ribs again noted.  There is a severe compression fracture of T8.  There is a moderate compression fracture of T9.  There is mild anterior wedging of T1. OTHER:  None.            CONCLUSION:   1. Large right-sided pleural effusion.  No definitive layering acute blood products are seen within this pleural fluid as clinically questioned.  2. Extensive consolidation is seen throughout the right lower lobe and portions of the right middle lobe and right upper lobe which may represent compressive atelectasis and/or pneumonia.  3. There are fractures of the right 6th, 7th,  8th, 9th, 10th, and 11th ribs again noted.   4. There is a severe compression fracture of T8.  There is a moderate compression fracture of T9.  There is mild anterior wedging of T1.   LOCATION:  [facility]   Dictated by (CST): Stromberg, LeRoy, MD on 8/23/2024 at 7:16 PM     Finalized by (CST): Stromberg, LeRoy, MD on 8/23/2024 at 7:29 PM       XR CHEST AP PORTABLE  (CPT=71045)    Result Date: 8/23/2024  PROCEDURE:  XR CHEST AP PORTABLE  (CPT=71045)  TECHNIQUE:  AP chest radiograph was obtained.  COMPARISON:  [facility] , XR, XR RIBS WITH CHEST (3 VIEWS), RIGHT  (CPT=71101), 8/04/2024, 5:14 PM.  INDICATIONS:  SOB and CP since July  PATIENT STATED HISTORY: (As transcribed by Technologist)  Patient cannot breathe and has old rib fractures on his right side.    FINDINGS:  New large right-sided pleural effusion.  There is consolidation throughout the mid and lower right lung which could represent compressive atelectasis with a superimposed infectious process not excluded.  There are right-sided rib fractures.  Enlarged cardiac silhouette, unchanged.  The cardiac silhouette is partially obscured.  Postoperative changes from sternotomy.  Trace left basilar atelectasis.  No measurable pneumothorax.            CONCLUSION:  See above.   LOCATION:  [facility]      Dictated by (CST): Stromberg, LeRoy, MD on 8/23/2024 at 5:32 PM     Finalized by (CST): Stromberg, LeRoy, MD on 8/23/2024 at 5:34 PM       CT ABDOMEN+PELVIS(CONTRAST ONLY)(CPT=74177)    Result Date: 8/4/2024  PROCEDURE:  CT ABDOMEN+PELVIS (CONTRAST ONLY) (CPT=74177)  COMPARISON:  None.  INDICATIONS:  fell at assisted living, has edmond cath and has blood in output, having right sided pain from fall  TECHNIQUE:  CT scanning was performed from the dome of the diaphragm to the pubic symphysis with non-ionic intravenous contrast material. Post contrast coronal MPR imaging was performed.  Dose reduction techniques were used. Dose information is transmitted to the ACR (American  College of Radiology) NRDR (National Radiology Data Registry) which includes the Dose Index Registry.  PATIENT STATED HISTORY:(As transcribed by Technologist)  Patient fell at assisted living, has edmond cath and has blood in output. Having right sided pain from fall.   CONTRAST USED:  100cc of Isovue 370  FINDINGS:  LIVER:  No enlargement, atrophy, abnormal density, or significant focal lesion.  BILIARY:  No visible dilatation or calcification.  PANCREAS:  No lesion, fluid collection, ductal dilatation, or atrophy.  SPLEEN:  Scattered calcifications are noted. KIDNEYS:  Prominent simple cyst of the left kidney noted.  This measures up to 6.7 x 5.2 cm.  No specific further follow-up is recommended. ADRENALS:  No mass or enlargement.  AORTA/VASCULAR:  No aneurysm or dissection.  RETROPERITONEUM:  No mass or adenopathy.  BOWEL/MESENTERY:  No dilated loops of small bowel are seen.  Portions of the bowel are decompressed, limiting evaluation.  No free fluid is seen.  Lack of oral contrast limits assessment of the bowel.  Moderate amount of fecal material present within the  colon. ABDOMINAL WALL:  There is a right inguinal hernia containing fat as well as loops of small bowel without evidence of obstruction.  This is incompletely visualized. URINARY BLADDER:  Prominent circumferential mural thickening of the urinary bladder with areas of trabeculation.  There are probable diverticula present with several scattered foci of air as well as a Edmond catheter. PELVIC NODES:  No adenopathy.  PELVIC ORGANS:  No visible mass.  Pelvic organs appropriate for patient age.  BONES:  No bony lesion or fracture.  LUNG BASES:  Trace bilateral pleural effusions with associated basilar atelectasis. OTHER:  Negative.             CONCLUSION:  There is prominent circumferential mural thickening of the urinary bladder with areas of trabeculation and possible diverticula.  This may represent cystitis.  A neoplastic process cannot be excluded.   Correlation with direct visualization is suggested.   LOCATION:  [facility]   Dictated by (CST): Jackson Lopez MD on 8/04/2024 at 7:32 PM     Finalized by (CST): Jackson Lopez MD on 8/04/2024 at 7:35 PM       XR RIBS WITH CHEST (3 VIEWS), RIGHT  (CPT=71101)    Result Date: 8/4/2024  PROCEDURE:  XR RIBS WITH CHEST (3 VIEWS), RIGHT  (CPT=71101)  TECHNIQUE:  PA Chest and three views of the ribs were obtained  COMPARISON:  [facility] , XR, XR CHEST AP PORTABLE  (CPT=71045), 7/09/2024, 11:03 PM.  INDICATIONS:  fell at assisted living, has edmond cath and has blood in output, having right sided pain from fall  PATIENT STATED HISTORY: (As transcribed by Technologist)  Patient fell at nursing home and landed on right posterior ribs..Patient also was in the ED last week unable to urinate and now has blood in his urine bag.  Family states he resides in an assisted  living facility.    FINDINGS:  Severe diffuse osteopenia limits evaluation.  There are likely minimally displaced fractures of the right fifth through seventh ribs.  No significant pleural effusion or pneumothorax is seen.  Postsurgical changes of the chest are noted.  If clinical symptoms persist then consider follow-up imaging.            CONCLUSION:  See above.   LOCATION:  [facility]     Dictated by (CST): Jackson Lopez MD on 8/04/2024 at 5:45 PM     Finalized by (CST): Jackson Lopez MD on 8/04/2024 at 5:46 PM       US SCROTUM W/ DOPPLER (CPT=93975/63668)    Result Date: 8/2/2024  PROCEDURE:  US SCROTUM W/ DOPPLER (CPT=93975/10801)  COMPARISON:  None.  INDICATIONS:  scrotal pain, erythema eval abscess  TECHNIQUE:  Real time grey scale ultrasound was performed of the scrotal contents including the testicles, epididymis, spermatic cord, and scrotal wall. A duplex scan with B-mode, Doppler color flow, and spectral analysis were also performed.  PATIENT STATED HISTORY: (As transcribed by Technologist)     FINDINGS:  TESTES:  Right testis measures 2.7 x 2.8 x 2.0 cm.  Left testis measures  3.8 x 2.3 x 2.7 cm.  There is Doppler flow to both testicles.  There is heterogeneous echotexture involving both testicles which may represent edema.  EPIDIDYMIS:  Hyperemic color flow to the right greater than left epididymis.  This is concerning for epididymitis.. HYDROCELE:  Small bilateral hydroceles VARICOCELE:  Negative OTHER:  Bilateral scrotal skin thickening.            CONCLUSION:  Bilateral hyperemic right greater than left epididymis.  Heterogeneous echotexture to both testicles with color flow.  Findings suspicious for epididymo-orchitis.  No discrete drainable abscess appreciated.  Bilateral scrotal skin thickening.  LOCATION:  [facility]    Dictated by (CST): Pia Connell MD on 8/02/2024 at 7:23 PM     Finalized by (CST): Pia Connell MD on 8/02/2024 at 7:26 PM          Operative Procedures:      Code Status: Full Code    Total Time Coordinating Care: Greater than 30 minutes    Patient had opportunity to ask questions and state understand and agree with therapeutic plan as outlined. I reviewed and reconciled current and discharge medications on day of discharge and discussed meds with patient. D/w RN     Stevenson Dowell MD  [facility]  [phone]  9/1/2024  2:44 PM

## 2024-09-01 NOTE — PROGRESS NOTES
Gave report to [facility] RN. Family and HHRN notified of discharge. Agreeable to discharge plan. Plan for [facility] as DC transportation. IV removed. Tele removed. All questions answered. Ambulance as discharge transportation.

## 2024-09-01 NOTE — PLAN OF CARE
Pt alert and oriented x3-4 forgetful at time.  Up SBA with assistive device .  On room air.  NSR/ 1ST degree AV block on tele.  Continent of bowel but constipated and Incontinent of  bladder (chronic edmond in place).  No complaints of pain, sob, or chest pain/ discomfort.  Plan of care discussed with pt.  Falls precautions in place.  Call light within reach.    Problem: CARDIOVASCULAR - ADULT  Goal: Maintains optimal cardiac output and hemodynamic stability  Description: INTERVENTIONS:  - Monitor vital signs, rhythm, and trends  - Monitor for bleeding, hypotension and signs of decreased cardiac output  - Evaluate effectiveness of vasoactive medications to optimize hemodynamic stability  - Monitor arterial and/or venous puncture sites for bleeding and/or hematoma  - Assess quality of pulses, skin color and temperature  - Assess for signs of decreased coronary artery perfusion - ex. Angina  - Evaluate fluid balance, assess for edema, trend weights  Outcome: Progressing  Goal: Absence of cardiac arrhythmias or at baseline  Description: INTERVENTIONS:  - Continuous cardiac monitoring, monitor vital signs, obtain 12 lead EKG if indicated  - Evaluate effectiveness of antiarrhythmic and heart rate control medications as ordered  - Initiate emergency measures for life threatening arrhythmias  - Monitor electrolytes and administer replacement therapy as ordered  Outcome: Progressing     Problem: SAFETY ADULT - FALL  Goal: Free from fall injury  Description: INTERVENTIONS:  - Assess pt frequently for physical needs  - Identify cognitive and physical deficits and behaviors that affect risk of falls.  - [place] fall precautions as indicated by assessment.  - Educate pt/family on patient safety including physical limitations  - Instruct pt to call for assistance with activity based on assessment  - Modify environment to reduce risk of injury  - Provide assistive devices as appropriate  - Consider OT/PT consult to assist with  strengthening/mobility  - Encourage toileting schedule  Outcome: Progressing

## 2024-09-02 NOTE — CDS QUERY
Dear Dr. Dowell,    Clinical information includes a diagnosis of ATRIAL FIBRILLATION. Based on your judgement and review of the clinical findings, can you please specify the type?     [ x  ] Paroxysmal Atrial Fibrillation   [   ] Chronic Atrial Fibrillation   [   ] Other (Please specify):       CLINICAL INFORMATION FROM THE MEDICAL RECORD    Risk Factors:  PMH of Atrial Fibrillation     Clinical Indicators:   EKG with Afib on admission (8/23/2024)  H&P Assessment / Plan (8/23/24): Chronic Atrial Fibrillation   Repeat EKG (8/30/2/24): Sinus rhythm with 1st degree A-V block with premature atrial complexes  Cardiology Consult Assessment / Plan (8/30/24): Paroxysmal Atrial Fibrillation   Discharge Summary (9/1/24): Chronic Atrial Fibrillation - resume Xarelto, Metoprolol hold given bradycardia  Historical EKG (7/9/24): Sinus rhythm with 1st degree A-V block with premature atrial complexes    Treatment:   Management per Cardiology consult  Telemetry  Continue Xarelto     Use of terms such as suspected, possible, or probable (associated with a specific diagnosis that is being evaluated, monitored, or treated as if it exists) are acceptable and can be coded in the inpatient setting, when documented at the time of discharge.    Please add any additional documentation to your progress note and continue to document this through discharge. For questions regarding this query, please contact Clinical : Rosaline Reed RN at #[phone].    THIS FORM IS A PERMANENT PART OF THE MEDICAL RECORD

## 2024-09-03 NOTE — CDS QUERY
Dear Dr. Dowell,     Clinical information includes a diagnosis of ACUTE ENCEPHALOPATHY. Based on your judgement and review of the clinical findings, can you please specify further the diagnosis treated?    [ x ] Acute metabolic encephalopathy confirmed   [   ] Acute metabolic encephalopathy ruled out   [  ] Other (please specify):       CLINICAL INFORMATION FROM THE MEDICAL RECORD    Risk Factors:  UTI due to edmond catheter  PMH of Parkinson's disease     Clinical Indicators:   Episodes of confusion reported by RN on 08/30/2024 8/30/2024 Patient alert x3-4, Deb Coma Scale 15  CT head negative for acute findings     Treatment:   CT of head and brain  Close monitoring     Criteria for Types of acute encephalopathy: References by national institute of neurologic disorders and stroke: NINDS Encephalopathy information  Metabolic due to such things as fever, dehydration, electrolyte imbalance, hypoglycemia, hypoxemia, infection and organ failure.  Toxic- refers to the effects of drugs and toxins  Toxic-metabolic is a combination of toxic and metabolic factors  Septic encephalopathy is a manifestation of severe sepsis, which is a specific type of metabolic encephalopathy  Hepatic encephalopathy is due to elevated blood ammonia levels and describes a spectrum of neurologic impairment (e.g. altered mental status, combativeness) in patients with severe end stage liver disease.  Hypertensive encephalopathy is an acute or subacute consequence of severe hypertension marked by headache, obtundation, confusion or stupor, with or without convulsions. Papilloma may be noted.     Please add any additional documentation to your progress note and continue to document this through discharge. For questions regarding this query, please contact Clinical : Rosaline Reed RN at #[phone].    THIS FORM IS A PERMANENT PART OF THE MEDICAL RECORD

## 2024-09-11 NOTE — PROGRESS NOTES
HPI:     Reginaldo Hdz is a 84 year old male with a PMH of HTN, HL, AF, CAD, pleural effusion.    New to me, saw Dhara and Nadya in the hospital.    Following for:  1. Gross hematuria   2. BPH/LUTS  - no prior meds  3. Urinary retention  - edmond placed 8/2/24 during episode of epididymitis and incidentally noted to be in retention  3. Recurrent UTI  - UCx 8/23/24: Enterococcus    PCP - Allegra Do  Prior Urologist - Dhara 8/24/24    Presents for cysto, symptom check, discuss next steps.  His [facility] nurse is with him today and reports intermittent gross hematuria with small clots since edmond placement.    Has been in assisted living for the past few months. Was living at home prior. Had no trouble urinating prior to fall in August but was having UTIs and frequency prior.  IFC inserted during ER visit on 08/02/2024 at the time of an episode of epididymoorchitis and found found to be in asymptomatic retention.     Returned and hospitalized 8/4/2024 following a mechanical fall where he suffered rib fractures.  He had gross hematuria at the time.  His blood thinners were held and his hematuria resolved.    Has has pretty constant hematuria with activity.    Currently ambulating with walker.    He is not taking flomax because he didn't realize he needed to take this.  Prior issues with retention: none    AUA SS is 26/35 with 5 s; 4 n, w, u, SHANELLE; 3 I; 2 f. Unhappy with LUTS.  Incontinence prior to edmond: rare dribbles    Tobacco hx: 30 pack years, quit 1982  Kidney stone hx: none  Fam h/o  malignancy: none    No PSAs in system. We discussed the risks and benefits to PSA screening and he would prefer  to check.    Drinks ~ 20 oz water, 20-30 soda/juice with dark urine.    CT AP 8/4/24: BWT    Cysto today: mod BPH with ANANYA. Papillary lesions along the bladder trigone c/f either edema versus malignancy with some friability and bleeding.  Also with small bladder stones. I was initially unable to visualize  the bladder due to hematuria and irrigated his bladder to light pink.    We discussed both flomax and proscar as options for LUTS and reviewed SEs (including low risk of hypotension with flomax and possible sexual side effects with both medications). He would like to try both.    We discussed options for urinary retention and recurrent hematuria.  He would like to proceed to OR for cystoscopy, TURBT, TURP. We discussed the risks and benefits to the procedure including, but not limited to, bleeding, infection, possible damage to surrounding structures. The patient understands and would like to proceed.    Will check PSA, starting flomax/proscar for BPH/LUTS. Maintain IFC. Increase water intake for UTI prevention. OR for TURP, TURBT.    PROCEDURE NOTE    PROCEDURE PERFORMED: Flexible Cystoscopy    After informed consent and urinalysis was obtained, he was placed in the supine position and prepped and draped in the usual sterile fashion using Betadine. Local anesthesia was induced by the introduction of 2% Lidocaine jelly per urethra.  A 16 Hungarian flexible scope was passed through the anterior urethra, the posterior urethra and prostate were negotiated and the bladder was entered. The entirety of the bladder was examined and the scope was retroflexed to examine the bladder neck.     Findings: as noted above    The patient tolerated the procedure well, suffered no complications, was able to void spontaneously after completion of the procedure in the office, and left the office in good condition.    Mandie-procedural antibiotics were given.  _________________________________      HISTORY:  Past Medical History:    Anemia    Atherosclerosis of coronary artery    Chronic atrial fibrillation (HCC)    Esophageal reflux    Heart attack (HCC)    Hyperlipidemia    Hypertension    Lupus (HCC)    Parkinson disease (HCC)      Past Surgical History:   Procedure Laterality Date    Angiogram  1984    Angiogram  1996    Angiogram   2002    [facility], Dr. Scott/ Mikayla: 1.Successful PTCA of the infarct related artery which was the proximal graft of the saphenous vein graft to the right posterior descending/LV branch which was dilated and stented using a 4 x 13 BX Velocity to a maximum diameter of 4.45 mm.  2.Proximal PDA primarily stented using a 3 x 18 Penta stent from about 70 to 80% to 0%, HUGH III flow, no dissection.     Angiogram  2011    [facility], Dr. Steele: Successful stenting with a drug-eluting stent to the graft to the right coronary artery. Severe native CAD. Patent vein graft to LAD. Severe disease in the vein graft to the right coronary artery.    Angiogram  2011    [facility], Dr. Steele: Successful stenting of the circumflex artery in the proximal and mid portions. Relook angiography of the vein graft to the right coronary artery showed that this vessel is widely patent.    Cabg  1984    x2    Cath bare metal stent (bms)      Cath drug eluting stent      Shoulder surg proc unlisted Left     fracture      Family History   Problem Relation Age of Onset    Cancer Father         Liver    Other (Other) Father         Heart Attack    Cancer Mother         uterine    Other (Other) Daughter         Hosimotos    Other (Other) Son         Heart Problem      Social History:   Social History     Socioeconomic History    Marital status:    Tobacco Use    Smoking status: Former     Current packs/day: 0.00     Types: Cigarettes     Quit date: 1982     Years since quittin.4    Smokeless tobacco: Never   Vaping Use    Vaping status: Never Used   Substance and Sexual Activity    Alcohol use: Yes     Alcohol/week: 0.0 standard drinks of alcohol     Comment: Social    Drug use: No     Social Determinants of Health     Food Insecurity: No Food Insecurity (2024)    Food Insecurity     Food Insecurity: Never true   Transportation Needs: No Transportation Needs (2024)    Transportation Needs     Lack of  Transportation: No   Housing Stability: Low Risk  (8/24/2024)    Housing Stability     Housing Instability: No        Medications (Active prior to today's visit):  Current Outpatient Medications   Medication Sig Dispense Refill    metoprolol tartrate 50 MG Oral Tab       traMADol 50 MG Oral Tab Take 1 tablet (50 mg total) by mouth every 12 (twelve) hours as needed.      acetaminophen-codeine 300-30 MG Oral Tab Take 1-2 tablets by mouth every 4 (four) hours as needed for Pain.      tamsulosin 0.4 MG Oral Cap Take 1 capsule (0.4 mg total) by mouth every evening. 90 capsule 6    finasteride 5 MG Oral Tab Take 1 tablet (5 mg total) by mouth daily. 90 tablet 6    pantoprazole 40 MG Oral Tab EC Take 1 tablet (40 mg total) by mouth every morning before breakfast.      Polyethylene Glycol 3350 17 g Oral Powd Pack Take 17 g by mouth daily.      acetaminophen 500 MG Oral Tab Take 1 tablet (500 mg total) by mouth every 6 (six) hours as needed for Pain.      docusate sodium 100 MG Oral Cap Take 1 capsule (100 mg total) by mouth daily as needed for constipation.      melatonin 5 MG Oral Cap Take 1 capsule (5 mg total) by mouth nightly.      rivaroxaban (XARELTO) 20 MG Oral Tab Take 1 tablet (20 mg total) by mouth daily with food. 90 tablet 0    pregabalin 50 MG Oral Cap Take 1 capsule (50 mg total) by mouth 2 (two) times daily.      nystatin 100,000 Units/g External Cream Apply topically 2 (two) times daily.      carbidopa-levodopa ER  MG Oral Tab CR Take 2 tablets by mouth See Admin Instructions. Takes two tablets by mouth twice daily at 6am and 11pm      hydroxychloroquine 200 MG Oral Tab Take 1 tablet (200 mg total) by mouth 2 (two) times daily.      Niacin  MG Oral Tab CR Take 1 tablet (500 mg total) by mouth 2 (two) times daily. (Patient taking differently: Take 1 tablet (500 mg total) by mouth 2 (two) times daily with meals.) 180 tablet 1    EZETIMIBE 10 MG Oral Tab TAKE ONE TABLET BY MOUTH ONE TIME DAILY 90  tablet 0    carbidopa-levodopa  MG Oral Tab Take 1.5 tablets by mouth 4 (four) times daily. Takes four times a day 9am, 1pm, 5pm, 9pm      Multivitamin Chewtab, ADULT, Oral Chew Tab Chew 1 tablet by mouth daily.      PRAVASTATIN SODIUM 80 MG Oral Tab TAKE 1 TABLET BY MOUTH NIGHTLY (Patient taking differently: Take 1 tablet (80 mg total) by mouth daily.) 90 tablet 3    cetirizine 10 MG Oral Tab Take 1 tablet (10 mg total) by mouth daily.      Acidophilus/Pectin Oral Cap Take 1 capsule by mouth daily.         Allergies:  Allergies   Allergen Reactions    Hydrocodone-Acetaminophen CONFUSION, DIZZINESS and HALLUCINATION    Inderal [Propranolol] NAUSEA AND VOMITING and UNKNOWN    Isosorbide NAUSEA AND VOMITING    Other OTHER (SEE COMMENTS)         ROS:     A comprehensive 10 point review of systems was completed.  Pertinent positives and negatives noted in the the HPI.    PHYSICAL EXAM:     GENERAL APPEARANCE: well, developed, well nourished, in no acute distress  NEUROLOGIC: nonfocal, alert and oriented  HEAD: normocephalic, atraumatic  EYES: sclera non-icteric  EARS: hearing intact  ORAL CAVITY: mucosa moist  NECK/THYROID: no obvious goiter or masses  LUNGS: nonlabored breathing  ABDOMEN: soft, no obvious masses or tenderness  SKIN: no obvious rashes    : as noted above    ASSESSMENT/PLAN:   Diagnoses and all orders for this visit:    BPH with obstruction/lower urinary tract symptoms  -     tamsulosin 0.4 MG Oral Cap; Take 1 capsule (0.4 mg total) by mouth every evening.  -     finasteride 5 MG Oral Tab; Take 1 tablet (5 mg total) by mouth daily.    Gross hematuria  -     sulfamethoxazole-trimethoprim DS (Bactrim DS) 800-160 MG per tab 1 tablet  -     CYSTOURETHROSCOPY    Urinary retention    Epididymo-orchitis    Elevated PSA  -     PSA Total, Diagnostic; Future    - as noted above.    Thanks again for this consult.    William Leon MD, FACS  Urologist  [facility]  Office: [phone]

## 2024-09-16 ENCOUNTER — TELEPHONE (OUTPATIENT)
Dept: SURGERY | Facility: CLINIC | Age: 84
End: 2024-09-16

## 2024-09-16 NOTE — TELEPHONE ENCOUNTER
S/w caregiver.All questions answered.     Future Appointments   Date Time Provider Department Center   9/18/2024 10:00 AM William Leon MD [id number]

## 2024-09-16 NOTE — TELEPHONE ENCOUNTER
[place] Patient's care giver calling to ask what 9/18 procedure is for and the pre and post op instructions. Please call.

## 2024-09-18 ENCOUNTER — PROCEDURE (OUTPATIENT)
Dept: SURGERY | Facility: CLINIC | Age: 84
End: 2024-09-18

## 2024-09-18 ENCOUNTER — TELEPHONE (OUTPATIENT)
Dept: SURGERY | Facility: CLINIC | Age: 84
End: 2024-09-18

## 2024-09-18 DIAGNOSIS — N45.3 EPIDIDYMO-ORCHITIS: ICD-10-CM

## 2024-09-18 DIAGNOSIS — N40.1 BPH WITH OBSTRUCTION/LOWER URINARY TRACT SYMPTOMS: Primary | ICD-10-CM

## 2024-09-18 DIAGNOSIS — N40.1 BENIGN PROSTATIC HYPERPLASIA WITH LOWER URINARY TRACT SYMPTOMS, SYMPTOM DETAILS UNSPECIFIED: Primary | ICD-10-CM

## 2024-09-18 DIAGNOSIS — R31.0 GROSS HEMATURIA: ICD-10-CM

## 2024-09-18 DIAGNOSIS — N32.89 BLADDER MASS: ICD-10-CM

## 2024-09-18 DIAGNOSIS — R97.20 ELEVATED PSA: ICD-10-CM

## 2024-09-18 DIAGNOSIS — R33.9 URINARY RETENTION: ICD-10-CM

## 2024-09-18 DIAGNOSIS — N21.0 BLADDER STONES: ICD-10-CM

## 2024-09-18 DIAGNOSIS — N13.8 BPH WITH OBSTRUCTION/LOWER URINARY TRACT SYMPTOMS: Primary | ICD-10-CM

## 2024-09-18 PROCEDURE — 52000 CYSTOURETHROSCOPY: CPT | Performed by: UROLOGY

## 2024-09-18 PROCEDURE — 99214 OFFICE O/P EST MOD 30 MIN: CPT | Performed by: UROLOGY

## 2024-09-18 RX ORDER — ACETAMINOPHEN AND CODEINE PHOSPHATE 300; 30 MG/1; MG/1
1-2 TABLET ORAL EVERY 4 HOURS PRN
COMMUNITY
Start: 2024-09-03

## 2024-09-18 RX ORDER — SULFAMETHOXAZOLE/TRIMETHOPRIM 800-160 MG
1 TABLET ORAL ONCE
Status: COMPLETED | OUTPATIENT
Start: 2024-09-18 | End: 2024-09-18

## 2024-09-18 RX ORDER — METOPROLOL TARTRATE 50 MG
TABLET ORAL
COMMUNITY
Start: 2024-09-02

## 2024-09-18 RX ORDER — TAMSULOSIN HYDROCHLORIDE 0.4 MG/1
0.4 CAPSULE ORAL EVERY EVENING
Qty: 90 CAPSULE | Refills: 6 | Status: SHIPPED | OUTPATIENT
Start: 2024-09-18

## 2024-09-18 RX ORDER — FINASTERIDE 5 MG/1
5 TABLET, FILM COATED ORAL DAILY
Qty: 90 TABLET | Refills: 6 | Status: SHIPPED | OUTPATIENT
Start: 2024-09-18

## 2024-09-18 RX ORDER — TRAMADOL HYDROCHLORIDE 50 MG/1
1 TABLET ORAL EVERY 12 HOURS PRN
COMMUNITY
Start: 2024-09-03

## 2024-09-18 RX ADMIN — SULFAMETHOXAZOLE/TRIMETHOPRIM 1 TABLET: 800-160 MG TABLET ORAL at 10:19:00

## 2024-09-18 NOTE — TELEPHONE ENCOUNTER
Please schedule this patient for surgery.  He has a history of UTIs.  Please ask him to start antibiotics about 5 days prior to his surgery.  I sent these antibiotics to his pharmacy. He also needs Cardiac clearance prior (Hi)    JOSE RAUL Samaniego    Urology Surgery Request  Surgeon: Edward  Location (if known): [facility]  Procedure: cysto, TURBT, TURP, removal of bladder stones  Anesthesia: General   Time Frame: pt preference - needs cardiac clearance prior  Time required: 90 minutes  Diagnosis: BPH with retention, bladder mass, bladder stones    Antibiotics: per hospital protocol unless checked below   _x_ Levaquin 500 mg IV   ___ Gemcitabine 2 g/100 mL NS bladder instillation to be given in OR    Estimated Post Op/Follow Up Appt: DOTTIE

## 2024-09-18 NOTE — PATIENT INSTRUCTIONS
1. Increase water intake to 40-60 ounces (2 liters) per day or enough to keep urine clear to pale yellow.  2. Start tamsulosin, finasteride  3. PSA  4. Plan for OR for cystoscopy, TURBT and TURP

## 2024-09-19 NOTE — TELEPHONE ENCOUNTER
Spoke with the patient.  Scheduling the surgery for 11/14/24.  Reviewed the pre-op instructions and will send via My Chart or mail to patient once confirmed.  Patient can contact me at [phone]

## 2024-10-10 ENCOUNTER — HOSPITAL ENCOUNTER (EMERGENCY)
Facility: HOSPITAL | Age: 84
Discharge: HOME | End: 2024-10-10
Attending: EMERGENCY MEDICINE
Payer: MEDICARE

## 2024-10-10 VITALS
SYSTOLIC BLOOD PRESSURE: 134 MMHG | DIASTOLIC BLOOD PRESSURE: 82 MMHG | OXYGEN SATURATION: 98 % | WEIGHT: 166 LBS | TEMPERATURE: 98 F | BODY MASS INDEX: 26 KG/M2 | HEART RATE: 74 BPM | RESPIRATION RATE: 23 BRPM

## 2024-10-10 DIAGNOSIS — T83.9XXA PROBLEM WITH FOLEY CATHETER, INITIAL ENCOUNTER (HCC): Primary | ICD-10-CM

## 2024-10-10 LAB
BILIRUB UR QL STRIP.AUTO: NEGATIVE
COLOR UR AUTO: YELLOW
GLUCOSE UR STRIP.AUTO-MCNC: NEGATIVE MG/DL
KETONES UR STRIP.AUTO-MCNC: NEGATIVE MG/DL
NITRITE UR QL STRIP.AUTO: NEGATIVE
PH UR STRIP.AUTO: 7.5 [PH] (ref 5–8)
PROT UR STRIP.AUTO-MCNC: >=300 MG/DL
RBC #/AREA URNS AUTO: >10 /HPF
RBC #/AREA URNS AUTO: >10 /HPF
SP GR UR STRIP.AUTO: >=1.03 (ref 1–1.03)
UROBILINOGEN UR STRIP.AUTO-MCNC: 1 MG/DL
WBC #/AREA URNS AUTO: >50 /HPF
WBC #/AREA URNS AUTO: >50 /HPF
WBC CLUMPS UR QL AUTO: PRESENT /HPF
WBC CLUMPS UR QL AUTO: PRESENT /HPF

## 2024-10-10 PROCEDURE — 81001 URINALYSIS AUTO W/SCOPE: CPT | Performed by: EMERGENCY MEDICINE

## 2024-10-10 PROCEDURE — 87186 SC STD MICRODIL/AGAR DIL: CPT | Performed by: EMERGENCY MEDICINE

## 2024-10-10 PROCEDURE — 87086 URINE CULTURE/COLONY COUNT: CPT | Performed by: EMERGENCY MEDICINE

## 2024-10-10 PROCEDURE — 87077 CULTURE AEROBIC IDENTIFY: CPT | Performed by: EMERGENCY MEDICINE

## 2024-10-10 PROCEDURE — 99283 EMERGENCY DEPT VISIT LOW MDM: CPT

## 2024-10-10 NOTE — ED INITIAL ASSESSMENT (HPI)
PT has chronic edmond cath with blood in the urine, now having crampy burning pain at the penis intermittently.

## 2024-10-10 NOTE — ED QUICK NOTES
Pt stated that he had no pain in abd, or penis area. He stated that he had pain in his leg where the secure device sticker was holding the tube in place. This RN removed that device and replaced a new one on the opposite side. Pt states he has no pain at this time and states \"I feel wonderful\". This RN walked pt with walker and edmond was not pulling and pt states no associated pain. Dr Waters notified.

## 2024-10-10 NOTE — DISCHARGE INSTRUCTIONS
Milan catheter adhesive was removed and changed to the other leg.  Patient is symptomatically improved.  The skin was folded underneath the original adhesive.

## 2024-10-10 NOTE — ED PROVIDER NOTES
Patient Seen in: [facility] Emergency Department      History     Chief Complaint   Patient presents with    Cath Tube Problem     Stated Complaint: Urinary catheter problems    Subjective:   HPI      84-year-old male presents to the emergency department for evaluation of dysuria.  Patient has a chronic indwelling Milan catheter and the patient catheter reportedly was changed out yesterday.  There is no history of fever.  He declines any analgesics for pain at this time.    Objective:     Past Medical History:    Anemia    Atherosclerosis of coronary artery    Chronic atrial fibrillation (HCC)    Esophageal reflux    Heart attack (HCC)    Hyperlipidemia    Hypertension    Lupus    Parkinson disease (HCC)              Past Surgical History:   Procedure Laterality Date    Angiogram  1984    Angiogram  1996    Angiogram  03/27/2002    [facility], Dr. Scott/ Mikayla: 1.Successful PTCA of the infarct related artery which was the proximal graft of the saphenous vein graft to the right posterior descending/LV branch which was dilated and stented using a 4 x 13 BX Velocity to a maximum diameter of 4.45 mm.  2.Proximal PDA primarily stented using a 3 x 18 Penta stent from about 70 to 80% to 0%, HUGH III flow, no dissection.     [facility] Successful stenting with a drug-eluting stent to the graft to the right coronary artery. Severe native CAD. Patent vein graft to LAD. Severe disease in the vein graft to the right coronary artery.    [facility] Successful stenting of the circumflex artery in the proximal and mid portions. Relook angiography of the vein graft to the right coronary artery showed that this vessel is widely patent.    Cabg  1984    x2    Cath bare metal stent (bms)      Cath drug eluting stent      Shoulder surg proc unlisted Left     fracture                Social History     Socioeconomic History    Marital status:    Tobacco Use     Smoking status: Former     Current packs/day: 0.00     Types: Cigarettes     Quit date: 1982     Years since quittin.5    Smokeless tobacco: Never   Vaping Use    Vaping status: Never Used   Substance and Sexual Activity    Alcohol use: Yes     Alcohol/week: 0.0 standard drinks of alcohol     Comment: Social    Drug use: No     Social Drivers of Health     Food Insecurity: No Food Insecurity (2024)    Food Insecurity     Food Insecurity: Never true   Transportation Needs: No Transportation Needs (2024)    Transportation Needs     Lack of Transportation: No   Housing Stability: Low Risk  (2024)    Housing Stability     Housing Instability: No                  Physical Exam     ED Triage Vitals [10/10/24 0858]   /77   Pulse 88   Resp 22   Temp 97.9 °F (36.6 °C)   Temp src Temporal   SpO2 92 %   O2 Device None (Room air)       Current Vitals:   Vital Signs  BP: 134/82  Pulse: 74  Resp: 23  Temp: 97.9 °F (36.6 °C)  Temp src: Temporal  MAP (mmHg): 94    Oxygen Therapy  SpO2: 98 %  O2 Device: None (Room air)        Physical Exam     General Appearance: This is an older male lying on a gurney.  Vital signs were reviewed per nurses notes.  HEENT: Normocephalic atraumatic.  Anicteric sclera.  Oral mucosa is moist.  Oropharynx is normal.  Neck: No adenopathy or thyromegaly.  Lungs are clear to auscultation.  Heart exam: Normal S1-S2 without extra sounds or murmurs.  Regular rate and rhythm.  Abdomen is soft and nontender.  Extremities are atraumatic.  Skin is dry without rashes or lesions.  Neuroexam: Awake, conversive and moving all 4 extremities well.  ED Course     Labs Reviewed   URINALYSIS WITH CULTURE REFLEX            Urine is yellow and cloudy.    Nursing staff was instructed to change out the Milan catheter and send urinalysis with reflex culture from the new catheter bag.       MDM      #1.  Milan catheter pain.  Simple a matter of resecuring the catheter to a different portion of  the leg.  Urinalysis and culture were sent.  As the patient is not running a fever, defer treatment at this time.  Likely colonization due to chronic catheter use.        Medical Decision Making      Disposition and Plan     Clinical Impression:  1. Problem with Milan catheter, initial encounter ([facility])         Disposition:  Discharge  10/10/2024  9:56 am    Follow-up:  William Leon MD  [address]  [facility] [address] [phone]    Call  As needed          Medications Prescribed:  Current Discharge Medication List              Supplementary Documentation:

## 2024-10-11 ENCOUNTER — APPOINTMENT (OUTPATIENT)
Dept: CT IMAGING | Facility: HOSPITAL | Age: 84
End: 2024-10-11
Attending: EMERGENCY MEDICINE
Payer: MEDICARE

## 2024-10-11 ENCOUNTER — HOSPITAL ENCOUNTER (INPATIENT)
Facility: HOSPITAL | Age: 84
LOS: 5 days | Discharge: NURSING, REHAB OR LONG-TERM CARE | End: 2024-10-16
Attending: EMERGENCY MEDICINE | Admitting: HOSPITALIST
Payer: MEDICARE

## 2024-10-11 ENCOUNTER — HOSPITAL ENCOUNTER (INPATIENT)
Facility: HOSPITAL | Age: 84
LOS: 5 days | Discharge: NURSING, REHAB OR LONG-TERM CARE | End: 2024-10-16
Attending: EMERGENCY MEDICINE | Admitting: HOSPITALIST
Payer: MEDICARE

## 2024-10-11 DIAGNOSIS — D69.6 THROMBOCYTOPENIA (HCC): ICD-10-CM

## 2024-10-11 DIAGNOSIS — N30.01 ACUTE CYSTITIS WITH HEMATURIA: Primary | ICD-10-CM

## 2024-10-11 DIAGNOSIS — K40.90 RIGHT INGUINAL HERNIA: ICD-10-CM

## 2024-10-11 DIAGNOSIS — D64.9 CHRONIC ANEMIA: ICD-10-CM

## 2024-10-11 DIAGNOSIS — D68.9 COAGULOPATHY (HCC): ICD-10-CM

## 2024-10-11 LAB
ALBUMIN SERPL-MCNC: 3.4 G/DL (ref 3.2–4.8)
ALBUMIN/GLOB SERPL: 0.8 {RATIO} (ref 1–2)
ALP LIVER SERPL-CCNC: 118 U/L
ALT SERPL-CCNC: <7 U/L
ANION GAP SERPL CALC-SCNC: 4 MMOL/L (ref 0–18)
APTT PPP: 43.7 SECONDS (ref 23–36)
AST SERPL-CCNC: 44 U/L (ref ?–34)
BASOPHILS # BLD AUTO: 0.02 X10(3) UL (ref 0–0.2)
BASOPHILS NFR BLD AUTO: 0.4 %
BILIRUB SERPL-MCNC: 0.6 MG/DL (ref 0.2–1.1)
BUN BLD-MCNC: 23 MG/DL (ref 9–23)
CALCIUM BLD-MCNC: 8.6 MG/DL (ref 8.7–10.4)
CHLORIDE SERPL-SCNC: 104 MMOL/L (ref 98–112)
CO2 SERPL-SCNC: 28 MMOL/L (ref 21–32)
CREAT BLD-MCNC: 0.96 MG/DL
EGFRCR SERPLBLD CKD-EPI 2021: 77 ML/MIN/1.73M2 (ref 60–?)
EOSINOPHIL # BLD AUTO: 0 X10(3) UL (ref 0–0.7)
EOSINOPHIL NFR BLD AUTO: 0 %
ERYTHROCYTE [DISTWIDTH] IN BLOOD BY AUTOMATED COUNT: 14 %
GLOBULIN PLAS-MCNC: 4.1 G/DL (ref 2–3.5)
GLUCOSE BLD-MCNC: 126 MG/DL (ref 70–99)
HCT VFR BLD AUTO: 30.4 %
HGB BLD-MCNC: 9.9 G/DL
HYALINE CASTS #/AREA URNS AUTO: PRESENT /LPF
IMM GRANULOCYTES # BLD AUTO: 0.01 X10(3) UL (ref 0–1)
IMM GRANULOCYTES NFR BLD: 0.2 %
INR BLD: 6.21 (ref 0.8–1.2)
LACTATE SERPL-SCNC: 1.2 MMOL/L (ref 0.5–2)
LYMPHOCYTES # BLD AUTO: 0.57 X10(3) UL (ref 1–4)
LYMPHOCYTES NFR BLD AUTO: 10.8 %
MCH RBC QN AUTO: 29 PG (ref 26–34)
MCHC RBC AUTO-ENTMCNC: 32.6 G/DL (ref 31–37)
MCV RBC AUTO: 89.1 FL
MONOCYTES # BLD AUTO: 0.62 X10(3) UL (ref 0.1–1)
MONOCYTES NFR BLD AUTO: 11.8 %
NEUTROPHILS # BLD AUTO: 4.04 X10 (3) UL (ref 1.5–7.7)
NEUTROPHILS # BLD AUTO: 4.04 X10(3) UL (ref 1.5–7.7)
NEUTROPHILS NFR BLD AUTO: 76.8 %
OSMOLALITY SERPL CALC.SUM OF ELEC: 287 MOSM/KG (ref 275–295)
PLATELET # BLD AUTO: 87 10(3)UL (ref 150–450)
POTASSIUM SERPL-SCNC: 3.8 MMOL/L (ref 3.5–5.1)
PROT SERPL-MCNC: 7.5 G/DL (ref 5.7–8.2)
PROTHROMBIN TIME: 55.4 SECONDS (ref 11.6–14.8)
RBC # BLD AUTO: 3.41 X10(6)UL
RBC #/AREA URNS AUTO: >10 /HPF
SODIUM SERPL-SCNC: 136 MMOL/L (ref 136–145)
SP GR UR REFRACTOMETRY: 1.02 (ref 1–1.03)
WBC # BLD AUTO: 5.3 X10(3) UL (ref 4–11)
WBC #/AREA URNS AUTO: >50 /HPF
WBC CLUMPS UR QL AUTO: PRESENT /HPF

## 2024-10-11 PROCEDURE — 99222 1ST HOSP IP/OBS MODERATE 55: CPT

## 2024-10-11 PROCEDURE — 74177 CT ABD & PELVIS W/CONTRAST: CPT | Performed by: EMERGENCY MEDICINE

## 2024-10-11 RX ORDER — MAGNESIUM HYDROXIDE 1200 MG/15ML
3000 LIQUID ORAL CONTINUOUS
Status: DISCONTINUED | OUTPATIENT
Start: 2024-10-11 | End: 2024-10-16

## 2024-10-11 RX ORDER — TRAMADOL HYDROCHLORIDE 50 MG/1
50 TABLET ORAL EVERY 12 HOURS PRN
Status: DISCONTINUED | OUTPATIENT
Start: 2024-10-11 | End: 2024-10-16

## 2024-10-11 RX ORDER — ACETAMINOPHEN 500 MG
500 TABLET ORAL EVERY 4 HOURS PRN
Status: DISCONTINUED | OUTPATIENT
Start: 2024-10-11 | End: 2024-10-16

## 2024-10-11 RX ORDER — LIDOCAINE HYDROCHLORIDE 20 MG/ML
20 JELLY TOPICAL AS NEEDED
Status: DISCONTINUED | OUTPATIENT
Start: 2024-10-11 | End: 2024-10-14

## 2024-10-11 RX ORDER — CARBIDOPA AND LEVODOPA 25; 100 MG/1; MG/1
1.5 TABLET ORAL ONCE
Status: COMPLETED | OUTPATIENT
Start: 2024-10-11 | End: 2024-10-11

## 2024-10-11 RX ORDER — CARBIDOPA AND LEVODOPA 25; 100 MG/1; MG/1
1.5 TABLET ORAL
Status: DISCONTINUED | OUTPATIENT
Start: 2024-10-12 | End: 2024-10-16

## 2024-10-11 RX ORDER — PREGABALIN 25 MG/1
50 CAPSULE ORAL 2 TIMES DAILY
Status: DISCONTINUED | OUTPATIENT
Start: 2024-10-11 | End: 2024-10-16

## 2024-10-11 RX ORDER — HYDROXYCHLOROQUINE SULFATE 200 MG/1
200 TABLET, FILM COATED ORAL 2 TIMES DAILY
Status: DISCONTINUED | OUTPATIENT
Start: 2024-10-11 | End: 2024-10-16

## 2024-10-11 RX ORDER — TAMSULOSIN HYDROCHLORIDE 0.4 MG/1
0.4 CAPSULE ORAL EVERY EVENING
Status: DISCONTINUED | OUTPATIENT
Start: 2024-10-11 | End: 2024-10-16

## 2024-10-11 RX ORDER — FINASTERIDE 5 MG/1
5 TABLET, FILM COATED ORAL DAILY
Status: DISCONTINUED | OUTPATIENT
Start: 2024-10-12 | End: 2024-10-16

## 2024-10-11 RX ORDER — LIDOCAINE HYDROCHLORIDE 20 MG/ML
10 JELLY TOPICAL ONCE
Status: COMPLETED | OUTPATIENT
Start: 2024-10-11 | End: 2024-10-11

## 2024-10-11 RX ORDER — ATORVASTATIN CALCIUM 20 MG/1
20 TABLET, FILM COATED ORAL NIGHTLY
Status: DISCONTINUED | OUTPATIENT
Start: 2024-10-11 | End: 2024-10-16

## 2024-10-11 RX ORDER — CARBIDOPA AND LEVODOPA 25; 100 MG/1; MG/1
2 TABLET, EXTENDED RELEASE ORAL
Status: DISCONTINUED | OUTPATIENT
Start: 2024-10-11 | End: 2024-10-16

## 2024-10-11 RX ORDER — EZETIMIBE 10 MG/1
10 TABLET ORAL NIGHTLY
Status: DISCONTINUED | OUTPATIENT
Start: 2024-10-11 | End: 2024-10-16

## 2024-10-11 RX ORDER — PANTOPRAZOLE SODIUM 40 MG/1
40 TABLET, DELAYED RELEASE ORAL
Status: DISCONTINUED | OUTPATIENT
Start: 2024-10-12 | End: 2024-10-16

## 2024-10-11 RX ORDER — METOPROLOL TARTRATE 50 MG
50 TABLET ORAL
Status: DISCONTINUED | OUTPATIENT
Start: 2024-10-12 | End: 2024-10-11

## 2024-10-11 RX ORDER — ACETAMINOPHEN 325 MG/1
650 TABLET ORAL ONCE
Status: COMPLETED | OUTPATIENT
Start: 2024-10-11 | End: 2024-10-11

## 2024-10-11 RX ORDER — CARBIDOPA AND LEVODOPA 25; 100 MG/1; MG/1
1.5 TABLET ORAL
Status: DISCONTINUED | OUTPATIENT
Start: 2024-10-11 | End: 2024-10-11 | Stop reason: SDUPTHER

## 2024-10-11 NOTE — CONSULTS
[facility]  Report of Consultation    Reginaldo Hdz Patient Status:  Emergency    8/10/1940 MRN [id number]   Location [facility] EMERGENCY DEPARTMENT Attending Mateo Flores, DO   Hosp Day # 0 PCP Olvin Dinh MD     10/11/24    Reason for Consultation:    Surgical Consultation     CC:   Chief Complaint   Patient presents with    Cath Tube Problem    Urinary Symptoms        History of Present Illness:    Reginaldo Hdz is a a(n) 84 year old male. Patient was brought to the ER for hematuria increased confusion, temp of 100 . He was evaluated in the ER for edmond issues, urine cx prelim with UTI.   He does have a known right inguinal hernia that has started causing him more pain recently.     CT scan obtained revealing thickened bladder wall, indwelling edmond cath. Right inguinal hernia with fat and small bowel though no obstruction noted.   INR 6.21  hgb 9.9. no leukocytosis    H/o a fib on xarelto, h/o CABG and cardiac stents   Lymphoma, followed by hem onc  Last dose of xarelto- last night  Denies any previous abdominal surgery     Past Medical History:    Past Medical History:    Anemia    Atherosclerosis of coronary artery    Chronic atrial fibrillation (HCC)    Esophageal reflux    Heart attack (HCC)    Hyperlipidemia    Hypertension    Lupus    Parkinson disease (HCC)    Peripheral neuropathy       Past Surgical History:    Past Surgical History:   Procedure Laterality Date    Angiogram      Angiogram      [facility] 1.Successful PTCA of the infarct related artery which was the proximal graft of the saphenous vein graft to the right posterior descending/LV branch which was dilated and stented using a 4 x 13 BX Velocity to a maximum diameter of 4.45 mm.  2.Proximal PDA primarily stented using a 3 x 18 Penta stent from about 70 to 80% to 0%, HUGH III flow, no dissection.     [facility] Successful stenting with a  drug-eluting stent to the graft to the right coronary artery. Severe native CAD. Patent vein graft to LAD. Severe disease in the vein graft to the right coronary artery.    Angiogram  07/26/2011    [facility], Dr. Steele: Successful stenting of the circumflex artery in the proximal and mid portions. Relook angiography of the vein graft to the right coronary artery showed that this vessel is widely patent.    Cabg  1984    x2    Cath bare metal stent (bms)      Cath drug eluting stent      Shoulder surg proc unlisted Left     fracture       Family History:    Family History   Problem Relation Age of Onset    Cancer Father         Liver    Other (Other) Father         Heart Attack    Cancer Mother         uterine    Other (Other) Daughter         Rosemarieimotos    Other (Other) Son         Heart Problem       Social History:     reports that he quit smoking about 42 years ago. His smoking use included cigarettes. He has never used smokeless tobacco. He reports current alcohol use. He reports that he does not use drugs.    Allergies:    Allergies[1]    Current Medications:    No current facility-administered medications for this encounter.    Home Medications:    Medications Ordered Prior to Encounter[2]    Review of Systems:    10 point review performed pertinent positives and negatives per history of present illness.    Physical Exam:    Blood pressure 128/59, pulse 69, temperature 99.6 °F (37.6 °C), temperature source Axillary, resp. rate 19, height 5' 6\" (1.676 m), weight 165 lb 5.5 oz (75 kg), SpO2 99%.    GENERAL: Alert and oriented x 3, well developed, well nourished male, in no apparent distress    SKIN: anicteric    RESPIRATORY: non labored breathing    CARDIOVASCULAR: RRR    ABDOMEN: soft, non distended, non tender  Right inguinal hernia, soft, mild tenderness, edmond in place, bleeding noted from penis     LYMPHATIC: no lymphadenopathy    EXTREMITIES: no cyanosis, clubbing or edema    .    Laboratory Data:  Recent  Labs   Lab 10/11/24  1251   WBC 5.3   HGB 9.9*   MCV 89.1   PLT 87.0*   INR 6.21*       Recent Labs   Lab 10/11/24  1251      K 3.8      CO2 28.0   BUN 23   CREATSERUM 0.96   *   CA 8.6*       Recent Labs   Lab 10/11/24  1251   ALT <7*   AST 44*   ALB 3.4       No results for input(s): \"TROP\" in the last 168 hours.          Radiology:    CT ABDOMEN+PELVIS(CONTRAST ONLY)(CPT=74177)    Result Date: 10/11/2024  PROCEDURE:  CT ABDOMEN+PELVIS (CONTRAST ONLY) (CPT=74177)  COMPARISON:  [facility] , CT, CT ABDOMEN+PELVIS(CONTRAST ONLY)(CPT=74177), 8/04/2024, 6:53 PM.  INDICATIONS:  R inguinal hernia  TECHNIQUE:  CT scanning was performed from the dome of the diaphragm to the pubic symphysis with non-ionic intravenous contrast material. Post contrast coronal MPR imaging was performed.  Dose reduction techniques were used. Dose information is transmitted to the ACR (American College of Radiology) NRDR (National Radiology Data Registry) which includes the Dose Index Registry.  PATIENT STATED HISTORY:(As transcribed by Technologist)  Right inguinal hernia with hematuria.   CONTRAST USED:  100cc of Isovue 370  FINDINGS:  LIVER:  No enlargement, atrophy, abnormal density, or significant focal lesion.  BILIARY:  No visible dilatation or calcification.  PANCREAS:  No lesion, fluid collection, ductal dilatation, or atrophy.  SPLEEN:  No enlargement or focal lesion.  KIDNEYS:  There is a simple cyst within left midpole of the kidney measures 69 x 54 mm.  The right kidney is unremarkable. ADRENALS:  No mass or enlargement.  AORTA/VASCULAR:  No aneurysm or dissection.  RETROPERITONEUM:  No mass or adenopathy.  BOWEL/MESENTERY:  No visible mass, obstruction, or bowel wall thickening.  ABDOMINAL WALL:  Right inguinal hernia containing small bowel with small to moderate amount of fluid within the hernia sac .  The overall size of the hernia has increased in size since the previous study but the fluid within the sac is a  stable finding.  The hernia sac measures approximately 73 x 50 mm transversely.  There is no evidence of obstruction. URINARY BLADDER:  There is severe thickening of the urinary bladder which is decompressed with a Milan catheter.  There is small amount a gas within the bladder due to the catheterization. PELVIC NODES:  No adenopathy.  PELVIC ORGANS:  No visible mass.  Pelvic organs appropriate for patient age.  BONES:  No bony lesion or fracture.  LUNG BASES:  Small to moderate pleural effusions more on the right than the left.  There is atelectasis at the lung bases. OTHER:  Negative.             CONCLUSION:   1.  Diffuse severe bladder wall thickening can be seen with infectious or inflammatory cystitis with neoplasm not completely excluded.   2. Slightly enlarged right inguinal hernia containing small bowel with small amount of fluid.  There is no definite evidence of obstruction.   LOCATION:  [facility]   Dictated by (CST): Jose Angel Houston MD on 10/11/2024 at 3:19 PM     Finalized by (CST): Jose Angel Houston MD on 10/11/2024 at 3:25 PM          Problem List:    Patient Active Problem List   Diagnosis    Coronary artery disease involving native heart without angina pectoris    Hyperlipidemia    Essential hypertension    Parkinson disease (HCC)    Anemia    Hyperglycemia    Chest pain, atypical    Atrial fibrillation (HCC)    Benign essential HTN    Dyslipidemia    Dizziness    PAF (paroxysmal atrial fibrillation) (HCC)    Spinal stenosis of lumbar region, unspecified whether neurogenic claudication present    Closed fracture of proximal end of left humerus with routine healing    S/P CABG x 2    Strain of adductor meng muscle of left lower extremity    Avulsion fracture    Pleural effusion    Peripheral edema    Dyspnea, unspecified type    Chronic anemia    Exposure to confirmed case of COVID-19    COVID-19    Multiple fractures of ribs, right side, initial encounter for closed fracture    Gross hematuria    Anemia,  unspecified type    Azotemia    Closed fracture of multiple ribs of right side with routine healing, subsequent encounter    Hematuria, unspecified type    Indwelling Milan catheter present    Urinary tract infection associated with indwelling urethral catheter (HCC)    Acute cystitis with hematuria       Impression:    85 y/o with hematuria. Right inguinal hernia without obstruction  UTI  CT scan as noted above  No leukocytosis, afebrile  INR 6.2      Plan:    Reviewed no plans for acute general surgical management of right inguinal hernia. - reviewed with patient and daughter at bedside surgical repair can be done once optimized for surgery- route of surgical management per Dr Sepulveda  - recommend f/u as outpatient to discuss timing of surgery.   Iv antibiotics per primary service for UTI    BRAYAN Hahn  [facility]  General Surgery  10/11/2024         [1]   Allergies  Allergen Reactions    Hydrocodone-Acetaminophen CONFUSION, DIZZINESS and HALLUCINATION    Inderal [Propranolol] NAUSEA AND VOMITING and UNKNOWN    Isosorbide NAUSEA AND VOMITING    Other OTHER (SEE COMMENTS)   [2]   Current Facility-Administered Medications on File Prior to Encounter   Medication Dose Route Frequency Provider Last Rate Last Admin    [COMPLETED] sulfamethoxazole-trimethoprim DS (Bactrim DS) 800-160 MG per tab 1 tablet  1 tablet Oral Once William Leon MD   1 tablet at 09/18/24 1019    [COMPLETED] magnesium oxide (Mag-Ox) tab 400 mg  400 mg Oral Once Zlatarov, Aroldo, DO   400 mg at 08/30/24 0938    [COMPLETED] potassium chloride (Klor-Con M20) tab 40 mEq  40 mEq Oral Once Zlatarov, Aroldo, DO   40 mEq at 08/30/24 0938    [COMPLETED] potassium chloride (Klor-Con) 20 MEQ oral powder 40 mEq  40 mEq Oral Once Zlatarov, Aroldo, DO   40 mEq at 08/29/24 0915    [COMPLETED] magnesium oxide (Mag-Ox) tab 400 mg  400 mg Oral Once Zlatarov, Aroldo, DO   400 mg at 08/29/24 0916    [COMPLETED] sodium chloride 0.9% infusion    Intravenous Once Aroldo Maki DO 83 mL/hr at 08/29/24 1337 New Bag at 08/29/24 1337    [COMPLETED] sodium chloride 0.9 % IV bolus 500 mL  500 mL Intravenous Once Aroldo Maki  mL/hr at 08/28/24 1130 500 mL at 08/28/24 1130    Followed by    [COMPLETED] sodium chloride 0.9% infusion   Intravenous Once Aroldo Maki DO 83 mL/hr at 08/28/24 1244 New Bag at 08/28/24 1244    [COMPLETED] potassium chloride (Klor-Con M20) tab 40 mEq  40 mEq Oral Once Delvin Arevalo MD   40 mEq at 08/26/24 0636    [COMPLETED] potassium chloride (Klor-Con M20) tab 40 mEq  40 mEq Oral Once Delvin Arevalo MD   40 mEq at 08/24/24 1054    [COMPLETED] magnesium oxide (Mag-Ox) tab 400 mg  400 mg Oral Once Delvin Arevalo MD   400 mg at 08/24/24 1053    [COMPLETED] fentaNYL (Sublimaze) 50 mcg/mL injection 50 mcg  50 mcg Intravenous Once Beena Jordan MD   50 mcg at 08/23/24 1807    [COMPLETED] ondansetron (Zofran) 4 MG/2ML injection 4 mg  4 mg Intravenous Once Beena Jordan MD   4 mg at 08/23/24 1807    [COMPLETED] cefTRIAXone (Rocephin) 1 g in sodium chloride 0.9% 100 mL IVPB-ADDV  1 g Intravenous Once Beena Jordan MD   Stopped at 08/23/24 2059    [COMPLETED] azithromycin (Zithromax) 500 mg in sodium chloride 0.9% 250mL IVPB premix  500 mg Intravenous Once Beena Jordan MD   Stopped at 08/23/24 2230    [COMPLETED] carbidopa-levodopa ER (SINEMET CR)  MG per tab 1 tablet  1 tablet Oral Once Beena Jordan MD   1 tablet at 08/23/24 2159    [COMPLETED] piperacillin-tazobactam (Zosyn) 3.375 g in dextrose 5% 100 mL IVPB-ADDV  3.375 g Intravenous Q8H Aroldo Maki DO 25 mL/hr at 08/30/24 1739 3.375 g at 08/30/24 1739    [COMPLETED] acetaminophen (Tylenol Extra Strength) tab 1,000 mg  1,000 mg Oral Once Cliff Lai MD   1,000 mg at 08/14/24 2034    [COMPLETED] potassium chloride (Klor-Con M20) tab 40 mEq  40 mEq Oral Once Hugo Garcia DO   40 mEq at 08/06/24 0935    [COMPLETED] potassium chloride (Klor-Con) 20  MEQ oral powder 40 mEq  40 mEq Oral Once Garcia Hugo, DO   40 mEq at 24 1153    [COMPLETED] iopamidol 76% (ISOVUE-370) injection for power injector  100 mL Intravenous ONCE PRN Olvin Elaine MD   100 mL at 24 191    [] HYDROmorphone (Dilaudid) 1 MG/ML injection 0.5 mg  0.5 mg Intravenous Q30 Min PRN Olvin Elaine MD        [] ondansetron (Zofran) 4 MG/2ML injection 4 mg  4 mg Intravenous Q4H PRN Olvin Elaine MD        [COMPLETED] HYDROcodone-acetaminophen (Norco) 5-325 MG per tab 1 tablet  1 tablet Oral Once Quique Hickey MD   1 tablet at 24 171    [COMPLETED] ciprofloxacin (Cipro) tab 500 mg  500 mg Oral Once Quique Hickey MD   500 mg at 24    [COMPLETED] lidocaine (Urojet) 2 % urethral jelly 10 mL  10 mL Urethral Once Quique Hickey MD   10 mL at 24    [COMPLETED] potassium chloride (Klor-Con M20) tab 40 mEq  40 mEq Oral Once Delvin Arevalo MD   40 mEq at 24 1313     Current Outpatient Medications on File Prior to Encounter   Medication Sig Dispense Refill    metoprolol tartrate 50 MG Oral Tab       traMADol 50 MG Oral Tab Take 1 tablet (50 mg total) by mouth every 12 (twelve) hours as needed.      acetaminophen-codeine 300-30 MG Oral Tab Take 1-2 tablets by mouth every 4 (four) hours as needed for Pain.      tamsulosin 0.4 MG Oral Cap Take 1 capsule (0.4 mg total) by mouth every evening. 90 capsule 6    finasteride 5 MG Oral Tab Take 1 tablet (5 mg total) by mouth daily. 90 tablet 6    pantoprazole 40 MG Oral Tab EC Take 1 tablet (40 mg total) by mouth every morning before breakfast.      Polyethylene Glycol 3350 17 g Oral Powd Pack Take 17 g by mouth daily.      acetaminophen 500 MG Oral Tab Take 1 tablet (500 mg total) by mouth every 6 (six) hours as needed for Pain.      docusate sodium 100 MG Oral Cap Take 1 capsule (100 mg total) by mouth daily as needed for constipation.      melatonin 5 MG Oral Cap Take 1 capsule (5 mg total) by mouth  nightly.      rivaroxaban (XARELTO) 20 MG Oral Tab Take 1 tablet (20 mg total) by mouth daily with food. 90 tablet 0    pregabalin 50 MG Oral Cap Take 1 capsule (50 mg total) by mouth 2 (two) times daily.      nystatin 100,000 Units/g External Cream Apply topically 2 (two) times daily.      carbidopa-levodopa ER  MG Oral Tab CR Take 2 tablets by mouth See Admin Instructions. Takes two tablets by mouth twice daily at 6am and 11pm      hydroxychloroquine 200 MG Oral Tab Take 1 tablet (200 mg total) by mouth 2 (two) times daily.      Niacin  MG Oral Tab CR Take 1 tablet (500 mg total) by mouth 2 (two) times daily. (Patient taking differently: Take 1 tablet (500 mg total) by mouth 2 (two) times daily with meals.) 180 tablet 1    EZETIMIBE 10 MG Oral Tab TAKE ONE TABLET BY MOUTH ONE TIME DAILY 90 tablet 0    carbidopa-levodopa  MG Oral Tab Take 1.5 tablets by mouth 4 (four) times daily. Takes four times a day 9am, 1pm, 5pm, 9pm      Multivitamin Chewtab, ADULT, Oral Chew Tab Chew 1 tablet by mouth daily.      PRAVASTATIN SODIUM 80 MG Oral Tab TAKE 1 TABLET BY MOUTH NIGHTLY (Patient taking differently: Take 1 tablet (80 mg total) by mouth daily.) 90 tablet 3    cetirizine 10 MG Oral Tab Take 1 tablet (10 mg total) by mouth daily.      Acidophilus/Pectin Oral Cap Take 1 capsule by mouth daily.

## 2024-10-11 NOTE — H&P
[facility] H&P/Consult note       CC:   Chief Complaint   Patient presents with    Cath Tube Problem    Urinary Symptoms        PCP: Olvin Dnih MD    History of Present Illness: Patient is a 84 year old male with PMH sig for CAD, HTN, HLD, PAF, SLE, lymphoma, PD here for confusion, low grade temps    Hx from pt and daughter   Has had low grade temps, confusion for couple days and intermittent abd pain and hematuria for several days. No n/v but had diarrhea today (however previously constipated and took laxatives). Has chronic edmond and recently has had hematuria.   In ER found to have UTI, started on IV abx.   Imaging also concerning for non obs hernia   On exam was also found to have blood in mouth. Denied any trauma / falls. No pain. No hx of seizures.          PMH  Past Medical History:    Anemia    Atherosclerosis of coronary artery    Chronic atrial fibrillation (HCC)    Esophageal reflux    Heart attack (HCC)    Hyperlipidemia    Hypertension    Lupus    Parkinson disease (HCC)    Peripheral neuropathy        PSH  Past Surgical History:   Procedure Laterality Date    Angiogram  1984    Angiogram  1996    Angiogram  03/27/2002    [facility], Dr. Scott/ Mikayla: 1.Successful PTCA of the infarct related artery which was the proximal graft of the saphenous vein graft to the right posterior descending/LV branch which was dilated and stented using a 4 x 13 BX Velocity to a maximum diameter of 4.45 mm.  2.Proximal PDA primarily stented using a 3 x 18 Penta stent from about 70 to 80% to 0%, HUGH III flow, no dissection.     [facility]  [facility] Successful stenting with a drug-eluting stent to the graft to the right coronary artery. Severe native CAD. Patent vein graft to LAD. Severe disease in the vein graft to the right coronary artery.    [facility]  [facility] Successful stenting of the circumflex artery in the proximal and mid portions. Relook angiography of  the vein graft to the right coronary artery showed that this vessel is widely patent.    Cabg  1984    x2    Cath bare metal stent (bms)      Cath drug eluting stent      Shoulder surg proc unlisted Left     fracture        ALL:  Allergies[1]     Home Medications:  Medications Taking[2]      Soc Hx  Social History     Tobacco Use    Smoking status: Former     Current packs/day: 0.00     Types: Cigarettes     Quit date: 1982     Years since quittin.5    Smokeless tobacco: Never   Substance Use Topics    Alcohol use: Yes     Alcohol/week: 0.0 standard drinks of alcohol     Comment: Social        Fam Hx  Family History   Problem Relation Age of Onset    Cancer Father         Liver    Other (Other) Father         Heart Attack    Cancer Mother         uterine    Other (Other) Daughter         Hosimotos    Other (Other) Son         Heart Problem       Review of Systems  Comprehensive ROS reviewed and negative except for what's stated above.         OBJECTIVE:  /59   Pulse 69   Temp 99.6 °F (37.6 °C) (Axillary)   Resp 19   Ht 5' 6\" (1.676 m)   Wt 165 lb 5.5 oz (75 kg)   SpO2 99%   BMI 26.69 kg/m²   General:  Alert, no distress, appears stated age.   Head:  Normocephalic,    Eyes:  Sclera anicteric, No conjunctival pallor, EOMs intact.    Throat: Drywiht blood on tongue / lip   Neck: Supple, symmetrical, trachea midline   Lungs:   Clear to auscultation bilaterally. Normal effort   Chest wall:  No tenderness or deformity.   Heart:  Regular rate and rhythm,    Abdomen:   Soft, NT/ND   Extremities: Atraumatic, trace edema   Skin: No visible rashes or lesions.    Neurologic: Normal strength, no focal deficit appreciated  No facial asymmetry, no drift, normal ftn   Alert to self but no hospital name / or current year     Diagnostic Data:    CBC/Chem  Recent Labs   Lab 10/11/24  1251   WBC 5.3   HGB 9.9*   MCV 89.1   PLT 87.0*   INR 6.21*       Recent Labs   Lab 10/11/24  1251      K 3.8      CO2  28.0   BUN 23   CREATSERUM 0.96   *   CA 8.6*       Recent Labs   Lab 10/11/24  1251   ALT <7*   AST 44*   ALB 3.4       No results for input(s): \"TROP\" in the last 168 hours.         Radiology: No results found.     ASSESSMENT / PLAN:     Patient is a 84 year old male with PMH sig for CAD, HTN, HLD, PAF, SLE, lymphoma, PD here for confusion, low grade temps    Impression    -complicated cystitis  -gross hematuria  -CAD with prior CABG  -p AF on AC  -HTN  -HLD    -SLE on Plaquenil     -small lymphocytic lymphoma stage 4  -PD   -TCP, chronic      Plan    *cystitis  -IV abx - will change to cefepmie given cx results  -blood cx pending    *gross hematuria  -? Milan trauma vs from cystitis woresened by coagulopathy from DOAC  -hold xarelto  -france consulted    *chronic conditions  Home meds as able    DMR dw pt / daughter        Further recommendations pending patient's clinical course. [facility] to continue to follow patient while in house    Patient and/or patient's family given opportunity to ask questions and note understanding and agreeing with therapeutic plan as outlined    [facility] Hospitalist  [phone]  Answering Service: [phone]           [1]   Allergies  Allergen Reactions    Hydrocodone-Acetaminophen CONFUSION, DIZZINESS and HALLUCINATION    Inderal [Propranolol] NAUSEA AND VOMITING and UNKNOWN    Isosorbide NAUSEA AND VOMITING    Other OTHER (SEE COMMENTS)   [2]   Current Facility-Administered Medications for the 10/11/24 encounter (Hospital Encounter)   Medication Dose Route Frequency Provider Last Rate Last Admin    [COMPLETED] sulfamethoxazole-trimethoprim DS (Bactrim DS) 800-160 MG per tab 1 tablet  1 tablet Oral Once William Leon MD   1 tablet at 09/18/24 1019     No outpatient medications have been marked as taking for the 10/11/24 encounter (Hospital Encounter).

## 2024-10-11 NOTE — PROGRESS NOTES
A&Ox1-2. VSS. RA. .  Telemetry: a fib  GI: Abdomen soft, nondistended. Passing gas.  Denies nausea.  : Voids with edmond in place  Pain controlled with PRN pain medications  Up with one assist and walker  Drains: edmond  Diet: regular, NPO at 0000  IVF running per order.  All appropriate safety measures in place. Patient updated on plan of care. All questions and concerns addressed.    1840: edmond exchanged to 3 way and CBI started

## 2024-10-11 NOTE — ED QUICK NOTES
Orders for admission, patient is aware of plan and ready to go upstairs. Any questions, please call [facility] JARRED Marley at extension [phone].     Patient Covid vaccination status: Unvaccinated     COVID Test Ordered in ED: None    COVID Suspicion at Admission: N/A    Running Infusions:  None    Mental Status/LOC at time of transport: A&O x 2    Other pertinent information: Milan catheter present    CIWA score: N/A   NIH score:  N/A

## 2024-10-11 NOTE — ED PROVIDER NOTES
Patient Seen in: [facility] Emergency Department      History     Chief Complaint   Patient presents with    Cath Tube Problem    Urinary Symptoms     Stated Complaint: uti, edmond problems    Subjective:   84-year-old male, indwelling Edmond catheter, scheduled for TURP in November with [facility] urology, presents with his facility for hematuria.  Patient seen yesterday for Edmond catheter connection issues.  Was discharged back to facility, per report hematuria started overnight.  Here for evaluation.  Patient for started secondary to baseline dementia therefore limited HPI/ROS.  He is on Xarelto for A-fib              Objective:     Past Medical History:    Anemia    Atherosclerosis of coronary artery    Chronic atrial fibrillation (HCC)    Esophageal reflux    Heart attack (HCC)    Hyperlipidemia    Hypertension    Lupus    Parkinson disease (HCC)    Peripheral neuropathy              Past Surgical History:   Procedure Laterality Date    Angiogram  1984    Angiogram  1996    Angiogram  03/27/2002    [facility], Dr. Scott/ Mikayla: 1.Successful PTCA of the infarct related artery which was the proximal graft of the saphenous vein graft to the right posterior descending/LV branch which was dilated and stented using a 4 x 13 BX Velocity to a maximum diameter of 4.45 mm.  2.Proximal PDA primarily stented using a 3 x 18 Penta stent from about 70 to 80% to 0%, HUGH III flow, no dissection.     Angiogram  06/24/2011    [facility], Dr. Steele: Successful stenting with a drug-eluting stent to the graft to the right coronary artery. Severe native CAD. Patent vein graft to LAD. Severe disease in the vein graft to the right coronary artery.    [facility] Successful stenting of the circumflex artery in the proximal and mid portions. Relook angiography of the vein graft to the right coronary artery showed that this vessel is widely patent.    Cabg  1984    x2    Cath bare metal stent (bms)      Cath  drug eluting stent      Shoulder surg proc unlisted Left     fracture                Social History     Socioeconomic History    Marital status:    Tobacco Use    Smoking status: Former     Current packs/day: 0.00     Types: Cigarettes     Quit date: 1982     Years since quittin.5    Smokeless tobacco: Never   Vaping Use    Vaping status: Never Used   Substance and Sexual Activity    Alcohol use: Yes     Alcohol/week: 0.0 standard drinks of alcohol     Comment: Social    Drug use: No     Social Drivers of Health     Food Insecurity: No Food Insecurity (2024)    Food Insecurity     Food Insecurity: Never true   Transportation Needs: No Transportation Needs (2024)    Transportation Needs     Lack of Transportation: No   Housing Stability: Low Risk  (2024)    Housing Stability     Housing Instability: No                  Physical Exam     ED Triage Vitals [10/11/24 1237]   /67   Pulse 81   Resp 22   Temp 99.6 °F (37.6 °C)   Temp src Axillary   SpO2 97 %   O2 Device None (Room air)       Current Vitals:   Vital Signs  BP: 128/59  Pulse: 69  Resp: 19  Temp: 99.6 °F (37.6 °C)  Temp src: Axillary  MAP (mmHg): 77    Oxygen Therapy  SpO2: 99 %  O2 Device: None (Room air)        Physical Exam  Vitals and nursing note reviewed.   HENT:      Head: Normocephalic.      Mouth/Throat:      Pharynx: Oropharynx is clear.      Comments: Likely bit his right lower lip and there are some dried blood there.  No active bleeding  Cardiovascular:      Rate and Rhythm: Normal rate.      Pulses: Normal pulses.   Pulmonary:      Effort: Pulmonary effort is normal. No respiratory distress.   Abdominal:      General: There is no distension.      Palpations: Abdomen is soft.      Tenderness: There is no abdominal tenderness. There is no guarding or rebound.   Genitourinary:     Comments: Milan catheter in place, gross hematuria in the tubing  Skin:     General: Skin is warm and dry.      Coloration: Skin is  pale.   Neurological:      Mental Status: He is alert.             ED Course     Labs Reviewed   COMP METABOLIC PANEL (14) - Abnormal; Notable for the following components:       Result Value    Glucose 126 (*)     Calcium, Total 8.6 (*)     AST 44 (*)     ALT <7 (*)     Alkaline Phosphatase 118 (*)     Globulin  4.1 (*)     A/G Ratio 0.8 (*)     All other components within normal limits   CBC WITH DIFFERENTIAL WITH PLATELET - Abnormal; Notable for the following components:    RBC 3.41 (*)     HGB 9.9 (*)     HCT 30.4 (*)     PLT 87.0 (*)     Lymphocyte Absolute 0.57 (*)     All other components within normal limits   URINALYSIS WITH CULTURE REFLEX - Abnormal; Notable for the following components:    Urine Color Red (*)     Clarity Urine Ex.Turbid (*)     WBC Urine >50 (*)     RBC Urine >10 (*)     Bacteria Urine 2+ (*)     Hyaline Casts Present (*)     WBC Clump Present (*)     All other components within normal limits   PROTHROMBIN TIME (PT) - Abnormal; Notable for the following components:    PT 55.4 (*)     INR 6.21 (*)     All other components within normal limits   PTT, ACTIVATED - Abnormal; Notable for the following components:    PTT 43.7 (*)     All other components within normal limits   SPECIFIC GRAVITY, URINE - Normal   LACTIC ACID, PLASMA - Normal   SCAN SLIDE   RAINBOW DRAW LAVENDER   RAINBOW DRAW LIGHT GREEN   RAINBOW DRAW BLUE   URINE CULTURE, ROUTINE   BLOOD CULTURE   BLOOD CULTURE               MDM      External chart review demonstrates his upcoming TURP.  Also his urinalysis yesterday with greater than 50 WBCs and 2+ bacteria    Some information obtained by EMS upon arrival regarding history presenting illness    Differential diagnosis includes, but not limited to, hematuria, UTI, anemia, Milan catheter issue      I independently interpreted the CT abdomen pelvis and note the right inguinal hernia, bladder wall thickening    Spoke with Dr. Madison on-call for Dr. Leon, aware and will see in  consultation    Patient admitted to [facility] hospitalist    Admission disposition: 10/11/2024  3:01 PM       Dr Sepulveda on consult for hernia    CT ABDOMEN+PELVIS(CONTRAST ONLY)(CPT=74177)    Result Date: 10/11/2024  CONCLUSION:   1.  Diffuse severe bladder wall thickening can be seen with infectious or inflammatory cystitis with neoplasm not completely excluded.   2. Slightly enlarged right inguinal hernia containing small bowel with small amount of fluid.  There is no definite evidence of obstruction.   LOCATION:  [facility]   Dictated by (CST): Jose Angel Houston MD on 10/11/2024 at 3:19 PM     Finalized by (CST): Jose Angel Houston MD on 10/11/2024 at 3:25 PM        I independently interpreted the CT abdomen pelvis and note the right inguinal hernia, bladder wall thickening    84-year-old male presents with hematuria.  No clots.  Switch out his Milan catheter.  Urine starting to clear.  No clots.  Different UTI.  Bladder wall thickening on CT as well.  Rocephin given.  Culture sent.  Urinalysis from yesterday showed infection as well.  Chronically anemic.  Coagulopathy secondary to Xarelto use.  Discussed with clinical pharmacist.  No significant active hemorrhaging noted therefore no need for throughout the reversal at this time.  Blood pressure stable.  Heart rate is stable.  Has a firm, mildly tender right inguinal hernia.  Partially reproducible.  No signs of incarceration on CT that are obvious.  Dr. Sepulveda of general surgery consulted.  Urology at bedside.  Hospitalist as well, awaiting bed assignment.  Daughter updated, at bedside in agreement as well.          Medical Decision Making      Disposition and Plan     Clinical Impression:  1. Acute cystitis with hematuria    2. Right inguinal hernia    3. Coagulopathy (HCC)    4. Chronic anemia    5. Thrombocytopenia (HCC)         Disposition:  Admit  10/11/2024  3:01 pm    Follow-up:  No follow-up provider specified.        Medications Prescribed:  Current Discharge  Medication List              Supplementary Documentation:        Hospital Problems       Present on Admission  Date Reviewed: 8/21/2024            ICD-10-CM Noted POA    * (Principal) Acute cystitis with hematuria N30.01 10/11/2024 Unknown

## 2024-10-11 NOTE — ED INITIAL ASSESSMENT (HPI)
Patient is from [facility] assisted living coming with hematuria from indwelling edmond catheter and increased confusion per home health RN. Patient is normally A&O x 2-3, now only 1-2. Patient presents with dried blood in and around mouth. Denies trauma. Pt is on Xarelto.

## 2024-10-11 NOTE — CONSULTS
[facility]   part of [facility]    Urology Consult Note    History of Present Illness:   Patient is a(n) 84 year old male with hx of HTN, HLD, AF on xarelto, CAD, pleural effusion who presented for worsening gross hematuria.     Pt with chronic edmond scheduled for TURBT TURP in 11/2024 with Dr. Leon. Developed retention initially 2/2 epididymo orchitis. Then maintained edmond due to mechanical fall. Had some gross hematuria with fall but resolved once AC was stopped.     Planned on VT but due to continued constipation, this was held off. Cysto showed mod BPH with ANANYA. Papillary lesions along the bladder trigone c/f either edema versus malignancy with some friability and bleeding.  Also with small bladder stones. Initially unable to visualize the bladder due to hematuria and irrigated his bladder to light pink.    Edmond was changed out 2 days ago at his facility. Was seen yesterday in ER for dysuria and issues with stat lock. No fevers. Culture was done showing >100k serratia marcescens. Presented to ER again today for worsening gross hematuria, confusion, and low grade temps.     Daughter is with him today and reports urine in tubing usually being clear light red. Today it is closer to clear fruit punch. No clots and edmond continues to drain.    Temp upon arrival today 99.6F. WBC: 5.3, cr: 0.96, repeat UA with >50WBC and >10 RBC. Repeat culture pending but currently on rocephin, INR 6.2    HISTORY:  Past Medical History:    Anemia    Atherosclerosis of coronary artery    Chronic atrial fibrillation (HCC)    Esophageal reflux    Heart attack (HCC)    Hyperlipidemia    Hypertension    Lupus    Parkinson disease (HCC)    Peripheral neuropathy      Past Surgical History:   Procedure Laterality Date    Angiogram  1984    Angiogram  1996    Angiogram  03/27/2002    [facility], Dr. Scott/ Mikayla: 1.Successful PTCA of the infarct related artery which was the proximal graft of the saphenous vein graft to the right  posterior descending/LV branch which was dilated and stented using a 4 x 13 BX Velocity to a maximum diameter of 4.45 mm.  2.Proximal PDA primarily stented using a 3 x 18 Penta stent from about 70 to 80% to 0%, HUGH III flow, no dissection.     Angiogram  2011    [facility], Dr. Steele: Successful stenting with a drug-eluting stent to the graft to the right coronary artery. Severe native CAD. Patent vein graft to LAD. Severe disease in the vein graft to the right coronary artery.    Angiogram  2011    [facility], Dr. Steele: Successful stenting of the circumflex artery in the proximal and mid portions. Relook angiography of the vein graft to the right coronary artery showed that this vessel is widely patent.    Cabg  1984    x2    Cath bare metal stent (bms)      Cath drug eluting stent      Shoulder surg proc unlisted Left     fracture      Family History   Problem Relation Age of Onset    Cancer Father         Liver    Other (Other) Father         Heart Attack    Cancer Mother         uterine    Other (Other) Daughter         Hosimotos    Other (Other) Son         Heart Problem      Social History:   Social History     Socioeconomic History    Marital status:    Tobacco Use    Smoking status: Former     Current packs/day: 0.00     Types: Cigarettes     Quit date: 1982     Years since quittin.5    Smokeless tobacco: Never   Vaping Use    Vaping status: Never Used   Substance and Sexual Activity    Alcohol use: Yes     Alcohol/week: 0.0 standard drinks of alcohol     Comment: Social    Drug use: No     Social Drivers of Health     Food Insecurity: No Food Insecurity (10/11/2024)    Food Insecurity     Food Insecurity: Never true   Transportation Needs: No Transportation Needs (10/11/2024)    Transportation Needs     Lack of Transportation: No   Housing Stability: Low Risk  (10/11/2024)    Housing Stability     Housing Instability: No        Allergies  Allergies[1]    Review of Systems:   A  10-point review of systems was completed and is negative other than as noted above.    Physical Exam:   /69 (BP Location: Left arm)   Pulse 76   Temp 98.4 °F (36.9 °C) (Oral)   Resp 16   Ht 5' 6\" (1.676 m)   Wt 165 lb 5.5 oz (75 kg)   SpO2 97%   BMI 26.69 kg/m²     GENERAL APPEARANCE: no acute distress  NEUROLOGIC: sleepy  HEAD: atraumatic, normocephalic  LUNGS: non-labored breathing  ABDOMEN: soft, nontender, non-distended  BACK: no CVA tenderness  PSYCH: appropriate affect and mood    Results:     Laboratory Data:  Lab Results   Component Value Date    WBC 5.3 10/11/2024    HGB 9.9 (L) 10/11/2024    PLT 87.0 (L) 10/11/2024     Lab Results   Component Value Date     10/11/2024    K 3.8 10/11/2024     10/11/2024    CO2 28.0 10/11/2024    BUN 23 10/11/2024     (H) 10/11/2024    GFRAA 84 12/06/2019    AST 44 (H) 10/11/2024    ALT <7 (L) 10/11/2024    TP 7.5 10/11/2024    ALB 3.4 10/11/2024    CA 8.6 (L) 10/11/2024    MG 2.0 09/01/2024       Urinalysis Results (last 3 years):  Recent Labs     05/19/24  1030 07/10/24  0026 08/02/24  1758 08/04/24  1629 08/14/24  1808 08/23/24  1724 10/10/24  0940 10/11/24  1250   COLORUR Yellow Yellow Yellow  --  [place]* Red* Yellow Red*   CLARITY Ex.Turbid* Clear Clear Ex.Turbid* Turbid* Turbid* Cloudy* Ex.Turbid*   SPECGRAVITY 1.016 1.015 1.022 >1.030* >=1.030 1.020 >=1.030 1.019   PHURINE 7.0 6.5 6.0 6.0 5.5 5.5 7.5  --    PROUR 50* Trace* 20* 100* >=300* >=300* >=300*  --    GLUUR Normal Normal Normal Normal 100* 100* Negative  --    KETUR Negative Negative Negative Negative 40* 40* Negative  --    BILUR Negative Negative Negative Negative  --   --  Negative  --    BLOODURINE 1+* Negative Negative 3+* Large* Large* Large*  --    NITRITE Negative Negative Negative Negative Negative Positive* Negative  --    UROBILINOGEN Normal Normal Normal Normal 2.0* >=8.0* 1.0*  --    LEUUR 500* Negative 25* 75* Large* Large* Large*  --    WBCUR >50*  --  6-10* None  Seen None Seen  None Seen >50*  >50* >50*  >50* >50*   RBCUR >10*  --  >10* >10* >10*  >10* >10*  >10* >10*  >10* >10*   BACUR 1+*  --  None Seen None Seen None Seen  None Seen None Seen  None Seen 3+*  3+* 2+*       Urine Culture Results (last 3 years):  Lab Results   Component Value Date    URINECUL >100,000 CFU/ML Serratia marcescens (A) 10/10/2024    URINECUL >100,000 CFU/ML Enterococcus faecalis NOT VRE (A) 08/23/2024    URINECUL No Growth at 18-24 hrs. 08/14/2024    URINECUL No Growth at 18-24 hrs. 08/04/2024    URINECUL No Growth at 18-24 hrs. 05/19/2024       Imaging  CT ABDOMEN+PELVIS(CONTRAST ONLY)(CPT=74177)    Result Date: 10/11/2024  PROCEDURE:  CT ABDOMEN+PELVIS (CONTRAST ONLY) (CPT=74177)  COMPARISON:  [facility] , CT, CT ABDOMEN+PELVIS(CONTRAST ONLY)(CPT=74177), 8/04/2024, 6:53 PM.  INDICATIONS:  R inguinal hernia  TECHNIQUE:  CT scanning was performed from the dome of the diaphragm to the pubic symphysis with non-ionic intravenous contrast material. Post contrast coronal MPR imaging was performed.  Dose reduction techniques were used. Dose information is transmitted to the ACR (American College of Radiology) NRDR (National Radiology Data Registry) which includes the Dose Index Registry.  PATIENT STATED HISTORY:(As transcribed by Technologist)  Right inguinal hernia with hematuria.   CONTRAST USED:  100cc of Isovue 370  FINDINGS:  LIVER:  No enlargement, atrophy, abnormal density, or significant focal lesion.  BILIARY:  No visible dilatation or calcification.  PANCREAS:  No lesion, fluid collection, ductal dilatation, or atrophy.  SPLEEN:  No enlargement or focal lesion.  KIDNEYS:  There is a simple cyst within left midpole of the kidney measures 69 x 54 mm.  The right kidney is unremarkable. ADRENALS:  No mass or enlargement.  AORTA/VASCULAR:  No aneurysm or dissection.  RETROPERITONEUM:  No mass or adenopathy.  BOWEL/MESENTERY:  No visible mass, obstruction, or bowel wall thickening.   ABDOMINAL WALL:  Right inguinal hernia containing small bowel with small to moderate amount of fluid within the hernia sac .  The overall size of the hernia has increased in size since the previous study but the fluid within the sac is a stable finding.  The hernia sac measures approximately 73 x 50 mm transversely.  There is no evidence of obstruction. URINARY BLADDER:  There is severe thickening of the urinary bladder which is decompressed with a Milan catheter.  There is small amount a gas within the bladder due to the catheterization. PELVIC NODES:  No adenopathy.  PELVIC ORGANS:  No visible mass.  Pelvic organs appropriate for patient age.  BONES:  No bony lesion or fracture.  LUNG BASES:  Small to moderate pleural effusions more on the right than the left.  There is atelectasis at the lung bases. OTHER:  Negative.             CONCLUSION:   1.  Diffuse severe bladder wall thickening can be seen with infectious or inflammatory cystitis with neoplasm not completely excluded.   2. Slightly enlarged right inguinal hernia containing small bowel with small amount of fluid.  There is no definite evidence of obstruction.   LOCATION:  [facility]   Dictated by (CST): Jose Angel Houston MD on 10/11/2024 at 3:19 PM     Finalized by (CST): Jose Angel Houston MD on 10/11/2024 at 3:25 PM           Impression:   Recommendations:  Gross hematuria  UTI  - given high INR and current color of urine, will upsize to 22fr 3 way, manually irrigate, and begin CBI; discussed with RN and daughter   - INR reversal and abx per primary team  - monitor urine clarity and titrate as necessary  - we will follow    Thank you very much for this consult. Please call if there are any questions or concerns.     Dhara Madison PA-C  Urology  [facility]  Phone: [phone]    Date: 10/11/2024  Time: 5:35 PM         [1]   Allergies  Allergen Reactions    Hydrocodone-Acetaminophen CONFUSION, DIZZINESS and HALLUCINATION    Inderal [Propranolol] NAUSEA AND  VOMITING and UNKNOWN    Isosorbide NAUSEA AND VOMITING    Other OTHER (SEE COMMENTS)

## 2024-10-11 NOTE — ED QUICK NOTES
Rounded on patient. Updated him and daughter on plan of care. Call light in reach. Patient currently A&O x 2. Next in line for CT scan.

## 2024-10-11 NOTE — PROGRESS NOTES
NURSING ADMISSION NOTE      Patient admitted via Cart  Oriented to room.  Safety precautions initiated.  Bed in low position.  Call light in reach.    2 person skin check completed with Grace PCT, skin intact but epidermis thin/fragile

## 2024-10-12 LAB
ANION GAP SERPL CALC-SCNC: 6 MMOL/L (ref 0–18)
BUN BLD-MCNC: 17 MG/DL (ref 9–23)
CALCIUM BLD-MCNC: 8 MG/DL (ref 8.7–10.4)
CHLORIDE SERPL-SCNC: 104 MMOL/L (ref 98–112)
CO2 SERPL-SCNC: 25 MMOL/L (ref 21–32)
CREAT BLD-MCNC: 0.71 MG/DL
E FAECALIS DNA BLD POS QL NAA+NON-PROBE: DETECTED
EGFRCR SERPLBLD CKD-EPI 2021: 90 ML/MIN/1.73M2 (ref 60–?)
ERYTHROCYTE [DISTWIDTH] IN BLOOD BY AUTOMATED COUNT: 14 %
GLUCOSE BLD-MCNC: 76 MG/DL (ref 70–99)
HCT VFR BLD AUTO: 29.2 %
HGB BLD-MCNC: 9.3 G/DL
INR BLD: 3.33 (ref 0.8–1.2)
MAGNESIUM SERPL-MCNC: 1.7 MG/DL (ref 1.6–2.6)
MCH RBC QN AUTO: 29 PG (ref 26–34)
MCHC RBC AUTO-ENTMCNC: 31.8 G/DL (ref 31–37)
MCV RBC AUTO: 91 FL
OSMOLALITY SERPL CALC.SUM OF ELEC: 280 MOSM/KG (ref 275–295)
PLATELET # BLD AUTO: 82 10(3)UL (ref 150–450)
POTASSIUM SERPL-SCNC: 3.4 MMOL/L (ref 3.5–5.1)
PROTHROMBIN TIME: 34 SECONDS (ref 11.6–14.8)
RBC # BLD AUTO: 3.21 X10(6)UL
SODIUM SERPL-SCNC: 135 MMOL/L (ref 136–145)
VANA+VANB BLD POS QL NAA+NON-PROBE: NOT DETECTED
WBC # BLD AUTO: 4 X10(3) UL (ref 4–11)

## 2024-10-12 RX ORDER — SODIUM CHLORIDE 9 MG/ML
INJECTION, SOLUTION INTRAVENOUS ONCE
Status: COMPLETED | OUTPATIENT
Start: 2024-10-12 | End: 2024-10-12

## 2024-10-12 RX ORDER — POTASSIUM CHLORIDE 1500 MG/1
40 TABLET, EXTENDED RELEASE ORAL ONCE
Status: COMPLETED | OUTPATIENT
Start: 2024-10-12 | End: 2024-10-12

## 2024-10-12 RX ORDER — VANCOMYCIN HYDROCHLORIDE
15 EVERY 24 HOURS
Status: DISCONTINUED | OUTPATIENT
Start: 2024-10-12 | End: 2024-10-14

## 2024-10-12 RX ORDER — MAGNESIUM OXIDE 400 MG/1
400 TABLET ORAL ONCE
Status: COMPLETED | OUTPATIENT
Start: 2024-10-12 | End: 2024-10-12

## 2024-10-12 RX ORDER — IPRATROPIUM BROMIDE AND ALBUTEROL SULFATE 2.5; .5 MG/3ML; MG/3ML
3 SOLUTION RESPIRATORY (INHALATION) EVERY 6 HOURS PRN
Status: DISCONTINUED | OUTPATIENT
Start: 2024-10-12 | End: 2024-10-16

## 2024-10-12 NOTE — PROGRESS NOTES
A&Ox1-2. VSS. RA. .  Telemetry: NSR  GI: Abdomen soft, nondistended. Passing gas.  Denies nausea.  : CBI going slow with clear output  Pain controlled with PRN pain medications  Up with one assist and walker  Drains: edmond catheter  Diet: Regular  IVF running per order.   All appropriate safety measures in place. Patient updated on plan of care. All questions and concerns addressed.

## 2024-10-12 NOTE — PHYSICAL THERAPY NOTE
PHYSICAL THERAPY EVALUATION - INPATIENT     Room Number: 323/323-A  Evaluation Date: 10/12/2024  Type of Evaluation: Initial  Physician Order: PT Eval and Treat    Presenting Problem: confusion  Co-Morbidities : SLE, lmphoma, CAD, HTN, chronic edmond  Reason for Therapy: Mobility Dysfunction and Discharge Planning    PHYSICAL THERAPY ASSESSMENT   Patient is a 84 year old male admitted 10/11/2024 for cystitis.   Patient is currently functioning at baseline with bed mobility, transfers, and gait. Prior to admission, patient's baseline is with assistance from caregiver.  Caregiver present and able to demonstrate safety and cueing with all activities.  Given above, pt is appropriate to discharge home.       PLAN  Patient has been evaluated and presents with no skilled Physical Therapy needs at this time.  Patient discharged from Physical Therapy services.  Please re-order if a new functional limitation presents during this admission.    PT Device Recommendation: Rolling walker    GOALS  Patient was able to achieve the following goals ...    Patient was able to transfer At previous, functional level   Patient able to ambulate on level surfaces At previous, functional level     HOME SITUATION  Type of Home: Assisted living facility                        Lives With: Caregiver 24 hours;Spouse              Prior Level of [place]: assist with all activities, hx of recent fall when caregiver was not present, now pt with24 hour caregiver    SUBJECTIVE  Agreeable to ambulation    OBJECTIVE     Fall Risk: Standard fall risk    WEIGHT BEARING RESTRICTION     PAIN ASSESSMENT  Ratin          COGNITION  A&Ox1-2, requires cueing thorughout the session, follows direction    RANGE OF MOTION AND STRENGTH ASSESSMENT  Upper extremity ROM and strength are within functional limits     Lower extremity ROM is within functional limits     Lower extremity strength is within functional limits     BALANCE  Static Sitting: Fair  Dynamic  Sitting: Fair  Static Standing: Fair  Dynamic Standing: Fair    ADDITIONAL TESTS                                    ACTIVITY TOLERANCE                         O2 WALK  Oxygen Therapy  SPO2% Ambulation on Room Air: 96    NEUROLOGICAL FINDINGS                        AM-PAC '6-Clicks' INPATIENT SHORT FORM - BASIC MOBILITY  How much difficulty does the patient currently have...  Patient Difficulty: Turning over in bed (including adjusting bedclothes, sheets and blankets)?: A Little   Patient Difficulty: Sitting down on and standing up from a chair with arms (e.g., wheelchair, bedside commode, etc.): A Little   Patient Difficulty: Moving from lying on back to sitting on the side of the bed?: A Little   How much help from another person does the patient currently need...   Help from Another: Moving to and from a bed to a chair (including a wheelchair)?: A Little   Help from Another: Need to walk in hospital room?: A Little   Help from Another: Climbing 3-5 steps with a railing?: A Little       AM-PAC Score:  Raw Score: 18   Approx Degree of Impairment: 46.58%   Standardized Score (AM-PAC Scale): 43.63   CMS Modifier (G-Code): CK    FUNCTIONAL ABILITY STATUS  Gait Assessment   Functional Mobility/Gait Assessment  Gait Assistance: Contact guard assist  Distance (ft): 200  Assistive Device: Rolling walker    Skilled Therapy Provided     Bed Mobility:  Rolling: min A  Supine to sit: min A   Sit to supine: min A     Transfer Mobility:  Sit to stand: min A   Stand to sit: min A  Gait = 200ft with RW CGA, min A for steering around obstacles/turns, forward head        Exercise/Education Provided:  Activity recs, safety, caregiver verbalized understanding    Patient End of Session: In bed;Needs met;Call light within reach;RN aware of session/findings;All patient questions and concerns addressed;Hospital anti-slip socks;Family present    Patient Evaluation Complexity Level:  History Moderate - 1 or 2 personal factors and/or  co-morbidities   Examination of body systems Low -  addressing 1-2 elements   Clinical Presentation Low- Stable   Clinical Decision Making Low Complexity       PT Session Time: 30 minutes  Gait Training: 10 minutes

## 2024-10-12 NOTE — CONSULTS
[facility]   part of [facility] Infectious Disease Consult    Reginaldo Hdz Patient Status:  Inpatient    8/10/1940 MRN [id number]   Location [facility] 3NW-A Attending Aroldo Maki, DO   Hosp Day # 1 PCP Olvin Dinh MD     Reason for Consultation:  To help with infection and treatment, requested by Dr. Glynn Kendrick,     History of Present Illness:  Reginaldo Hdz is a 84 year old male admitted to ED on 10/11 with confusion and fevers,   Significant hx of   - SLE on HCQ,   - Parkinsonism,   - Chronic edomnd catheter which is exchanged monthly, for last three to four months,   - Hx of recurrent UTI,   - Hx of Bladder mass and bladder stones for which procedure is planned for 10/14.   Had his edmond catheter exchanged last weekend of 10/05, it was uncomfortable and patient noted leakage of urine around the catheter, there was blood in bladder bag too, he was brought to the ER at [facility] on 10/10 where edmond was repositioned and UA was sent which was abn, no abx were given.  Now due to worsening blood and discomfort was brought back to the hospital on 10/11.   Also was noted to be increasingly weak with some confusion,   Here in house Blood cul grew GPC in Chains,   UA is abn, started on CBI due to hematuria,   ID is asked to help with infection and treatment,       History:  Past Medical History:    Anemia    Atherosclerosis of coronary artery    Chronic atrial fibrillation (HCC)    Esophageal reflux    Heart attack (HCC)    Hyperlipidemia    Hypertension    Lupus    Parkinson disease (HCC)    Peripheral neuropathy     Past Surgical History:   Procedure Laterality Date    Angiogram      Angiogram      Angiogram  2002    [facility], Dr. Scott/ Mikayla: 1.Successful PTCA of the infarct related artery which was the proximal graft of the saphenous vein graft to the right posterior descending/LV branch which was dilated and stented using a 4 x 13 BX Velocity to a maximum  diameter of 4.45 mm.  2.Proximal PDA primarily stented using a 3 x 18 Penta stent from about 70 to 80% to 0%, HUGH III flow, no dissection.     Angiogram  06/24/2011    [facility], Dr. Steele: Successful stenting with a drug-eluting stent to the graft to the right coronary artery. Severe native CAD. Patent vein graft to LAD. Severe disease in the vein graft to the right coronary artery.    Angiogram  07/26/2011    [facility], Dr. Steele: Successful stenting of the circumflex artery in the proximal and mid portions. Relook angiography of the vein graft to the right coronary artery showed that this vessel is widely patent.    Cabg  1984    x2    Cath bare metal stent (bms)      Cath drug eluting stent      Shoulder surg proc unlisted Left     fracture     Family History   Problem Relation Age of Onset    Cancer Father         Liver    Other (Other) Father         Heart Attack    Cancer Mother         uterine    Other (Other) Daughter         Hosimotos    Other (Other) Son         Heart Problem      reports that he quit smoking about 42 years ago. His smoking use included cigarettes. He has never used smokeless tobacco. He reports current alcohol use. He reports that he does not use drugs.    Allergies:  Allergies[1]    Medications:    Current Facility-Administered Medications:     vancomycin (Vancocin) 1.25 g in sodium chloride 0.9% 250mL IVPB premix, 15 mg/kg, Intravenous, Q24H    potassium chloride (Klor-Con M20) tab 40 mEq, 40 mEq, Oral, Once    ipratropium-albuterol (Duoneb) 0.5-2.5 (3) MG/3ML inhalation solution 3 mL, 3 mL, Nebulization, Q6H PRN    carbidopa-levodopa ER (SINEMET CR)  MG per tab 2 tablet, 2 tablet, Oral, 2 times per day    ezetimibe (Zetia) tab 10 mg, 10 mg, Oral, Nightly    finasteride (Proscar) tab 5 mg, 5 mg, Oral, Daily    hydroxychloroquine (Plaquenil) tab 200 mg, 200 mg, Oral, BID    pantoprazole (Protonix) DR tab 40 mg, 40 mg, Oral, QAM AC    atorvastatin (Lipitor) tab 20 mg, 20 mg, Oral,  Nightly    pregabalin (Lyrica) cap 50 mg, 50 mg, Oral, BID    tamsulosin (Flomax) cap 0.4 mg, 0.4 mg, Oral, QPM    traMADol (Ultram) tab 50 mg, 50 mg, Oral, Q12H PRN    acetaminophen (Tylenol Extra Strength) tab 500 mg, 500 mg, Oral, Q4H PRN    ceFEPIme (Maxipime) 1 g in sodium chloride 0.9% 100 mL IVPB-MBP, 1 g, Intravenous, Q8H    [place] / urology care, , , Until Discontinued **AND** [place] / urology care, , , Continuous **AND** sodium chloride 0.9 % irrigation 3,000 mL, 3,000 mL, Irrigation, Continuous    lidocaine (Urojet) 2 % urethral jelly 20 mL, 20 mL, Urethral, PRN    carbidopa-levodopa (SINEMET)  MG per tab 1.5 tablet, 1.5 tablet, Oral, 4 times per day    Review of Systems:   Constitutional: Negative for anorexia, chills, fatigue, fevers, malaise, night sweats and weight loss.  Eyes: Negative for visual disturbance, irritation and redness.  Ears, nose, mouth, throat, and face: Negative for hearing loss, tinnitus, nasal congestion, snoring, sore throat, hoarseness and voice change.  Respiratory: Negative for cough, sputum, hemoptysis, chest pain, wheezing, dyspnea on exertion, or stridor.  Cardiovascular: Negative for chest pain, palpitations, irregular heart beats, syncope, fatigue, orthopnea, paroxysmal nocturnal dyspnea, lower extremity edema.  Gastrointestinal: Negative for dysphagia, odynophagia, reflux symptoms, nausea, vomiting, change in bowel habits, diarrhea, constipation and abdominal pain.  Integument/breast: Negative for rash, skin lesions, and pruritus.  Hematologic/lymphatic: Negative for easy bruising, bleeding, and lymphadenopathy.  Musculoskeletal: Negative for myalgias, arthralgias, muscle weakness.  Neurological: Negative for headaches, dizziness, seizures, memory problems, trouble swallowing, speech problems, gait problems and weakness.  Behavioral/Psych: Negative for active tobacco use.  Endocrine: No history of of diabetes, thyroid disorder.  All other review of systems are  negative.    Vital signs in last 24 hours:  Patient Vitals for the past 24 hrs:   BP Temp Temp src Pulse Resp SpO2 Weight   10/12/24 1157 145/69 98.3 °F (36.8 °C) Oral 70 18 98 % --   10/12/24 0805 145/69 98.9 °F (37.2 °C) Oral 80 18 96 % --   10/12/24 0604 126/31 98.1 °F (36.7 °C) Oral 82 18 96 % --   10/11/24 2330 144/71 98.4 °F (36.9 °C) Oral 91 18 92 % --   10/11/24 2028 151/72 98.7 °F (37.1 °C) -- 83 19 98 % --   10/11/24 1657 -- -- -- -- -- -- 165 lb 5.5 oz (75 kg)   10/11/24 1647 145/69 98.4 °F (36.9 °C) Oral 76 16 97 % --       Physical Exam:   General: alert, cooperative, oriented.  No respiratory distress.   Head: Normocephalic, without obvious abnormality, atraumatic.   Eyes: Conjunctivae/corneas clear.  No scleral icterus.  No conjunctival     hemorrhage.   Nose: Nares normal.   Throat: Lips, mucosa, and tongue normal.  No thrush noted.   Neck: Soft, supple neck; trachea midline,    Lungs: CTAB, normal and equal bilateral chest rise   Chest wall: No tenderness or deformity.   Heart: Regular rate and rhythm, normal S1S2, no murmur.   Abdomen: soft, non-tender, non-distended,  positive BS.   Extremity: no edema, no cyanosis   Skin: No rashes or lesions.   Neurological: Alert, interactive, no focal deficits    Labs:  Lab Results   Component Value Date    WBC 4.0 10/12/2024    HGB 9.3 10/12/2024    HCT 29.2 10/12/2024    PLT 82.0 10/12/2024    CREATSERUM 0.71 10/12/2024    BUN 17 10/12/2024     10/12/2024    K 3.4 10/12/2024     10/12/2024    CO2 25.0 10/12/2024    GLU 76 10/12/2024    CA 8.0 10/12/2024    INR 3.33 10/12/2024    MG 1.7 10/12/2024       Radiology:  CT-    1.  Diffuse severe bladder wall thickening can be seen with infectious or inflammatory cystitis with neoplasm not completely excluded.     2. Slightly enlarged right inguinal hernia containing small bowel with small amount of fluid.  There is no definite evidence of obstruction.       Cultures:  Reviewed,     Assessment and  Plan:    Enterococcal Sepsis: with dysuria and hematuria,   - Chronic indwelling edmond catheter due to Bladder stones and mass for 3-4 months, exchanged monthly, last exchange on 10/05 followed by leakage around the catheter and bleeding ? Partial occlusion,   - Seen in ER on 101/10. UA abn, Edmond repositioned, not exchanged,   - Brought to ER on 10/11 again with worsening dysuria, AMS, weakness,   - UA is abn, Cul with Serratia M, ? Colonizer,   - Previously grew Enterococcus Faecalis,   - Blood cul with E Faecalis,? Urinary source,   - CT with Diffuse severe bladder wall thickening with infectious or inflam cystitis, neoplasm not completely excluded. Slightly enlarged R inguinal hernia with small bowel & small fluid. No evidence of obstruction.   - Will get 2 D echo too,   - Repeat blood cul,   - On IV Vanc and Cefepime, will continue for now,   - CBI on going, will follow Urology input,     2.    AMS due to TME in the presence of Parkinson's disease,   - Supportive care,     3.    Hx of SLE: On HCQ,     4.    Disposition: in house, An elderly male with parkinsonism and Chronic indwelling edmond catheter admitted with weakness, AMS and possible  infection with E Faecalis bacteremia,   - Follow pending cul,   - Continue IV Vanc and Cefepime, pharm to dose,   - TTE,  - Repeat blood cul,   - Urology input,     Discussed with patient, RN, all questions answered, further recommendations to follow, Thank you for consulting [facility] for Reginaldo Hdz.  If you have any questions or concerns please call [facility] Infectious Disease at [phone].     Christian Miramontes MD  10/12/2024  2:59 PM         [1]   Allergies  Allergen Reactions    Hydrocodone-Acetaminophen CONFUSION, DIZZINESS and HALLUCINATION    Inderal [Propranolol] NAUSEA AND VOMITING and UNKNOWN    Isosorbide NAUSEA AND VOMITING    Other OTHER (SEE COMMENTS)

## 2024-10-12 NOTE — PROGRESS NOTES
CC: follow-up hospital admission cystitis    SUBJECTIVE:  Interval History:     No acute issues overnight  Urine is clear now     Labs pending for today    OBJECTIVE:  Scheduled Meds:    carbidopa-levodopa ER  2 tablet Oral 2 times per day    ezetimibe  10 mg Oral Nightly    finasteride  5 mg Oral Daily    hydroxychloroquine  200 mg Oral BID    pantoprazole  40 mg Oral QAM AC    atorvastatin  20 mg Oral Nightly    pregabalin  50 mg Oral BID    tamsulosin  0.4 mg Oral QPM    cefepime  1 g Intravenous Q8H    carbidopa-levodopa  1.5 tablet Oral 4 times per day     Continuous Infusions:    sodium chloride       PRN Meds:   traMADol    acetaminophen    lidocaine    PHYSICAL EXAM  Vital signs: Temp:  [98.4 °F (36.9 °C)-99.6 °F (37.6 °C)] 98.4 °F (36.9 °C)  Pulse:  [69-91] 91  Resp:  [10-32] 18  BP: (119-151)/(56-87) 144/71  SpO2:  [92 %-100 %] 92 %      GENERAL - NAD,   EYES- sclera anicteric,    HENT- normocephalic, OP - dry  NECK - no JVD  CV- RRR  RESP - CTAB, normal resp effort  ABDOMEN- soft, NT/ND,   EXT- no LE edema,   PSYCH - normal mentation/ normal affect    Data Review:   Labs:   Recent Labs   Lab 10/11/24  1251   WBC 5.3   HGB 9.9*   MCV 89.1   PLT 87.0*   INR 6.21*       Recent Labs   Lab 10/11/24  1251      K 3.8      CO2 28.0   BUN 23   CREATSERUM 0.96   CA 8.6*   *       Recent Labs   Lab 10/11/24  1251   ALT <7*   AST 44*   ALB 3.4       No results for input(s): \"PGLU\" in the last 168 hours.        ASSESSMENT/PLAN:    Patient is a 84 year old male with PMH sig for CAD, HTN, HLD, PAF, SLE, lymphoma, PD here for confusion, low grade temps     Impression     -complicated cystitis (Serratia)  -bacteremia (Enterococcus)  -gross hematuria    -CAD with prior CABG  -p AF on AC  -HTN  -HLD     -SLE on Plaquenil      -small lymphocytic lymphoma stage 4  -PD   -TCP, chronic        Plan     *cystitis / bacteremia  -IV abx - started on Cefepime for Serratia in urine  -add Vancomycin for positive  blood cx (enterococcus)   -will consult ID  -start IVF x 1 bag today     *gross hematuria  -? [place] trauma vs from cystitis woresened by coagulopathy from DOAC  -hold xarelto  -gu consulted - on CBI     *chronic conditions  Home meds as able     DNR dw pt / daughter         Will continue to follow while hospitalized. Please page me or the on-call hospitalist with questions or concerns.    Aroldo Mcnulty Hospitalist  [phone]  Answering Service: [phone]

## 2024-10-13 LAB
ANION GAP SERPL CALC-SCNC: 8 MMOL/L (ref 0–18)
BUN BLD-MCNC: 17 MG/DL (ref 9–23)
CALCIUM BLD-MCNC: 8.4 MG/DL (ref 8.7–10.4)
CHLORIDE SERPL-SCNC: 104 MMOL/L (ref 98–112)
CO2 SERPL-SCNC: 25 MMOL/L (ref 21–32)
CREAT BLD-MCNC: 0.77 MG/DL
EGFRCR SERPLBLD CKD-EPI 2021: 88 ML/MIN/1.73M2 (ref 60–?)
ERYTHROCYTE [DISTWIDTH] IN BLOOD BY AUTOMATED COUNT: 14 %
GLUCOSE BLD-MCNC: 102 MG/DL (ref 70–99)
GLUCOSE BLD-MCNC: 104 MG/DL (ref 70–99)
HCT VFR BLD AUTO: 29.5 %
HGB BLD-MCNC: 9.4 G/DL
MAGNESIUM SERPL-MCNC: 1.8 MG/DL (ref 1.6–2.6)
MCH RBC QN AUTO: 28.4 PG (ref 26–34)
MCHC RBC AUTO-ENTMCNC: 31.9 G/DL (ref 31–37)
MCV RBC AUTO: 89.1 FL
OSMOLALITY SERPL CALC.SUM OF ELEC: 286 MOSM/KG (ref 275–295)
PLATELET # BLD AUTO: 85 10(3)UL (ref 150–450)
POTASSIUM SERPL-SCNC: 4.1 MMOL/L (ref 3.5–5.1)
POTASSIUM SERPL-SCNC: 4.1 MMOL/L (ref 3.5–5.1)
RBC # BLD AUTO: 3.31 X10(6)UL
SODIUM SERPL-SCNC: 137 MMOL/L (ref 136–145)
WBC # BLD AUTO: 4.3 X10(3) UL (ref 4–11)

## 2024-10-13 RX ORDER — LORAZEPAM 2 MG/ML
0.5 INJECTION INTRAMUSCULAR
Status: COMPLETED | OUTPATIENT
Start: 2024-10-13 | End: 2024-10-14

## 2024-10-13 NOTE — PROGRESS NOTES
[facility]   part of [facility] Infectious Disease Consult    Reginaldo Hdz Patient Status:  Inpatient    8/10/1940 MRN [id number]   Location [facility] 3NW-A Attending Aroldo Maki, DO   Hosp Day # 2 PCP MD Reginaldo Shelton seen and examined,   Afebrile,   Previous entries noted,   Sitting on chair today  Feels better,       History:  Past Medical History:    Anemia    Atherosclerosis of coronary artery    Chronic atrial fibrillation (HCC)    Esophageal reflux    Heart attack (HCC)    Hyperlipidemia    Hypertension    Lupus    Parkinson disease (HCC)    Peripheral neuropathy     Past Surgical History:   Procedure Laterality Date    Angiogram      Angiogram      Angiogram  2002    [facility], Dr. Scott/ Mikayla: 1.Successful PTCA of the infarct related artery which was the proximal graft of the saphenous vein graft to the right posterior descending/LV branch which was dilated and stented using a 4 x 13 BX Velocity to a maximum diameter of 4.45 mm.  2.Proximal PDA primarily stented using a 3 x 18 Penta stent from about 70 to 80% to 0%, HUGH III flow, no dissection.     Angiogram  2011    [facility], Dr. Steele: Successful stenting with a drug-eluting stent to the graft to the right coronary artery. Severe native CAD. Patent vein graft to LAD. Severe disease in the vein graft to the right coronary artery.    Angiogram  2011    [facility], Dr. Steele: Successful stenting of the circumflex artery in the proximal and mid portions. Relook angiography of the vein graft to the right coronary artery showed that this vessel is widely patent.    Cabg  1984    x2    Cath bare metal stent (bms)      Cath drug eluting stent      Shoulder surg proc unlisted Left     fracture     Family History   Problem Relation Age of Onset    Cancer Father         Liver    Other (Other) Father         Heart Attack    Cancer Mother         uterine    Other (Other) Daughter          Minesh    Other (Other) Son         Heart Problem      reports that he quit smoking about 42 years ago. His smoking use included cigarettes. He has never used smokeless tobacco. He reports current alcohol use. He reports that he does not use drugs.    Allergies:  Allergies[1]    Medications:    Current Facility-Administered Medications:     vancomycin (Vancocin) 1.25 g in sodium chloride 0.9% 250mL IVPB premix, 15 mg/kg, Intravenous, Q24H    ipratropium-albuterol (Duoneb) 0.5-2.5 (3) MG/3ML inhalation solution 3 mL, 3 mL, Nebulization, Q6H PRN    carbidopa-levodopa ER (SINEMET CR)  MG per tab 2 tablet, 2 tablet, Oral, 2 times per day    ezetimibe (Zetia) tab 10 mg, 10 mg, Oral, Nightly    finasteride (Proscar) tab 5 mg, 5 mg, Oral, Daily    hydroxychloroquine (Plaquenil) tab 200 mg, 200 mg, Oral, BID    pantoprazole (Protonix) DR tab 40 mg, 40 mg, Oral, QAM AC    atorvastatin (Lipitor) tab 20 mg, 20 mg, Oral, Nightly    pregabalin (Lyrica) cap 50 mg, 50 mg, Oral, BID    tamsulosin (Flomax) cap 0.4 mg, 0.4 mg, Oral, QPM    traMADol (Ultram) tab 50 mg, 50 mg, Oral, Q12H PRN    acetaminophen (Tylenol Extra Strength) tab 500 mg, 500 mg, Oral, Q4H PRN    ceFEPIme (Maxipime) 1 g in sodium chloride 0.9% 100 mL IVPB-MBP, 1 g, Intravenous, Q8H    [place] / urology care, , , Until Discontinued **AND** [place] / urology care, , , Continuous **AND** sodium chloride 0.9 % irrigation 3,000 mL, 3,000 mL, Irrigation, Continuous    lidocaine (Urojet) 2 % urethral jelly 20 mL, 20 mL, Urethral, PRN    carbidopa-levodopa (SINEMET)  MG per tab 1.5 tablet, 1.5 tablet, Oral, 4 times per day    Review of Systems:   Constitutional: Negative for anorexia, chills, fatigue, fevers, malaise, night sweats and weight loss.  Eyes: Negative for visual disturbance, irritation and redness.  Ears, nose, mouth, throat, and face: Negative for hearing loss, tinnitus, nasal congestion, snoring, sore throat, hoarseness and voice  change.  Respiratory: Negative for cough, sputum, hemoptysis, chest pain, wheezing, dyspnea on exertion, or stridor.  Cardiovascular: Negative for chest pain, palpitations, irregular heart beats, syncope, fatigue, orthopnea, paroxysmal nocturnal dyspnea, lower extremity edema.  Gastrointestinal: Negative for dysphagia, odynophagia, reflux symptoms, nausea, vomiting, change in bowel habits, diarrhea, constipation and abdominal pain.  Integument/breast: Negative for rash, skin lesions, and pruritus.  Hematologic/lymphatic: Negative for easy bruising, bleeding, and lymphadenopathy.  Musculoskeletal: Negative for myalgias, arthralgias, muscle weakness.  Neurological: Negative for headaches, dizziness, seizures, memory problems, trouble swallowing, speech problems, gait problems and weakness.  Behavioral/Psych: Negative for active tobacco use.  Endocrine: No history of of diabetes, thyroid disorder.  All other review of systems are negative.    Vital signs in last 24 hours:  Patient Vitals for the past 24 hrs:   BP Temp Temp src Pulse Resp SpO2 Weight   10/12/24 1157 145/69 98.3 °F (36.8 °C) Oral 70 18 98 % --   10/12/24 0805 145/69 98.9 °F (37.2 °C) Oral 80 18 96 % --   10/12/24 0604 126/31 98.1 °F (36.7 °C) Oral 82 18 96 % --   10/11/24 2330 144/71 98.4 °F (36.9 °C) Oral 91 18 92 % --   10/11/24 2028 151/72 98.7 °F (37.1 °C) -- 83 19 98 % --   10/11/24 1657 -- -- -- -- -- -- 165 lb 5.5 oz (75 kg)   10/11/24 1647 145/69 98.4 °F (36.9 °C) Oral 76 16 97 % --       Physical Exam:   General: alert, cooperative, oriented.  No respiratory distress.   Head: Normocephalic, without obvious abnormality, atraumatic.   Eyes: Conjunctivae/corneas clear.  No scleral icterus.  No conjunctival     hemorrhage.   Nose: Nares normal.   Throat: Lips, mucosa, and tongue normal.  No thrush noted.   Neck: Soft, supple neck; trachea midline,    Lungs: CTAB, normal and equal bilateral chest rise   Chest wall: No tenderness or  deformity.   Heart: Regular rate and rhythm, normal S1S2, no murmur.   Abdomen: soft, non-tender, non-distended,  positive BS.   Extremity: no edema, no cyanosis   Skin: No rashes or lesions.   Neurological: Alert, interactive, no focal deficits    Labs:  Lab Results   Component Value Date    WBC 4.3 10/13/2024    HGB 9.4 10/13/2024    HCT 29.5 10/13/2024    PLT 85.0 10/13/2024    CREATSERUM 0.77 10/13/2024    BUN 17 10/13/2024     10/13/2024    K 4.1 10/13/2024    K 4.1 10/13/2024     10/13/2024    CO2 25.0 10/13/2024     10/13/2024    CA 8.4 10/13/2024    MG 1.8 10/13/2024       Radiology:  CT-    1.  Diffuse severe bladder wall thickening can be seen with infectious or inflammatory cystitis with neoplasm not completely excluded.     2. Slightly enlarged right inguinal hernia containing small bowel with small amount of fluid.  There is no definite evidence of obstruction.       Cultures:  Reviewed,     Assessment and Plan:    Enterococcal Sepsis: with dysuria and hematuria,   - Chronic indwelling edmond catheter due to Bladder stones and mass for 3-4 months, exchanged monthly, last exchange on 10/05 followed by leakage around the catheter and bleeding ? Partial occlusion,   - Seen in ER on 10/10. UA abn, Edmond repositioned, not exchanged,   - Brought to ER on 10/11 again with worsening dysuria, AMS, weakness,   - UA is abn, Cul with Serratia M, Previously grew Enterococcus Faecalis,   - Blood cul with E Faecalis,Serratia and Staph Epidermidis,   - Staph epi can be a contaminant, Enterococcus and Serratia can be from  source, Follow repeat blood Cul,   - CT with Diffuse severe bladder wall thickening with infectious or inflam cystitis, neoplasm not completely excluded. Slightly enlarged R inguinal hernia with small bowel & small fluid. No evidence of obstruction.   - Will get 2 D echo too,   - On IV Vanc and Cefepime, will continue for now,   - CBI on going, noted Urology input,     2.    AMS  due to TME in the presence of Parkinson's disease,   - Supportive care,     3.    Hx of SLE: On HCQ,     4.    Disposition: in house, An elderly male with parkinsonism and Chronic indwelling edmond catheter admitted with weakness, AMS and possible  infection with E Faecalis / Serratia bacteremia,   - Follow pending cul,   - Continue IV Vanc and Cefepime, pharm to dose,   - TTE,    Discussed with patient, RN, all questions answered, further recommendations to follow, Thank you for consulting [facility] for Reginaldo Hdz.  If you have any questions or concerns please call [facility] Infectious Disease at [phone].     Christian Miramontes MD  10/12/2024  2:59 PM         [1]   Allergies  Allergen Reactions    Hydrocodone-Acetaminophen CONFUSION, DIZZINESS and HALLUCINATION    Inderal [Propranolol] NAUSEA AND VOMITING and UNKNOWN    Isosorbide NAUSEA AND VOMITING    Other OTHER (SEE COMMENTS)

## 2024-10-13 NOTE — OCCUPATIONAL THERAPY NOTE
OT orders received per functional mobility screening protocol.  Chart reviewed and case discussed w/ PT.  Patient is functioning at baseline of min to mod A w/ ADLs and functional mobility.  Patient has a 24 hr caregiver and appropriate equipment at home.  Patient presents with no immediate skilled OT needs at this time.  Will sign off.

## 2024-10-13 NOTE — PROGRESS NOTES
CC: follow-up hospital admission cystitis    SUBJECTIVE:  Interval History:     Sleeping but easily arousable   Afebrile overnight    OBJECTIVE:  Scheduled Meds:    vancomycin  15 mg/kg Intravenous Q24H    carbidopa-levodopa ER  2 tablet Oral 2 times per day    ezetimibe  10 mg Oral Nightly    finasteride  5 mg Oral Daily    hydroxychloroquine  200 mg Oral BID    pantoprazole  40 mg Oral QAM AC    atorvastatin  20 mg Oral Nightly    pregabalin  50 mg Oral BID    tamsulosin  0.4 mg Oral QPM    cefepime  1 g Intravenous Q8H    carbidopa-levodopa  1.5 tablet Oral 4 times per day     Continuous Infusions:    sodium chloride       PRN Meds:   ipratropium-albuterol    traMADol    acetaminophen    lidocaine    PHYSICAL EXAM  Vital signs: Temp:  [98 °F (36.7 °C)-98.9 °F (37.2 °C)] 98.4 °F (36.9 °C)  Pulse:  [67-73] 71  Resp:  [17-18] 18  BP: (119-145)/(55-75) 141/65  SpO2:  [90 %-98 %] 96 %      GENERAL - NAD,   EYES- sclera anicteric,    HENT- normocephalic, OP - dry  NECK - no JVD  CV- RRR  RESP - CTAB, normal resp effort  ABDOMEN- soft, NT/ND,   EXT- no LE edema,        Data Review:   Labs:   Recent Labs   Lab 10/11/24  1251 10/12/24  1031 10/12/24  1123 10/13/24  0545   WBC 5.3 4.0  --  4.3   HGB 9.9* 9.3*  --  9.4*   MCV 89.1 91.0  --  89.1   PLT 87.0* 82.0*  --  85.0*   INR 6.21*  --  3.33*  --        Recent Labs   Lab 10/11/24  1251 10/12/24  1031 10/13/24  0545    135* 137   K 3.8 3.4* 4.1  4.1    104 104   CO2 28.0 25.0 25.0   BUN 23 17 17   CREATSERUM 0.96 0.71 0.77   CA 8.6* 8.0* 8.4*   MG  --  1.7 1.8   * 76 102*       Recent Labs   Lab 10/11/24  1251   ALT <7*   AST 44*   ALB 3.4       No results for input(s): \"PGLU\" in the last 168 hours.        ASSESSMENT/PLAN:    Patient is a 84 year old male with PMH sig for CAD, HTN, HLD, PAF, SLE, lymphoma, PD here for confusion, low grade temps     Impression     -complicated cystitis (Serratia)  -bacteremia (Enterococcus)  -gross hematuria    -CAD  with prior CABG  -p AF on AC  -HTN  -HLD     -SLE on Plaquenil      -small lymphocytic lymphoma stage 4  -PD   -TCP, chronic        Plan     *cystitis / bacteremia  -IV abx - started on Cefepime for Serratia in urine  -add Vancomycin for positive blood cx (enterococcus)   -ID following     *gross hematuria  -? Milan trauma vs from cystitis woresened by coagulopathy from DOAC  -hold xarelto  -gu consulted - sp CBI, urine clear now     *chronic conditions  Home meds as able     DNR dw pt / daughter         Will continue to follow while hospitalized. Please page me or the on-call hospitalist with questions or concerns.    Aroldo Mcnulty Hospitalist  [phone]  Answering Service: [phone]

## 2024-10-13 NOTE — PROGRESS NOTES
A/Ox1-2, RA, iv abx, tolerating regular diet well.  Milan in place with CBI running slowly, urine is clear yellow, pt tolerating well.  No complaints of pain or nausea this evening. Call light and belongings within reach, appropriate safety measures in place.

## 2024-10-14 ENCOUNTER — APPOINTMENT (OUTPATIENT)
Dept: CT IMAGING | Facility: HOSPITAL | Age: 84
End: 2024-10-14
Attending: STUDENT IN AN ORGANIZED HEALTH CARE EDUCATION/TRAINING PROGRAM
Payer: MEDICARE

## 2024-10-14 ENCOUNTER — APPOINTMENT (OUTPATIENT)
Dept: CV DIAGNOSTICS | Facility: HOSPITAL | Age: 84
End: 2024-10-14
Attending: INTERNAL MEDICINE
Payer: MEDICARE

## 2024-10-14 LAB — BACTERIA BLD CULT: POSITIVE

## 2024-10-14 PROCEDURE — 93306 TTE W/DOPPLER COMPLETE: CPT | Performed by: INTERNAL MEDICINE

## 2024-10-14 PROCEDURE — 70450 CT HEAD/BRAIN W/O DYE: CPT | Performed by: STUDENT IN AN ORGANIZED HEALTH CARE EDUCATION/TRAINING PROGRAM

## 2024-10-14 PROCEDURE — 99231 SBSQ HOSP IP/OBS SF/LOW 25: CPT

## 2024-10-14 RX ORDER — LIDOCAINE HYDROCHLORIDE 20 MG/ML
20 JELLY TOPICAL AS NEEDED
Status: DISCONTINUED | OUTPATIENT
Start: 2024-10-14 | End: 2024-10-16

## 2024-10-14 NOTE — PROGRESS NOTES
[facility]   part of [facility]    Progress Note    Reginaldo Hdz Patient Status:  Inpatient    8/10/1940 MRN [id number]   Location [facility] 3NW-A Attending Aroldo Maki, DO   Hosp Day # 3 PCP Olvin Dinh MD       Subjective:   Urine clear yellow on very slow flow CBI. Pt very confused.    ID following for pos culture enterococcus and serratia marcescens.     Objective:   Blood pressure 138/77, pulse 63, temperature 97.7 °F (36.5 °C), temperature source Oral, resp. rate 16, height 5' 6\" (1.676 m), weight 165 lb 5.5 oz (75 kg), SpO2 97%.    No distress  Non labored respirations  Abdomen soft, NTND    Assessment and Plan:     Acute cystitis with hematuria  - clamp trial this AM; if clear, can downsize to 16fr coude catheter before d'c   - abx per ID  - plan for outpt TURBT and TURP with Dr. Leon on 24 as scheduled    ADDENDUM 1127: RN reports urine is yellow with some haziness. Will plan to exchange catheter to 16fr coude.   Results:     Lab Results   Component Value Date    WBC 4.3 10/13/2024    HGB 9.4 (L) 10/13/2024    HCT 29.5 (L) 10/13/2024    PLT 85.0 (L) 10/13/2024    CREATSERUM 0.77 10/13/2024    BUN 17 10/13/2024     10/13/2024    K 4.1 10/13/2024    K 4.1 10/13/2024     10/13/2024    CO2 25.0 10/13/2024     (H) 10/13/2024    CA 8.4 (L) 10/13/2024    ALB 3.4 10/11/2024    ALKPHO 118 (H) 10/11/2024    AST 44 (H) 10/11/2024    ALT <7 (L) 10/11/2024    PTT 43.7 (H) 10/11/2024    INR 3.33 (H) 10/12/2024         CT BRAIN OR HEAD (CPT=70450)    Result Date: 10/14/2024  CONCLUSION:   1. Negative for acute intracranial process.    LOCATION:  [place]   Dictated by (CST): Pearl Vallejo MD on 10/14/2024 at 6:34 AM     Finalized by (CST): Pearl Vallejo MD on 10/14/2024 at 6:35 AM            Dhara Madison PA-C  10/14/2024

## 2024-10-14 NOTE — PROGRESS NOTES
Alert and Oriented x 3.    Denies pain declines pain medication  VSS  AFebrile.  O2 sat WNL on RA  Telemetry = NSR  Abdomen soft and nondistended. Passing gas  Denies nausea.  Tolerating Regular diet well.  Appetite good.   Milan catheter intact  with CBI running slowly.  Note clear yellow urine output.    IV site changed. Patient difficult stick and US used to insert IV.  IVF continues per orders.  Patient up in chair most of day and tolerated it well  Up to bathroom with assist and walker tolerating it fairly well.

## 2024-10-14 NOTE — CM/SW NOTE
POLST reviewed w/ pt and his son William. Pt agrees with DNAR/ [employer], and requests his son to sign it on his behalf. KATELYN completed and placed on the chart for MD signature. Dr. Maki notified.    CM/SW will remain available for DC planning and/or support.     GIBRAN Londono, CMSRN    [id number]

## 2024-10-14 NOTE — PROGRESS NOTES
Infectious Disease Progress Note      Date of admission: 10/11/2024 12:32 PM     Reason for consult: Polymicrobial bacteremia    Referring physician: Aroldo Maki DO    Subjective: Patient is currently encephalopathic.  Does not appear to be in pain or respiratory distress.  Does not answer any questions appropriately.    Interval events: This is an 84-year-old male patient, who presents here with polymicrobial bacteremia, blood cultures with Enterococcus faecalis, and Serratia.  In addition to that, he grew staph epi from his blood.  Urine culture with Serratia.  Currently on IV cefepime and IV vancomycin.  On 10/14, the patient developed encephalopathy, concerning for cefepime induced neurotoxicity.  Urology currently following for his  issues.      Medications:    meropenem    ipratropium-albuterol    carbidopa-levodopa ER    ezetimibe    finasteride    hydroxychloroquine    pantoprazole    atorvastatin    pregabalin    tamsulosin    traMADol    acetaminophen    Milan / urology care **AND** Milan / urology care **AND** sodium chloride    lidocaine    carbidopa-levodopa     Allergies:  Allergies[1]    Physical Exam:  Vitals:    10/14/24 0516   BP: 138/77   Pulse: 63   Resp: 16   Temp: 97.7 °F (36.5 °C)     Vitals signs and nursing note reviewed.   Constitutional:       Appearance: Chronically ill appearance.   HENT:      Head: Normocephalic and atraumatic.      Mouth: Mucous membranes are moist.   Neck:      Musculoskeletal: Neck supple.   Cardiovascular:      Rate and Rhythm: Normal rate.   Pulmonary:      Effort: Pulmonary effort is normal. No respiratory distress.   Abdominal:      General: Abdomen is flat. There is no distension.      Palpations: Abdomen is soft. There is no mass.      Tenderness: There is no tenderness. There is no guarding or rebound.      Hernia: No hernia is present.   Musculoskeletal:      Right lower leg: No edema.      Left lower leg: No edema.   Skin:     General: Skin is  warm and dry.   Neurological:      General: Encephalopathic      Laboratory data:  I have reviewed all the lab results independently.      Recent Labs   Lab 10/11/24  1251 10/12/24  1031 10/13/24  0545   RBC 3.41*   < > 3.31*   HGB 9.9*   < > 9.4*   HCT 30.4*   < > 29.5*   MCV 89.1   < > 89.1   MCH 29.0   < > 28.4   MCHC 32.6   < > 31.9   RDW 14.0   < > 14.0   NEPRELIM 4.04  --   --    WBC 5.3   < > 4.3   PLT 87.0*   < > 85.0*    < > = values in this interval not displayed.      Microbiology data:  Hospital Encounter on 10/11/24   1. Blood Culture     Status: None (Preliminary result)    Collection Time: 10/12/24  4:40 PM    Specimen: Blood,peripheral   Result Value Ref Range    Blood Culture Result No Growth 1 Day N/A   2. Urine Culture, Routine     Status: Abnormal    Collection Time: 10/11/24 12:50 PM    Specimen: Urine, clean catch   Result Value Ref Range    Urine Culture >100,000 CFU/ML Serratia marcescens (A) N/A        Radiology:  I have reviewed all imagining data available independently.   CT brain on 10/14:  No acute intracranial abnormalities    CT abdomen pelvis on 10/11:  Diffuse severe bladder wall thickening consistent with acute cystitis  Slightly enlarged right inguinal hernia containing small bowel with small amount of fluid.  No definite evidence of obstruction.    Impression:  Reginaldo Hdz is a 84 year old male with    Polymicrobial sepsis/bacteremia  Blood cultures from 10/11 are growing Serratia along with Enterococcus faecalis  Most likely source is  in the setting of hematuria due to Milan catheter injury  Clearance cultures from 10/12 remain negative to date  TTE pending  The patient has a chronic indwelling Milan catheter that gets exchanged every month  Last exchange on 10/5  Seen in 10/10 with abnormal UA at which point his Milan was repositioned but not exchanged  Now admitted here with hematuria  CBI was started, currently off as per urology  History of Parkinson's disease  Supportive  care as per the primary team  Encephalopathy  Concerning for cefepime induced neurotoxicity  CT head without acute changes  Staph epi in blood  This is pseudobacteremia, does not necessitate any further workup    Recommendations:    Discontinue IV cefepime and vancomycin  Start IV meropenem 500 mg every 8 hours  Supportive care as per the primary team  Continue to follow and clearance cultures and TTE  Management of hematuria as per urology  Continue to monitor daily labs for antibiotic toxicities  Further recommendations will depend on the above work-up and clinical progress     The plan of care was discussed with the primary hospital team, Aroldo Maki DO    Thank you for this consultation. Please don't hesitate to call the ID team for questions or any acute changes in patient's clinical condition.    Please note that this report has been produced using speech recognition software and may contain errors related to that system including, but not limited to, errors in grammar, punctuation, and spelling, as well as words and phrases that possibly may have been recognized inappropriately.  If there are any questions or concerns, contact the dictating provider for clarification.    The  [employer] Cures Act makes medical notes like these available to patients in the interest of transparency. Please be advised this is a medical document. Medical documents are intended to carry relevant information, facts as evident, and the clinical opinion of the practitioner. The medical note is intended as peer to peer communication and may appear blunt or direct. It is written in medical language and may contain abbreviations or verbiage that are unfamiliar.     Tosin Mercado MD  [facility]. Tel: [phone]. Fax: [phone].     Reginaldo De Leónmariah : 8/10/1940 MRN: [id number] [facility]: [id number]          [1]   Allergies  Allergen Reactions    Hydrocodone-Acetaminophen CONFUSION, DIZZINESS and HALLUCINATION     Inderal [Propranolol] NAUSEA AND VOMITING and UNKNOWN    Isosorbide NAUSEA AND VOMITING    Other OTHER (SEE COMMENTS)

## 2024-10-14 NOTE — PROGRESS NOTES
CC: follow-up hospital admission cystitis    SUBJECTIVE:  Interval History:     Confused / agited last night  Ct head neg  This am alert to self, place     OBJECTIVE:  Scheduled Meds:    meropenem  500 mg Intravenous Q8H    carbidopa-levodopa ER  2 tablet Oral 2 times per day    ezetimibe  10 mg Oral Nightly    finasteride  5 mg Oral Daily    hydroxychloroquine  200 mg Oral BID    pantoprazole  40 mg Oral QAM AC    atorvastatin  20 mg Oral Nightly    pregabalin  50 mg Oral BID    tamsulosin  0.4 mg Oral QPM    carbidopa-levodopa  1.5 tablet Oral 4 times per day     Continuous Infusions:    sodium chloride       PRN Meds:   lidocaine    ipratropium-albuterol    traMADol    acetaminophen    PHYSICAL EXAM  Vital signs: Temp:  [97.6 °F (36.4 °C)-97.9 °F (36.6 °C)] 97.6 °F (36.4 °C)  Pulse:  [61-77] 61  Resp:  [16-18] 16  BP: (109-150)/(61-80) 138/61  SpO2:  [94 %-98 %] 95 %      GENERAL - NAD,   EYES- sclera anicteric,    HENT- normocephalic, OP - dry  NECK - no JVD  CV- RRR  RESP - CTAB, normal resp effort  ABDOMEN- soft, NT/ND,   EXT- no LE edema,   Neuro - CN 2-12 grossly intact, no drift strength symmetrical       Data Review:   Labs:   Recent Labs   Lab 10/11/24  1251 10/12/24  1031 10/12/24  1123 10/13/24  0545   WBC 5.3 4.0  --  4.3   HGB 9.9* 9.3*  --  9.4*   MCV 89.1 91.0  --  89.1   PLT 87.0* 82.0*  --  85.0*   INR 6.21*  --  3.33*  --        Recent Labs   Lab 10/11/24  1251 10/12/24  1031 10/13/24  0545    135* 137   K 3.8 3.4* 4.1  4.1    104 104   CO2 28.0 25.0 25.0   BUN 23 17 17   CREATSERUM 0.96 0.71 0.77   CA 8.6* 8.0* 8.4*   MG  --  1.7 1.8   * 76 102*       Recent Labs   Lab 10/11/24  1251   ALT <7*   AST 44*   ALB 3.4       Recent Labs   Lab 10/13/24  2257   PGLU 104*           ASSESSMENT/PLAN:    Patient is a 84 year old male with PMH sig for CAD, HTN, HLD, PAF, SLE, lymphoma, PD here for confusion, low grade temps     Impression     -complicated cystitis (Serratia)  -bacteremia  (Enterococcus)  -gross hematuria    -CAD with prior CABG  -p AF on AC  -HTN  -HLD     -SLE on Plaquenil      -small lymphocytic lymphoma stage 4  -PD   -TCP, chronic        Plan     *cystitis / bacteremia  -IV abx - started on Cefepime for Serratia in urine. Change to meropenem given confusion   -add Vancomycin for positive blood cx (enterococcus)   -ID following  -TTE pending     *gross hematuria  -? Milan trauma vs from cystitis woresened by coagulopathy from DOAC  -hold xarelto  -gu consulted - sp CBI, urine clear now    *encephalopathy  -likely TME, abx changed as above, cont treatment of infection, frequent orientation  Ct head neg     *chronic conditions  Home meds as able     DNR dw pt / daughter     Addendum    Dw daughter     Will continue to follow while hospitalized. Please page me or the on-call hospitalist with questions or concerns.    Aroldo Mcnulty Hospitalist  [phone]  Answering Service: [phone]

## 2024-10-14 NOTE — PLAN OF CARE
Neuro: A&Ox2, VSS, RA, IS. Denies cough, chest pain, and SOB.   Telemetry: Normal sinus rhythm/A-fib.   GI: Abdomen soft, passing gas and belching. Denies nausea. Last BM was today. Large. Continent.   : CBI edmond exchanged for a 2 way 16 F coude. No problems with insertion.   Pt denies pain.   Up with 2 assist and a walker.   Diet: Tolerating regular diet.   IVF running per order through PIV R FA.    All appropriate safety measures in place. All questions and concerns addressed. Bed locked and in lowest position. Call light in reach.

## 2024-10-15 LAB
ANION GAP SERPL CALC-SCNC: 4 MMOL/L (ref 0–18)
BACTERIA BLD CULT: POSITIVE
BASOPHILS # BLD AUTO: 0.02 X10(3) UL (ref 0–0.2)
BASOPHILS NFR BLD AUTO: 0.7 %
BUN BLD-MCNC: 10 MG/DL (ref 9–23)
CALCIUM BLD-MCNC: 8 MG/DL (ref 8.7–10.4)
CHLORIDE SERPL-SCNC: 105 MMOL/L (ref 98–112)
CO2 SERPL-SCNC: 28 MMOL/L (ref 21–32)
CREAT BLD-MCNC: 0.62 MG/DL
EGFRCR SERPLBLD CKD-EPI 2021: 94 ML/MIN/1.73M2 (ref 60–?)
EOSINOPHIL # BLD AUTO: 0.03 X10(3) UL (ref 0–0.7)
EOSINOPHIL NFR BLD AUTO: 1.1 %
ERYTHROCYTE [DISTWIDTH] IN BLOOD BY AUTOMATED COUNT: 13.9 %
GLUCOSE BLD-MCNC: 129 MG/DL (ref 70–99)
HCT VFR BLD AUTO: 30 %
HGB BLD-MCNC: 9.8 G/DL
IMM GRANULOCYTES # BLD AUTO: 0.01 X10(3) UL (ref 0–1)
IMM GRANULOCYTES NFR BLD: 0.4 %
LYMPHOCYTES # BLD AUTO: 0.53 X10(3) UL (ref 1–4)
LYMPHOCYTES NFR BLD AUTO: 19.7 %
MCH RBC QN AUTO: 29.1 PG (ref 26–34)
MCHC RBC AUTO-ENTMCNC: 32.7 G/DL (ref 31–37)
MCV RBC AUTO: 89 FL
MONOCYTES # BLD AUTO: 0.33 X10(3) UL (ref 0.1–1)
MONOCYTES NFR BLD AUTO: 12.3 %
NEUTROPHILS # BLD AUTO: 1.77 X10 (3) UL (ref 1.5–7.7)
NEUTROPHILS # BLD AUTO: 1.77 X10(3) UL (ref 1.5–7.7)
NEUTROPHILS NFR BLD AUTO: 65.8 %
OSMOLALITY SERPL CALC.SUM OF ELEC: 285 MOSM/KG (ref 275–295)
PLATELET # BLD AUTO: 99 10(3)UL (ref 150–450)
POTASSIUM SERPL-SCNC: 3 MMOL/L (ref 3.5–5.1)
RBC # BLD AUTO: 3.37 X10(6)UL
SODIUM SERPL-SCNC: 137 MMOL/L (ref 136–145)
WBC # BLD AUTO: 2.7 X10(3) UL (ref 4–11)

## 2024-10-15 PROCEDURE — 99231 SBSQ HOSP IP/OBS SF/LOW 25: CPT | Performed by: PHYSICIAN ASSISTANT

## 2024-10-15 RX ORDER — POTASSIUM CHLORIDE 1500 MG/1
40 TABLET, EXTENDED RELEASE ORAL EVERY 4 HOURS
Status: COMPLETED | OUTPATIENT
Start: 2024-10-15 | End: 2024-10-15

## 2024-10-15 NOTE — PROGRESS NOTES
A/Ox1-2, RA, IV abx, tolerating a general diet well.  No complaints of pain or nausea this evening.  Voiding adequately via edmond.  Call light and belongings within reach, appropriate safety measures in place.

## 2024-10-15 NOTE — PROGRESS NOTES
A&Ox1-2. VSS. RA. .  Telemetry: NSR  GI: Abdomen soft, nondistended. Passing gas.  Denies nausea.  : Milan in place draining clear yellow urine   Denies pain at this time   Up with assistance, gait belt, and walker   Diet: Tolerating regular diet   Saline locked. IV Merrem per order.   All appropriate safety measures in place. All questions and concerns addressed.

## 2024-10-15 NOTE — PROGRESS NOTES
[facility]   part of [facility] Infectious Disease  Progress Note    Reginaldo Hdz Patient Status:  Inpatient    8/10/1940 MRN [id number]   Location [facility] 3NW-A Attending Aroldo Maki, DO   Hosp Day # 4 PCP Olvin Dinh MD     Subjective:  Patient seen and examined sitting up in bed. Reports feeling better this morning, but still with some confusion. Tolerating IV antibiotics. Remains afebrile. Denies n/v/d. Otherwise no new complaints.     Objective:  Blood pressure 144/66, pulse 64, temperature 97.4 °F (36.3 °C), temperature source Oral, resp. rate 16, height 5' 6\" (1.676 m), weight 165 lb 5.5 oz (75 kg), SpO2 95%.    Intake/Output:    Intake/Output Summary (Last 24 hours) at 10/15/2024 0906  Last data filed at 10/15/2024 0733  Gross per 24 hour   Intake 1987.5 ml   Output 2900 ml   Net -912.5 ml       Physical Exam:  General: Awake, alert, NAD.  HEENT:  Oropharynx clear, trachea ML.  Heart: RRR S1S2 no murmurs.  Lungs: Essentially CTA b/l, no rhonchi, rales, wheezes.  Abdomen: Soft, NT/ND.  BS present.  No guarding or rebound.  Extremity: No edema.  Neurological: No focal deficits.  Derm:  Warm and dry.    Lab Data Review:        Cultures:  Hospital Encounter on 10/11/24   1. Blood Culture     Status: None (Preliminary result)    Collection Time: 10/12/24  4:40 PM    Specimen: Blood,peripheral   Result Value Ref Range    Blood Culture Result No Growth 2 Days N/A   2. Urine Culture, Routine     Status: Abnormal    Collection Time: 10/11/24 12:50 PM    Specimen: Urine, clean catch   Result Value Ref Range    Urine Culture >100,000 CFU/ML Serratia marcescens (A) N/A       Radiology:  CT BRAIN OR HEAD (CPT=70450)    Result Date: 10/14/2024  CONCLUSION:   1. Negative for acute intracranial process.    LOCATION:  [place]   Dictated by (CST): Pearl Vallejo MD on 10/14/2024 at 6:34 AM     Finalized by (CST): Pearl Vallejo MD on 10/14/2024 at 6:35 AM          Assessment and  Plan:  1.  Polymicrobial sepsis/bacteremia  - Patient has chronic indwelling Milan catheter that gets exchanged monthly.  - Last exchange on 10/5/24.  - Seen on 10/10/24 with abnormal UA at which point his Milan was repositioned, but not exchanged.  - Now admitted with hematuria.  - CBI as per urology.  - Blood cultures, 10/11, isolating Serratia and Enterococcus faecalis.  - Repeat blood cultures, 10/12, NGTD.  - Most likely  source in the setting of hematuria d/t Milan catheter injury.  - TTE without vegetations.  - IV cefepime + vancomycin -> IV meropenem on 10/14.  - IV meropenem, ongoing.     2.  H/o Parkinson's disease  - Supportive care as per the primary team.    3.  Encephalopathy  - Concerning for cefepime induced neurotoxicity.  - CT head negative for acute intracranial process.    4.  Staph epi in 1/2 blood   - This is pseudobacteremia -- no further w/u necessary.    5.  Recs  - Continue IV meropenem at this time.  - F/u WBC and fever curve.  - F/u cultures.  - Supportive care as per the primary team.  - Management of hematuria as per urology.  - Discussed plan of care with nursing.  - Discussed plan of care with patient.  All questions addressed understanding verbalized.  - Further recommendations pending clinical course.    Discussed case with ID attending/collaborating physician, Dr. Tosin Mercado, who is in agreement with the above plan of care    Please note that this report has been produced using speech recognition software and may contain errors related to that system including, but not limited to, errors in grammar, punctuation, and spelling, as well as words and phrases that possibly may have been recognized inappropriately.  If there are any questions or concerns, contact the dictating provider for clarification.     The 21st Century Cures Act makes medical notes like these available to patients in the interest of transparency. Please be advised this is a medical document. Medical  documents are intended to carry relevant information, facts as evident, and the clinical opinion of the practitioner. The medical note is intended as peer to peer communication and may appear blunt or direct. It is written in medical language and may contain abbreviations or verbiage that are unfamiliar.     If you have any questions or concerns please call [facility] Infectious Disease at [phone].     OUSMANE Benitez    10/15/2024  9:06 AM

## 2024-10-15 NOTE — PROGRESS NOTES
CC: follow-up hospital admission cystitis    SUBJECTIVE:  Interval History:     Confused / agited last night  Ct head neg  This am alert to self, place     OBJECTIVE:  Scheduled Meds:    meropenem  500 mg Intravenous Q8H    carbidopa-levodopa ER  2 tablet Oral 2 times per day    ezetimibe  10 mg Oral Nightly    finasteride  5 mg Oral Daily    hydroxychloroquine  200 mg Oral BID    pantoprazole  40 mg Oral QAM AC    atorvastatin  20 mg Oral Nightly    pregabalin  50 mg Oral BID    tamsulosin  0.4 mg Oral QPM    carbidopa-levodopa  1.5 tablet Oral 4 times per day     Continuous Infusions:    sodium chloride       PRN Meds:   lidocaine    ipratropium-albuterol    traMADol    acetaminophen    PHYSICAL EXAM  Vital signs: Temp:  [97.4 °F (36.3 °C)-97.7 °F (36.5 °C)] 97.4 °F (36.3 °C)  Pulse:  [57-64] 64  Resp:  [16-18] 16  BP: (111-152)/(50-75) 144/66  SpO2:  [95 %-98 %] 95 %      GENERAL - NAD,   EYES- sclera anicteric,    HENT- normocephalic, OP - dry  NECK - no JVD  CV- RRR  RESP - CTAB, normal resp effort  ABDOMEN- soft, NT/ND,   EXT- no LE edema,   Neuro - CN 2-12 grossly intact, no drift strength symmetrical       Data Review:   Labs:   Recent Labs   Lab 10/11/24  1251 10/12/24  1031 10/12/24  1123 10/13/24  0545   WBC 5.3 4.0  --  4.3   HGB 9.9* 9.3*  --  9.4*   MCV 89.1 91.0  --  89.1   PLT 87.0* 82.0*  --  85.0*   INR 6.21*  --  3.33*  --        Recent Labs   Lab 10/11/24  1251 10/12/24  1031 10/13/24  0545    135* 137   K 3.8 3.4* 4.1  4.1    104 104   CO2 28.0 25.0 25.0   BUN 23 17 17   CREATSERUM 0.96 0.71 0.77   CA 8.6* 8.0* 8.4*   MG  --  1.7 1.8   * 76 102*       Recent Labs   Lab 10/11/24  1251   ALT <7*   AST 44*   ALB 3.4       Recent Labs   Lab 10/13/24  2257   PGLU 104*     ECHO    Conclusions:     1. Left ventricle: The cavity size was normal. Wall thickness was normal.      Systolic function was normal. The estimated ejection fraction was 60%.   2. Right ventricle: The cavity size  was normal. Systolic function was      normal. Systolic pressure was at the upper limits of normal.   3. Left atrium: The atrium was moderately dilated.   4. Mitral valve: There was mild regurgitation.   5. Pulmonary arteries: Systolic pressure was mildly increased, estimated to      be 32mm Hg. The peak systolic pressure is 32mm Hg. The end-diastolic      pressure is 10mm Hg.   6. Pericardium, extracardiac: There was no pericardial effusion. There were      bilateral pleural effusions present.   7. No valvular vegetations were seen.       ASSESSMENT/PLAN:    Patient is a 84 year old male with PMH sig for CAD, HTN, HLD, PAF, SLE, lymphoma, PD here for confusion, low grade temps     Impression     -complicated cystitis (Serratia)  -bacteremia (Enterococcus)  -gross hematuria    -CAD with prior CABG  -p AF on AC  -HTN  -HLD     -SLE on Plaquenil      -small lymphocytic lymphoma stage 4  -PD   -TCP, chronic        Plan     *cystitis / bacteremia  -IV abx - started on Cefepime for Serratia in urine. Change to meropenem given confusion   -add Vancomycin for positive blood cx (enterococcus)   -ID following >> apprec recs  -TTE done, reviewed, ok     *gross hematuria  -? Edmond trauma vs from cystitis woresened by coagulopathy from DOAC  -hold xarelto  -gu consulted - sp CBI, urine clear now >> edmond out    *encephalopathy  -likely TME, abx changed as above, cont treatment of infection, frequent orientation  Ct head neg     *chronic conditions  Home meds as able     DNR dw pt / daughter       Will continue to follow while hospitalized. Please page me or the on-call hospitalist with questions or concerns.    DISPO:  ID following        Raquel Mcnulty Hospitalist  Pager [phone]  Answering Service number: [phone]

## 2024-10-15 NOTE — PROGRESS NOTES
[facility]   part of [facility]    Progress Note    Reginaldo Hdz Patient Status:  Inpatient    8/10/1940 MRN [id number]   Location [facility] 3NW-A Attending Aroldo Maki, DO   Hosp Day # 4 PCP Olvin Dinh MD     Subjective:   Reginaldo Hdz is a(n) 84 year old male no complaints, eating breakfast.    No overnight events.  Milan exchanged yesterday to 16fr 2 way catheter. Urine remains clear.  Was switched from IV cefepime and vanco -> IV meropenem yesterday per ID    No fevers. Excellent UOP.  Urine culture >100K Serratia  Blood culture 10/11/24 Enterococcus  Repeat blood culture negative.    Objective:   Blood pressure 144/66, pulse 64, temperature 97.4 °F (36.3 °C), temperature source Oral, resp. rate 16, height 5' 6\" (1.676 m), weight 165 lb 5.5 oz (75 kg), SpO2 95%.    No distress  Non labored respirations  Milan draining clear yellow    Results:   Lab Results   Component Value Date    WBC 4.3 10/13/2024    HGB 9.4 (L) 10/13/2024    HCT 29.5 (L) 10/13/2024    PLT 85.0 (L) 10/13/2024    CREATSERUM 0.77 10/13/2024    BUN 17 10/13/2024     10/13/2024    K 4.1 10/13/2024    K 4.1 10/13/2024     10/13/2024    CO2 25.0 10/13/2024     (H) 10/13/2024    CA 8.4 (L) 10/13/2024    ALB 3.4 10/11/2024    ALKPHO 118 (H) 10/11/2024    BILT 0.6 10/11/2024    TP 7.5 10/11/2024    AST 44 (H) 10/11/2024    ALT <7 (L) 10/11/2024    PTT 43.7 (H) 10/11/2024    INR 3.33 (H) 10/12/2024    TSH 1.670 2019    MG 1.8 10/13/2024    TROPHS 18 2024       CT BRAIN OR HEAD (CPT=70450)    Result Date: 10/14/2024  CONCLUSION:   1. Negative for acute intracranial process.    LOCATION:  [facility]   Dictated by (CST): Pearl Vallejo MD on 10/14/2024 at 6:34 AM     Finalized by (CST): Pearl Vallejo MD on 10/14/2024 at 6:35 AM            Assessment & Plan:   Gross hematuria with UTI  BPH with urinary retention  AF, HD stable  Excellent UOP  Repeat cultures negative.  Urine has cleared appropriately with  abx.    PLAN:  Recommend continuation of edmond catheter.  Abx per ID services.  Outpatient TURBT/TURP as scheduled with Dr. Leon 11/14/24.    We will sign off at this time. Please let us know if we can be of any further assistance during admission.     Loyda Dias PA-C  [facility]  10/15/2024

## 2024-10-16 VITALS
HEIGHT: 66 IN | RESPIRATION RATE: 18 BRPM | OXYGEN SATURATION: 96 % | DIASTOLIC BLOOD PRESSURE: 49 MMHG | WEIGHT: 165.38 LBS | HEART RATE: 77 BPM | TEMPERATURE: 98 F | SYSTOLIC BLOOD PRESSURE: 106 MMHG | BODY MASS INDEX: 26.58 KG/M2

## 2024-10-16 LAB
ANION GAP SERPL CALC-SCNC: 7 MMOL/L (ref 0–18)
BUN BLD-MCNC: 9 MG/DL (ref 9–23)
CALCIUM BLD-MCNC: 8.6 MG/DL (ref 8.7–10.4)
CHLORIDE SERPL-SCNC: 106 MMOL/L (ref 98–112)
CO2 SERPL-SCNC: 27 MMOL/L (ref 21–32)
CREAT BLD-MCNC: 0.65 MG/DL
EGFRCR SERPLBLD CKD-EPI 2021: 92 ML/MIN/1.73M2 (ref 60–?)
ERYTHROCYTE [DISTWIDTH] IN BLOOD BY AUTOMATED COUNT: 14 %
GLUCOSE BLD-MCNC: 99 MG/DL (ref 70–99)
HCT VFR BLD AUTO: 29.6 %
HGB BLD-MCNC: 9.8 G/DL
MAGNESIUM SERPL-MCNC: 1.9 MG/DL (ref 1.6–2.6)
MCH RBC QN AUTO: 28.8 PG (ref 26–34)
MCHC RBC AUTO-ENTMCNC: 33.1 G/DL (ref 31–37)
MCV RBC AUTO: 87.1 FL
OSMOLALITY SERPL CALC.SUM OF ELEC: 289 MOSM/KG (ref 275–295)
PLATELET # BLD AUTO: 103 10(3)UL (ref 150–450)
POTASSIUM SERPL-SCNC: 3.7 MMOL/L (ref 3.5–5.1)
POTASSIUM SERPL-SCNC: 3.7 MMOL/L (ref 3.5–5.1)
RBC # BLD AUTO: 3.4 X10(6)UL
SODIUM SERPL-SCNC: 140 MMOL/L (ref 136–145)
WBC # BLD AUTO: 3.5 X10(3) UL (ref 4–11)

## 2024-10-16 RX ORDER — SULFAMETHOXAZOLE AND TRIMETHOPRIM 800; 160 MG/1; MG/1
1 TABLET ORAL 2 TIMES DAILY
Qty: 16 TABLET | Refills: 0 | Status: ON HOLD | OUTPATIENT
Start: 2024-10-16 | End: 2024-10-28

## 2024-10-16 RX ORDER — AMOXICILLIN 500 MG/1
1000 CAPSULE ORAL 3 TIMES DAILY
Qty: 48 CAPSULE | Refills: 0 | Status: ON HOLD | OUTPATIENT
Start: 2024-10-16 | End: 2024-10-28

## 2024-10-16 RX ORDER — POTASSIUM CHLORIDE 14.9 MG/ML
20 INJECTION INTRAVENOUS ONCE
Status: COMPLETED | OUTPATIENT
Start: 2024-10-16 | End: 2024-10-16

## 2024-10-16 NOTE — CDS QUERY
Dear Dr Lyon,   Can you please clarify the documented diagnosis of Sepsis  [X   ] Bacteremia with Sepsis ruled out  [   ] Sepsis confirmed   [   ] Clinically Unable to Determine.  ______________________________________________________________________________________________  Documentation from the Medical Record:  Clinical Indicators:   ___10/11 84M to ER with Hematuria. Temp: 99.6, HR 75, RR 32, WBC 5.3. GCS 15, PO2 110, FiO2 20%, MAP> 70, Cr 0.96, Tbili 0.6, PLT 82. Lactic Acid 1.2  ___Urine culture: >100,000 CFU/ML Serratia marcescens.   ___Blood cultures: Enterococcus faecalis, Serratia marcescens  ___10/16 Hospitalist's Progress Note: *cystitis/bacteremia -IV abx - started on Cefepime for Serratia in urine. Change to  meropenem given confusion -add Vancomycin for positive blood cx (enterococcus) -ID following >>   ___10/16 ID Consult Note: Polymicrobial sepsis/bacteremia Blood cultures from 10/11 are growing Serratia along with Enterococcus faecalis Most likely source is  in the setting of hematuria due to Milan catheter injury.    Potential Risk Factors:  Cystitis    Treatment: blood cultures, IVF normal saline bolus 2250mL, repeat blood cultures, urine cultures ID consult, IV Cefepime changed to Meropenem, Vancomycin.  Consult.  Catheter exchange, echocardiogram.                   Use of terms such as suspected, possible, or probable (associated with a specific diagnosis that is being evaluated, monitored, or treated as if it exists) are acceptable and can be coded in the inpatient setting, when documented at the time of discharge.     Please add any additional documentation to your progress note and continue to document this through discharge.    Thank you. For questions regarding this query, please contact Clinical : Neida LEARY RN, CCDS [phone]  This document is a permanent part of the medical record

## 2024-10-16 NOTE — PROGRESS NOTES
Patient alert to self only, cooperative with care, frequent reorientation required. Saturating on room air, diminished lung sounds at bases. Afib on telemetry, Xarelto on hold. Refusing SCDs. Abdomen soft, nontender, passing gas and belching. Milan catheter intact, clear/yellow urine. Patient denies pain and nausea. Fine to mild tremors of the hands and arms. Plan of care discussed with family. Bed locked in lowest position, alarm on. Call light within reach, frequent rounds performed.

## 2024-10-16 NOTE — PROGRESS NOTES
Infectious Disease Progress Note      Date of admission: 10/11/2024 12:32 PM     Reason for consult: Polymicrobial bacteremia    Referring physician: Raquel Lyon MD    Subjective: Patient is currently encephalopathic.  Does not appear to be in pain or respiratory distress.  Does not answer any questions appropriately.    Interval events: This is an 84-year-old male patient, who presents here with polymicrobial bacteremia, blood cultures with Enterococcus faecalis, and Serratia.  In addition to that, he grew staph epi from his blood.  Urine culture with Serratia. On 10/14, the patient developed encephalopathy, concerning for cefepime induced neurotoxicity.  Urology currently following for his  issues.    Antimicrobials:  IV cefepime from 10/11 through 10/13  IV ceftriaxone on 10/11  IV vancomycin from 10/12 through 10/13  IV meropenem from 10/14 through today      Medications:    potassium chloride    meropenem    lidocaine    ipratropium-albuterol    carbidopa-levodopa ER    ezetimibe    finasteride    hydroxychloroquine    pantoprazole    atorvastatin    pregabalin    tamsulosin    traMADol    acetaminophen    Milan / urology care **AND** Milan / urology care **AND** sodium chloride    carbidopa-levodopa     Allergies:  Allergies[1]    Physical Exam:  Vitals:    10/16/24 0927   BP:    Pulse: 93   Resp:    Temp:      Vitals signs and nursing note reviewed.   Constitutional:       Appearance: Chronically ill appearance.   HENT:      Head: Normocephalic and atraumatic.      Mouth: Mucous membranes are moist.   Neck:      Musculoskeletal: Neck supple.   Cardiovascular:      Rate and Rhythm: Normal rate.   Pulmonary:      Effort: Pulmonary effort is normal. No respiratory distress.   Abdominal:      General: Abdomen is flat. There is no distension.      Palpations: Abdomen is soft. There is no mass.      Tenderness: There is no tenderness. There is no guarding or rebound.      Hernia: No hernia is present.    Musculoskeletal:      Right lower leg: No edema.      Left lower leg: No edema.   Skin:     General: Skin is warm and dry.   Neurological:      General: Encephalopathic      Laboratory data:  I have reviewed all the lab results independently.  Lab Results   Component Value Date    WBC 3.5 10/16/2024    HGB 9.8 10/16/2024    HCT 29.6 10/16/2024    .0 10/16/2024    CREATSERUM 0.65 10/16/2024    BUN 9 10/16/2024     10/16/2024    K 3.7 10/16/2024    K 3.7 10/16/2024     10/16/2024    CO2 27.0 10/16/2024    GLU 99 10/16/2024    CA 8.6 10/16/2024    MG 1.9 10/16/2024      Recent Labs   Lab 10/15/24  1032 10/16/24  0556   RBC 3.37* 3.40*   HGB 9.8* 9.8*   HCT 30.0* 29.6*   MCV 89.0 87.1   MCH 29.1 28.8   MCHC 32.7 33.1   RDW 13.9 14.0   NEPRELIM 1.77  --    WBC 2.7* 3.5*   PLT 99.0* 103.0*      Microbiology data:  Hospital Encounter on 10/11/24   1. Blood Culture     Status: None (Preliminary result)    Collection Time: 10/12/24  4:40 PM    Specimen: Blood,peripheral   Result Value Ref Range    Blood Culture Result No Growth 3 Days N/A   2. Urine Culture, Routine     Status: Abnormal    Collection Time: 10/11/24 12:50 PM    Specimen: Urine, clean catch   Result Value Ref Range    Urine Culture >100,000 CFU/ML Serratia marcescens (A) N/A        Radiology:  I have reviewed all imagining data available independently.   CT brain on 10/14:  No acute intracranial abnormalities    CT abdomen pelvis on 10/11:  Diffuse severe bladder wall thickening consistent with acute cystitis  Slightly enlarged right inguinal hernia containing small bowel with small amount of fluid.  No definite evidence of obstruction.    Impression:  Reginaldo Hdz is a 84 year old male with    Polymicrobial sepsis/bacteremia  Blood cultures from 10/11 are growing Serratia along with Enterococcus faecalis  Most likely source is  in the setting of hematuria due to Milan catheter injury  Clearance cultures from 10/12 remain negative to  date  TTE without endocarditis  The patient has a chronic indwelling Milan catheter that gets exchanged every month  Last exchange on 10/5  Seen in 10/10 with abnormal UA at which point his Milan was repositioned but not exchanged  Now admitted here with hematuria  CBI was started, currently off as per urology  Antimicrobials:  IV cefepime from 10/11 through 10/13  IV ceftriaxone on 10/11  IV vancomycin from 10/12 through 10/13  IV meropenem from 10/14 through today  History of Parkinson's disease  Supportive care as per the primary team  Encephalopathy  Concerning for cefepime induced neurotoxicity  CT head without acute changes  Improved once cefepime was stopped  Staph epi in blood  This is pseudobacteremia, does not necessitate any further workup    Recommendations:    Continue IV meropenem 500 mg every 8 hours while inpatient  Patient can be discharged on amoxicillin 1 g 3 times daily along with Bactrim DS 1 tablets twice daily to finish a 2-week course through 10/24  Supportive care as per the primary team  Management of hematuria as per urology  Okay to discharge from ID perspective once okay with the other services    The plan of care was discussed with the primary hospital team, Raquel Lyon MD    Thank you for this consultation. Please don't hesitate to call the ID team for questions or any acute changes in patient's clinical condition.    Please note that this report has been produced using speech recognition software and may contain errors related to that system including, but not limited to, errors in grammar, punctuation, and spelling, as well as words and phrases that possibly may have been recognized inappropriately.  If there are any questions or concerns, contact the dictating provider for clarification.    The 21st Century Cures Act makes medical notes like these available to patients in the interest of transparency. Please be advised this is a medical document. Medical documents are intended to  carry relevant information, facts as evident, and the clinical opinion of the practitioner. The medical note is intended as peer to peer communication and may appear blunt or direct. It is written in medical language and may contain abbreviations or verbiage that are unfamiliar.     Tosin Mercado MD  [facility]. Tel: [phone]. Fax: [phone].     Reginaldo Hdz : 8/10/1940 MRN: [id number] CSN: [id number]          [1]   Allergies  Allergen Reactions    Hydrocodone-Acetaminophen CONFUSION, DIZZINESS and HALLUCINATION    Inderal [Propranolol] NAUSEA AND VOMITING and UNKNOWN    Isosorbide NAUSEA AND VOMITING    Other OTHER (SEE COMMENTS)

## 2024-10-16 NOTE — PROGRESS NOTES
CC: follow-up hospital admission cystitis    SUBJECTIVE:  Interval History:     No issues overnight  Afebrile  Pleasant   Family in room     OBJECTIVE:  Scheduled Meds:    potassium chloride  20 mEq Intravenous Once    meropenem  500 mg Intravenous Q8H    carbidopa-levodopa ER  2 tablet Oral 2 times per day    ezetimibe  10 mg Oral Nightly    finasteride  5 mg Oral Daily    hydroxychloroquine  200 mg Oral BID    pantoprazole  40 mg Oral QAM AC    atorvastatin  20 mg Oral Nightly    pregabalin  50 mg Oral BID    tamsulosin  0.4 mg Oral QPM    carbidopa-levodopa  1.5 tablet Oral 4 times per day     Continuous Infusions:    sodium chloride       PRN Meds:   lidocaine    ipratropium-albuterol    traMADol    acetaminophen    PHYSICAL EXAM  Vital signs: Temp:  [97.6 °F (36.4 °C)-97.8 °F (36.6 °C)] 97.8 °F (36.6 °C)  Pulse:  [68-83] 70  Resp:  [16-20] 16  BP: ()/(53-90) 151/77  SpO2:  [92 %-100 %] 94 %      GENERAL - NAD,   EYES- sclera anicteric,    HENT- normocephalic, OP - dry  NECK - no JVD  CV- RRR  RESP - CTAB, normal resp effort  ABDOMEN- soft, NT/ND,   EXT- no LE edema,   Neuro - CN 2-12 grossly intact, no drift strength symmetrical       Data Review:   Labs:   Recent Labs   Lab 10/11/24  1251 10/12/24  1031 10/12/24  1123 10/13/24  0545 10/15/24  1032 10/16/24  0556   WBC 5.3 4.0  --  4.3 2.7* 3.5*   HGB 9.9* 9.3*  --  9.4* 9.8* 9.8*   MCV 89.1 91.0  --  89.1 89.0 87.1   PLT 87.0* 82.0*  --  85.0* 99.0* 103.0*   INR 6.21*  --  3.33*  --   --   --        Recent Labs   Lab 10/11/24  1251 10/12/24  1031 10/13/24  0545 10/15/24  1032 10/16/24  0556    135* 137 137 140   K 3.8 3.4* 4.1  4.1 3.0* 3.7  3.7    104 104 105 106   CO2 28.0 25.0 25.0 28.0 27.0   BUN 23 17 17 10 9   CREATSERUM 0.96 0.71 0.77 0.62* 0.65*   CA 8.6* 8.0* 8.4* 8.0* 8.6*   MG  --  1.7 1.8  --  1.9   * 76 102* 129* 99       Recent Labs   Lab 10/11/24  1251   ALT <7*   AST 44*   ALB 3.4       Recent Labs   Lab  10/13/24  2257   PGLU 104*     ECHO    Conclusions:     1. Left ventricle: The cavity size was normal. Wall thickness was normal.      Systolic function was normal. The estimated ejection fraction was 60%.   2. Right ventricle: The cavity size was normal. Systolic function was      normal. Systolic pressure was at the upper limits of normal.   3. Left atrium: The atrium was moderately dilated.   4. Mitral valve: There was mild regurgitation.   5. Pulmonary arteries: Systolic pressure was mildly increased, estimated to      be 32mm Hg. The peak systolic pressure is 32mm Hg. The end-diastolic      pressure is 10mm Hg.   6. Pericardium, extracardiac: There was no pericardial effusion. There were      bilateral pleural effusions present.   7. No valvular vegetations were seen.       ASSESSMENT/PLAN:    Patient is a 84 year old male with PMH sig for CAD, HTN, HLD, PAF, SLE, lymphoma, PD here for confusion, low grade temps     Impression     -complicated cystitis (Serratia)  -bacteremia (Enterococcus)  -gross hematuria    -CAD with prior CABG  -p AF on AC  -HTN  -HLD     -SLE on Plaquenil      -small lymphocytic lymphoma stage 4  -PD   -TCP, chronic        Plan     *cystitis / bacteremia  -IV abx - started on Cefepime for Serratia in urine. Change to meropenem given confusion   -add Vancomycin for positive blood cx (enterococcus)   -ID following >> apprec recs  -TTE done, reviewed, ok     *gross hematuria  -? Edmond trauma vs from cystitis woresened by coagulopathy from DOAC  -hold xarelto  -gu consulted - sp CBI, urine clear now >> edmond out    *encephalopathy  -likely TME, abx changed as above, cont treatment of infection, frequent orientation  Ct head neg     *chronic conditions  Home meds as able     DNR dw pt / daughter       Will continue to follow while hospitalized. Please page me or the on-call hospitalist with questions or concerns.    DISPO:  Dc planning in process  Cleared for dc by ID  Urology signed off -  edmond in place  Resuming xarelto    [place] Coordinator contacted to facilitate appointments with PCP and consultant services.          Raquel Lyon MD  [facility]  Pager [phone]  Answering Service number: [phone]

## 2024-10-16 NOTE — CDS QUERY
Dear Dr Lyon,   Can you please clarify the relationship, if any, between the diagnosis of Cystitis and the chronic indwelling edmond catheter:  [ X  ] Cystitis related to/associated with chronic indwelling edmond catheter  [   ] Cystitis not related to/associated with chronic indwelling edmond catheter  [   ] Clinically Unable to Determine.  ______________________________________________________________________________________________  Documentation from the Medical Record:  Clinical Indicators:   ___10/11 84M to ER with Hematuria. Urine culture: >100,000 CFU/ML Serratia marcescens.   ___Blood cultures: Enterococcus faecalis, Serratia marcescens  ___10/15  Consult Note: Assessment & Plan: Gross hematuria with UTI BPH with urinary retention, Repeat cultures negative.  Urine has cleared appropriately with abx. PLAN:   Recommend continuation of edmond catheter. Abx per ID services.  Outpatient TURBT/TURP as scheduled with Dr. Leon 11/14/24.    ___10/16 Hospitalist's Progress Note: *cystitis / bacteremia -IV abx - started on Cefepime for Serratia in urine. Change to  meropenem given confusion -add Vancomycin for positive blood cx (enterococcus) -ID following >> apprec recs  -TTE done, reviewed, ok  *gross hematuria -?Edmond trauma vs from cystitis worsened by coagulopathy from DOAC -hold Xarelto -gu consulted - sp CBI, urine clear now >> edmond out    ___10/16 ID Consult Note: Polymicrobial sepsis/bacteremia Blood cultures from 10/11 are growing Serratia along with Enterococcus faecalis Most likely source is  in the setting of hematuria due to Edmond catheter injury Clearance cultures from 10/12 remain negative to date TTE without endocarditis The patient has a chronic indwelling Edmond catheter that gets exchanged every month Last exchange on 10/5 Seen in 10/10 with abnormal UA at which point his Edmond was repositioned but not exchanged Now admitted here with hematuria   Potential Risk Factors:  BPH with urinary  retention, chronic indwelling Milan catheter POA.     Treatment: repeat blood cultures, ID consult, IV Cefepime changed to Meropenem, Vancomycin.  Consult.  Catheter exchange                  Use of terms such as suspected, possible, or probable (associated with a specific diagnosis that is being evaluated, monitored, or treated as if it exists) are acceptable and can be coded in the inpatient setting, when documented at the time of discharge.     Please add any additional documentation to your progress note and continue to document this through discharge.    Thank you. For questions regarding this query, please contact Clinical : Neida LEARY RN, CCDS [phone]  This document is a permanent part of the medical record

## 2024-10-16 NOTE — PROGRESS NOTES
NURSING DISCHARGE NOTE    Discharged Home via Ambulance.  Accompanied by Support staff  Belongings Taken by patient/family      Report called to [facility]- spoke to Allison STERN. IV dc'd w/ catheter intact.

## 2024-10-16 NOTE — CM/SW NOTE
CM contacted [facility] to discuss pt's expected medical clearance for DC today and return to facility. Spoke to April, RN, who will let DON know but pt should be approved for return to facility. Discussed that LUCILA was sent to [facility] and that chronic edmond was exchanged on 10/14.     Pt does have 2 prescribe PO antibiotics that were sent to his preferred pharmacy [facility] in [place]. Pt does typically get his medications from [facility] pharmacy, but they would likely not be able to get new meds until 10/17.Facility requests pt /family  prescription to ensure no delay in PO antibiotics. RN updated.     RN to call nurse to nurse report to receiving RN at [facility]. [phone], ask for AL RN.     Addendum:    1205: [facility] scheduled for 2:00 pm pickup. Per dispatch, pt will be transported in an ambulance at medicar rate d/t availability. PCS form completed and available for RN.      ROMI LondonoN, CMSRN    [id number]

## 2024-10-22 ENCOUNTER — TELEPHONE (OUTPATIENT)
Dept: SURGERY | Facility: CLINIC | Age: 84
End: 2024-10-22

## 2024-10-22 RX ORDER — SODIUM CHLORIDE, SODIUM LACTATE, POTASSIUM CHLORIDE, CALCIUM CHLORIDE 600; 310; 30; 20 MG/100ML; MG/100ML; MG/100ML; MG/100ML
INJECTION, SOLUTION INTRAVENOUS CONTINUOUS
Status: CANCELLED | OUTPATIENT
Start: 2024-10-22

## 2024-10-22 RX ORDER — LEVOFLOXACIN 5 MG/ML
500 INJECTION, SOLUTION INTRAVENOUS ONCE
Status: CANCELLED | OUTPATIENT
Start: 2024-10-22

## 2024-10-22 NOTE — TELEPHONE ENCOUNTER
Can start bactrim x 10 days starting ~ 5 days prior to TURP. I sent a Rx to his pharmacy but it sounds like nursing home needs an order   [facility] @ [place] [phone]   Notified Marie, nurse at nursing home..Will  Fax order to nursing home for pre op abx.  Tried to reach daughter.  [phone].  [facility]

## 2024-10-22 NOTE — PAT NURSING NOTE
RN made several attempts to collect Healthcare POA paperwork and DNAR paperwork from patient's assisted living facility [facility] [place]. Facility has only been faxing over facesheets and not actual POA documentation. In addition, SSS reads that patient is to start on antibiotics 5 days prior to surgery date but no antibiotic name or dose given. Per JARRED Lundberg at [facility], they do not have information regarding this. Spoke to surgery scheduler Ximena who stated that she will follow up with surgeon's office regarding request for antibiotics to be started 5 days prior to surgery.

## 2024-10-23 NOTE — PAT NURSING NOTE
Spoke to Toño at [facility] who stated that they do not have POA/DNAR paperwork at their facility, they only have a facesheet that reflects this. Notified Toño STERN that our chart reflects that patient is a DNAR. Spoke to patient's daughter Chloé to clarify this, and per daughter Chloé patient is a DNAR. Advised Chloé to contact patient's assisted living facility to notify them that patient is a DNAR and not a full code. Patient's daughter will contact [facility] and in addtion fax PAT, POA paperwork and DNAR paperwork. PAT fax number [phone] provided to patient's daughter.

## 2024-10-28 ENCOUNTER — APPOINTMENT (OUTPATIENT)
Dept: CT IMAGING | Facility: HOSPITAL | Age: 84
End: 2024-10-28
Attending: EMERGENCY MEDICINE
Payer: MEDICARE

## 2024-10-28 ENCOUNTER — HOSPITAL ENCOUNTER (OUTPATIENT)
Facility: HOSPITAL | Age: 84
Setting detail: OBSERVATION
Discharge: NURSING, REHAB OR LONG-TERM CARE | End: 2024-10-31
Attending: EMERGENCY MEDICINE | Admitting: STUDENT IN AN ORGANIZED HEALTH CARE EDUCATION/TRAINING PROGRAM
Payer: MEDICARE

## 2024-10-28 DIAGNOSIS — E86.0 DEHYDRATION: ICD-10-CM

## 2024-10-28 DIAGNOSIS — R55 SYNCOPE AND COLLAPSE: Primary | ICD-10-CM

## 2024-10-28 LAB
ALBUMIN SERPL-MCNC: 3 G/DL (ref 3.2–4.8)
ALBUMIN/GLOB SERPL: 0.7 {RATIO} (ref 1–2)
ALP LIVER SERPL-CCNC: 126 U/L
ALT SERPL-CCNC: <7 U/L
ANION GAP SERPL CALC-SCNC: 2 MMOL/L (ref 0–18)
AST SERPL-CCNC: 45 U/L (ref ?–34)
ATRIAL RATE: 66 BPM
BASOPHILS # BLD AUTO: 0.02 X10(3) UL (ref 0–0.2)
BASOPHILS NFR BLD AUTO: 0.7 %
BILIRUB SERPL-MCNC: 0.4 MG/DL (ref 0.2–1.1)
BILIRUB UR QL CFM: NEGATIVE
BUN BLD-MCNC: 20 MG/DL (ref 9–23)
CALCIUM BLD-MCNC: 8.8 MG/DL (ref 8.7–10.4)
CHLORIDE SERPL-SCNC: 109 MMOL/L (ref 98–112)
CO2 SERPL-SCNC: 26 MMOL/L (ref 21–32)
CREAT BLD-MCNC: 0.93 MG/DL
EGFRCR SERPLBLD CKD-EPI 2021: 80 ML/MIN/1.73M2 (ref 60–?)
EOSINOPHIL # BLD AUTO: 0.01 X10(3) UL (ref 0–0.7)
EOSINOPHIL NFR BLD AUTO: 0.4 %
ERYTHROCYTE [DISTWIDTH] IN BLOOD BY AUTOMATED COUNT: 14.1 %
FLUAV + FLUBV RNA SPEC NAA+PROBE: NEGATIVE
FLUAV + FLUBV RNA SPEC NAA+PROBE: NEGATIVE
GLOBULIN PLAS-MCNC: 4.2 G/DL (ref 2–3.5)
GLUCOSE BLD-MCNC: 145 MG/DL (ref 70–99)
GLUCOSE UR STRIP.AUTO-MCNC: 100 MG/DL
HCT VFR BLD AUTO: 31.6 %
HGB BLD-MCNC: 10 G/DL
IMM GRANULOCYTES # BLD AUTO: 0.01 X10(3) UL (ref 0–1)
IMM GRANULOCYTES NFR BLD: 0.4 %
KETONES UR STRIP.AUTO-MCNC: 15 MG/DL
LEUKOCYTE ESTERASE UR QL STRIP.AUTO: NEGATIVE
LYMPHOCYTES # BLD AUTO: 0.66 X10(3) UL (ref 1–4)
LYMPHOCYTES NFR BLD AUTO: 23.2 %
MCH RBC QN AUTO: 27.9 PG (ref 26–34)
MCHC RBC AUTO-ENTMCNC: 31.6 G/DL (ref 31–37)
MCV RBC AUTO: 88.3 FL
MONOCYTES # BLD AUTO: 0.33 X10(3) UL (ref 0.1–1)
MONOCYTES NFR BLD AUTO: 11.6 %
NEUTROPHILS # BLD AUTO: 1.81 X10 (3) UL (ref 1.5–7.7)
NEUTROPHILS # BLD AUTO: 1.81 X10(3) UL (ref 1.5–7.7)
NEUTROPHILS NFR BLD AUTO: 63.7 %
NITRITE UR QL STRIP.AUTO: NEGATIVE
OSMOLALITY SERPL CALC.SUM OF ELEC: 289 MOSM/KG (ref 275–295)
P AXIS: 15 DEGREES
P-R INTERVAL: 258 MS
PH UR STRIP.AUTO: 5.5 [PH] (ref 5–8)
PLATELET # BLD AUTO: 120 10(3)UL (ref 150–450)
POTASSIUM SERPL-SCNC: 4.5 MMOL/L (ref 3.5–5.1)
PROT SERPL-MCNC: 7.2 G/DL (ref 5.7–8.2)
PROT UR STRIP.AUTO-MCNC: >=300 MG/DL
Q-T INTERVAL: 450 MS
QRS DURATION: 84 MS
QTC CALCULATION (BEZET): 471 MS
R AXIS: -31 DEGREES
RBC # BLD AUTO: 3.58 X10(6)UL
RSV RNA SPEC NAA+PROBE: NEGATIVE
SARS-COV-2 RNA RESP QL NAA+PROBE: NOT DETECTED
SODIUM SERPL-SCNC: 137 MMOL/L (ref 136–145)
SP GR UR STRIP.AUTO: >=1.03 (ref 1–1.03)
T AXIS: 2 DEGREES
TROPONIN I SERPL HS-MCNC: 18 NG/L
UROBILINOGEN UR STRIP.AUTO-MCNC: 1 MG/DL
VENTRICULAR RATE: 66 BPM
WBC # BLD AUTO: 2.8 X10(3) UL (ref 4–11)

## 2024-10-28 PROCEDURE — 70450 CT HEAD/BRAIN W/O DYE: CPT | Performed by: EMERGENCY MEDICINE

## 2024-10-28 RX ORDER — SODIUM CHLORIDE, SODIUM LACTATE, POTASSIUM CHLORIDE, CALCIUM CHLORIDE 600; 310; 30; 20 MG/100ML; MG/100ML; MG/100ML; MG/100ML
INJECTION, SOLUTION INTRAVENOUS CONTINUOUS
Status: DISCONTINUED | OUTPATIENT
Start: 2024-10-28 | End: 2024-10-31

## 2024-10-28 RX ORDER — DOCUSATE SODIUM 100 MG/1
100 CAPSULE, LIQUID FILLED ORAL DAILY PRN
Status: DISCONTINUED | OUTPATIENT
Start: 2024-10-28 | End: 2024-10-31

## 2024-10-28 RX ORDER — FINASTERIDE 5 MG/1
5 TABLET, FILM COATED ORAL DAILY
Status: DISCONTINUED | OUTPATIENT
Start: 2024-10-29 | End: 2024-10-31

## 2024-10-28 RX ORDER — CARBIDOPA AND LEVODOPA 25; 100 MG/1; MG/1
1.5 TABLET ORAL 4 TIMES DAILY
Status: DISCONTINUED | OUTPATIENT
Start: 2024-10-28 | End: 2024-10-31

## 2024-10-28 RX ORDER — CETIRIZINE HYDROCHLORIDE 5 MG/1
5 TABLET ORAL DAILY
Status: DISCONTINUED | OUTPATIENT
Start: 2024-10-29 | End: 2024-10-31

## 2024-10-28 RX ORDER — ACETAMINOPHEN 325 MG/1
650 TABLET ORAL EVERY 4 HOURS PRN
Status: DISCONTINUED | OUTPATIENT
Start: 2024-10-28 | End: 2024-10-31

## 2024-10-28 RX ORDER — TRAMADOL HYDROCHLORIDE 50 MG/1
50 TABLET ORAL EVERY 12 HOURS PRN
Status: DISCONTINUED | OUTPATIENT
Start: 2024-10-28 | End: 2024-10-31

## 2024-10-28 RX ORDER — METOCLOPRAMIDE HYDROCHLORIDE 5 MG/ML
5 INJECTION INTRAMUSCULAR; INTRAVENOUS EVERY 8 HOURS PRN
Status: DISCONTINUED | OUTPATIENT
Start: 2024-10-28 | End: 2024-10-31

## 2024-10-28 RX ORDER — PREGABALIN 75 MG/1
75 CAPSULE ORAL 2 TIMES DAILY
Status: DISCONTINUED | OUTPATIENT
Start: 2024-10-28 | End: 2024-10-31

## 2024-10-28 RX ORDER — EZETIMIBE 10 MG/1
10 TABLET ORAL DAILY
Status: DISCONTINUED | OUTPATIENT
Start: 2024-10-29 | End: 2024-10-31

## 2024-10-28 RX ORDER — PANTOPRAZOLE SODIUM 40 MG/1
40 TABLET, DELAYED RELEASE ORAL
Status: DISCONTINUED | OUTPATIENT
Start: 2024-10-29 | End: 2024-10-31

## 2024-10-28 RX ORDER — ONDANSETRON 2 MG/ML
4 INJECTION INTRAMUSCULAR; INTRAVENOUS EVERY 6 HOURS PRN
Status: DISCONTINUED | OUTPATIENT
Start: 2024-10-28 | End: 2024-10-31

## 2024-10-28 RX ORDER — POLYETHYLENE GLYCOL 3350 17 G/17G
17 POWDER, FOR SOLUTION ORAL DAILY
Status: DISCONTINUED | OUTPATIENT
Start: 2024-10-29 | End: 2024-10-31

## 2024-10-28 RX ORDER — CARBIDOPA AND LEVODOPA 25; 100 MG/1; MG/1
1 TABLET, EXTENDED RELEASE ORAL 2 TIMES DAILY
Status: DISCONTINUED | OUTPATIENT
Start: 2024-10-28 | End: 2024-10-31

## 2024-10-28 RX ORDER — TAMSULOSIN HYDROCHLORIDE 0.4 MG/1
0.4 CAPSULE ORAL EVERY EVENING
Status: DISCONTINUED | OUTPATIENT
Start: 2024-10-28 | End: 2024-10-30

## 2024-10-28 RX ORDER — HYDROXYCHLOROQUINE SULFATE 200 MG/1
200 TABLET, FILM COATED ORAL 2 TIMES DAILY
Status: DISCONTINUED | OUTPATIENT
Start: 2024-10-28 | End: 2024-10-31

## 2024-10-28 NOTE — H&P
[facility]      Chief Complaint   Patient presents with    Altered Mental Status        PCP: Olvin Dinh MD      History of Present Illness: Patient is a 84 year old male with PMH sig for Parkinson disease, A-fib on Xarelto, SLE, lymphoma who presents for syncope.  Patient states he was being helped standing up by his caretaker after using the bathroom and reportedly his eyes rolled back as he stood up straight and refill he became minimally responsive.  According to family his blood pressure after this episode was 80's systolic.  Patient states he did not pass out.  Currently resides in assisted living.  Had recent admission for UTI/ bacteremia, completed abx course. Denies chest pain, shortness of breath, fever, chills, nausea, vomiting.    Workup significant for CT head negative for acute process, hemoglobin 10, platelets 128, creatinine 0.93, blood glucose 145.  Patient given IV fluids in the ED.    Past Medical History:    Anemia    Anesthesia complication    Can become agitated/combative coming out of anesthesia    Anxiety state    Arrhythmia    Atherosclerosis of coronary artery    Back problem    Blood disorder    Chronic atrial fibrillation (HCC)    Coronary atherosclerosis    Esophageal reflux    Heart attack (HCC)    High blood pressure    High cholesterol    History of adverse reaction to anesthesia    Hyperlipidemia    Hypertension    Incontinence    Has an indwelling urinary catheter    Lupus    Osteoarthritis    Parkinson disease (HCC)    Peripheral neuropathy    Problems with swallowing    Difficulties with swallowing with Parkinson's disease    Visual impairment    Glasses      Past Surgical History:   Procedure Laterality Date    Angiogram  1984    Angiogram  1996    Angiogram  03/27/2002    [facility], Dr. Scott/ Mikayla: 1.Successful PTCA of the infarct related artery which was the proximal graft of the saphenous vein graft to the right posterior  descending/LV branch which was dilated and stented using a 4 x 13 BX Velocity to a maximum diameter of 4.45 mm.  2.Proximal PDA primarily stented using a 3 x 18 Penta stent from about 70 to 80% to 0%, HUGH III flow, no dissection.     Angiogram  06/24/2011    [facility], Dr. Steele: Successful stenting with a drug-eluting stent to the graft to the right coronary artery. Severe native CAD. Patent vein graft to LAD. Severe disease in the vein graft to the right coronary artery.    Angiogram  07/26/2011    [facility], Dr. Steele: Successful stenting of the circumflex artery in the proximal and mid portions. Relook angiography of the vein graft to the right coronary artery showed that this vessel is widely patent.    Cabg  1984    x2    Cath bare metal stent (bms)      Cath drug eluting stent      Shoulder surg proc unlisted Left     fracture        ALL:  Allergies[1]     Prior to Admission Medications   Medication Sig    traMADol 50 MG Oral Tab Take 1 tablet (50 mg total) by mouth every 12 (twelve) hours as needed.    tamsulosin 0.4 MG Oral Cap Take 1 capsule (0.4 mg total) by mouth every evening.    finasteride 5 MG Oral Tab Take 1 tablet (5 mg total) by mouth daily.    pantoprazole 40 MG Oral Tab EC Take 1 tablet (40 mg total) by mouth every morning before breakfast.    Polyethylene Glycol 3350 17 g Oral Powd Pack Take 17 g by mouth daily.    acetaminophen 500 MG Oral Tab Take 1 tablet (500 mg total) by mouth every 6 (six) hours as needed for Pain.    docusate sodium 100 MG Oral Cap Take 1 capsule (100 mg total) by mouth daily as needed for constipation.    melatonin 5 MG Oral Cap Take 1 capsule (5 mg total) by mouth nightly.    rivaroxaban (XARELTO) 20 MG Oral Tab Take 1 tablet (20 mg total) by mouth daily with food.    pregabalin 50 MG Oral Cap Take 1 capsule (50 mg total) by mouth 2 (two) times daily.    nystatin 100,000 Units/g External Cream Apply topically as needed.    carbidopa-levodopa ER  MG Oral Tab CR Take  2 tablets by mouth See Admin Instructions. Takes two tablets by mouth twice daily at 6am and 11pm    hydroxychloroquine 200 MG Oral Tab Take 1 tablet (200 mg total) by mouth 2 (two) times daily.    Niacin  MG Oral Tab CR Take 1 tablet (500 mg total) by mouth 2 (two) times daily. (Patient taking differently: Take 1 tablet (500 mg total) by mouth 2 (two) times daily with meals.)    EZETIMIBE 10 MG Oral Tab TAKE ONE TABLET BY MOUTH ONE TIME DAILY    carbidopa-levodopa  MG Oral Tab Take 1.5 tablets by mouth 4 (four) times daily. Takes four times a day 9am, 1pm, 5pm, 9pm    Multivitamin Chewtab, ADULT, Oral Chew Tab Chew 1 tablet by mouth daily.    PRAVASTATIN SODIUM 80 MG Oral Tab TAKE 1 TABLET BY MOUTH NIGHTLY (Patient taking differently: Take 1 tablet (80 mg total) by mouth daily.)    cetirizine 10 MG Oral Tab Take 1 tablet (10 mg total) by mouth daily.    Acidophilus/Pectin Oral Cap Take 1 capsule by mouth daily.       Social History     Tobacco Use    Smoking status: Former     Current packs/day: 0.00     Types: Cigarettes     Quit date: 1982     Years since quittin.6    Smokeless tobacco: Never   Substance Use Topics    Alcohol use: Not Currently     Comment: Social        Fam Hx  Family History   Problem Relation Age of Onset    Cancer Father         Liver    Other (Other) Father         Heart Attack    Cancer Mother         uterine    Other (Other) Daughter         Hosimotos    Other (Other) Son         Heart Problem       Review of Systems  Comprehensive ROS reviewed and negative except for what is stated in HPI.      OBJECTIVE:  BP (!) 162/72   Pulse 68   Temp 97.7 °F (36.5 °C) (Oral)   Resp 18   SpO2 100%   Physical Exam:  General: Alert, awake, cooperative.  HEENT:  Normocephalic, atraumatic.  Neck:  Trachea midline.  No JVD.   Chest:  Clear to auscultation bilaterally. No wheezes, rales, or rhonchi.  CV:  Regular rate and rhythm.  Positive S1/S2. No murmur, no gallops, no rubs  GI:  Bowel sounds present in all four quadrants, abdomen is soft, non-tender, non-distended.  Extremities:  No lower extremity edema or cyanosis.  Neurological:  AAOx3.  Moving all extremities.  Skin:  Warm and dry.        Data Review:    LABS:   Lab Results   Component Value Date    WBC 2.8 10/28/2024    HGB 10.0 10/28/2024    HCT 31.6 10/28/2024    .0 10/28/2024    CREATSERUM 0.93 10/28/2024    BUN 20 10/28/2024     10/28/2024    K 4.5 10/28/2024     10/28/2024    CO2 26.0 10/28/2024     10/28/2024    CA 8.8 10/28/2024    ALB 3.0 10/28/2024    ALKPHO 126 10/28/2024    BILT 0.4 10/28/2024    TP 7.2 10/28/2024    AST 45 10/28/2024    ALT <7 10/28/2024       CT head: No acute hemorrhage or infarct.    Radiology: CT BRAIN OR HEAD (CPT=70450)    Result Date: 10/28/2024  PROCEDURE:  CT BRAIN OR HEAD (55390)  COMPARISON:  [facility] , CT, CT BRAIN OR HEAD ([id number]), 10/14/2024, 0:17 AM.  INDICATIONS:  ams, syncope  TECHNIQUE:  Noncontrast CT scanning is performed through the brain. Dose reduction techniques were used. Dose information is transmitted to the ACR (American College of Radiology) NRDR (National Radiology Data Registry) which includes the Dose Index Registry.  PATIENT STATED HISTORY: (As transcribed by Technologist)  AMS, syncope.    FINDINGS:  VENTRICLES/SULCI:  Ventricles and sulci are stable in size.   INTRACRANIAL:  There are no abnormal extraaxial fluid collections.  There is no midline shift.  There is no acute intracranial hemorrhage. Periventricular and subcortical low attenuation are nonspecific but most consistent with chronic small vessel ischemic change.   SINUSES:           No sign of acute sinusitis.   MASTOIDS:          No sign of acute inflammation.  SKULL:             No evidence for fracture or osseous abnormality.  OTHER:             Atherosclerosis.            CONCLUSION:  1. No acute intracranial hemorrhage.  2. Findings most consistent with chronic small vessel ischemic  change within the deep white matter.  If an acute infarct is of high clinical concern, recommend MRI for further evaluation.    LOCATION:  [facility]   Dictated by (CST): Nicole Gresham MD on 10/28/2024 at 3:10 PM     Finalized by (CST): Nicole Gresham MD on 10/28/2024 at 3:12 PM       CARD ECHO 2D DOPPLER (CPT=93306)    Result Date: 10/14/2024  Transthoracic Echocardiogram Name:Reginaldo Hdz Date: 10/14/2024 :  08/10/1940 Ht:  (66in)  BP: 138 / 61 MRN:  [id number]    Age:  84years    Wt:  (165lb) HR: 62bpm Loc:  [facility]       Gndr: M          BSA: 1.84m^2 Sonographer: Mario ROSALES AE, PE Ordering:    Christian Miramontes Consulting:  Tosin Mercado ---------------------------------------------------------------------------- History/Indications:  + Blood Culture. ---------------------------------------------------------------------------- Procedure information:  A transthoracic complete 2D study was performed. Additional evaluation included M-mode, complete spectral Doppler, and color Doppler.  Patient status:  Inpatient.  Location:  Bedside.    Comparison was made to the study of 07/10/2024.    This was a routine study. Transthoracic echocardiography for ventricular function evaluation and endocarditis evaluation. Image quality was adequate. ECG rhythm:   Normal sinus  Study completion:  There were no complications. ---------------------------------------------------------------------------- Conclusions: 1. Left ventricle: The cavity size was normal. Wall thickness was normal.    Systolic function was normal. The estimated ejection fraction was 60%. 2. Right ventricle: The cavity size was normal. Systolic function was    normal. Systolic pressure was at the upper limits of normal. 3. Left atrium: The atrium was moderately dilated. 4. Mitral valve: There was mild regurgitation. 5. Pulmonary arteries: Systolic pressure was mildly increased, estimated to    be 32mm Hg. The peak systolic pressure is 32mm Hg. The end-diastolic    pressure  is 10mm Hg. 6. Pericardium, extracardiac: There was no pericardial effusion. There were    bilateral pleural effusions present. 7. No valvular vegetations were seen. Impressions:  This study is compared with previous dated 07-10-24: * ---------------------------------------------------------------------------- * Findings: Left ventricle:  The cavity size was normal. Wall thickness was normal. Systolic function was normal. The estimated ejection fraction was 60%. No diagnostic evidence for diffuse regional wall motion abnormalities. Left ventricular diastolic function parameters were normal for the patient's age. Left atrium:  The atrium was moderately dilated. Right ventricle:  The cavity size was normal. Systolic function was normal. Systolic pressure was at the upper limits of normal. Right atrium:  The atrium was normal in size. Mitral valve:  The valve was structurally normal. Leaflet separation was normal.  Doppler:  Transvalvular velocity was within the normal range. There was no evidence for stenosis. There was mild regurgitation. Aortic valve:   The valve was trileaflet. The leaflets were mildly calcified. Cusp separation was normal.  Doppler:  Transvalvular velocity was within the normal range. There was no evidence for stenosis. There was trivial regurgitation. Tricuspid valve:  The valve is structurally normal. Leaflet separation was normal.  Doppler:  Transvalvular velocity was within the normal range. There was no evidence for stenosis. There was mild regurgitation. Pulmonic valve:    There was no significant valve disease.    Doppler: Transvalvular velocity was within the normal range. There was no evidence for stenosis. There was mild regurgitation. Pericardium:   There was no pericardial effusion. Pleura:  There were bilateral pleural effusions present. Aorta: Aortic root: The aortic root was normal-sized. Ascending aorta: The ascending aorta was normal. Pulmonary arteries: Systolic pressure was  mildly increased, estimated to be 32mm Hg. Systemic veins: Inferior vena cava: The IVC was normally collapsible and normal-sized. ---------------------------------------------------------------------------- Measurements  Left ventricle         Value        Ref       07/10/2024  IVS thickness, ED,     1.0   cm     0.6 - 1.0 1.0  PLAX  LV ID, ED, PLAX        4.3   cm     4.2 - 5.8 4.6  LV ID, ES, PLAX        3.1   cm     2.5 - 4.0 3.1  LV PW thickness,       1.0   cm     0.6 - 1.0 0.9  ED, PLAX  IVS/LV PW ratio,       0.98         --------- 1.06  ED, PLAX  LV PW/LV ID ratio,     0.23         --------- 0.2  ED, PLAX  LV ejection            60    %      52 - 72   61  fraction  LV e', average         7.3   cm/sec --------- ----------  LV E/e', average       12           <=14      ----------  Aortic root            Value        Ref       07/10/2024  Aortic root ID, ED     3.6   cm     2.6 - 4.0 4.0  Ascending aorta        Value        Ref       07/10/2024  Ascending aorta        3.2   cm     2.2 - 3.8 3.2  ID, A-P, ED  Left atrium            Value        Ref       07/10/2024  LA ID, A-P, ES     (H) 4.9   cm     3.0 - 4.0 4.3  LA volume, ES, 1-p (H) 85    ml     18 - 58   81  A4C  LA/aortic root         1.36         --------- 1.08  ratio  Mitral valve           Value        Ref       07/10/2024  Mitral E-wave peak     0.84  m/sec  --------- ----------  velocity  Mitral A-wave peak     0.46  m/sec  --------- ----------  velocity  Mitral E/A ratio,      1.8          --------- ----------  peak  Pulmonary artery       Value        Ref       07/10/2024  PA pressure, S, DP     32    mm Hg  --------- ----------  PA pressure, ED,       10    mm Hg  --------- ----------  DP  Tricuspid valve        Value        Ref       07/10/2024  Tricuspid regurg       2.62  m/sec  <=2.8     2.41  peak velocity  Tricuspid peak         27    mm Hg  --------- 25  RV-RA gradient  Systemic veins         Value        Ref       07/10/2024  Estimated  CVP          5     mm Hg  --------- 10  Right ventricle        Value        Ref       07/10/2024  TAPSE, 2D              1.70  cm     >=1.70    1.99  RV pressure, S, DP     32    mm Hg  --------- 35  Pulmonic valve         Value        Ref       07/10/2024  Pulmonic regurg        1.1   m/sec  --------- 1.11  velocity, ED  Pulmonic regurg        5     mm Hg  --------- 5  gradient, ED Legend: (L)  and  (H)  keaton values outside specified reference range. ---------------------------------------------------------------------------- Prepared and electronically signed by Howard Do MD 10/14/2024 14:15     CT BRAIN OR HEAD (CPT=70450)    Result Date: 10/14/2024  PROCEDURE:  CT BRAIN OR HEAD ([id number])  COMPARISON:  [facility], CT, CT BRAIN OR HEAD ([id number]), 8/30/2024, 1:29 PM.  INDICATIONS:  Increasing agitation/change in LOC  TECHNIQUE:  Noncontrast CT scanning is performed through the brain. Dose reduction techniques were used. Dose information is transmitted to the ACR (American College of Radiology) NRDR (National Radiology Data Registry) which includes the Dose Index Registry.  PATIENT STATED HISTORY: (As transcribed by Technologist)  Altered mental status, confusion.    FINDINGS:   VENTRICLES/SULCI: Diffuse sulcal and ventricular prominence concordant with age.  No hydrocephalus. INTRACRANIAL:  Negative for intracranial hemorrhage, mass effect, or acute large vessel transcortical infarct.  Patchy periventricular white matter hypodensity most in keeping with chronic microvascular ischemia.  Intracranial atherosclerosis. SINUSES:           Paranasal sinuses and mastoid air cells are clear. SKULL:               No evidence for fracture or osseous abnormality. OTHER:               No scalp hematoma.            CONCLUSION:   1. Negative for acute intracranial process.    LOCATION:  [facility]   Dictated by (CST): Pearl Vallejo MD on 10/14/2024 at 6:34 AM     Finalized by (CST): Pearl Vallejo MD on 10/14/2024 at 6:35 AM       CT  ABDOMEN+PELVIS(CONTRAST ONLY)(CPT=74177)    Result Date: 10/11/2024  PROCEDURE:  CT ABDOMEN+PELVIS (CONTRAST ONLY) (CPT=74177)  COMPARISON:  [facility] , CT, CT ABDOMEN+PELVIS(CONTRAST ONLY)(CPT=74177), 8/04/2024, 6:53 PM.  INDICATIONS:  R inguinal hernia  TECHNIQUE:  CT scanning was performed from the dome of the diaphragm to the pubic symphysis with non-ionic intravenous contrast material. Post contrast coronal MPR imaging was performed.  Dose reduction techniques were used. Dose information is transmitted to the ACR (American College of Radiology) NRDR (National Radiology Data Registry) which includes the Dose Index Registry.  PATIENT STATED HISTORY:(As transcribed by Technologist)  Right inguinal hernia with hematuria.   CONTRAST USED:  100cc of Isovue 370  FINDINGS:  LIVER:  No enlargement, atrophy, abnormal density, or significant focal lesion.  BILIARY:  No visible dilatation or calcification.  PANCREAS:  No lesion, fluid collection, ductal dilatation, or atrophy.  SPLEEN:  No enlargement or focal lesion.  KIDNEYS:  There is a simple cyst within left midpole of the kidney measures 69 x 54 mm.  The right kidney is unremarkable. ADRENALS:  No mass or enlargement.  AORTA/VASCULAR:  No aneurysm or dissection.  RETROPERITONEUM:  No mass or adenopathy.  BOWEL/MESENTERY:  No visible mass, obstruction, or bowel wall thickening.  ABDOMINAL WALL:  Right inguinal hernia containing small bowel with small to moderate amount of fluid within the hernia sac .  The overall size of the hernia has increased in size since the previous study but the fluid within the sac is a stable finding.  The hernia sac measures approximately 73 x 50 mm transversely.  There is no evidence of obstruction. URINARY BLADDER:  There is severe thickening of the urinary bladder which is decompressed with a Milan catheter.  There is small amount a gas within the bladder due to the catheterization. PELVIC NODES:  No adenopathy.  PELVIC ORGANS:  No visible  mass.  Pelvic organs appropriate for patient age.  BONES:  No bony lesion or fracture.  LUNG BASES:  Small to moderate pleural effusions more on the right than the left.  There is atelectasis at the lung bases. OTHER:  Negative.             CONCLUSION:   1.  Diffuse severe bladder wall thickening can be seen with infectious or inflammatory cystitis with neoplasm not completely excluded.   2. Slightly enlarged right inguinal hernia containing small bowel with small amount of fluid.  There is no definite evidence of obstruction.   LOCATION:  [facility]   Dictated by (CST): Jose Angel Houston MD on 10/11/2024 at 3:19 PM     Finalized by (CST): Jose Angel Houston MD on 10/11/2024 at 3:25 PM          Assessment/Plan:   84 year old male with PMH sig for Parkinson disease, A-fib on Xarelto, SLE, lymphoma who presents for syncope.    # Vasovagal syncope, suspected  -Suspect presentation likely due to vasovagal syncope after having a bowel movement in combination with dysautonomia from Parkinson's  -Continue IV fluids  -Check orthostatic vitals  -Recent echo 10/2024, normal EF  -Monitor on telemetry  -PT/OT    #Parkinson's disease  -Continue Sinemet    #A-fib   -Continue Xarelto    #CAD s/p CABG  -Continue Zetia    # SLE  -Continue hydroxychloroquine    #BPH  -Continue Flomax and finasteride    DVT prophylaxis: Xarelto  CODE STATUS: DNR/DNI    Discussed with ED physician    Outpatient records or previous hospital records reviewed.   [facility] hospitalist to continue to follow patient while in house  A total of 76 minutes taken with patient and coordinating care.  Greater than 50% face to face encounter.      Jacklyn Nam [facility] Hospitalist               [1]   Allergies  Allergen Reactions    Hydrocodone-Acetaminophen CONFUSION, DIZZINESS and HALLUCINATION    Inderal [Propranolol] NAUSEA AND VOMITING and UNKNOWN    Isosorbide NAUSEA AND VOMITING    Other OTHER (SEE COMMENTS)

## 2024-10-28 NOTE — ED INITIAL ASSESSMENT (HPI)
Pt at the nursing home and went unresponsive for a few sec.  Pt states he ws dizzy and never lost consciousness but sent to er for eval pt awake alert and oriented

## 2024-10-28 NOTE — ED PROVIDER NOTES
Patient Seen in: [facility] Emergency Department      History     Chief Complaint   Patient presents with    Altered Mental Status     Stated Complaint: ams    Subjective:   HPI    This is a an 84-year-old gentleman history of hypertension hyperlipidemia, Parkinson's, here for evaluation after syncopal episode at home.  Patient was reportedly using the toilet, after he got up, was being assisted by his home caretaker and he states that he suddenly stood up straight his eyes rolled back into his head, and he became unresponsive.  Denies any chest pain or difficulty breathing prior to this episode, states he might of felt lightheaded, states this is never happened to him previously he did not fall or standing injuries he was helped down by his caretaker, denies any other complaints or concerns at this time.  Denies any fevers vomiting diarrhea urinary symptoms ,any other complaints.  May have been  eating and drinking less than usual as of late according to his son who is at bedside.    Objective:     Past Medical History:    Anemia    Anesthesia complication    Can become agitated/combative coming out of anesthesia    Anxiety state    Arrhythmia    Atherosclerosis of coronary artery    Back problem    Blood disorder    Chronic atrial fibrillation (HCC)    Coronary atherosclerosis    Esophageal reflux    Heart attack (HCC)    High blood pressure    High cholesterol    History of adverse reaction to anesthesia    Hyperlipidemia    Hypertension    Incontinence    Has an indwelling urinary catheter    Lupus    Osteoarthritis    Parkinson disease (HCC)    Peripheral neuropathy    Problems with swallowing    Difficulties with swallowing with Parkinson's disease    Visual impairment    Glasses              Past Surgical History:   Procedure Laterality Date    Angiogram  1984    Angiogram  1996    Angiogram  03/27/2002    [facility], Dr. Scott/ Mikayla: 1.Successful PTCA of the infarct related artery which was the  proximal graft of the saphenous vein graft to the right posterior descending/LV branch which was dilated and stented using a 4 x 13 BX Velocity to a maximum diameter of 4.45 mm.  2.Proximal PDA primarily stented using a 3 x 18 Penta stent from about 70 to 80% to 0%, HUGH III flow, no dissection.     Angiogram  2011    [facility], Dr. Steele: Successful stenting with a drug-eluting stent to the graft to the right coronary artery. Severe native CAD. Patent vein graft to LAD. Severe disease in the vein graft to the right coronary artery.    Angiogram  2011    [facility], Dr. Steele: Successful stenting of the circumflex artery in the proximal and mid portions. Relook angiography of the vein graft to the right coronary artery showed that this vessel is widely patent.    Cabg  1984    x2    Cath bare metal stent (bms)      Cath drug eluting stent      Shoulder surg proc unlisted Left     fracture                Social History     Socioeconomic History    Marital status:    Tobacco Use    Smoking status: Former     Current packs/day: 0.00     Types: Cigarettes     Quit date: 1982     Years since quittin.6    Smokeless tobacco: Never   Vaping Use    Vaping status: Never Used   Substance and Sexual Activity    Alcohol use: Not Currently     Comment: Social    Drug use: No     Social Drivers of Health     Food Insecurity: No Food Insecurity (10/11/2024)    Food Insecurity     Food Insecurity: Never true   Transportation Needs: No Transportation Needs (10/11/2024)    Transportation Needs     Lack of Transportation: No   Housing Stability: Low Risk  (10/11/2024)    Housing Stability     Housing Instability: No                  Physical Exam     ED Triage Vitals   BP 10/28/24 1144 115/67   Pulse 10/28/24 1144 68   Resp 10/28/24 1144 20   Temp 10/28/24 1144 97.7 °F (36.5 °C)   Temp src 10/28/24 1144 Oral   SpO2 10/28/24 1144 96 %   O2 Device 10/28/24 1345 None (Room air)       Current Vitals:   Vital  Signs  BP: 148/69  Pulse: 69  Resp: 21  Temp: 97.7 °F (36.5 °C)  Temp src: Oral  MAP (mmHg): 90    Oxygen Therapy  SpO2: 99 %  O2 Device: None (Room air)        Physical Exam      Physical Exam  Vitals signs and nursing note reviewed.   General:  Patient laying supine in the bed in no acute distress  Head: Normocephalic and atraumatic.   HEENT:  Mucous membranes are moist.   Cardiovascular:  Normal rate and regular rhythm.  No Edema  Pulmonary:  Pulmonary effort is normal.  Normal breath sounds. No wheezing, rhonchi or rales.   Abdominal: Soft nontender nondistended, normal bowel sounds, no guarding no rebound tenderness  Skin: Warm and dry  Neurological: Awake alert, speech is normal, motor 5/5 in bilateral upper and lower extremities.  sensation is grossly intact throughout.  No facial asymmetry.          ED Course     Labs Reviewed   CBC WITH DIFFERENTIAL WITH PLATELET - Abnormal; Notable for the following components:       Result Value    WBC 2.8 (*)     RBC 3.58 (*)     HGB 10.0 (*)     HCT 31.6 (*)     .0 (*)     Lymphocyte Absolute 0.66 (*)     All other components within normal limits   COMP METABOLIC PANEL (14) - Abnormal; Notable for the following components:    Glucose 145 (*)     AST 45 (*)     ALT <7 (*)     Alkaline Phosphatase 126 (*)     Albumin 3.0 (*)     Globulin  4.2 (*)     A/G Ratio 0.7 (*)     All other components within normal limits   URINALYSIS WITH CULTURE REFLEX - Abnormal; Notable for the following components:    Urine Color Loida (*)     Clarity Urine Cloudy (*)     Glucose Urine 100 (*)     Ketones Urine 15 (*)     Blood Urine Large (*)     Protein Urine >=300 (*)     Urobilinogen Urine 1.0 (*)     All other components within normal limits   TROPONIN I HIGH SENSITIVITY - Normal   UA MICROSCOPIC ONLY, URINE - Normal   ICTOTEST - Normal   SARS-COV-2/FLU A AND B/RSV BY PCR (GENEXPERT) - Normal    Narrative:     This test is intended for the qualitative detection and differentiation  of SARS-CoV-2, influenza A, influenza B, and respiratory syncytial virus (RSV) viral RNA in nasopharyngeal or nares swabs from individuals suspected of respiratory viral infection consistent with COVID-19 by their healthcare provider. Signs and symptoms of respiratory viral infection due to SARS-CoV-2, influenza, and RSV can be similar.    Test performed using the Xpert Xpress SARS-CoV-2/FLU/RSV (real time RT-PCR)  assay on the GeneXpert instrument, [employer], [place] [address].   This test is being used under the Food and Drug Administration's Emergency Use Authorization.    The authorized Fact Sheet for Healthcare Providers for this assay is available upon request from the laboratory.   RAINBOW DRAW LAVENDER   RAINBOW DRAW LIGHT GREEN   RAINBOW DRAW BLUE     EKG    Rate, intervals and axes as noted on EKG Report.  Rate: 66  Rhythm: Sinus Rhythm  Reading: No acute ischemic changes                CT BRAIN OR HEAD (CPT=70450)    Result Date: 10/28/2024  PROCEDURE:  CT BRAIN OR HEAD ([id number])  COMPARISON:  [facility] , CT, CT BRAIN OR HEAD ([id number]), 10/14/2024, 0:17 AM.  INDICATIONS:  ams, syncope  TECHNIQUE:  Noncontrast CT scanning is performed through the brain. Dose reduction techniques were used. Dose information is transmitted to the ACR (American College of Radiology) NRDR (National Radiology Data Registry) which includes the Dose Index Registry.  PATIENT STATED HISTORY: (As transcribed by Technologist)  AMS, syncope.    FINDINGS:  VENTRICLES/SULCI:  Ventricles and sulci are stable in size.   INTRACRANIAL:  There are no abnormal extraaxial fluid collections.  There is no midline shift.  There is no acute intracranial hemorrhage. Periventricular and subcortical low attenuation are nonspecific but most consistent with chronic small vessel ischemic change.   SINUSES:           No sign of acute sinusitis.   MASTOIDS:          No sign of acute inflammation.  SKULL:             No evidence for fracture or osseous  abnormality.  OTHER:             Atherosclerosis.            CONCLUSION:  1. No acute intracranial hemorrhage.  2. Findings most consistent with chronic small vessel ischemic change within the deep white matter.  If an acute infarct is of high clinical concern, recommend MRI for further evaluation.    LOCATION:  [facility]   Dictated by (CST): Nicole Gresham MD on 10/28/2024 at 3:10 PM     Finalized by (CST): Nicole Gresham MD on 10/28/2024 at 3:12 PM       CARD ECHO 2D DOPPLER (CPT=93306)    Result Date: 10/14/2024  Transthoracic Echocardiogram Name:Reginaldo Hdz Date: 10/14/2024 :  08/10/1940 Ht:  (66in)  BP: 138 / 61 MRN:  [id number]    Age:  84years    Wt:  (165lb) HR: 62bpm Loc:  [facility]       Gndr: M          BSA: 1.84m^2 Sonographer: Mario ROSALES AE, PE Ordering:    Christian Miramontes Consulting:  Tosin Mercado ---------------------------------------------------------------------------- History/Indications:  + Blood Culture. ---------------------------------------------------------------------------- Procedure information:  A transthoracic complete 2D study was performed. Additional evaluation included M-mode, complete spectral Doppler, and color Doppler.  Patient status:  Inpatient.  Location:  Bedside.    Comparison was made to the study of 07/10/2024.    This was a routine study. Transthoracic echocardiography for ventricular function evaluation and endocarditis evaluation. Image quality was adequate. ECG rhythm:   Normal sinus  Study completion:  There were no complications. ---------------------------------------------------------------------------- Conclusions: 1. Left ventricle: The cavity size was normal. Wall thickness was normal.    Systolic function was normal. The estimated ejection fraction was 60%. 2. Right ventricle: The cavity size was normal. Systolic function was    normal. Systolic pressure was at the upper limits of normal. 3. Left atrium: The atrium was moderately dilated. 4. Mitral valve: There was mild  regurgitation. 5. Pulmonary arteries: Systolic pressure was mildly increased, estimated to    be 32mm Hg. The peak systolic pressure is 32mm Hg. The end-diastolic    pressure is 10mm Hg. 6. Pericardium, extracardiac: There was no pericardial effusion. There were    bilateral pleural effusions present. 7. No valvular vegetations were seen. Impressions:  This study is compared with previous dated 07-10-24: * ---------------------------------------------------------------------------- * Findings: Left ventricle:  The cavity size was normal. Wall thickness was normal. Systolic function was normal. The estimated ejection fraction was 60%. No diagnostic evidence for diffuse regional wall motion abnormalities. Left ventricular diastolic function parameters were normal for the patient's age. Left atrium:  The atrium was moderately dilated. Right ventricle:  The cavity size was normal. Systolic function was normal. Systolic pressure was at the upper limits of normal. Right atrium:  The atrium was normal in size. Mitral valve:  The valve was structurally normal. Leaflet separation was normal.  Doppler:  Transvalvular velocity was within the normal range. There was no evidence for stenosis. There was mild regurgitation. Aortic valve:   The valve was trileaflet. The leaflets were mildly calcified. Cusp separation was normal.  Doppler:  Transvalvular velocity was within the normal range. There was no evidence for stenosis. There was trivial regurgitation. Tricuspid valve:  The valve is structurally normal. Leaflet separation was normal.  Doppler:  Transvalvular velocity was within the normal range. There was no evidence for stenosis. There was mild regurgitation. Pulmonic valve:    There was no significant valve disease.    Doppler: Transvalvular velocity was within the normal range. There was no evidence for stenosis. There was mild regurgitation. Pericardium:   There was no pericardial effusion. Pleura:  There were bilateral  pleural effusions present. Aorta: Aortic root: The aortic root was normal-sized. Ascending aorta: The ascending aorta was normal. Pulmonary arteries: Systolic pressure was mildly increased, estimated to be 32mm Hg. Systemic veins: Inferior vena cava: The IVC was normally collapsible and normal-sized. ---------------------------------------------------------------------------- Measurements  Left ventricle         Value        Ref       07/10/2024  IVS thickness, ED,     1.0   cm     0.6 - 1.0 1.0  PLAX  LV ID, ED, PLAX        4.3   cm     4.2 - 5.8 4.6  LV ID, ES, PLAX        3.1   cm     2.5 - 4.0 3.1  LV PW thickness,       1.0   cm     0.6 - 1.0 0.9  ED, PLAX  IVS/LV PW ratio,       0.98         --------- 1.06  ED, PLAX  LV PW/LV ID ratio,     0.23         --------- 0.2  ED, PLAX  LV ejection            60    %      52 - 72   61  fraction  LV e', average         7.3   cm/sec --------- ----------  LV E/e', average       12           <=14      ----------  Aortic root            Value        Ref       07/10/2024  Aortic root ID, ED     3.6   cm     2.6 - 4.0 4.0  Ascending aorta        Value        Ref       07/10/2024  Ascending aorta        3.2   cm     2.2 - 3.8 3.2  ID, A-P, ED  Left atrium            Value        Ref       07/10/2024  LA ID, A-P, ES     (H) 4.9   cm     3.0 - 4.0 4.3  LA volume, ES, 1-p (H) 85    ml     18 - 58   81  A4C  LA/aortic root         1.36         --------- 1.08  ratio  Mitral valve           Value        Ref       07/10/2024  Mitral E-wave peak     0.84  m/sec  --------- ----------  velocity  Mitral A-wave peak     0.46  m/sec  --------- ----------  velocity  Mitral E/A ratio,      1.8          --------- ----------  peak  Pulmonary artery       Value        Ref       07/10/2024  PA pressure, S, DP     32    mm Hg  --------- ----------  PA pressure, ED,       10    mm Hg  --------- ----------  DP  Tricuspid valve        Value        Ref       07/10/2024  Tricuspid regurg       2.62   m/sec  <=2.8     2.41  peak velocity  Tricuspid peak         27    mm Hg  --------- 25  RV-RA gradient  Systemic veins         Value        Ref       07/10/2024  Estimated CVP          5     mm Hg  --------- 10  Right ventricle        Value        Ref       07/10/2024  TAPSE, 2D              1.70  cm     >=1.70    1.99  RV pressure, S, DP     32    mm Hg  --------- 35  Pulmonic valve         Value        Ref       07/10/2024  Pulmonic regurg        1.1   m/sec  --------- 1.11  velocity, ED  Pulmonic regurg        5     mm Hg  --------- 5  gradient, ED Legend: (L)  and  (H)  keaton values outside specified reference range. ---------------------------------------------------------------------------- Prepared and electronically signed by Howard Do MD 10/14/2024 14:15     CT BRAIN OR HEAD (CPT=70450)    Result Date: 10/14/2024  PROCEDURE:  CT BRAIN OR HEAD ([id number])  COMPARISON:  [facility], CT BRAIN OR HEAD ([id number]), 8/30/2024, 1:29 PM.  INDICATIONS:  Increasing agitation/change in LOC  TECHNIQUE:  Noncontrast CT scanning is performed through the brain. Dose reduction techniques were used. Dose information is transmitted to the ACR (American College of Radiology) NRDR (National Radiology Data Registry) which includes the Dose Index Registry.  PATIENT STATED HISTORY: (As transcribed by Technologist)  Altered mental status, confusion.    FINDINGS:   VENTRICLES/SULCI: Diffuse sulcal and ventricular prominence concordant with age.  No hydrocephalus. INTRACRANIAL:  Negative for intracranial hemorrhage, mass effect, or acute large vessel transcortical infarct.  Patchy periventricular white matter hypodensity most in keeping with chronic microvascular ischemia.  Intracranial atherosclerosis. SINUSES:           Paranasal sinuses and mastoid air cells are clear. SKULL:               No evidence for fracture or osseous abnormality. OTHER:               No scalp hematoma.            CONCLUSION:   1. Negative for acute  intracranial process.    LOCATION:  [facility]   Dictated by (CST): Pearl Vallejo MD on 10/14/2024 at 6:34 AM     Finalized by (CST): Pearl Vallejo MD on 10/14/2024 at 6:35 AM       CT ABDOMEN+PELVIS(CONTRAST ONLY)(CPT=74177)    Result Date: 10/11/2024  PROCEDURE:  CT ABDOMEN+PELVIS (CONTRAST ONLY) (CPT=74177)  COMPARISON:  [facility] , CT, CT ABDOMEN+PELVIS(CONTRAST ONLY)(CPT=74177), 8/04/2024, 6:53 PM.  INDICATIONS:  R inguinal hernia  TECHNIQUE:  CT scanning was performed from the dome of the diaphragm to the pubic symphysis with non-ionic intravenous contrast material. Post contrast coronal MPR imaging was performed.  Dose reduction techniques were used. Dose information is transmitted to the ACR (American College of Radiology) NRDR (National Radiology Data Registry) which includes the Dose Index Registry.  PATIENT STATED HISTORY:(As transcribed by Technologist)  Right inguinal hernia with hematuria.   CONTRAST USED:  100cc of Isovue 370  FINDINGS:  LIVER:  No enlargement, atrophy, abnormal density, or significant focal lesion.  BILIARY:  No visible dilatation or calcification.  PANCREAS:  No lesion, fluid collection, ductal dilatation, or atrophy.  SPLEEN:  No enlargement or focal lesion.  KIDNEYS:  There is a simple cyst within left midpole of the kidney measures 69 x 54 mm.  The right kidney is unremarkable. ADRENALS:  No mass or enlargement.  AORTA/VASCULAR:  No aneurysm or dissection.  RETROPERITONEUM:  No mass or adenopathy.  BOWEL/MESENTERY:  No visible mass, obstruction, or bowel wall thickening.  ABDOMINAL WALL:  Right inguinal hernia containing small bowel with small to moderate amount of fluid within the hernia sac .  The overall size of the hernia has increased in size since the previous study but the fluid within the sac is a stable finding.  The hernia sac measures approximately 73 x 50 mm transversely.  There is no evidence of obstruction. URINARY BLADDER:  There is severe thickening of the urinary bladder  which is decompressed with a Milan catheter.  There is small amount a gas within the bladder due to the catheterization. PELVIC NODES:  No adenopathy.  PELVIC ORGANS:  No visible mass.  Pelvic organs appropriate for patient age.  BONES:  No bony lesion or fracture.  LUNG BASES:  Small to moderate pleural effusions more on the right than the left.  There is atelectasis at the lung bases. OTHER:  Negative.             CONCLUSION:   1.  Diffuse severe bladder wall thickening can be seen with infectious or inflammatory cystitis with neoplasm not completely excluded.   2. Slightly enlarged right inguinal hernia containing small bowel with small amount of fluid.  There is no definite evidence of obstruction.   LOCATION:  [facility]   Dictated by (CST): Jose Angel Houston MD on 10/11/2024 at 3:19 PM     Finalized by (CST): Jose Angel Houston MD on 10/11/2024 at 3:25 PM             MDM      84-year-old gentleman here for evaluation after syncopal episode, differential includes vagal episode orthostatic hypotension, cardiac dysrhythmia.  Patient is awake alert hemodynamically stable at this time blood pressure 140s over 60s EKG is normal sinus rhythm, basic labs unremarkable, urinalysis without evidence of infection, CT of the head shows no acute abnormality.  Believe this was likely a vagal episode as patient had just finished having a bowel movement, son at bedside would like to monitor overnight.  Gentle IV fluids given, CT of the head shows no acute abnormality patient carey hemodynamically stable at this time Case endorsed to the [facility] hospitalist who agrees with plan for admission.    I independently viewed and interpreted the following imaging: CT head with no acute intracranial hemorrhage  Admission disposition: 10/28/2024  2:39 PM           Medical Decision Making      Disposition and Plan     Clinical Impression:  1. Syncope and collapse    2. Dehydration         Disposition:  Admit  10/28/2024  2:39 pm    Follow-up:  Allegra  MD Olvin  [id number] [facility] [address]  [phone]    Follow up            Medications Prescribed:  Current Discharge Medication List              Supplementary Documentation:         Hospital Problems       Present on Admission  Date Reviewed: 8/21/2024            ICD-10-CM Noted POA    * (Principal) Syncope and collapse R55 10/28/2024 Unknown

## 2024-10-28 NOTE — ED QUICK NOTES
Rounding Completed    Plan of Care reviewed. Waiting for md to review results, bed upstairs.  Elimination needs assessed.  Provided warm blanket, IV fluids.    Bed is locked and in lowest position. Call light within reach.

## 2024-10-28 NOTE — ED QUICK NOTES
Orders for admission, patient is aware of plan and ready to go upstairs. Any questions, please call ED RN jason at extension [phone].     Patient Covid vaccination status: Unvaccinated     COVID Test Ordered in ED: SARS-CoV-2/Flu A and B/RSV by PCR (GeneXpert)    COVID Suspicion at Admission: N/A    Running Infusions:  None    Mental Status/LOC at time of transport: a&ox3    Other pertinent information:   CIWA score: N/A   NIH score:  N/A

## 2024-10-29 LAB
ANION GAP SERPL CALC-SCNC: 4 MMOL/L (ref 0–18)
BASOPHILS # BLD AUTO: 0.02 X10(3) UL (ref 0–0.2)
BASOPHILS NFR BLD AUTO: 0.7 %
BUN BLD-MCNC: 16 MG/DL (ref 9–23)
CALCIUM BLD-MCNC: 8.6 MG/DL (ref 8.7–10.4)
CHLORIDE SERPL-SCNC: 106 MMOL/L (ref 98–112)
CO2 SERPL-SCNC: 28 MMOL/L (ref 21–32)
CREAT BLD-MCNC: 0.72 MG/DL
EGFRCR SERPLBLD CKD-EPI 2021: 90 ML/MIN/1.73M2 (ref 60–?)
EOSINOPHIL # BLD AUTO: 0.03 X10(3) UL (ref 0–0.7)
EOSINOPHIL NFR BLD AUTO: 1 %
ERYTHROCYTE [DISTWIDTH] IN BLOOD BY AUTOMATED COUNT: 14 %
GLUCOSE BLD-MCNC: 93 MG/DL (ref 70–99)
HCT VFR BLD AUTO: 28 %
HGB BLD-MCNC: 8.9 G/DL
IMM GRANULOCYTES # BLD AUTO: 0.01 X10(3) UL (ref 0–1)
IMM GRANULOCYTES NFR BLD: 0.3 %
LYMPHOCYTES # BLD AUTO: 1.09 X10(3) UL (ref 1–4)
LYMPHOCYTES NFR BLD AUTO: 36.6 %
MCH RBC QN AUTO: 28 PG (ref 26–34)
MCHC RBC AUTO-ENTMCNC: 31.8 G/DL (ref 31–37)
MCV RBC AUTO: 88.1 FL
MONOCYTES # BLD AUTO: 0.37 X10(3) UL (ref 0.1–1)
MONOCYTES NFR BLD AUTO: 12.4 %
NEUTROPHILS # BLD AUTO: 1.46 X10 (3) UL (ref 1.5–7.7)
NEUTROPHILS # BLD AUTO: 1.46 X10(3) UL (ref 1.5–7.7)
NEUTROPHILS NFR BLD AUTO: 49 %
OSMOLALITY SERPL CALC.SUM OF ELEC: 287 MOSM/KG (ref 275–295)
PLATELET # BLD AUTO: 114 10(3)UL (ref 150–450)
POTASSIUM SERPL-SCNC: 3.7 MMOL/L (ref 3.5–5.1)
RBC # BLD AUTO: 3.18 X10(6)UL
SODIUM SERPL-SCNC: 138 MMOL/L (ref 136–145)
WBC # BLD AUTO: 3 X10(3) UL (ref 4–11)

## 2024-10-29 RX ORDER — POTASSIUM CHLORIDE 1500 MG/1
40 TABLET, EXTENDED RELEASE ORAL ONCE
Status: COMPLETED | OUTPATIENT
Start: 2024-10-29 | End: 2024-10-29

## 2024-10-29 NOTE — CM/SW NOTE
Received update from Toño at [facility] stating that they do not have a POLST form on file for pt. SW placed a copy for POLST on the chart and updated Hospitalist.     Sharon URRUTIA, LSW  Discharge Planner

## 2024-10-29 NOTE — PLAN OF CARE
Pt is A&O x4. Forgetful. Reports no pain. Denies SOB & cardiac symptoms.  Maintaining O2 on RA. NSR on tele, S1&S2 present.  Bowel sounds active. Continent of bowel. Milan in place.  Lactated ringers running at 75 mL/hr. Pt updated on plan of care.  Bed in lowest position. Bed alarm on. Call light within reach.     Problem: SAFETY ADULT - FALL  Goal: Free from fall injury  Description: INTERVENTIONS:  - Assess pt frequently for physical needs  - Identify cognitive and physical deficits and behaviors that affect risk of falls.  - [place] fall precautions as indicated by assessment.  - Educate pt/family on patient safety including physical limitations  - Instruct pt to call for assistance with activity based on assessment  - Modify environment to reduce risk of injury  - Provide assistive devices as appropriate  - Consider OT/PT consult to assist with strengthening/mobility  - Encourage toileting schedule  Outcome: Progressing     Problem: CARDIOVASCULAR - ADULT  Goal: Maintains optimal cardiac output and hemodynamic stability  Description: INTERVENTIONS:  - Monitor vital signs, rhythm, and trends  - Monitor for bleeding, hypotension and signs of decreased cardiac output  - Evaluate effectiveness of vasoactive medications to optimize hemodynamic stability  - Monitor arterial and/or venous puncture sites for bleeding and/or hematoma  - Assess quality of pulses, skin color and temperature  - Assess for signs of decreased coronary artery perfusion - ex. Angina  - Evaluate fluid balance, assess for edema, trend weights  Outcome: Progressing  Goal: Absence of cardiac arrhythmias or at baseline  Description: INTERVENTIONS:  - Continuous cardiac monitoring, monitor vital signs, obtain 12 lead EKG if indicated  - Evaluate effectiveness of antiarrhythmic and heart rate control medications as ordered  - Initiate emergency measures for life threatening arrhythmias  - Monitor electrolytes and administer replacement therapy as  ordered  Outcome: Progressing

## 2024-10-29 NOTE — PLAN OF CARE
Pt. Is alert and oriented times 4. 1st degree AV block on tele. On RA. Pt. Denies chest pain or shortness of breath. Pt. Is continent of bowel. Still ortho positive. Pt. Denies dizziness but BP does drop. Chronic edmond in place. Pt. Updated on plan of care. Call light within reach.   Problem: SAFETY ADULT - FALL  Goal: Free from fall injury  Description: INTERVENTIONS:  - Assess pt frequently for physical needs  - Identify cognitive and physical deficits and behaviors that affect risk of falls.  - [place] fall precautions as indicated by assessment.  - Educate pt/family on patient safety including physical limitations  - Instruct pt to call for assistance with activity based on assessment  - Modify environment to reduce risk of injury  - Provide assistive devices as appropriate  - Consider OT/PT consult to assist with strengthening/mobility  - Encourage toileting schedule  Outcome: Progressing     Problem: CARDIOVASCULAR - ADULT  Goal: Maintains optimal cardiac output and hemodynamic stability  Description: INTERVENTIONS:  - Monitor vital signs, rhythm, and trends  - Monitor for bleeding, hypotension and signs of decreased cardiac output  - Evaluate effectiveness of vasoactive medications to optimize hemodynamic stability  - Monitor arterial and/or venous puncture sites for bleeding and/or hematoma  - Assess quality of pulses, skin color and temperature  - Assess for signs of decreased coronary artery perfusion - ex. Angina  - Evaluate fluid balance, assess for edema, trend weights  Outcome: Progressing  Goal: Absence of cardiac arrhythmias or at baseline  Description: INTERVENTIONS:  - Continuous cardiac monitoring, monitor vital signs, obtain 12 lead EKG if indicated  - Evaluate effectiveness of antiarrhythmic and heart rate control medications as ordered  - Initiate emergency measures for life threatening arrhythmias  - Monitor electrolytes and administer replacement therapy as ordered  Outcome: Progressing

## 2024-10-29 NOTE — PROGRESS NOTES
10/29/24 1231 10/29/24 1234 10/29/24 1239   Vital Signs   /65 106/68 (!) 77/53   MAP (mmHg) 80 79 (!) 62   BP Location Left arm Left arm Left arm   BP Method Automatic Automatic Automatic   Patient Position Lying Sitting Standing     Orthos

## 2024-10-29 NOTE — PHYSICAL THERAPY NOTE
PHYSICAL THERAPY EVALUATION - INPATIENT     Room Number: 8604/8604-A  Evaluation Date: 10/29/2024  Type of Evaluation: Initial  Physician Order: PT Eval and Treat    Presenting Problem: syncope and collapse  Co-Morbidities : PD, afib, SLE, lymphoma, HTN, OA, MI, L shoulder surgery  Reason for Therapy: Mobility Dysfunction and Discharge Planning    PHYSICAL THERAPY ASSESSMENT   Patient is a 84 year old male admitted 10/28/2024 for syncope and collapse.  Prior to admission, patient's baseline is supervision with rollator.  Patient is currently functioning near baseline with bed mobility, transfers, and gait.  Patient is requiring contact guard assist and minimal assist as a result of the following impairments: decreased functional strength, decreased endurance/aerobic capacity, impaired   balance, and decreased muscular endurance.  Physical Therapy will continue to follow for duration of hospitalization.    Patient will benefit from continued skilled PT Services at discharge to promote prior level of function and safety with additional support and return home with home health PT.    PLAN DURING HOSPITALIZATION  Nursing Mobility Recommendation : 1 Assist  PT Device Recommendation: Rolling walker;Gait belt  PT Treatment Plan: Bed mobility;Endurance;Energy conservation;Patient education;Family education;Gait training;Strengthening;Transfer training;Balance training  Rehab Potential : Good  Frequency (Obs): 3-5x/week     CURRENT GOALS    Goal #1 Patient is able to demonstrate supine - sit EOB @ level: supervision     Goal #2 Patient is able to demonstrate transfers Sit to/from Stand at assistance level: supervision     Goal #3 Patient is able to ambulate 150 feet with assist device: walker - rolling at assistance level: supervision     Goal #4    Goal #5    Goal #6    Goal Comments: Goals established on 10/29/2024      PHYSICAL THERAPY MEDICAL/SOCIAL HISTORY  History related to current admission: Patient is a 84 year old  male admitted on 10/28/2024 from [facility] for syncope and collapse.    HOME SITUATION  Type of Home: Assisted living facility  Home Layout: One level                     Lives With: Spouse;Caregiver 24 hours    Drives: No   Patient Regularly Uses: Glasses;Rollator      Prior Level of [place]: Pt reports he lives with his spouse and a 24 hour CG at [facility]. Pt reports he can mostly dress himself but needs some help. Pt ambulates with a rollator.     SUBJECTIVE  \"My wife and I talk to each other every day, so I need to call her\"     OBJECTIVE  Precautions: Bed/chair alarm (orthos positive)  Fall Risk: High fall risk    WEIGHT BEARING RESTRICTION     PAIN ASSESSMENT  Ratin          COGNITION  Overall Cognitive Status:  WFL - within functional limits    RANGE OF MOTION AND STRENGTH ASSESSMENT  Upper extremity ROM and strength are within functional limits     Lower extremity ROM is within functional limits     Lower extremity strength is within functional limits     BALANCE  Static Sitting: Good  Dynamic Sitting: Fair -  Static Standing: Poor +  Dynamic Standing: Poor +    ADDITIONAL TESTS                                    ACTIVITY TOLERANCE   BP supine: 143/77   BP sitting EOB: 123/77   BP standin/56   BP sitting with legs reclined: 146/85      Pt asymptomatic, RN notified.        O2 WALK  Oxygen Therapy  SPO2% on Room Air at Rest: 100    NEUROLOGICAL FINDINGS                        AM-PAC '6-Clicks' INPATIENT SHORT FORM - BASIC MOBILITY  How much difficulty does the patient currently have...  Patient Difficulty: Turning over in bed (including adjusting bedclothes, sheets and blankets)?: None   Patient Difficulty: Sitting down on and standing up from a chair with arms (e.g., wheelchair, bedside commode, etc.): A Little   Patient Difficulty: Moving from lying on back to sitting on the side of the bed?: A Little   How much help from another person does the patient currently need...   Help from Another: Moving to and  from a bed to a chair (including a wheelchair)?: A Little   Help from Another: Need to walk in hospital room?: A Little   Help from Another: Climbing 3-5 steps with a railing?: Total     AM-PAC Score:  Raw Score: 17   Approx Degree of Impairment: 50.57%   Standardized Score (AM-PAC Scale): 42.13   CMS Modifier (G-Code): CK    FUNCTIONAL ABILITY STATUS  Gait Assessment   Functional Mobility/Gait Assessment  Gait Assistance: Minimum assistance  Distance (ft): 2  Assistive Device: Rolling walker  Pattern: Shuffle (flexed posture)    Skilled Therapy Provided: Per RN rajiv to work with pt. Pt received in supine and was agreeable to PT session.     Bed Mobility:  Rolling: NT  Supine to sit: min A   Sit to supine: NT     Transfer Mobility:  Sit to stand: min A    Stand to sit: min A   Gait = pt ambulated from bed to chair with RW and min A    Session limited by positive orthostatic BP.     Therapist's Comments: Pt educated on role of therapy, goals for session, safety, fall prevention, and activity recommendations.     Exercise/Education Provided:  Bed mobility  Energy conservation  Functional activity tolerated  Gait training  Posture  Strengthening  Transfer training    Patient End of Session: Up in chair;Needs met;Call light within reach;RN aware of session/findings;All patient questions and concerns addressed;Hospital anti-slip socks      Patient Evaluation Complexity Level:  History Moderate - 1 or 2 personal factors and/or co-morbidities   Examination of body systems Low -  addressing 1-2 elements   Clinical Presentation Low- Stable   Clinical Decision Making Low Complexity       PT Session Time: 25 minutes  Therapeutic Activity: 8 minutes

## 2024-10-29 NOTE — CM/SW NOTE
SW placed call to RN at [facility]: [phone]. Inquired if facility has a POLST form on pt. RN has to go look to see, and will fax if they have a copy. Await call back from the RN.     Sharon URRUTIA, LSW  Discharge Planner

## 2024-10-29 NOTE — PROGRESS NOTES
10/28/24 1741 10/28/24 1744 10/28/24 1747   Vital Signs   Pulse 72 77 86   Heart Rate Source Monitor Monitor Monitor   Resp 22 22 22   Respiratory Quality Normal Normal Normal   /76 133/62 101/51   MAP (mmHg) 97 81 66   BP Location Left arm Left arm Left arm   BP Method Automatic Automatic Automatic   Patient Position Lying Sitting Standing

## 2024-10-29 NOTE — PLAN OF CARE
NURSING ADMISSION NOTE      Patient admitted via Cart  Oriented to room.  Safety precautions initiated.  Bed in low position.  Call light in reach.  Admission interview completed.  IV fluids infusion initiated.

## 2024-10-29 NOTE — PROGRESS NOTES
[facility] Hospitalist Progress Note     PCP: Olvin Dinh MD    SUBJECTIVE:  Patient doing well today.  No new complaints.  Had positive orthostatic vitals, however asymptomatic.    OBJECTIVE:  Temp:  [97.6 °F (36.4 °C)-98.3 °F (36.8 °C)] 97.6 °F (36.4 °C)  Pulse:  [66-86] 67  Resp:  [14-27] 14  BP: ()/(51-79) 77/53  SpO2:  [92 %-100 %] 92 %    Intake/Output:    Intake/Output Summary (Last 24 hours) at 10/29/2024 1557  Last data filed at 10/29/2024 1524  Gross per 24 hour   Intake 720 ml   Output 1600 ml   Net -880 ml       Last 3 Weights   10/28/24 1747 154 lb 5.2 oz (70 kg)   10/11/24 1657 165 lb 5.5 oz (75 kg)   10/11/24 1237 165 lb 5.5 oz (75 kg)   10/10/24 0858 166 lb (75.3 kg)       Exam  Physical Exam:  General: Alert, awake, cooperative.  HEENT:  Normocephalic, atraumatic.  Neck:  Trachea midline.  No JVD.   Chest:  Clear to auscultation bilaterally. No wheezes, rales, or rhonchi.  CV:  Regular rate and rhythm.  Positive S1/S2. No murmur, no gallops, no rubs  GI: Bowel sounds present in all four quadrants, abdomen is soft, non-tender, non-distended.  Extremities:  No lower extremity edema or cyanosis.  Neurological:  AAOx3.  Moving all extremities.  Skin:  Warm and dry.      Data Review:       Labs:     Recent Labs   Lab 10/28/24  1149 10/29/24  0823   WBC 2.8* 3.0*   HGB 10.0* 8.9*   MCV 88.3 88.1   .0* 114.0*       Recent Labs   Lab 10/28/24  1149 10/29/24  0823    138   K 4.5 3.7    106   CO2 26.0 28.0   BUN 20 16   CREATSERUM 0.93 0.72   CA 8.8 8.6*   * 93       Recent Labs   Lab 10/28/24  1149   ALT <7*   AST 45*   ALB 3.0*       No results for input(s): \"PGLU\" in the last 168 hours.    No results for input(s): \"TROP\" in the last 168 hours.      Meds:      carbidopa-levodopa  1.5 tablet Oral QID    carbidopa-levodopa ER  1 tablet Oral BID    ezetimibe  10 mg Oral Daily    cetirizine  5 mg Oral Daily    finasteride  5 mg Oral Daily    hydroxychloroquine  200 mg Oral BID     melatonin  5 mg Oral Nightly    pantoprazole  40 mg Oral QAM AC    polyethylene glycol (PEG 3350)  17 g Oral Daily    pregabalin  75 mg Oral BID    rivaroxaban  20 mg Oral Daily with food    tamsulosin  0.4 mg Oral QPM      lactated ringers 75 mL/hr at 10/29/24 0800       acetaminophen    ondansetron    metoclopramide    docusate sodium    traMADol       Assessment/Plan:   84 year old male with PMH sig for Parkinson disease, A-fib on Xarelto, SLE, lymphoma who presents for syncope.     # Vasovagal syncope  # Orthostatic hypotension  -Suspect presentation likely due to vasovagal syncope after having a bowel movement in combination with dysautonomia from Parkinson's  -orthostatic vitals positive  -Continue IV fluids.  Additional fluid bolus given.  -Recent echo 10/2024, normal EF  -Monitor on telemetry  -PT/OT     #Parkinson's disease  -Continue Sinemet    #TCP, chronic  # Chronic anemia  -Trend CBC, monitor for active signs of bleeding.     #A-fib   -Continue Xarelto     #CAD s/p CABG  -Continue Zetia     # SLE  -Continue hydroxychloroquine     #BPH  -Continue Flomax and finasteride    Discussed with RN and daughter.     DVT prophylaxis: Xarelto  CODE STATUS: DNR/DNI      Jacklyn Mcneal DO  [place] Hospitalist

## 2024-10-30 LAB
ANION GAP SERPL CALC-SCNC: 5 MMOL/L (ref 0–18)
BASOPHILS # BLD AUTO: 0.01 X10(3) UL (ref 0–0.2)
BASOPHILS NFR BLD AUTO: 0.3 %
BUN BLD-MCNC: 13 MG/DL (ref 9–23)
CALCIUM BLD-MCNC: 8.8 MG/DL (ref 8.7–10.4)
CHLORIDE SERPL-SCNC: 106 MMOL/L (ref 98–112)
CO2 SERPL-SCNC: 28 MMOL/L (ref 21–32)
CREAT BLD-MCNC: 0.73 MG/DL
EGFRCR SERPLBLD CKD-EPI 2021: 89 ML/MIN/1.73M2 (ref 60–?)
EOSINOPHIL # BLD AUTO: 0.03 X10(3) UL (ref 0–0.7)
EOSINOPHIL NFR BLD AUTO: 1 %
ERYTHROCYTE [DISTWIDTH] IN BLOOD BY AUTOMATED COUNT: 14 %
GLUCOSE BLD-MCNC: 101 MG/DL (ref 70–99)
HCT VFR BLD AUTO: 26.8 %
HGB BLD-MCNC: 8.7 G/DL
IMM GRANULOCYTES # BLD AUTO: 0 X10(3) UL (ref 0–1)
IMM GRANULOCYTES NFR BLD: 0 %
LYMPHOCYTES # BLD AUTO: 0.95 X10(3) UL (ref 1–4)
LYMPHOCYTES NFR BLD AUTO: 32.5 %
MAGNESIUM SERPL-MCNC: 1.7 MG/DL (ref 1.6–2.6)
MCH RBC QN AUTO: 28.3 PG (ref 26–34)
MCHC RBC AUTO-ENTMCNC: 32.5 G/DL (ref 31–37)
MCV RBC AUTO: 87.3 FL
MONOCYTES # BLD AUTO: 0.34 X10(3) UL (ref 0.1–1)
MONOCYTES NFR BLD AUTO: 11.6 %
NEUTROPHILS # BLD AUTO: 1.59 X10 (3) UL (ref 1.5–7.7)
NEUTROPHILS # BLD AUTO: 1.59 X10(3) UL (ref 1.5–7.7)
NEUTROPHILS NFR BLD AUTO: 54.6 %
OSMOLALITY SERPL CALC.SUM OF ELEC: 288 MOSM/KG (ref 275–295)
PLATELET # BLD AUTO: 104 10(3)UL (ref 150–450)
POTASSIUM SERPL-SCNC: 4.1 MMOL/L (ref 3.5–5.1)
POTASSIUM SERPL-SCNC: 4.1 MMOL/L (ref 3.5–5.1)
RBC # BLD AUTO: 3.07 X10(6)UL
SODIUM SERPL-SCNC: 139 MMOL/L (ref 136–145)
WBC # BLD AUTO: 2.9 X10(3) UL (ref 4–11)

## 2024-10-30 PROCEDURE — 99222 1ST HOSP IP/OBS MODERATE 55: CPT | Performed by: SURGERY

## 2024-10-30 RX ORDER — MIDODRINE HYDROCHLORIDE 10 MG/1
10 TABLET ORAL 3 TIMES DAILY
Status: DISCONTINUED | OUTPATIENT
Start: 2024-10-30 | End: 2024-10-31

## 2024-10-30 RX ORDER — MAGNESIUM OXIDE 400 MG/1
400 TABLET ORAL ONCE
Status: COMPLETED | OUTPATIENT
Start: 2024-10-30 | End: 2024-10-30

## 2024-10-30 NOTE — OCCUPATIONAL THERAPY NOTE
OCCUPATIONAL THERAPY EVALUATION - INPATIENT    Room Number: 8604/8604-A  Evaluation Date: 10/30/2024     Type of Evaluation: Initial  Presenting Problem: Syncope & collapse    Physician Order: IP Consult to Occupational Therapy  Reason for Therapy:  ADL/IADL Dysfunction and Discharge Planning    OCCUPATIONAL THERAPY ASSESSMENT   Patient is a 84 year old male admitted on 10/28/2024 with Presenting Problem: Syncope & collapse. Co-Morbidities : PD, afib, SLE, lymphoma, HTN, OA, MI, L shoulder surgery  Patient is currently functioning at baseline with toileting, bathing, upper body dressing, lower body dressing, grooming, eating, bed mobility, transfers, static sitting balance, dynamic sitting balance, static standing balance, dynamic standing balance, maintaining seated position, and functional standing tolerance.  Prior to admission, patient's baseline is MIN A-MOD A w/ all BADLs & has a 24 hour caregiver.  Patient met all OT goals at MIN A for functional transfers, toileting, standing balance, bed mobility, & MOD A for LB dressing.  Patient reports no further questions/concerns at this time.     Patient will not benefit from continued skilled OT Services     Recommendations for nursing staff:   Transfers: CGA w/ RW   Toileting location: Toilet    EVALUATION SESSION:  Patient at start of session: semi-supine in bed.     FUNCTIONAL TRANSFER ASSESSMENT  Sit to Stand: Edge of Bed  Edge of Bed: Minimal Assist    BED MOBILITY  Supine to Sit : Minimal Assist  Scooting: Scooted to EOB w/ CGA.    BALANCE ASSESSMENT  Static Sitting: Contact Guard Assist  Static Standing: Minimal Assist    FUNCTIONAL ADL ASSESSMENT  LB Dressing Seated: Moderate Assist (Pt. simulated LB dressing by slightly flexing trunk in attempt to grasp socks. Pt. stated that caregiver assists w/ LB dressing if he can not complete it.)  Toileting Seated: Minimal Assist (Pt. stated that he is able to flex trunk to perfrom per-care but caregiver assists with  thoroughness.)  Toileting Standing: Minimal Assist (Pt. simulated toileting by completing sit to stand/stand to sit transfer from EOB w/ RW.)    ACTIVITY TOLERANCE: Pt. Is orthostatic positive. BP was taken in supine, sitting, & standing prior to ambulation (supine 149/85, sitting 121/71, &  standing 135/113) Pt. Expressed dizziness when transitioning from supine to sitting & sitting to standing. After standing for several minutes pt. Ambulated down the douglas & back to room. On way back to room pt. Began shuffle gait pattern. Pt. BP was then taken at end of session while sitting in chair (seated 106/64).                          O2 SATURATIONS       COGNITION  Memory:  impaired working memory  Following Commands:  follows one-step commands inconsistently    COGNITION ASSESSMENTS     Upper Extremity:   ROM: within functional limits   Strength: is within functional limits   Coordination:  Gross motor: WFL  Fine motor: WFL  Sensation: Light touch:  intact    EDUCATION PROVIDED  Patient Education : Role of Occupational Therapy; Plan of Care  Patient's Response to Education: Verbalized Understanding    Equipment used: RW  Demonstrates functional use    Patient End of Session: Up in chair;Needs met;Call light within reach;All patient questions and concerns addressed;Hospital anti-slip socks;Alarm set;RN aware of session/findings    OCCUPATIONAL PROFILE    HOME SITUATION  Type of Home: Assisted living facility  Home Layout: One level  Lives With: Spouse;Caregiver 24 hours    Toilet and Equipment: Comfort height toilet;Grab bar  Shower/Tub and Equipment: Walk-in shower;Shower chair  Other Equipment: None          Drives: No  Patient Regularly Uses: Glasses;Rollator    Prior Level of Function: Pt. Lives w/ wife at a [facility] & has a 24 hour caretaker that assists w/ both. Pt. Receives min A-mod A w/ all BADLs. Pt. Uses RW or rollator to ambulate around [facility].     SUBJECTIVE  Pt. Was pleasant & cooperative.     PAIN  ASSESSMENT  Rating: Unable to rate  Location: Did not state pain at this time.       OBJECTIVE  Precautions: Bed/chair alarm (orthos positive)  Fall Risk: High fall risk    WEIGHT BEARING RESTRICTION       AM-PAC ‘6-Clicks’ Inpatient Daily Activity Short Form  -   Putting on and taking off regular lower body clothing?: A Lot  -   Bathing (including washing, rinsing, drying)?: A Lot  -   Toileting, which includes using toilet, bedpan or urinal? : A Lot  -   Putting on and taking off regular upper body clothing?: A Little  -   Taking care of personal grooming such as brushing teeth?: A Little  -   Eating meals?: None    AM-PAC Score:  Score: 16  Approx Degree of Impairment: 53.32%  Standardized Score (AM-PAC Scale): 35.96    ADDITIONAL TESTS     NEUROLOGICAL FINDINGS      PLAN   Patient has been evaluated and presents with no skilled Occupational Therapy needs at this time.  Patient discharged from Occupational Therapy services.  Please re-order if a new functional limitation presents during this admission.    OT Device Recommendations: None    Patient Evaluation Complexity Level:   Occupational Profile/Medical History LOW - Brief history including review of medical or therapy records    Specific performance deficits impacting engagement in ADL/IADL LOW  1 - 3 performance deficits    Client Assessment/Performance Deficits LOW - No comorbidities nor modifications of tasks    Clinical Decision Making LOW - Analysis of occupational profile, problem-focused assessments, limited treatment options    Overall Complexity LOW     OT Session Time: 25 minutes  Self-Care Home Management: 5 minutes  Therapeutic Activity: 15 minutes  Neuromuscular Re-education: 0 minutes  Therapeutic Exercise: 0 minutes  Cognitive Skills: 0 minutes  Sensory Integrative: 0 minutes  Orthotic Management and Trainin minutes  Can add/delete any of these

## 2024-10-30 NOTE — PROGRESS NOTES
[facility] Hospitalist Progress Note     PCP: Olvin Dinh MD    SUBJECTIVE:  Still experiencing some dizziness w/ standing up.     OBJECTIVE:  Temp:  [97.4 °F (36.3 °C)-98.3 °F (36.8 °C)] 97.4 °F (36.3 °C)  Pulse:  [68-78] 68  Resp:  [18-31] 19  BP: ()/() 106/64  SpO2:  [95 %-100 %] 97 %    Intake/Output:    Intake/Output Summary (Last 24 hours) at 10/30/2024 1838  Last data filed at 10/30/2024 1827  Gross per 24 hour   Intake 2373.33 ml   Output 3350 ml   Net -976.67 ml       Last 3 Weights   10/28/24 1747 154 lb 5.2 oz (70 kg)   10/11/24 1657 165 lb 5.5 oz (75 kg)   10/11/24 1237 165 lb 5.5 oz (75 kg)   10/10/24 0858 166 lb (75.3 kg)       Exam  Physical Exam:  General: Alert, awake, cooperative.  HEENT:  Normocephalic, atraumatic.  Neck:  Trachea midline.  No JVD.   Chest:  Clear to auscultation bilaterally. No wheezes, rales, or rhonchi.  CV:  Regular rate and rhythm.  Positive S1/S2. No murmur, no gallops, no rubs  GI: Bowel sounds present in all four quadrants, abdomen is soft, non-tender, non-distended.  Extremities:  No lower extremity edema or cyanosis.  Neurological:  AAOx3.  Moving all extremities.  Skin:  Warm and dry.      Data Review:       Labs:     Recent Labs   Lab 10/28/24  1149 10/29/24  0823 10/30/24  0616   WBC 2.8* 3.0* 2.9*   HGB 10.0* 8.9* 8.7*   MCV 88.3 88.1 87.3   .0* 114.0* 104.0*       Recent Labs   Lab 10/28/24  1149 10/29/24  0823 10/30/24  0616    138 139   K 4.5 3.7 4.1  4.1    106 106   CO2 26.0 28.0 28.0   BUN 20 16 13   CREATSERUM 0.93 0.72 0.73   CA 8.8 8.6* 8.8   MG  --   --  1.7   * 93 101*       Recent Labs   Lab 10/28/24  1149   ALT <7*   AST 45*   ALB 3.0*       No results for input(s): \"PGLU\" in the last 168 hours.    No results for input(s): \"TROP\" in the last 168 hours.      Meds:      midodrine  10 mg Oral TID    carbidopa-levodopa  1.5 tablet Oral QID    carbidopa-levodopa ER  1 tablet Oral BID    ezetimibe  10 mg Oral Daily     cetirizine  5 mg Oral Daily    finasteride  5 mg Oral Daily    hydroxychloroquine  200 mg Oral BID    melatonin  5 mg Oral Nightly    pantoprazole  40 mg Oral QAM AC    polyethylene glycol (PEG 3350)  17 g Oral Daily    pregabalin  75 mg Oral BID    [Held by provider] rivaroxaban  20 mg Oral Daily with food      lactated ringers 100 mL/hr at 10/30/24 1141       acetaminophen    ondansetron    metoclopramide    docusate sodium    traMADol       Assessment/Plan:   84 year old male with PMH sig for Parkinson disease, A-fib on Xarelto, SLE, lymphoma who presents for syncope.     # Vasovagal syncope  # Orthostatic hypotension  -Suspect presentation likely due to vasovagal syncope after having a bowel movement in combination with dysautonomia from Parkinson's  -orthostatic vitals positive  -Continue IV fluids.    -Recent echo 10/2024, normal EF  -Monitor on telemetry  -PT/OT  -Midodrine started to help w/ orthostasis. Discontinue flomax as patient has chronic edmond and this is also contributing to his hypotension. Can reevaluate need for flomax at later time after he has procedure for his prostate by urology.    #Gross hematuria  -Urology consulted  -Xarelto held     #Parkinson's disease  -Continue Sinemet    #TCP, chronic  # Chronic anemia  -Trend CBC, monitor for active signs of bleeding.     #A-fib   -Continue Xarelto     #CAD s/p CABG  -Continue Zetia     # SLE  -Continue hydroxychloroquine     #BPH  -Continue Flomax and finasteride    Discussed with RN and daughter.     DVT prophylaxis: Xarelto  CODE STATUS: DNR/DNI      Jacklyn Mcnela DO  [facility]

## 2024-10-30 NOTE — PROGRESS NOTES
10/30/24 0902 10/30/24 0904 10/30/24 0906   Vital Signs   /85 121/71 (!) 135/113   BP Location Left arm Left arm Left arm   BP Method Automatic Automatic Automatic   Patient Position Lying Sitting Standing      10/30/24 0915   Vital Signs   /64   BP Location Left arm   BP Method Automatic   Patient Position Sitting  (after ambulating in hallway)     BP taken during PT/OT session. RN notified. Pt with lightheadedness and feelings of weakness throughout.

## 2024-10-30 NOTE — PLAN OF CARE
Pt. Is alert and oriented times 3. Forgetful at times. Anxious at times. Pt. Is on RA. O2 in the high 90's. Pt. Denies chest pain or shortness of breath. Pt. Is 1st degree AV block on tele. Pt. Is continent of bowel. Chronic edmond in place   1612: urine red tinged, Possibly some small clots. Hospitalist notified. Urology consulted. Urology to bedside. Will monitor for further red tinged urine.   Pt. Updated on plan of care. Call light within reach.      Problem: SAFETY ADULT - FALL  Goal: Free from fall injury  Description: INTERVENTIONS:  - Assess pt frequently for physical needs  - Identify cognitive and physical deficits and behaviors that affect risk of falls.  - [place] fall precautions as indicated by assessment.  - Educate pt/family on patient safety including physical limitations  - Instruct pt to call for assistance with activity based on assessment  - Modify environment to reduce risk of injury  - Provide assistive devices as appropriate  - Consider OT/PT consult to assist with strengthening/mobility  - Encourage toileting schedule  Outcome: Progressing     Problem: CARDIOVASCULAR - ADULT  Goal: Maintains optimal cardiac output and hemodynamic stability  Description: INTERVENTIONS:  - Monitor vital signs, rhythm, and trends  - Monitor for bleeding, hypotension and signs of decreased cardiac output  - Evaluate effectiveness of vasoactive medications to optimize hemodynamic stability  - Monitor arterial and/or venous puncture sites for bleeding and/or hematoma  - Assess quality of pulses, skin color and temperature  - Assess for signs of decreased coronary artery perfusion - ex. Angina  - Evaluate fluid balance, assess for edema, trend weights  Outcome: Progressing  Goal: Absence of cardiac arrhythmias or at baseline  Description: INTERVENTIONS:  - Continuous cardiac monitoring, monitor vital signs, obtain 12 lead EKG if indicated  - Evaluate effectiveness of antiarrhythmic and heart rate control  medications as ordered  - Initiate emergency measures for life threatening arrhythmias  - Monitor electrolytes and administer replacement therapy as ordered  Outcome: Progressing

## 2024-10-30 NOTE — PHYSICAL THERAPY NOTE
PHYSICAL THERAPY TREATMENT NOTE - INPATIENT    Room Number: 8604/8604-A     Session: 1     Number of Visits to Meet Established Goals: 4    Presenting Problem: syncope and collapse  Co-Morbidities : PD, afib, SLE, lymphoma, HTN, OA, MI, L shoulder surgery  History related to current admission: Patient is a 84 year old male admitted on 10/28/2024 from [facility] for syncope and collapse.     HOME SITUATION  Type of Home: Assisted living facility  Home Layout: One level       Lives With: Spouse;Caregiver 24 hours    Drives: No   Patient Regularly Uses: Glasses;Rollator       Prior Level of [place]: Pt reports he lives with his spouse and a 24 hour CG at [facility]. Pt reports he can mostly dress himself but needs some help. Pt ambulates with a rollator.   PHYSICAL THERAPY ASSESSMENT   Patient demonstrates fair progress this session, goals  remain in progress.      Patient is requiring contact guard assist and minimal assist as a result of the following impairments: decreased functional strength, decreased endurance/aerobic capacity, impaired   balance, impaired motor planning, and decreased muscular endurance.     Patient continues to function below baseline with bed mobility, transfers, and gait.  Next session anticipate patient to progress bed mobility, transfers, and gait.  Physical Therapy will continue to follow patient for duration of hospitalization.    Patient continues to benefit from continued skilled PT services: at discharge to promote prior level of function and safety with additional support and return home with home health PT.    PLAN DURING HOSPITALIZATION  Nursing Mobility Recommendation : 1 Assist  PT Device Recommendation: Rolling walker;Gait belt  PT Treatment Plan: Bed mobility;Endurance;Energy conservation;Patient education;Family education;Gait training;Strengthening;Transfer training;Balance training  Frequency (Obs): 3-5x/week     CURRENT GOALS   Goal #1 Patient is able to demonstrate supine - sit EOB  @ level: supervision      Goal #2 Patient is able to demonstrate transfers Sit to/from Stand at assistance level: supervision      Goal #3 Patient is able to ambulate 150 feet with assist device: walker - rolling at assistance level: supervision      Goal #4     Goal #5     Goal #6     Goal Comments: Goals established on 10/29/2024     10/30/2024 all goals ongoing    SUBJECTIVE  \"I was feeling a little sorry for myself because I've been away from my wife for two days\"     OBJECTIVE  Precautions: Bed/chair alarm (orthos positive)    WEIGHT BEARING RESTRICTION     PAIN ASSESSMENT   Ratin          BALANCE                                                                                                                       Static Sitting: Fair +  Dynamic Sitting: Fair -           Static Standing: Poor +  Dynamic Standing: Poor +    ACTIVITY TOLERANCE   BP supine: 149/85   BP sitting EOB: 121/71 (pt lightheaded)   BP standin/113 (pt lightheaded)   BP: 106/64  BP Location: Left arm  BP Method: Automatic  Patient Position: Sitting (after ambulating in hallway) (pt with c/o feeling weak)    RN notified.     O2 WALK       AM-PAC '6-Clicks' INPATIENT SHORT FORM - BASIC MOBILITY  How much difficulty does the patient currently have...  Patient Difficulty: Turning over in bed (including adjusting bedclothes, sheets and blankets)?: None   Patient Difficulty: Sitting down on and standing up from a chair with arms (e.g., wheelchair, bedside commode, etc.): A Little   Patient Difficulty: Moving from lying on back to sitting on the side of the bed?: A Little   How much help from another person does the patient currently need...   Help from Another: Moving to and from a bed to a chair (including a wheelchair)?: A Little   Help from Another: Need to walk in hospital room?: A Little   Help from Another: Climbing 3-5 steps with a railing?: Total     AM-PAC Score:  Raw Score: 17   Approx Degree of Impairment: 50.57%    Standardized Score (AM-PAC Scale): 42.13   CMS Modifier (G-Code): CK    FUNCTIONAL ABILITY STATUS  Gait Assessment   Functional Mobility/Gait Assessment  Gait Assistance: Contact guard assist;Minimum assistance  Distance (ft): 80  Assistive Device: Rolling walker  Pattern: Shuffle;R Flexed knee;L Flexed knee (flexed posture)    Skilled Therapy Provided: Per RN okay to work with pt. Pt received in supine and was agreeable to PT session.     Bed Mobility:  Rolling: NT   Supine<>Sit: min A    Sit<>Supine: NT     Transfer Mobility:  Sit<>Stand: CGA   Stand<>Sit: CGA   Gait: pt ambulated with RW and CGA, but as pt with increased fatigue/possibly dropping BP pt required min A     Session limited by pt activity tolerance due to orthostatic hypotension. See activity tolerance section above for details.     Therapist's Comments: Pt educated on role of therapy, goals for session, safety, fall prevention, and activity recommendations.     Patient End of Session: Up in chair;Needs met;Call light within reach;RN aware of session/findings;All patient questions and concerns addressed;Hospital anti-slip socks    PT Session Time: 15 minutes  Gait Training: 15 minutes

## 2024-10-30 NOTE — PROGRESS NOTES
10/30/24 1600 10/30/24 1605 10/30/24 1610   Vital Signs   /68 152/72 106/64   MAP (mmHg) 89 97 77   BP Location Left arm Left arm Left arm   BP Method Automatic Automatic Automatic   Patient Position Lying Sitting Standing     Orthos. Pt. Symptomatic. Feels \"weak\" when standing

## 2024-10-30 NOTE — CM/SW NOTE
10/30/24 1000   CM/SW Referral Data   Referral Source Social Work (self-referral)   Reason for Referral Discharge planning;Readmission   Informant EMR;Clinical Staff Member;Son   Readmission Assessment   Factors that patient feels contributed to this readmission Acute/Chronic Clinical Presentation   Pt's living situation prior to admission? Assisted living   Medical Hx   Does patient have an established PCP? Yes   Patient Info   Patient's Current Mental Status at Time of Assessment Alert;Oriented   Patient's Home Environment Assisted Living   Post Acute Care Provider Upon Admission   ([facility])   Patient lives with Spouse/Significant other;Other  (Has Caregiver for spouse and pt)   Patient Status Prior to Admission   Services in place prior to admission Home Health Care   Discharge Needs   Anticipated D/C needs Home health care       SW spoke with pt's son, William, over the phone to confirm discharge planning. Pt resides at [facility] w/ a 24 hour caregiver w/ spouse. Pt has had HHC in the past w/ [facility]. Son agreeable to using them again. Referral sent via [employer]. Orders placed. Agreeable to returning to [facility] at discharge. Son agreeable to medicar transport and is agreeable to the cost. POLST paperwork in chart for family to sign prior to discharge. RN aware.   Pt's PCP is Dr Dinh.      &  to remain available and supportive for discharge planning needs.    hSaron URRUTIA, MICW  Discharge Planner

## 2024-10-30 NOTE — PLAN OF CARE
Patient is alert, oriented x3-4. On room air maintaining SPO2 >90%. Refused edmond care, refused to be turned and stated he just want to sleep. LR running at 100ml/hr. Edmond in place with moderate amount of output. Denies chest pain, shortness of breath. Afebrile. Fall precaution maintained. All concern and question addressed. Kept monitored.       Problem: SAFETY ADULT - FALL  Goal: Free from fall injury  Description: INTERVENTIONS:  - Assess pt frequently for physical needs  - Identify cognitive and physical deficits and behaviors that affect risk of falls.  - [place] fall precautions as indicated by assessment.  - Educate pt/family on patient safety including physical limitations  - Instruct pt to call for assistance with activity based on assessment  - Modify environment to reduce risk of injury  - Provide assistive devices as appropriate  - Consider OT/PT consult to assist with strengthening/mobility  - Encourage toileting schedule  Outcome: Progressing     Problem: CARDIOVASCULAR - ADULT  Goal: Maintains optimal cardiac output and hemodynamic stability  Description: INTERVENTIONS:  - Monitor vital signs, rhythm, and trends  - Monitor for bleeding, hypotension and signs of decreased cardiac output  - Evaluate effectiveness of vasoactive medications to optimize hemodynamic stability  - Monitor arterial and/or venous puncture sites for bleeding and/or hematoma  - Assess quality of pulses, skin color and temperature  - Assess for signs of decreased coronary artery perfusion - ex. Angina  - Evaluate fluid balance, assess for edema, trend weights  Outcome: Progressing  Goal: Absence of cardiac arrhythmias or at baseline  Description: INTERVENTIONS:  - Continuous cardiac monitoring, monitor vital signs, obtain 12 lead EKG if indicated  - Evaluate effectiveness of antiarrhythmic and heart rate control medications as ordered  - Initiate emergency measures for life threatening arrhythmias  - Monitor electrolytes and  administer replacement therapy as ordered  Outcome: Progressing

## 2024-10-30 NOTE — CONSULTS
Urology Inpatient Consult Note  [facility]   part of [facility]      Reginaldo Hdz Patient Status:  Observation    8/10/1940 MRN [id number]   Location [facility] 8NE-A Attending Jacklyn Mcneal,    Hosp Day # 0 PCP Olvin Dinh MD     Date of Admission:  10/28/2024  Date of Consult: 10/30/2024  Primary Care Provider: Olvin Dinh MD     Consulting Provider: Dr. Mcneal    Reason for Consultation:   Gross hematuria    History of Present Illness:   Reginaldo Hdz is a 84 year old male with history of hypertension, hyperlipidemia, atrial fibrillation on Xarelto, CAD, BPH.  Patient has a chronic Milan catheter and is scheduled for TURBT and TURP in 2024 with Dr. Leon.  Clinic cystoscopy 2024 showed moderate BPH with intravesical protrusion, papillary lesions along the bladder trigone concerning for edema versus malignancy, several small bladder stones.  He was started on tamsulosin and finasteride.    Recently admitted and seen for gross hematuria on 10/11/2024.  At that time he had a supratherapeutic INR.  Three-way Milan catheter was placed and CBI was started after irrigation.  He had a Serratia UTI and Enterococcus bacteremia at that time as well.  Milan exchanged back to 16 [place] prior to discharge.    Readmitted now for syncope.  Afebrile.  Creatinine 0.73.  WBC 2.9.  Hemoglobin stable at 8.7.  Called for urology consult given recurrent gross hematuria.  Called for gross hematuria consult. On evaluation he has a 16Fr Milan in place draining clear urine in the tubing, pink tinged in the bag.    History:     Past Medical History:    Anemia    Anesthesia complication    Can become agitated/combative coming out of anesthesia    Anxiety state    Arrhythmia    Atherosclerosis of coronary artery    Back problem    Blood disorder    Chronic atrial fibrillation (HCC)    Coronary atherosclerosis    Esophageal reflux    Heart attack (HCC)    High blood pressure    High cholesterol    History  of adverse reaction to anesthesia    Hyperlipidemia    Hypertension    Incontinence    Has an indwelling urinary catheter    Lupus    Osteoarthritis    Parkinson disease (HCC)    Peripheral neuropathy    Problems with swallowing    Difficulties with swallowing with Parkinson's disease    Visual impairment    Glasses       Past Surgical History:   Procedure Laterality Date    Angiogram      Angiogram      Angiogram  2002    [facility], Dr. Scott/ Mikayla: 1.Successful PTCA of the infarct related artery which was the proximal graft of the saphenous vein graft to the right posterior descending/LV branch which was dilated and stented using a 4 x 13 BX Velocity to a maximum diameter of 4.45 mm.  2.Proximal PDA primarily stented using a 3 x 18 Penta stent from about 70 to 80% to 0%, HUGH III flow, no dissection.     Angiogram  2011    [facility], Dr. Steele: Successful stenting with a drug-eluting stent to the graft to the right coronary artery. Severe native CAD. Patent vein graft to LAD. Severe disease in the vein graft to the right coronary artery.    [facility] Successful stenting of the circumflex artery in the proximal and mid portions. Relook angiography of the vein graft to the right coronary artery showed that this vessel is widely patent.    Cabg  1984    x2    Cath bare metal stent (bms)      Cath drug eluting stent      Shoulder surg proc unlisted Left     fracture       Family History   Problem Relation Age of Onset    Cancer Father         Liver    Other (Other) Father         Heart Attack    Cancer Mother         uterine    Other (Other) Daughter         Hosimotos    Other (Other) Son         Heart Problem       Social History     Socioeconomic History    Marital status:    Tobacco Use    Smoking status: Former     Current packs/day: 0.00     Types: Cigarettes     Quit date: 1982     Years since quittin.6    Smokeless tobacco: Never   Vaping  Use    Vaping status: Never Used   Substance and Sexual Activity    Alcohol use: Not Currently     Comment: Social    Drug use: No     Social Drivers of Health     Food Insecurity: No Food Insecurity (10/28/2024)    Food Insecurity     Food Insecurity: Never true   Transportation Needs: No Transportation Needs (10/28/2024)    Transportation Needs     Lack of Transportation: No   Housing Stability: Low Risk  (10/28/2024)    Housing Stability     Housing Instability: No       Medications:  No current outpatient medications on file.       Allergies:  Allergies[1]    Review of Systems:   A comprehensive 10-point review of systems was completed.  Pertinent positives and negatives are noted in the the HPI.    Physical Exam:   Vital Signs:  Blood pressure 96/53, pulse 75, temperature 97.7 °F (36.5 °C), temperature source Oral, resp. rate (!) 29, weight 154 lb 5.2 oz (70 kg), SpO2 95%.     CONSTITUTIONAL: Well developed, well nourished, in no acute distress  NEUROLOGIC: Alert and oriented  HEAD: Normocephalic, atraumatic  EYES: Sclera non-icteric  ENT: Hearing intact, moist mucous membranes  NECK: No obvious goiter or masses  RESPIRATORY: Normal respiratory effort  SKIN: No evident rashes  ABDOMEN: Soft, non-tender, non-distended  GENITOURINARY: Milan draining clear urine    Laboratory Data:  Lab Results   Component Value Date    WBC 2.9 (L) 10/30/2024    HGB 8.7 (L) 10/30/2024    .0 (L) 10/30/2024     Lab Results   Component Value Date     10/30/2024    K 4.1 10/30/2024    K 4.1 10/30/2024     10/30/2024    CO2 28.0 10/30/2024    BUN 13 10/30/2024     (H) 10/30/2024    GFRAA 84 12/06/2019    AST 45 (H) 10/28/2024    ALT <7 (L) 10/28/2024    TP 7.2 10/28/2024    ALB 3.0 (L) 10/28/2024    CA 8.8 10/30/2024    MG 1.7 10/30/2024       Urinalysis Results (last three years):  Recent Labs     05/19/24  1030 07/10/24  0026 08/02/24  1758 08/04/24  1629 08/14/24  1808 08/23/24  1724 10/10/24  0940  10/11/24  1250 10/28/24  1149   COLORUR Yellow Yellow Yellow  --  [place]* Red* Yellow Red* [place]*   CLARITY Ex.Turbid* Clear Clear Ex.Turbid* Turbid* Turbid* Cloudy* Ex.Turbid* Cloudy*   SPECGRAVITY 1.016 1.015 1.022 >1.030* >=1.030 1.020 >=1.030 1.019 >=1.030   PHURINE 7.0 6.5 6.0 6.0 5.5 5.5 7.5  --  5.5   PROUR 50* Trace* 20* 100* >=300* >=300* >=300*  --  >=300*   GLUUR Normal Normal Normal Normal 100* 100* Negative  --  100*   KETUR Negative Negative Negative Negative 40* 40* Negative  --  15*   BILUR Negative Negative Negative Negative  --   --  Negative  --   --    BLOODURINE 1+* Negative Negative 3+* Large* Large* Large*  --  Large*   NITRITE Negative Negative Negative Negative Negative Positive* Negative  --  Negative   UROBILINOGEN Normal Normal Normal Normal 2.0* >=8.0* 1.0*  --  1.0*   LEUUR 500* Negative 25* 75* Large* Large* Large*  --  Negative   WBCUR >50*  --  6-10* None Seen None Seen  None Seen >50*  >50* >50*  >50* >50* None Seen   RBCUR >10*  --  >10* >10* >10*  >10* >10*  >10* >10*  >10* >10* 0-2   BACUR 1+*  --  None Seen None Seen None Seen  None Seen None Seen  None Seen 3+*  3+* 2+* None Seen       Urine Culture Results (last three years):  Lab Results   Component Value Date    URINECUL >100,000 CFU/ML Serratia marcescens (A) 10/11/2024    URINECUL >100,000 CFU/ML Serratia marcescens (A) 10/10/2024    URINECUL >100,000 CFU/ML Enterococcus faecalis NOT VRE (A) 08/23/2024    URINECUL No Growth at 18-24 hrs. 08/14/2024    URINECUL No Growth at 18-24 hrs. 08/04/2024    URINECUL No Growth at 18-24 hrs. 05/19/2024       PSA:  No results found for: \"PSA\", \"PERCENTPSA\", \"PSAS\", \"PSAULTRA\", \"QPSA\", \"PSATOT\", \"TOTPSADX\", \"TOTPSASCREEN\"     Imaging (last three days):  CT BRAIN OR HEAD (CPT=70450)    Result Date: 10/28/2024  CONCLUSION:  1. No acute intracranial hemorrhage.  2. Findings most consistent with chronic small vessel ischemic change within the deep white matter.  If an acute infarct  is of high clinical concern, recommend MRI for further evaluation.    LOCATION:  [facility]   Dictated by (CST): Nicole Gresham MD on 10/28/2024 at 3:10 PM     Finalized by (CST): Nicole Gresham MD on 10/28/2024 at 3:12 PM           Impression:   Reginaldo Hdz is a 84 year old male with history of hypertension, hyperlipidemia, atrial fibrillation on Xarelto, CAD, BPH.  Patient has a chronic Milan catheter and is scheduled for TURBT and TURP in November 2024 with Dr. Leon.  Clinic cystoscopy 9/18/2024 showed moderate BPH with intravesical protrusion, papillary lesions along the bladder trigone concerning for edema versus malignancy, several small bladder stones.  He was started on tamsulosin and finasteride.    Recently admitted and seen for gross hematuria on 10/11/2024.  At that time he had a supratherapeutic INR.  Three-way Milan catheter was placed and CBI was started after irrigation.  He had a Serratia UTI and Enterococcus bacteremia at that time as well.  Milan exchanged back to 16 French prior to discharge.    Readmitted now for syncope.  Afebrile.  Creatinine 0.73.  WBC 2.9.  Hemoglobin stable at 8.7.  Called for urology consult given recurrent gross hematuria.  Called for gross hematuria consult. On evaluation he has a 16Fr Milan in place draining clear urine in the tubing, pink tinged in the bag.    Recommendations:   - Urine culture  - Keep Milan in place, hand irrigate as needed if worsening hematuria      I have personally reviewed all relevant medical records, labs, and imaging.    Thank you for this consult. Please call if there are any questions or concerns.    Medical Decision Making  Gross hematuria: New problem    Amount and/or Complexity of Data Reviewed  External Data Reviewed: labs and notes.  Radiology: independent interpretation performed.          Elio Georges MD  Staff Urologist  [facility]  Office: [phone]               [1]   Allergies  Allergen Reactions    Hydrocodone-Acetaminophen  CONFUSION, DIZZINESS and HALLUCINATION    Inderal [Propranolol] NAUSEA AND VOMITING and UNKNOWN    Isosorbide NAUSEA AND VOMITING    Other OTHER (SEE COMMENTS)

## 2024-10-30 NOTE — CM/SW NOTE
Confirmed w/ [facility] that pt is still current w/ them for HHC services. AVS updated w/ their contact information.     [facility]  P:[phone]  F:[phone]    Sharon DYE MSW, LSW  Discharge Planner

## 2024-10-31 VITALS
WEIGHT: 154.31 LBS | RESPIRATION RATE: 26 BRPM | TEMPERATURE: 98 F | SYSTOLIC BLOOD PRESSURE: 74 MMHG | BODY MASS INDEX: 25 KG/M2 | HEART RATE: 77 BPM | DIASTOLIC BLOOD PRESSURE: 55 MMHG | OXYGEN SATURATION: 100 %

## 2024-10-31 PROBLEM — R55 SYNCOPE AND COLLAPSE: Status: RESOLVED | Noted: 2024-10-28 | Resolved: 2024-10-31

## 2024-10-31 PROBLEM — E86.0 DEHYDRATION: Status: RESOLVED | Noted: 2024-10-28 | Resolved: 2024-10-31

## 2024-10-31 LAB
ANION GAP SERPL CALC-SCNC: 4 MMOL/L (ref 0–18)
BASOPHILS # BLD AUTO: 0.02 X10(3) UL (ref 0–0.2)
BASOPHILS NFR BLD AUTO: 0.7 %
BUN BLD-MCNC: 12 MG/DL (ref 9–23)
CALCIUM BLD-MCNC: 9.1 MG/DL (ref 8.7–10.4)
CHLORIDE SERPL-SCNC: 107 MMOL/L (ref 98–112)
CO2 SERPL-SCNC: 28 MMOL/L (ref 21–32)
CREAT BLD-MCNC: 0.82 MG/DL
EGFRCR SERPLBLD CKD-EPI 2021: 86 ML/MIN/1.73M2 (ref 60–?)
EOSINOPHIL # BLD AUTO: 0.03 X10(3) UL (ref 0–0.7)
EOSINOPHIL NFR BLD AUTO: 1 %
ERYTHROCYTE [DISTWIDTH] IN BLOOD BY AUTOMATED COUNT: 14.3 %
GLUCOSE BLD-MCNC: 115 MG/DL (ref 70–99)
HCT VFR BLD AUTO: 30.2 %
HGB BLD-MCNC: 9.6 G/DL
IMM GRANULOCYTES # BLD AUTO: 0 X10(3) UL (ref 0–1)
IMM GRANULOCYTES NFR BLD: 0 %
LYMPHOCYTES # BLD AUTO: 0.85 X10(3) UL (ref 1–4)
LYMPHOCYTES NFR BLD AUTO: 28.7 %
MAGNESIUM SERPL-MCNC: 1.8 MG/DL (ref 1.6–2.6)
MCH RBC QN AUTO: 28.2 PG (ref 26–34)
MCHC RBC AUTO-ENTMCNC: 31.8 G/DL (ref 31–37)
MCV RBC AUTO: 88.6 FL
MONOCYTES # BLD AUTO: 0.25 X10(3) UL (ref 0.1–1)
MONOCYTES NFR BLD AUTO: 8.4 %
NEUTROPHILS # BLD AUTO: 1.81 X10 (3) UL (ref 1.5–7.7)
NEUTROPHILS # BLD AUTO: 1.81 X10(3) UL (ref 1.5–7.7)
NEUTROPHILS NFR BLD AUTO: 61.2 %
OSMOLALITY SERPL CALC.SUM OF ELEC: 289 MOSM/KG (ref 275–295)
PLATELET # BLD AUTO: 116 10(3)UL (ref 150–450)
POTASSIUM SERPL-SCNC: 3.8 MMOL/L (ref 3.5–5.1)
RBC # BLD AUTO: 3.41 X10(6)UL
SODIUM SERPL-SCNC: 139 MMOL/L (ref 136–145)
WBC # BLD AUTO: 3 X10(3) UL (ref 4–11)

## 2024-10-31 RX ORDER — MIDODRINE HYDROCHLORIDE 10 MG/1
10 TABLET ORAL 3 TIMES DAILY
Qty: 90 TABLET | Refills: 0 | Status: ON HOLD | OUTPATIENT
Start: 2024-10-31 | End: 2024-11-30

## 2024-10-31 RX ORDER — MIDODRINE HYDROCHLORIDE 10 MG/1
10 TABLET ORAL 3 TIMES DAILY
Status: DISCONTINUED | OUTPATIENT
Start: 2024-10-31 | End: 2024-10-31

## 2024-10-31 RX ORDER — POTASSIUM CHLORIDE 1500 MG/1
40 TABLET, EXTENDED RELEASE ORAL ONCE
Status: COMPLETED | OUTPATIENT
Start: 2024-10-31 | End: 2024-10-31

## 2024-10-31 RX ORDER — MIDODRINE HYDROCHLORIDE 5 MG/1
5 TABLET ORAL 3 TIMES DAILY
Status: DISCONTINUED | OUTPATIENT
Start: 2024-10-31 | End: 2024-10-31

## 2024-10-31 RX ORDER — MAGNESIUM OXIDE 400 MG/1
400 TABLET ORAL ONCE
Status: COMPLETED | OUTPATIENT
Start: 2024-10-31 | End: 2024-10-31

## 2024-10-31 NOTE — PROGRESS NOTES
UROLOGY NOTE    Patient briefly seen. Milan catheter draining clear yellow, no evidence of hematuria.    No additional  recommendations at this time. We will sign off. Please let u know if we can be of any further assistance.    Loyda Dias PA-C  [facility]

## 2024-10-31 NOTE — CM/SW NOTE
10/31/24 1216   Discharge disposition   Expected discharge disposition [facility]   Discharge transportation [facility]       Pt cleared to discharge today back to [facility]. Attempted to call facility to provide update, but reception had to take SW message. Updated [facility] on discharge. POLST form signed, will make sure a copy is sent to facility and pt has copy.   Family agreeable to medicar transport cost at discharge from conversation yesterday w/ son.     [facility]  P:[phone]  F:[phone]    RN to call report at dc: [phone]    [facility]: [phone]    Addendum: [facility] faxed POLST form to [facility].     Sharon URRUTIA, LSW  Discharge Planner

## 2024-10-31 NOTE — PLAN OF CARE
NURSING NOTES:  0800: Pt received AOx4. Room air. SR. IVF. Edmond. Denies pain.  1000: Saline lock. Pt ambulated in the hallway-tolerated.  1200: Orthostatic hypotension positive (not new)-notified Dr. Mcneal and the Sepsis fired. Pt will be okay to transfer to [facility] with .  1320: Updated pt's daughter Chloé with pt's discharge and plan of care. Chloé will let her brother William knows about discharge. Report given to Howard STERN from [facility]. Pt transferring back with edmond.  1420: Discharge instructions discussed with patient such as follow up appointments, medications to take and signs and symptoms to report to MD. Pt verbalized understanding.   1510: Pt transported by ambulance to [facility] facility with all his belongings. Extra copy of discharge instructions given to pt.    Problem: SAFETY ADULT - FALL  Goal: Free from fall injury  Description: INTERVENTIONS:  - Assess pt frequently for physical needs  - Identify cognitive and physical deficits and behaviors that affect risk of falls.  - [place] fall precautions as indicated by assessment.  - Educate pt/family on patient safety including physical limitations  - Instruct pt to call for assistance with activity based on assessment  - Modify environment to reduce risk of injury  - Provide assistive devices as appropriate  - Consider OT/PT consult to assist with strengthening/mobility  - Encourage toileting schedule  Outcome: Adequate for Discharge     Problem: CARDIOVASCULAR - ADULT  Goal: Maintains optimal cardiac output and hemodynamic stability  Description: INTERVENTIONS:  - Monitor vital signs, rhythm, and trends  - Monitor for bleeding, hypotension and signs of decreased cardiac output  - Evaluate effectiveness of vasoactive medications to optimize hemodynamic stability  - Monitor arterial and/or venous puncture sites for bleeding and/or hematoma  - Assess quality of pulses, skin color and temperature  - Assess for signs of  decreased coronary artery perfusion - ex. Angina  - Evaluate fluid balance, assess for edema, trend weights  Outcome: Adequate for Discharge  Goal: Absence of cardiac arrhythmias or at baseline  Description: INTERVENTIONS:  - Continuous cardiac monitoring, monitor vital signs, obtain 12 lead EKG if indicated  - Evaluate effectiveness of antiarrhythmic and heart rate control medications as ordered  - Initiate emergency measures for life threatening arrhythmias  - Monitor electrolytes and administer replacement therapy as ordered  Outcome: Adequate for Discharge

## 2024-10-31 NOTE — PLAN OF CARE
Assumed care of patient at 1930.   Pt is Aox4, forgetful at times, resting comfortably in bed.   Patient is on room air.  Complains of no difficulty breathing.  Lung sounds diminished.   1st degree AV block on tele.  Pt is on cardiac diet.    Pt has chronic edmond in place.   Complains of no pain.   Pt is ambulating walker x1.   Skin in unremarkable.   Bed at lowest position, room cleared of clutter, bed alarm is on, and call light is within reach.   POC discussed, fall precautions reviewed, and questions answered.    Problem: SAFETY ADULT - FALL  Goal: Free from fall injury  Description: INTERVENTIONS:  - Assess pt frequently for physical needs  - Identify cognitive and physical deficits and behaviors that affect risk of falls.  - [place] fall precautions as indicated by assessment.  - Educate pt/family on patient safety including physical limitations  - Instruct pt to call for assistance with activity based on assessment  - Modify environment to reduce risk of injury  - Provide assistive devices as appropriate  - Consider OT/PT consult to assist with strengthening/mobility  - Encourage toileting schedule  Outcome: Progressing     Problem: CARDIOVASCULAR - ADULT  Goal: Maintains optimal cardiac output and hemodynamic stability  Description: INTERVENTIONS:  - Monitor vital signs, rhythm, and trends  - Monitor for bleeding, hypotension and signs of decreased cardiac output  - Evaluate effectiveness of vasoactive medications to optimize hemodynamic stability  - Monitor arterial and/or venous puncture sites for bleeding and/or hematoma  - Assess quality of pulses, skin color and temperature  - Assess for signs of decreased coronary artery perfusion - ex. Angina  - Evaluate fluid balance, assess for edema, trend weights  Outcome: Progressing  Goal: Absence of cardiac arrhythmias or at baseline  Description: INTERVENTIONS:  - Continuous cardiac monitoring, monitor vital signs, obtain 12 lead EKG if indicated  - Evaluate  effectiveness of antiarrhythmic and heart rate control medications as ordered  - Initiate emergency measures for life threatening arrhythmias  - Monitor electrolytes and administer replacement therapy as ordered  Outcome: Progressing

## 2024-10-31 NOTE — DISCHARGE INSTRUCTIONS
[facility]  P:[phone]  F:[phone]

## 2024-11-01 NOTE — DISCHARGE SUMMARY
[facility] Hospitalist Discharge Summary     Patient ID:  Reginaldo Hdz  84 year old  8/10/1940    Admit date: 10/28/2024  Discharge date and time: 10/31/2024  3:30 PM   Attending Physician: No att. providers found   Primary Care Physician: Olvin Dinh MD   Discharge Diagnoses: Syncope and collapse [R55]  Dehydration [E86.0]    Discharge Condition: Stable    Disposition:  Assisted living w/ [facility]    Follow up:   - PCP within 1 week    Hospital Course:  84 year old male with PMH sig for Parkinson disease, A-fib on Xarelto, SLE, lymphoma who presents for syncope.  Patient states he was being helped standing up by his caretaker after using the bathroom and reportedly his eyes rolled back as he stood up straight and refill he became minimally responsive.  According to family his blood pressure after this episode was 80's systolic.  Patient states he did not pass out.  Currently resides in assisted living.  Had recent admission for UTI/ bacteremia, completed abx course. Denies chest pain, shortness of breath, fever, chills, nausea, vomiting.     Workup significant for CT head negative for acute process, hemoglobin 10, platelets 128, creatinine 0.93, blood glucose 145.  Patient given IV fluids in the ED    # Vasovagal syncope  # Orthostatic hypotension  -Suspect presentation likely due to vasovagal syncope after having a bowel movement in combination with dysautonomia from Parkinson's  -orthostatic vitals positive  -Stop IV fluids.    -Recent echo 10/2024, normal EF  -Monitor on telemetry  -PT/OT  -Midodrine started to help w/ orthostasis. Discontinue flomax as patient has chronic edmond and this is also contributing to his hypotension. Can reevaluate need for flomax at later time after he has procedure for his prostate by urology.     #Gross hematuria  -Urology consulted, manually irrigated edmond. Urine now clear.     #Parkinson's disease  -Continue Sinemet     #TCP, chronic  # Chronic anemia  -Trend CBC, monitor for active  signs of bleeding.     #A-fib   -Continue Xarelto     #CAD s/p CABG  -Continue Zetia     # SLE  -Continue hydroxychloroquine     #BPH  -Continue finasteride. DC flomax as above.       Exam on Day of DC:  BP (!) 74/55   Pulse 77   Temp 98 °F (36.7 °C) (Oral)   Resp 26   Wt 154 lb 5.2 oz (70 kg)   SpO2 100%   BMI 24.91 kg/m²   Physical Exam:  General: Alert, awake, cooperative.  HEENT:  Normocephalic, atraumatic.  Neck:  Trachea midline.  No JVD.   Chest:  Clear to auscultation bilaterally. No wheezes, rales, or rhonchi.  CV:  Regular rate and rhythm.  Positive S1/S2. No murmur, no gallops, no rubs  GI: Bowel sounds present in all four quadrants, abdomen is soft, non-tender, non-distended.  Extremities:  No lower extremity edema or cyanosis.  Neurological:  AAOx3.  Moving all extremities.  Skin:  Warm and dry.         I as the attending physician reconciled the current and discharge medications on day of discharge.     Discharge Medication List as of 10/31/2024  2:11 PM        START taking these medications    Details   midodrine 10 MG Oral Tab Take 1 tablet (10 mg total) by mouth in the morning and 1 tablet (10 mg total) at noon and 1 tablet (10 mg total) in the evening., Normal, Disp-90 tablet, R-0           CONTINUE these medications which have NOT CHANGED    Details   traMADol 50 MG Oral Tab Take 1 tablet (50 mg total) by mouth every 12 (twelve) hours as needed., Historical      finasteride 5 MG Oral Tab Take 1 tablet (5 mg total) by mouth daily., Normal, Disp-90 tablet, R-6      pantoprazole 40 MG Oral Tab EC Take 1 tablet (40 mg total) by mouth every morning before breakfast., Historical      Polyethylene Glycol 3350 17 g Oral Powd Pack Take 17 g by mouth daily., Historical      acetaminophen 500 MG Oral Tab Take 1 tablet (500 mg total) by mouth every 6 (six) hours as needed for Pain., Historical      docusate sodium 100 MG Oral Cap Take 1 capsule (100 mg total) by mouth daily as needed for constipation.,  Historical      melatonin 5 MG Oral Cap Take 1 capsule (5 mg total) by mouth nightly., Historical      rivaroxaban (XARELTO) 20 MG Oral Tab Take 1 tablet (20 mg total) by mouth daily with food., Normal, Disp-90 tablet, R-0      pregabalin 75 MG Oral Cap Take 1 capsule (75 mg total) by mouth 2 (two) times daily., Historical      nystatin 100,000 Units/g External Cream Apply topically as needed., Historical      carbidopa-levodopa ER  MG Oral Tab CR Take 2 tablets by mouth See Admin Instructions. Takes two tablets by mouth twice daily at 6am and 11pm, Historical      hydroxychloroquine 200 MG Oral Tab Take 1 tablet (200 mg total) by mouth 2 (two) times daily., Historical      Niacin  MG Oral Tab CR Take 1 tablet (500 mg total) by mouth 2 (two) times daily., Normal, Disp-180 tablet, R-1      EZETIMIBE 10 MG Oral Tab TAKE ONE TABLET BY MOUTH ONE TIME DAILY, Normal, Disp-90 tablet, R-0      carbidopa-levodopa  MG Oral Tab Take 1.5 tablets by mouth 4 (four) times daily. Takes four times a day 9am, 1pm, 5pm, 9pm, Historical      Multivitamin Chewtab, ADULT, Oral Chew Tab Chew 1 tablet by mouth daily., Historical      PRAVASTATIN SODIUM 80 MG Oral Tab TAKE 1 TABLET BY MOUTH NIGHTLY, Normal, Disp-90 tablet, R-3      cetirizine 10 MG Oral Tab Take 1 tablet (10 mg total) by mouth daily., Historical      Acidophilus/Pectin Oral Cap Take 1 capsule by mouth daily., Historical           STOP taking these medications       tamsulosin 0.4 MG Oral Cap              Jacklyn Mcneal [facility] Hospitalist

## 2024-11-02 ENCOUNTER — TELEPHONE (OUTPATIENT)
Dept: SURGERY | Facility: CLINIC | Age: 84
End: 2024-11-02

## 2024-11-02 RX ORDER — SULFAMETHOXAZOLE AND TRIMETHOPRIM 800; 160 MG/1; MG/1
1 TABLET ORAL 2 TIMES DAILY
Qty: 14 TABLET | Refills: 0 | Status: SHIPPED | OUTPATIENT
Start: 2024-11-02 | End: 2024-11-09

## 2024-11-02 NOTE — PROGRESS NOTES
Urine culture from inpatient growing Serratia and stenotrophomonas. No antibiotics sent on discharge. Called patient's son and sent Bactrim.

## 2024-11-08 ENCOUNTER — APPOINTMENT (OUTPATIENT)
Dept: GENERAL RADIOLOGY | Facility: HOSPITAL | Age: 84
End: 2024-11-08
Attending: EMERGENCY MEDICINE
Payer: MEDICARE

## 2024-11-08 ENCOUNTER — HOSPITAL ENCOUNTER (INPATIENT)
Facility: HOSPITAL | Age: 84
LOS: 4 days | Discharge: HOME HEALTH | End: 2024-11-12
Attending: EMERGENCY MEDICINE | Admitting: HOSPITALIST
Payer: MEDICARE

## 2024-11-08 DIAGNOSIS — I50.813 ACUTE ON CHRONIC RIGHT-SIDED CONGESTIVE HEART FAILURE (HCC): Primary | ICD-10-CM

## 2024-11-08 DIAGNOSIS — R06.02 SHORTNESS OF BREATH: ICD-10-CM

## 2024-11-08 LAB
ALBUMIN SERPL-MCNC: 3.6 G/DL (ref 3.2–4.8)
ALBUMIN/GLOB SERPL: 0.9 {RATIO} (ref 1–2)
ALP LIVER SERPL-CCNC: 125 U/L
ALT SERPL-CCNC: <7 U/L
ANION GAP SERPL CALC-SCNC: 2 MMOL/L (ref 0–18)
AST SERPL-CCNC: 40 U/L (ref ?–34)
BASOPHILS # BLD AUTO: 0.02 X10(3) UL (ref 0–0.2)
BASOPHILS NFR BLD AUTO: 0.5 %
BILIRUB SERPL-MCNC: 0.3 MG/DL (ref 0.2–1.1)
BUN BLD-MCNC: 13 MG/DL (ref 9–23)
CALCIUM BLD-MCNC: 8.8 MG/DL (ref 8.7–10.4)
CHLORIDE SERPL-SCNC: 104 MMOL/L (ref 98–112)
CO2 SERPL-SCNC: 30 MMOL/L (ref 21–32)
CREAT BLD-MCNC: 1.07 MG/DL
EGFRCR SERPLBLD CKD-EPI 2021: 68 ML/MIN/1.73M2 (ref 60–?)
EOSINOPHIL # BLD AUTO: 0.13 X10(3) UL (ref 0–0.7)
EOSINOPHIL NFR BLD AUTO: 3 %
ERYTHROCYTE [DISTWIDTH] IN BLOOD BY AUTOMATED COUNT: 14.2 %
FLUAV + FLUBV RNA SPEC NAA+PROBE: NEGATIVE
FLUAV + FLUBV RNA SPEC NAA+PROBE: NEGATIVE
GLOBULIN PLAS-MCNC: 4 G/DL (ref 2–3.5)
GLUCOSE BLD-MCNC: 106 MG/DL (ref 70–99)
HCT VFR BLD AUTO: 28.6 %
HGB BLD-MCNC: 9.1 G/DL
IMM GRANULOCYTES # BLD AUTO: 0.01 X10(3) UL (ref 0–1)
IMM GRANULOCYTES NFR BLD: 0.2 %
LYMPHOCYTES # BLD AUTO: 0.83 X10(3) UL (ref 1–4)
LYMPHOCYTES NFR BLD AUTO: 19.3 %
MCH RBC QN AUTO: 27.6 PG (ref 26–34)
MCHC RBC AUTO-ENTMCNC: 31.8 G/DL (ref 31–37)
MCV RBC AUTO: 86.7 FL
MONOCYTES # BLD AUTO: 0.55 X10(3) UL (ref 0.1–1)
MONOCYTES NFR BLD AUTO: 12.8 %
NEUTROPHILS # BLD AUTO: 2.75 X10 (3) UL (ref 1.5–7.7)
NEUTROPHILS # BLD AUTO: 2.75 X10(3) UL (ref 1.5–7.7)
NEUTROPHILS NFR BLD AUTO: 64.2 %
NT-PROBNP SERPL-MCNC: 995 PG/ML (ref ?–450)
OSMOLALITY SERPL CALC.SUM OF ELEC: 283 MOSM/KG (ref 275–295)
PLATELET # BLD AUTO: 92 10(3)UL (ref 150–450)
POTASSIUM SERPL-SCNC: 4.5 MMOL/L (ref 3.5–5.1)
PROT SERPL-MCNC: 7.6 G/DL (ref 5.7–8.2)
RBC # BLD AUTO: 3.3 X10(6)UL
RSV RNA SPEC NAA+PROBE: NEGATIVE
SARS-COV-2 RNA RESP QL NAA+PROBE: NOT DETECTED
SODIUM SERPL-SCNC: 136 MMOL/L (ref 136–145)
TROPONIN I SERPL HS-MCNC: 14 NG/L
WBC # BLD AUTO: 4.3 X10(3) UL (ref 4–11)

## 2024-11-08 PROCEDURE — 84484 ASSAY OF TROPONIN QUANT: CPT | Performed by: EMERGENCY MEDICINE

## 2024-11-08 PROCEDURE — 99285 EMERGENCY DEPT VISIT HI MDM: CPT

## 2024-11-08 PROCEDURE — 83880 ASSAY OF NATRIURETIC PEPTIDE: CPT | Performed by: EMERGENCY MEDICINE

## 2024-11-08 PROCEDURE — 93010 ELECTROCARDIOGRAM REPORT: CPT

## 2024-11-08 PROCEDURE — 96374 THER/PROPH/DIAG INJ IV PUSH: CPT

## 2024-11-08 PROCEDURE — 0241U SARS-COV-2/FLU A AND B/RSV BY PCR (GENEXPERT): CPT | Performed by: EMERGENCY MEDICINE

## 2024-11-08 PROCEDURE — 85025 COMPLETE CBC W/AUTO DIFF WBC: CPT | Performed by: EMERGENCY MEDICINE

## 2024-11-08 PROCEDURE — 94640 AIRWAY INHALATION TREATMENT: CPT

## 2024-11-08 PROCEDURE — 71045 X-RAY EXAM CHEST 1 VIEW: CPT | Performed by: EMERGENCY MEDICINE

## 2024-11-08 PROCEDURE — 93005 ELECTROCARDIOGRAM TRACING: CPT

## 2024-11-08 PROCEDURE — 80053 COMPREHEN METABOLIC PANEL: CPT | Performed by: EMERGENCY MEDICINE

## 2024-11-08 RX ORDER — ONDANSETRON 2 MG/ML
4 INJECTION INTRAMUSCULAR; INTRAVENOUS EVERY 6 HOURS PRN
Status: DISCONTINUED | OUTPATIENT
Start: 2024-11-08 | End: 2024-11-12

## 2024-11-08 RX ORDER — TRAMADOL HYDROCHLORIDE 50 MG/1
50 TABLET ORAL EVERY 12 HOURS PRN
Status: DISCONTINUED | OUTPATIENT
Start: 2024-11-08 | End: 2024-11-12

## 2024-11-08 RX ORDER — IPRATROPIUM BROMIDE AND ALBUTEROL SULFATE 2.5; .5 MG/3ML; MG/3ML
3 SOLUTION RESPIRATORY (INHALATION) ONCE
Status: COMPLETED | OUTPATIENT
Start: 2024-11-08 | End: 2024-11-08

## 2024-11-08 RX ORDER — ATORVASTATIN CALCIUM 10 MG/1
20 TABLET, FILM COATED ORAL DAILY
Status: DISCONTINUED | OUTPATIENT
Start: 2024-11-09 | End: 2024-11-12

## 2024-11-08 RX ORDER — MIDODRINE HYDROCHLORIDE 5 MG/1
10 TABLET ORAL 3 TIMES DAILY
Status: DISCONTINUED | OUTPATIENT
Start: 2024-11-09 | End: 2024-11-09

## 2024-11-08 RX ORDER — HYDROXYCHLOROQUINE SULFATE 200 MG/1
200 TABLET, FILM COATED ORAL 2 TIMES DAILY
Status: DISCONTINUED | OUTPATIENT
Start: 2024-11-08 | End: 2024-11-12

## 2024-11-08 RX ORDER — EZETIMIBE 10 MG/1
10 TABLET ORAL DAILY
Status: DISCONTINUED | OUTPATIENT
Start: 2024-11-09 | End: 2024-11-12

## 2024-11-08 RX ORDER — FUROSEMIDE 10 MG/ML
40 INJECTION INTRAMUSCULAR; INTRAVENOUS ONCE
Status: COMPLETED | OUTPATIENT
Start: 2024-11-08 | End: 2024-11-08

## 2024-11-08 RX ORDER — POLYETHYLENE GLYCOL 3350 17 G/17G
17 POWDER, FOR SOLUTION ORAL DAILY
Status: DISCONTINUED | OUTPATIENT
Start: 2024-11-09 | End: 2024-11-12

## 2024-11-08 RX ORDER — CARBIDOPA AND LEVODOPA 25; 100 MG/1; MG/1
1.5 TABLET ORAL 4 TIMES DAILY
Status: DISCONTINUED | OUTPATIENT
Start: 2024-11-08 | End: 2024-11-12

## 2024-11-08 RX ORDER — FINASTERIDE 5 MG/1
5 TABLET, FILM COATED ORAL DAILY
Status: DISCONTINUED | OUTPATIENT
Start: 2024-11-09 | End: 2024-11-12

## 2024-11-08 RX ORDER — PANTOPRAZOLE SODIUM 40 MG/1
40 TABLET, DELAYED RELEASE ORAL
Status: DISCONTINUED | OUTPATIENT
Start: 2024-11-09 | End: 2024-11-12

## 2024-11-08 RX ORDER — CARBIDOPA AND LEVODOPA 25; 100 MG/1; MG/1
1 TABLET, EXTENDED RELEASE ORAL 2 TIMES DAILY
Status: DISCONTINUED | OUTPATIENT
Start: 2024-11-08 | End: 2024-11-12

## 2024-11-08 NOTE — ED PROVIDER NOTES
Patient Seen in: [facility] Emergency Department      History     Chief Complaint   Patient presents with    Difficulty Breathing     Stated Complaint: KENIA    Subjective:     HPI    84-year-old male with CAD, hypertension, Parkinson's, atrial fibrillation (Xarelto) who presented with shortness of breath. No leg swelling (from baseline). He denies any chest or abdominal pain. He used to smoke heavily in his younger years but quit at the age of 64. He does not use any inhalers or nebulizers for his breathing. He lives in an assisted living facility with his wife.    Objective:   Past Medical History:    Anemia    Anesthesia complication    Can become agitated/combative coming out of anesthesia    Anxiety state    Arrhythmia    Atherosclerosis of coronary artery    Back problem    Blood disorder    Chronic atrial fibrillation (HCC)    Coronary atherosclerosis    Esophageal reflux    Heart attack (HCC)    High blood pressure    High cholesterol    History of adverse reaction to anesthesia    Hyperlipidemia    Hypertension    Incontinence    Has an indwelling urinary catheter    Lupus    Osteoarthritis    Parkinson disease (HCC)    Peripheral neuropathy    Visual impairment    Glasses              Past Surgical History:   Procedure Laterality Date    Angiogram  1984    Angiogram  1996    [facility] 1.Successful PTCA of the infarct related artery which was the proximal graft of the saphenous vein graft to the right posterior descending/LV branch which was dilated and stented using a 4 x 13 BX Velocity to a maximum diameter of 4.45 mm.  2.Proximal PDA primarily stented using a 3 x 18 Penta stent from about 70 to 80% to 0%, HUGH III flow, no dissection.     [facility] Success[facility] with a drug-eluting stent to the graft to the right coronary artery. Severe native CAD. Patent vein graft to LAD. Severe disease in the vein graft to the  right coronary artery.    Angiogram  2011    [facility], Dr. Steele: Successful stenting of the circumflex artery in the proximal and mid portions. Relook angiography of the vein graft to the right coronary artery showed that this vessel is widely patent.    Cabg  1984    x2    Cath bare metal stent (bms)      Cath drug eluting stent      Shoulder surg proc unlisted Left     fracture                Social History     Socioeconomic History    Marital status:    Tobacco Use    Smoking status: Former     Current packs/day: 0.00     Types: Cigarettes     Quit date: 1982     Years since quittin.6    Smokeless tobacco: Never   Vaping Use    Vaping status: Never Used   Substance and Sexual Activity    Alcohol use: Not Currently     Comment: Social    Drug use: No     Social Drivers of Health     Food Insecurity: No Food Insecurity (2024)    Food Insecurity     Food Insecurity: Never true   Transportation Needs: No Transportation Needs (2024)    Transportation Needs     Lack of Transportation: No   Housing Stability: Low Risk  (2024)    Housing Stability     Housing Instability: No              Review of Systems    Positive for stated complaint: KENIA  Other systems are as noted in HPI.  Constitutional and vital signs reviewed.      All other systems reviewed and negative except as noted above.    Physical Exam     ED Triage Vitals   BP 24 1656 113/72   Pulse 24 1656 76   Resp 24 1656 19   Temp 24 1700 97.6 °F (36.4 °C)   Temp src 24 1700 Oral   SpO2 24 1656 95 %   O2 Device 24 1656 None (Room air)       Current:/61 (BP Location: Left arm)   Pulse 79   Temp 98.4 °F (36.9 °C) (Oral)   Resp 24   Wt 68.9 kg   SpO2 95%   BMI 24.53 kg/m²     General:  Vitals as listed.  No acute distress   HEENT: Sclerae anicteric.  Conjunctivae show no pallor.  Oropharynx clear, mucous membranes dry  Lungs: Decreased air exchange   Chest wall: Barrel  chested  Heart: regular rate rhythm and no murmur   Extremities: 1+ bilateral pretibial edema, normal peripheral pulses   Neuro: Alert oriented and nonfocal    Vitals:    11/08/24 1815 11/08/24 1900 11/08/24 1945 11/08/24 2038   BP: 97/70 129/62 148/63 128/61   BP Location:    Left arm   Pulse: 75 79 79 79   Resp: 22 18 24 24   Temp:    98.4 °F (36.9 °C)   TempSrc:    Oral   SpO2: 100% 100% 100% 95%   Weight:    68.9 kg         ED Course     Labs Reviewed   CBC WITH DIFFERENTIAL WITH PLATELET - Abnormal; Notable for the following components:       Result Value    RBC 3.30 (*)     HGB 9.1 (*)     HCT 28.6 (*)     PLT 92.0 (*)     Lymphocyte Absolute 0.83 (*)     All other components within normal limits   COMP METABOLIC PANEL (14) - Abnormal; Notable for the following components:    Glucose 106 (*)     AST 40 (*)     ALT <7 (*)     Alkaline Phosphatase 125 (*)     Globulin  4.0 (*)     A/G Ratio 0.9 (*)     All other components within normal limits   PRO BETA NATRIURETIC PEPTIDE - Abnormal; Notable for the following components:    Pro-Beta Natriuretic Peptide 995 (*)     All other components within normal limits   TROPONIN I HIGH SENSITIVITY - Normal   SARS-COV-2/FLU A AND B/RSV BY PCR (GENEXPERT) - Normal    Narrative:     This test is intended for the qualitative detection and differentiation of SARS-CoV-2, influenza A, influenza B, and respiratory syncytial virus (RSV) viral RNA in nasopharyngeal or nares swabs from individuals suspected of respiratory viral infection consistent with COVID-19 by their healthcare provider. Signs and symptoms of respiratory viral infection due to SARS-CoV-2, influenza, and RSV can be similar.    Test performed using the Xpert Xpress SARS-CoV-2/FLU/RSV (real time RT-PCR)  assay on the GeneXpert instrument, [employer], [place] [address].   This test is being used under the Food and Drug Administration's Emergency Use Authorization.    The authorized Fact Sheet for Healthcare Providers  for this assay is available upon request from the laboratory.   SCAN SLIDE   RAINBOW DRAW LAVENDER   RAINBOW DRAW LIGHT GREEN   RAINBOW DRAW BLUE     XR CHEST AP PORTABLE  (CPT=71045)    Result Date: 11/8/2024  CONCLUSION:  Cardiomegaly with pulmonary venous congestion.  Prominent interstitial markings throughout both lungs concerning for pulmonary edema.  Bibasilar infiltrates right greater than left with bilateral pleural effusions right greater than left.  Fracture deformities of posterior lateral right ribs unchanged from 8/30/2024.  No measurable pneumothorax.   LOCATION:  [facility]      Dictated by (CST): Pia Connell MD on 11/08/2024 at 6:20 PM     Finalized by (CST): Pia Connell MD on 11/08/2024 at 6:21 PM    as pictured below  I independently read the radiographs and appreciate the cardiomegaly and pulmonary edema    Electrocardiogram as interpreted by this provider shows sinus rhythm at a rate of 78 with no acute ST-T changes. Rate, axis and intervals as noted on the printed ECG report.  ED COURSE and MDM     Differential diagnosis before testing included fluid overload from cardiomyopathy versus acute MI, a medical condition that poses a threat to life.    I reviewed prior external notes including a gram done 10/14/2024 that showed an ejection fraction of 60%,  Mild mitral regurgitation and no other significant valvular disease.  He did have bilateral pleural effusions noted.    Given Lasix 40 mg IV and 1/2 inch of Nitropaste.    Dr. Hargrove, [facility] hospitalist, notified.  Dr. Do, [facility] cardiologist, notified.    I have discussed with the patient the results of testing, differential diagnosis, and treatment plan. They expressed clear understanding of these instructions and agrees to the plan provided.    Disposition and Plan     Clinical Impression:  1. Acute on chronic right-sided congestive heart failure (HCC)    2. Shortness of breath         Disposition:  Admit  11/8/2024  7:04  pm    Follow-up:  No follow-up provider specified.      Medications Prescribed:  Current Discharge Medication List

## 2024-11-08 NOTE — ED INITIAL ASSESSMENT (HPI)
Pt to ED from SNF for KENIA. Pt states that he had SOB for the past couple of days but isn't experiencing it upon arrival to ED. Pt does have some broken ribs and is scheduled for a thoracentesis per EMS.

## 2024-11-09 LAB
ANION GAP SERPL CALC-SCNC: 4 MMOL/L (ref 0–18)
ATRIAL RATE: 78 BPM
BILIRUB UR QL STRIP.AUTO: NEGATIVE
BUN BLD-MCNC: 12 MG/DL (ref 9–23)
CALCIUM BLD-MCNC: 8.6 MG/DL (ref 8.7–10.4)
CHLORIDE SERPL-SCNC: 106 MMOL/L (ref 98–112)
CO2 SERPL-SCNC: 30 MMOL/L (ref 21–32)
COLOR UR AUTO: YELLOW
CREAT BLD-MCNC: 0.98 MG/DL
EGFRCR SERPLBLD CKD-EPI 2021: 76 ML/MIN/1.73M2 (ref 60–?)
ERYTHROCYTE [DISTWIDTH] IN BLOOD BY AUTOMATED COUNT: 14.4 %
GLUCOSE BLD-MCNC: 83 MG/DL (ref 70–99)
GLUCOSE UR STRIP.AUTO-MCNC: NORMAL MG/DL
HCT VFR BLD AUTO: 24.9 %
HGB BLD-MCNC: 7.9 G/DL
HGB BLD-MCNC: 8.5 G/DL
KETONES UR STRIP.AUTO-MCNC: NEGATIVE MG/DL
LEUKOCYTE ESTERASE UR QL STRIP.AUTO: 25
MAGNESIUM SERPL-MCNC: 1.8 MG/DL (ref 1.6–2.6)
MCH RBC QN AUTO: 27.5 PG (ref 26–34)
MCHC RBC AUTO-ENTMCNC: 31.7 G/DL (ref 31–37)
MCV RBC AUTO: 86.8 FL
NITRITE UR QL STRIP.AUTO: NEGATIVE
OSMOLALITY SERPL CALC.SUM OF ELEC: 289 MOSM/KG (ref 275–295)
P AXIS: -25 DEGREES
P-R INTERVAL: 242 MS
PH UR STRIP.AUTO: 8 [PH] (ref 5–8)
PHOSPHATE SERPL-MCNC: 3.6 MG/DL (ref 2.4–5.1)
PLATELET # BLD AUTO: 79 10(3)UL (ref 150–450)
POTASSIUM SERPL-SCNC: 3.8 MMOL/L (ref 3.5–5.1)
PROT UR STRIP.AUTO-MCNC: 30 MG/DL
Q-T INTERVAL: 418 MS
QRS DURATION: 84 MS
QTC CALCULATION (BEZET): 476 MS
R AXIS: -33 DEGREES
RBC # BLD AUTO: 2.87 X10(6)UL
RBC #/AREA URNS AUTO: >10 /HPF
SODIUM SERPL-SCNC: 140 MMOL/L (ref 136–145)
SP GR UR STRIP.AUTO: 1.01 (ref 1–1.03)
T AXIS: 3 DEGREES
UROBILINOGEN UR STRIP.AUTO-MCNC: 2 MG/DL
VENTRICULAR RATE: 78 BPM
WBC # BLD AUTO: 3.5 X10(3) UL (ref 4–11)

## 2024-11-09 PROCEDURE — 87077 CULTURE AEROBIC IDENTIFY: CPT | Performed by: HOSPITALIST

## 2024-11-09 PROCEDURE — 36415 COLL VENOUS BLD VENIPUNCTURE: CPT | Performed by: INTERNAL MEDICINE

## 2024-11-09 PROCEDURE — 97162 PT EVAL MOD COMPLEX 30 MIN: CPT

## 2024-11-09 PROCEDURE — 83735 ASSAY OF MAGNESIUM: CPT | Performed by: INTERNAL MEDICINE

## 2024-11-09 PROCEDURE — 85018 HEMOGLOBIN: CPT | Performed by: HOSPITALIST

## 2024-11-09 PROCEDURE — 97116 GAIT TRAINING THERAPY: CPT

## 2024-11-09 PROCEDURE — 87086 URINE CULTURE/COLONY COUNT: CPT | Performed by: HOSPITALIST

## 2024-11-09 PROCEDURE — 87186 SC STD MICRODIL/AGAR DIL: CPT | Performed by: HOSPITALIST

## 2024-11-09 PROCEDURE — 84100 ASSAY OF PHOSPHORUS: CPT | Performed by: INTERNAL MEDICINE

## 2024-11-09 PROCEDURE — 85027 COMPLETE CBC AUTOMATED: CPT | Performed by: INTERNAL MEDICINE

## 2024-11-09 PROCEDURE — 97530 THERAPEUTIC ACTIVITIES: CPT

## 2024-11-09 PROCEDURE — 81001 URINALYSIS AUTO W/SCOPE: CPT | Performed by: HOSPITALIST

## 2024-11-09 PROCEDURE — 80048 BASIC METABOLIC PNL TOTAL CA: CPT | Performed by: INTERNAL MEDICINE

## 2024-11-09 RX ORDER — SODIUM CHLORIDE, SODIUM LACTATE, POTASSIUM CHLORIDE, CALCIUM CHLORIDE 600; 310; 30; 20 MG/100ML; MG/100ML; MG/100ML; MG/100ML
INJECTION, SOLUTION INTRAVENOUS CONTINUOUS
Status: DISCONTINUED | OUTPATIENT
Start: 2024-11-09 | End: 2024-11-12

## 2024-11-09 RX ORDER — MAGNESIUM OXIDE 400 MG/1
400 TABLET ORAL ONCE
Status: COMPLETED | OUTPATIENT
Start: 2024-11-09 | End: 2024-11-09

## 2024-11-09 RX ORDER — LEVOFLOXACIN 5 MG/ML
500 INJECTION, SOLUTION INTRAVENOUS ONCE
Status: DISCONTINUED | OUTPATIENT
Start: 2024-11-09 | End: 2024-11-09 | Stop reason: CLARIF

## 2024-11-09 RX ORDER — HALOPERIDOL 1 MG/1
1 TABLET ORAL EVERY 4 HOURS PRN
Status: DISCONTINUED | OUTPATIENT
Start: 2024-11-09 | End: 2024-11-12

## 2024-11-09 RX ORDER — MIDODRINE HYDROCHLORIDE 2.5 MG/1
2.5 TABLET ORAL 3 TIMES DAILY
Status: DISCONTINUED | OUTPATIENT
Start: 2024-11-09 | End: 2024-11-09

## 2024-11-09 RX ORDER — FUROSEMIDE 10 MG/ML
20 INJECTION INTRAMUSCULAR; INTRAVENOUS DAILY
Status: DISCONTINUED | OUTPATIENT
Start: 2024-11-09 | End: 2024-11-10

## 2024-11-09 RX ORDER — HALOPERIDOL 5 MG/ML
1 INJECTION INTRAMUSCULAR EVERY 4 HOURS PRN
Status: DISCONTINUED | OUTPATIENT
Start: 2024-11-09 | End: 2024-11-12

## 2024-11-09 RX ORDER — POTASSIUM CHLORIDE 1500 MG/1
40 TABLET, EXTENDED RELEASE ORAL ONCE
Status: COMPLETED | OUTPATIENT
Start: 2024-11-09 | End: 2024-11-09

## 2024-11-09 NOTE — CONSULTS
[facility] Consultation    Reginaldo Hdz Patient Status:  Observation    8/10/1940 MRN [id number]   [facility] 8NE-A Attending Delvin Arevalo MD   Hosp Day # 0 PCP Olvin Dinh MD     Reason for Consultation:  dyspnea      History of Present Illness:  Reginaldo Hdz is a a(n) 84 year old male. He has hx of PD, Hypertension , Paroxysmal Atrial Fibrillation (xarelto), CAD, s/p PCI's, SLE, CHF 2024 during covid infection. He had a fall with rib fractures and large right pleural effusion ---> right thora 24.  Recent UTI;'s. He has intermittent hypotension and uses midodrine. He has a chronic indwelling edmond catheter.  admitted with weakness, AMS and possible  infection with E Faecalis bacteremia 10/12/24.     He had dyspnea and low bp's yesterday at his assisted living facility. Was weak and unable to walk with his walker.  No chest pain reported.  Son reports that pt had LE edema as well. Felt to have some CHF and received IV lasix in ER.                  History:  Past Medical History:    Anemia    Anesthesia complication    Can become agitated/combative coming out of anesthesia    Anxiety state    Arrhythmia    Atherosclerosis of coronary artery    Back problem    Blood disorder    Chronic atrial fibrillation (HCC)    Coronary atherosclerosis    Esophageal reflux    Heart attack (HCC)    High blood pressure    High cholesterol    History of adverse reaction to anesthesia    Hyperlipidemia    Hypertension    Incontinence    Has an indwelling urinary catheter    Lupus    Osteoarthritis    Parkinson disease (HCC)    Peripheral neuropathy    Visual impairment    Glasses     Past Surgical History:   Procedure Laterality Date    Angiogram      Angiogram      Angiogram  2002    [facility], Dr. Scott/ Mikayla: 1.Successful PTCA of the infarct related artery which was the proximal graft of the saphenous vein graft to the right posterior descending/LV branch which was dilated and  stented using a 4 x 13 BX Velocity to a maximum diameter of 4.45 mm.  2.Proximal PDA primarily stented using a 3 x 18 Penta stent from about 70 to 80% to 0%, HUGH III flow, no dissection.     Angiogram  06/24/2011    [facility], Dr. Steele: Successful stenting with a drug-eluting stent to the graft to the right coronary artery. Severe native CAD. Patent vein graft to LAD. Severe disease in the vein graft to the right coronary artery.    Angiogram  07/26/2011    [facility], Dr. Steele: Successful stenting of the circumflex artery in the proximal and mid portions. Relook angiography of the vein graft to the right coronary artery showed that this vessel is widely patent.    Cabg  1984    x2    Cath bare metal stent (bms)      Cath drug eluting stent      Shoulder surg proc unlisted Left     fracture     Family History   Problem Relation Age of Onset    Cancer Father         Liver    Other (Other) Father         Heart Attack    Cancer Mother         uterine    Other (Other) Daughter         Hosimotos    Other (Other) Son         Heart Problem         Allergies:  Allergies[1]    Medications:   furosemide  20 mg Intravenous Daily    magnesium oxide  400 mg Oral Once    potassium chloride  40 mEq Oral Once    carbidopa-levodopa  1.5 tablet Oral QID    ezetimibe  10 mg Oral Daily    finasteride  5 mg Oral Daily    hydroxychloroquine  200 mg Oral BID    midodrine  10 mg Oral TID    pantoprazole  40 mg Oral QAM AC    polyethylene glycol (PEG 3350)  17 g Oral Daily    atorvastatin  20 mg Oral Daily    rivaroxaban  20 mg Oral Daily with food    carbidopa-levodopa ER  1 tablet Oral BID       Continuous Infusions:      Social History:   reports that he quit smoking about 42 years ago. His smoking use included cigarettes. He has never used smokeless tobacco. He reports that he does not currently use alcohol. He reports that he does not use drugs.    Review of Systems:  All systems were reviewed and are negative except as described above  in HPI.    Physical Exam:      Wt Readings from Last 3 Encounters:   11/08/24 152 lb (68.9 kg)   10/28/24 154 lb 5.2 oz (70 kg)   10/11/24 165 lb 5.5 oz (75 kg)       Vitals:    11/08/24 2038 11/08/24 2320 11/09/24 0540 11/09/24 0801   BP: 128/61 103/69 106/66 160/85   BP Location: Left arm Left arm Left arm Left arm   Pulse: 79 78 74 73   Resp: 24 22 20 19   Temp: 98.4 °F (36.9 °C) 98.4 °F (36.9 °C) 97.6 °F (36.4 °C) 98.2 °F (36.8 °C)   TempSrc: Oral Oral  Oral   SpO2: 95% 95%  98%   Weight: 152 lb (68.9 kg)          Temp:  [97.6 °F (36.4 °C)-98.4 °F (36.9 °C)] 98.2 °F (36.8 °C)  Pulse:  [73-79] 73  Resp:  [18-24] 19  BP: ()/(61-85) 160/85  SpO2:  [95 %-100 %] 98 %    Temp: 98.2 °F (36.8 °C)  Pulse: 73  Resp: 19  BP: 160/85      General:  Appears comfortable  HEENT: No focal deficits.  Neck: No JVD, carotids 2+ no bruits.  Cardiac: Regular S1S2.  No S3, S4, rub, click.  No murmur.  Lungs: diminished BS R base  Abdomen: Soft, non-tender.   Extremities: No LE edema.  No clubbing or cyanosis  Neurologic: Alert and oriented, normal affect.  Skin: Warm and dry.     Labs:      HEM:  Recent Labs   Lab 11/08/24 1717 11/09/24  0542   WBC 4.3 3.5*   HGB 9.1* 7.9*   HCT 28.6* 24.9*   PLT 92.0* 79.0*       Chem:  Recent Labs   Lab 11/08/24 1717 11/09/24  0532    140   K 4.5 3.8    106   CO2 30.0 30.0   BUN 13 12   CREATSERUM 1.07 0.98   MG  --  1.8   ALT <7*  --    AST 40*  --    ALB 3.6  --        No results for input(s): \"INR\" in the last 168 hours.                  Lab Results   Component Value Date    TROP <0.045 12/06/2019    TROP <0.045 12/05/2019    TROP <0.045 12/05/2019         Invalid input(s): \"PBNPML\"                 Telemetry:     Laboratories and Data:  Diagnostics:    EKG, 11/8/2024:  NSR, FAV, LAD, PRWP    CXR, 11/8/2024:    Impression   CONCLUSION:  Cardiomegaly with pulmonary venous congestion.     Prominent interstitial markings throughout both lungs concerning for pulmonary edema.      Bibasilar infiltrates right greater than left with bilateral pleural effusions right greater than left.     Fracture deformities of posterior lateral right ribs unchanged from 8/30/2024.  No measurable pneumothorax.       Echo, 10/14/24:    Conclusions:     1. Left ventricle: The cavity size was normal. Wall thickness was normal.      Systolic function was normal. The estimated ejection fraction was 60%.   2. Right ventricle: The cavity size was normal. Systolic function was      normal. Systolic pressure was at the upper limits of normal.   3. Left atrium: The atrium was moderately dilated.   4. Mitral valve: There was mild regurgitation.   5. Pulmonary arteries: Systolic pressure was mildly increased, estimated to      be 32mm Hg. The peak systolic pressure is 32mm Hg. The end-diastolic      pressure is 10mm Hg.   6. Pericardium, extracardiac: There was no pericardial effusion. There were      bilateral pleural effusions present.   7. No valvular vegetations were seen.   Impressions:  This study is compared with previous dated 07-10-24:   *         Impression:    1.  Dyspnea, LE edema, pleural effusions, elevated BNP. All c/w CHF    2. Intermittent hypotension: autonomic dysfunction 2/2 PD. On midodrine     3. CAD, s/p CABG 1984      4. Paroxysmal Atrial Fibrillation. NSR today. On DOAC  5. Hypertension   6. PD  7. SLE  8. Right hemothorax 2/2 falls with  rib fx, and xarelto use 8/2024---> right thora      Recommend:    1.  Telemetry  2. IV diuresis.    3. Monitor BMP's, weights, I/O's and bp's closely  4. Recent echo reviewed. Will avoid duplication of testing          Magdaleno Do MD  11/9/2024  9:27 AM         [1]   Allergies  Allergen Reactions    Hydrocodone-Acetaminophen CONFUSION, DIZZINESS and HALLUCINATION    Inderal [Propranolol] NAUSEA AND VOMITING and UNKNOWN    Isosorbide NAUSEA AND VOMITING    Other OTHER (SEE COMMENTS)

## 2024-11-09 NOTE — PLAN OF CARE
NURSING ADMISSION NOTE      Patient admitted via Cart  Oriented to room.  Safety precautions initiated.  Bed in low position.  Call light in reach.  Received patient awake and oriented with family at bedside. On room air, breath sounds diminished and labored at times. On tele, SR. No c/o pain voiced. Notified pharmacist of the times pt takes carbidopa outside of hospital. Incontinent of urine and briefed. Admission assessment completed.  Pt restless overnight, didn't sleep. Video monitoring for safety.

## 2024-11-09 NOTE — PHYSICAL THERAPY NOTE
PHYSICAL THERAPY EVALUATION - INPATIENT     Room Number: 8622/8622-A  Evaluation Date: 11/9/2024  Type of Evaluation: Initial  Physician Order: PT Eval and Treat    Presenting Problem: Acute on Chronic CHF  Co-Morbidities : Dementia, fallsPD, CAD  Reason for Therapy: Mobility Dysfunction and Discharge Planning    PHYSICAL THERAPY ASSESSMENT   Patient is a 84 year old male admitted 11/8/2024 for Acute in Chronic CHF.  Prior to admission, patient's baseline is assist in his mobilities and safety with hx of falls, amb with rollator SBA.  Patient is currently functioning near baseline with bed mobility, transfers, and gait.  Patient is requiring minimal assist as a result of the following impairments: decreased endurance/aerobic capacity, impaired standing balance, and cognitive deficits (impulsive and is agitated).  Physical Therapy will continue to follow for duration of hospitalization.    Patient will benefit from continued skilled PT Services at discharge to promote prior level of function.  Anticipate patient will return home with home health PT.    PLAN DURING HOSPITALIZATION  Nursing Mobility Recommendation : 1 Assist  PT Device Recommendation: Rolling walker  PT Treatment Plan: Bed mobility;Body mechanics;Patient education;Gait training;Strengthening;Stair training;Transfer training;Balance training;Range of motion;Family education  Rehab Potential : Good  Frequency (Obs): 3x/week     CURRENT GOALS    Goal #1 Patient is able to demonstrate supine - sit EOB @ level: supervision     Goal #2 Patient is able to demonstrate transfers Sit to/from Stand at assistance level: supervision     Goal #3 Patient is able to ambulate 50 feet with assist device: walker - rolling at assistance level: supervision/CGA     Goal #4    Goal #5    Goal #6    Goal Comments: Goals established on 11/9/2024      PHYSICAL THERAPY MEDICAL/SOCIAL HISTORY  History related to current admission: Patient is a 84 year old male admitted on  11/8/2024 from [facility] for SOB.  Pt diagnosed with Acute on chronic CHF.Recent hosp in end of 10/24 and multiple hosp this year      HOME SITUATION  Type of Home: Assisted living facility                        Lives With: Staff 24 hours               Prior Level of Forrest:  Prior to admission, patient's baseline is assist in his mobilities and safety with hx of falls, amb with rollator SBA    SUBJECTIVE  Leave me alone  I am not working anymore  Where is my pants and shoes  Don't touch me    OBJECTIVE     Fall Risk: High fall risk    WEIGHT BEARING RESTRICTION     PAIN ASSESSMENT             COGNITION  Overall Cognitive Status:  Impaired    RANGE OF MOTION AND STRENGTH ASSESSMENT  Upper extremity ROM and strength are within functional limits     Lower extremity ROM is within functional limits     Lower extremity strength is within functional limits     BALANCE  Static Sitting: Good  Dynamic Sitting: Good  Static Standing: Fair  Dynamic Standing: Fair -      ACTIVITY TOLERANCE: Fair             AM-PAC '6-Clicks' INPATIENT SHORT FORM - BASIC MOBILITY  How much difficulty does the patient currently have...  Patient Difficulty: Turning over in bed (including adjusting bedclothes, sheets and blankets)?: None   Patient Difficulty: Sitting down on and standing up from a chair with arms (e.g., wheelchair, bedside commode, etc.): A Little   Patient Difficulty: Moving from lying on back to sitting on the side of the bed?: A Little   How much help from another person does the patient currently need...   Help from Another: Moving to and from a bed to a chair (including a wheelchair)?: A Little   Help from Another: Need to walk in hospital room?: A Little   Help from Another: Climbing 3-5 steps with a railing?: A Lot     AM-PAC Score:  Raw Score: 18   Approx Degree of Impairment: 46.58%   Standardized Score (AM-PAC Scale): 43.63   CMS Modifier (G-Code): CK    FUNCTIONAL ABILITY STATUS  Gait Assessment   Functional  Mobility/Gait Assessment  Gait Assistance: Moderate assistance;Minimum assistance  Distance (ft): 2x2  Assistive Device: Rolling walker;None  Pattern: Shuffle (poor safety awareness)    Skilled Therapy Provided     Bed Mobility:  Rolling: min A/SBA  Supine to sit: min A   Sit to supine: NT     Transfer Mobility:  Sit to stand: min/mod A   Stand to sit: min A  Gait = Limited with agitation and was both using RW and without with HHA    Therapist's Comments:   Functional skills are limited due to significant agitation during this eval  However based on this visit and prior eval, noted that without the agitation he is likely close to his baseline level of functional skills  Highly recommend HHPT upon home and increase supervision/assistance from staff at  d.c vs [facility] placement due to cognitive issue as he might be more responsive on his own familiar people and environment  Left with nursing staff      Exercise/Education Provided:  Bed mobility  Body mechanics  Energy conservation  Functional activity tolerated  Gait training  Transfer training    Patient End of Session: Up in chair;Needs met;Call light within reach;RN aware of session/findings;All patient questions and concerns addressed      Patient Evaluation Complexity Level:  History Moderate - 1 or 2 personal factors and/or co-morbidities   Examination of body systems Moderate - addressing a total of 3 or more elements   Clinical Presentation  Moderate - Evolving   Clinical Decision Making Moderate Complexity       PT Session Time: 30 minutes  Gait Training: 10 minutes  Therapeutic Activity: 15 minutes

## 2024-11-09 NOTE — ED QUICK NOTES
Orders for admission, patient is aware of plan and ready to go upstairs. Any questions, please call ED RN Garry at extension [phone].     Patient Covid vaccination status: Unvaccinated     COVID Test Ordered in ED: SARS-CoV-2/Flu A and B/RSV by PCR (GeneXpert)    COVID Suspicion at Admission: N/A    Running Infusions:  None    Mental Status/LOC at time of transport: 2-3    Other pertinent information:   CIWA score: N/A   NIH score:  N/A

## 2024-11-09 NOTE — H&P
[facility]   part of [facility]    History and Physical     Reginaldo Hdz Patient Status:  Observation    8/10/1940 MRN [id number]   Location [facility] 8NE-A Attending Delvin Arevalo MD   Hosp Day # 0 PCP Olvin Dinh MD     Chief Complaint: SOB    History of Present Illness: Reginaldo Hdz is a 84 year old male with history of anxiety, cad with stents, atrial fibrillation, htn, hld, gerd, lupus, PD and neuropathy presenting with sob. Patient is not a great historian but discussion with pt and review of chart shows that the patient presented with sob which he says has been going on for a while but worse recently. He denies a cough, fevers, chills, palpitations or chest pain. Patient was brought in by his family for evaluation of his symptoms. Patient denies any other positive review of systems.     Past Medical History:  Past Medical History:    Anemia    Anesthesia complication    Can become agitated/combative coming out of anesthesia    Anxiety state    Arrhythmia    Atherosclerosis of coronary artery    Back problem    Blood disorder    Chronic atrial fibrillation (HCC)    Coronary atherosclerosis    Esophageal reflux    Heart attack (HCC)    High blood pressure    High cholesterol    History of adverse reaction to anesthesia    Hyperlipidemia    Hypertension    Incontinence    Has an indwelling urinary catheter    Lupus    Osteoarthritis    Parkinson disease (HCC)    Peripheral neuropathy    Visual impairment    Glasses        Past Surgical History:   Past Surgical History:   Procedure Laterality Date    Angiogram      Angiogram      Angiogram  2002    [facility], Dr. Scott/ Mikayla: 1.Successful PTCA of the infarct related artery which was the proximal graft of the saphenous vein graft to the right posterior descending/LV branch which was dilated and stented using a 4 x 13 BX Velocity to a maximum diameter of 4.45 mm.  2.Proximal PDA primarily stented using a 3 x 18 Penta stent  from about 70 to 80% to 0%, HUGH III flow, no dissection.     Angiogram  06/24/2011    [facility], Dr. Steele: Successful stenting with a drug-eluting stent to the graft to the right coronary artery. Severe native CAD. Patent vein graft to LAD. Severe disease in the vein graft to the right coronary artery.    Angiogram  07/26/2011    [facility], Dr. Steele: Successful stenting of the circumflex artery in the proximal and mid portions. Relook angiography of the vein graft to the right coronary artery showed that this vessel is widely patent.    Cabg  1984    x2    Cath bare metal stent (bms)      Cath drug eluting stent      Shoulder surg proc unlisted Left     fracture       Social History:  reports that he quit smoking about 42 years ago. His smoking use included cigarettes. He has never used smokeless tobacco. He reports that he does not currently use alcohol. He reports that he does not use drugs.no illegal drugs, , 2 children, use a cane/walker/wheel chair    Family History:   Family History   Problem Relation Age of Onset    Cancer Father         Liver    Other (Other) Father         Heart Attack    Cancer Mother         uterine    Other (Other) Daughter         Hosimotos    Other (Other) Son         Heart Problem   Mother passed  Father passed    Allergies: Allergies[1]    Medications:  Medications Ordered Prior to Encounter[2]    Review of Systems:   A comprehensive 14 point review of systems was completed.    Pertinent positives and negatives noted in the HPI.    Physical Exam:    /66 (BP Location: Left arm)   Pulse 74   Temp 97.6 °F (36.4 °C)   Resp 20   Wt 152 lb (68.9 kg)   SpO2 95%   BMI 24.53 kg/m²   General: No acute distress. Alert and oriented x 2  HEENT: Normocephalic atraumatic. Moist mucous membranes. EOM-I.  Neck: No JVD. No carotid bruits.  Respiratory: Clear to auscultation bilaterally. No wheezes. Crackles at the base  Cardiovascular: S1, S2. Regular rate and rhythm. No  murmurs  Chest and Back: No tenderness or deformity.  Abdomen: Soft, nontender, nondistended.  Positive bowel sounds. No rebound, guarding  Neurologic: No focal neurological deficits. CNII-XII grossly intact. Sensation and strength intact  Musculoskeletal: Moves all extremities.  Extremities: No edema or tenderness of the LE  Integument: No new rashes or lesions.   Psychiatric: Appropriate mood and affect.      Diagnostic Data:      Labs:  Recent Labs   Lab 11/08/24 1717 11/09/24  0542   WBC 4.3 3.5*   HGB 9.1* 7.9*   MCV 86.7 86.8   PLT 92.0* 79.0*       Recent Labs   Lab 11/08/24  1717 11/09/24  0532   * 83   BUN 13 12   CREATSERUM 1.07 0.98   CA 8.8 8.6*   ALB 3.6  --     140   K 4.5 3.8    106   CO2 30.0 30.0   ALKPHO 125*  --    AST 40*  --    ALT <7*  --    BILT 0.3  --    TP 7.6  --        Estimated Creatinine Clearance: 49.7 mL/min (based on SCr of 0.98 mg/dL).    No results for input(s): \"PTP\", \"INR\" in the last 168 hours.    No results for input(s): \"TROP\", \"CK\" in the last 168 hours.    Imaging: Imaging data reviewed in Epic.      ASSESSMENT / PLAN:   84 year old male with history of anxiety, cad with stents, atrial fibrillation, htn, hld, gerd, lupus, PD and neuropathy presenting with sob.     Acute CHF  -cardiology consulted  -strict I/o  -daily weights  -lasix iv    HLD  -atorvastatin  -zetia    PD  -sinemet    Lupus hx  -hydroxychloroquine    GERD  -pantoprazole    PAF  -xarelto    Quality:  DVT Prophylaxis: scd, xarelto  CODE status: DNR select per chart  [place]: yes    Plan of care discussed with patient and staff    Dispo: no discharge    MD Hamlet Sesay Hospitalist  [phone]             [1]   Allergies  Allergen Reactions    Hydrocodone-Acetaminophen CONFUSION, DIZZINESS and HALLUCINATION    Inderal [Propranolol] NAUSEA AND VOMITING and UNKNOWN    Isosorbide NAUSEA AND VOMITING    Other OTHER (SEE COMMENTS)   [2]   Current Facility-Administered Medications on File  Prior to Encounter   Medication Dose Route Frequency Provider Last Rate Last Admin    [COMPLETED] potassium chloride (Klor-Con M20) tab 40 mEq  40 mEq Oral Once Mcneal, Sohaib, DO   40 mEq at 10/31/24 1141    [COMPLETED] magnesium oxide (Mag-Ox) tab 400 mg  400 mg Oral Once Mcneal, Sohaib, DO   400 mg at 10/31/24 1141    [COMPLETED] magnesium oxide (Mag-Ox) tab 400 mg  400 mg Oral Once Mcneal, Sohaib, DO   400 mg at 10/30/24 0659    [COMPLETED] potassium chloride (Klor-Con M20) tab 40 mEq  40 mEq Oral Once Mcneal, Sohaib, DO   40 mEq at 10/29/24 0915    [COMPLETED] lactated ringers IV bolus 500 mL  500 mL Intravenous Once Mcneal Sohaib, DO   Stopped at 10/31/24 0730    [COMPLETED] sodium chloride 0.9 % IV bolus 500 mL  500 mL Intravenous Once Elio Berrios MD   Stopped at 10/28/24 1545    [COMPLETED] potassium chloride 20 mEq/100mL IVPB premix 20 mEq  20 mEq Intravenous Once Raquel Lyon MD 50 mL/hr at 10/16/24 1120 20 mEq at 10/16/24 1120    [COMPLETED] potassium chloride (Klor-Con M20) tab 40 mEq  40 mEq Oral Q4H Raquel Lyon MD   40 mEq at 10/15/24 1601    [COMPLETED] LORazepam (Ativan) 2 mg/mL injection 0.5 mg  0.5 mg Intravenous Once PASCALEN Teresa Cooley DO   0.5 mg at 10/14/24 0006    [COMPLETED] sodium chloride 0.9% infusion   Intravenous Once Aroldo Maki, DO 83 mL/hr at 10/12/24 0645 New Bag at 10/12/24 0645    [COMPLETED] magnesium oxide (Mag-Ox) tab 400 mg  400 mg Oral Once Ariel Sepulveda MD   400 mg at 10/12/24 1232    [COMPLETED] potassium chloride (Klor-Con M20) tab 40 mEq  40 mEq Oral Once Ariel Sepulveda MD   40 mEq at 10/12/24 1232    [COMPLETED] potassium chloride (Klor-Con M20) tab 40 mEq  40 mEq Oral Once rAiel Sepulveda MD   40 mEq at 10/12/24 1713    [COMPLETED] sodium chloride 0.9 % IV bolus 2,250 mL  30 mL/kg Intravenous Once Mateo Flores DO   Stopped at 10/11/24 1431    [COMPLETED] cefTRIAXone (Rocephin) 1 g in sodium chloride 0.9% 100 mL IVPB-ADDV  1 g Intravenous Once Mark  Mateo HEMPHILL, DO   Stopped at 10/11/24 1401    [COMPLETED] lidocaine (Urojet) 2 % urethral jelly 10 mL  10 mL Urethral Once Mateo Flores, DO   10 mL at 10/11/24 1331    [COMPLETED] acetaminophen (Tylenol) tab 650 mg  650 mg Oral Once Mateo Flores, DO   650 mg at 10/11/24 1343    [COMPLETED] iopamidol 76% (ISOVUE-370) injection for power injector  100 mL Intravenous ONCE PRN Mateo Flores, DO   100 mL at 10/11/24 1514    [COMPLETED] carbidopa-levodopa (SINEMET)  MG per tab 1.5 tablet  1.5 tablet Oral Once Zlatarov, Aroldo, DO   1.5 tablet at 10/11/24 1812    [COMPLETED] magnesium oxide (Mag-Ox) tab 400 mg  400 mg Oral Once Zlatarov, Aroldo, DO   400 mg at 08/30/24 0938    [COMPLETED] potassium chloride (Klor-Con M20) tab 40 mEq  40 mEq Oral Once Zlatarov, Aroldo, DO   40 mEq at 08/30/24 0938    [COMPLETED] potassium chloride (Klor-Con) 20 MEQ oral powder 40 mEq  40 mEq Oral Once Zlatarov, Aroldo, DO   40 mEq at 08/29/24 0915    [COMPLETED] magnesium oxide (Mag-Ox) tab 400 mg  400 mg Oral Once Zlatarov, Aroldo, DO   400 mg at 08/29/24 0916    [COMPLETED] sodium chloride 0.9% infusion   Intravenous Once Zlatarov, Aroldo, DO 83 mL/hr at 08/29/24 1337 New Bag at 08/29/24 1337    [COMPLETED] sodium chloride 0.9 % IV bolus 500 mL  500 mL Intravenous Once Zlatarov, Aroldo,  mL/hr at 08/28/24 1130 500 mL at 08/28/24 1130    Followed by    [COMPLETED] sodium chloride 0.9% infusion   Intravenous Once Zlatarov, Aroldo, DO 83 mL/hr at 08/28/24 1244 New Bag at 08/28/24 1244    [COMPLETED] potassium chloride (Klor-Con M20) tab 40 mEq  40 mEq Oral Once Delvin Arevalo MD   40 mEq at 08/26/24 0636    [COMPLETED] potassium chloride (Klor-Con M20) tab 40 mEq  40 mEq Oral Once Delvin rAevalo MD   40 mEq at 08/24/24 1054    [COMPLETED] magnesium oxide (Mag-Ox) tab 400 mg  400 mg Oral Once Delvin Arevalo MD   400 mg at 08/24/24 1053    [COMPLETED] fentaNYL (Sublimaze) 50 mcg/mL injection 50 mcg  50  mcg Intravenous Once Beena Jordan MD   50 mcg at 08/23/24 1807    [COMPLETED] ondansetron (Zofran) 4 MG/2ML injection 4 mg  4 mg Intravenous Once Beena Jordan MD   4 mg at 08/23/24 1807    [COMPLETED] cefTRIAXone (Rocephin) 1 g in sodium chloride 0.9% 100 mL IVPB-ADDV  1 g Intravenous Once Beena Jordan MD   Stopped at 08/23/24 2059    [COMPLETED] azithromycin (Zithromax) 500 mg in sodium chloride 0.9% 250mL IVPB premix  500 mg Intravenous Once Beena Jordan MD   Stopped at 08/23/24 2230    [COMPLETED] carbidopa-levodopa ER (SINEMET CR)  MG per tab 1 tablet  1 tablet Oral Once Beena Jordan MD   1 tablet at 08/23/24 2159    [COMPLETED] piperacillin-tazobactam (Zosyn) 3.375 g in dextrose 5% 100 mL IVPB-ADDV  3.375 g Intravenous Q8H Aroldo Maki DO 25 mL/hr at 08/30/24 1739 3.375 g at 08/30/24 1739    [COMPLETED] acetaminophen (Tylenol Extra Strength) tab 1,000 mg  1,000 mg Oral Once Cliff Lai MD   1,000 mg at 08/14/24 2034     Current Outpatient Medications on File Prior to Encounter   Medication Sig Dispense Refill    pantoprazole 40 MG Oral Tab EC Take 1 tablet (40 mg total) by mouth every morning before breakfast.      melatonin 5 MG Oral Cap Take 1 capsule (5 mg total) by mouth nightly.      rivaroxaban (XARELTO) 20 MG Oral Tab Take 1 tablet (20 mg total) by mouth daily with food. 90 tablet 0    pregabalin 75 MG Oral Cap Take 1 capsule (75 mg total) by mouth 2 (two) times daily.      carbidopa-levodopa ER  MG Oral Tab CR Take 2 tablets by mouth See Admin Instructions. Takes two tablets by mouth twice daily at 6am and 11pm      hydroxychloroquine 200 MG Oral Tab Take 1 tablet (200 mg total) by mouth 2 (two) times daily.      Niacin  MG Oral Tab CR Take 1 tablet (500 mg total) by mouth 2 (two) times daily. (Patient taking differently: Take 1 tablet (500 mg total) by mouth 2 (two) times daily with meals.) 180 tablet 1    EZETIMIBE 10 MG Oral Tab TAKE ONE TABLET BY MOUTH ONE TIME  DAILY 90 tablet 0    carbidopa-levodopa  MG Oral Tab Take 1.5 tablets by mouth 4 (four) times daily. Takes four times a day 9am, 1pm, 5pm, 9pm      PRAVASTATIN SODIUM 80 MG Oral Tab TAKE 1 TABLET BY MOUTH NIGHTLY (Patient taking differently: Take 1 tablet (80 mg total) by mouth daily.) 90 tablet 3    cetirizine 10 MG Oral Tab Take 1 tablet (10 mg total) by mouth daily.      Acidophilus/Pectin Oral Cap Take 1 capsule by mouth daily.      sulfamethoxazole-trimethoprim -160 MG Oral Tab per tablet Take 1 tablet by mouth 2 (two) times daily for 7 days. 14 tablet 0    midodrine 10 MG Oral Tab Take 1 tablet (10 mg total) by mouth in the morning and 1 tablet (10 mg total) at noon and 1 tablet (10 mg total) in the evening. 90 tablet 0    traMADol 50 MG Oral Tab Take 1 tablet (50 mg total) by mouth every 12 (twelve) hours as needed.      finasteride 5 MG Oral Tab Take 1 tablet (5 mg total) by mouth daily. 90 tablet 6    Polyethylene Glycol 3350 17 g Oral Powd Pack Take 17 g by mouth daily.      acetaminophen 500 MG Oral Tab Take 1 tablet (500 mg total) by mouth every 6 (six) hours as needed for Pain.      docusate sodium 100 MG Oral Cap Take 1 capsule (100 mg total) by mouth daily as needed for constipation.      nystatin 100,000 Units/g External Cream Apply topically as needed.      Multivitamin Chewtab, ADULT, Oral Chew Tab Chew 1 tablet by mouth daily.

## 2024-11-10 LAB
ANION GAP SERPL CALC-SCNC: 3 MMOL/L (ref 0–18)
BASOPHILS # BLD AUTO: 0.01 X10(3) UL (ref 0–0.2)
BASOPHILS NFR BLD AUTO: 0.3 %
BUN BLD-MCNC: 11 MG/DL (ref 9–23)
CALCIUM BLD-MCNC: 8.7 MG/DL (ref 8.7–10.4)
CHLORIDE SERPL-SCNC: 105 MMOL/L (ref 98–112)
CO2 SERPL-SCNC: 29 MMOL/L (ref 21–32)
CREAT BLD-MCNC: 0.93 MG/DL
EGFRCR SERPLBLD CKD-EPI 2021: 80 ML/MIN/1.73M2 (ref 60–?)
EOSINOPHIL # BLD AUTO: 0.05 X10(3) UL (ref 0–0.7)
EOSINOPHIL NFR BLD AUTO: 1.7 %
ERYTHROCYTE [DISTWIDTH] IN BLOOD BY AUTOMATED COUNT: 14.1 %
GLUCOSE BLD-MCNC: 87 MG/DL (ref 70–99)
HCT VFR BLD AUTO: 27.5 %
HGB BLD-MCNC: 8.7 G/DL
IMM GRANULOCYTES # BLD AUTO: 0 X10(3) UL (ref 0–1)
IMM GRANULOCYTES NFR BLD: 0 %
LYMPHOCYTES # BLD AUTO: 0.6 X10(3) UL (ref 1–4)
LYMPHOCYTES NFR BLD AUTO: 20.5 %
MAGNESIUM SERPL-MCNC: 1.9 MG/DL (ref 1.6–2.6)
MCH RBC QN AUTO: 27.1 PG (ref 26–34)
MCHC RBC AUTO-ENTMCNC: 31.6 G/DL (ref 31–37)
MCV RBC AUTO: 85.7 FL
MONOCYTES # BLD AUTO: 0.41 X10(3) UL (ref 0.1–1)
MONOCYTES NFR BLD AUTO: 14 %
NEUTROPHILS # BLD AUTO: 1.85 X10 (3) UL (ref 1.5–7.7)
NEUTROPHILS # BLD AUTO: 1.85 X10(3) UL (ref 1.5–7.7)
NEUTROPHILS NFR BLD AUTO: 63.5 %
OSMOLALITY SERPL CALC.SUM OF ELEC: 283 MOSM/KG (ref 275–295)
PLATELET # BLD AUTO: 92 10(3)UL (ref 150–450)
POTASSIUM SERPL-SCNC: 3.7 MMOL/L (ref 3.5–5.1)
POTASSIUM SERPL-SCNC: 3.7 MMOL/L (ref 3.5–5.1)
RBC # BLD AUTO: 3.21 X10(6)UL
SODIUM SERPL-SCNC: 137 MMOL/L (ref 136–145)
WBC # BLD AUTO: 2.9 X10(3) UL (ref 4–11)

## 2024-11-10 PROCEDURE — 97530 THERAPEUTIC ACTIVITIES: CPT

## 2024-11-10 PROCEDURE — 87040 BLOOD CULTURE FOR BACTERIA: CPT | Performed by: HOSPITALIST

## 2024-11-10 PROCEDURE — 83735 ASSAY OF MAGNESIUM: CPT | Performed by: HOSPITALIST

## 2024-11-10 PROCEDURE — 97165 OT EVAL LOW COMPLEX 30 MIN: CPT

## 2024-11-10 PROCEDURE — 85025 COMPLETE CBC W/AUTO DIFF WBC: CPT | Performed by: HOSPITALIST

## 2024-11-10 PROCEDURE — 80048 BASIC METABOLIC PNL TOTAL CA: CPT | Performed by: HOSPITALIST

## 2024-11-10 PROCEDURE — 84132 ASSAY OF SERUM POTASSIUM: CPT | Performed by: HOSPITALIST

## 2024-11-10 RX ORDER — FUROSEMIDE 20 MG/1
20 TABLET ORAL DAILY
Status: DISCONTINUED | OUTPATIENT
Start: 2024-11-11 | End: 2024-11-12

## 2024-11-10 RX ORDER — POTASSIUM CHLORIDE 1500 MG/1
40 TABLET, EXTENDED RELEASE ORAL ONCE
Status: COMPLETED | OUTPATIENT
Start: 2024-11-10 | End: 2024-11-10

## 2024-11-10 RX ORDER — ACETAMINOPHEN 325 MG/1
650 TABLET ORAL EVERY 6 HOURS PRN
Status: DISCONTINUED | OUTPATIENT
Start: 2024-11-10 | End: 2024-11-12

## 2024-11-10 NOTE — PLAN OF CARE
Assumed patient care at 1930. Patient is in bed. Alert to self. Restless and impulsive, asking to go home, attempting to get out of bed and pulling at edmond catheter, drainage now pink tinged.  Vital signs are stable, afebrile , refusing to take any medication , food or liquid orally at this time. /Not easily redirected.  Hospitalist notified of above, new order received for Haldol. Haldol given IV, now appears calm, VS remain stable, continues to refuse to drink fluids.  Telemetry monitoring in progress, NSR with occasional PAC's /PVC'. Denies any discomfort.  Frequent bedside rounds/ video monitoring in progress.  Problem: Patient/Family Goals  Goal: Patient/Family Long Term Goal  Description: Patient's Long Term Goal: Stay out of the hospital    Interventions:  - Take medications as ordered  - Attend follow up appointments  - See additional Care Plan goals for specific interventions  Outcome: Progressing  Goal: Patient/Family Short Term Goal  Description: Patient's Short Term Goal: Breathe better    Interventions:   - Labs, tele, consults  - Medications as ordered  - IV lasix  - See additional Care Plan goals for specific interventions  Outcome: Progressing     Problem: CARDIOVASCULAR - ADULT  Goal: Maintains optimal cardiac output and hemodynamic stability  Description: INTERVENTIONS:  - Monitor vital signs, rhythm, and trends  - Monitor for bleeding, hypotension and signs of decreased cardiac output  - Evaluate effectiveness of vasoactive medications to optimize hemodynamic stability  - Monitor arterial and/or venous puncture sites for bleeding and/or hematoma  - Assess quality of pulses, skin color and temperature  - Assess for signs of decreased coronary artery perfusion - ex. Angina  - Evaluate fluid balance, assess for edema, trend weights  Outcome: Progressing  Goal: Absence of cardiac arrhythmias or at baseline  Description: INTERVENTIONS:  - Continuous cardiac monitoring, monitor vital signs, obtain 12  lead EKG if indicated  - Evaluate effectiveness of antiarrhythmic and heart rate control medications as ordered  - Initiate emergency measures for life threatening arrhythmias  - Monitor electrolytes and administer replacement therapy as ordered  Outcome: Progressing     Problem: RESPIRATORY - ADULT  Goal: Achieves optimal ventilation and oxygenation  Description: INTERVENTIONS:  - Assess for changes in respiratory status  - Assess for changes in mentation and behavior  - Position to facilitate oxygenation and minimize respiratory effort  - Oxygen supplementation based on oxygen saturation or ABGs  - Provide Smoking Cessation handout, if applicable  - Encourage broncho-pulmonary hygiene including cough, deep breathe, Incentive Spirometry  - Assess the need for suctioning and perform as needed  - Assess and instruct to report SOB or any respiratory difficulty  - Respiratory Therapy support as indicated  - Manage/alleviate anxiety  - Monitor for signs/symptoms of CO2 retention  Outcome: Progressing     Problem: PAIN - ADULT  Goal: Verbalizes/displays adequate comfort level or patient's stated pain goal  Description: INTERVENTIONS:  - Encourage pt to monitor pain and request assistance  - Assess pain using appropriate pain scale  - Administer analgesics based on type and severity of pain and evaluate response  - Implement non-pharmacological measures as appropriate and evaluate response  - Consider cultural and social influences on pain and pain management  - Manage/alleviate anxiety  - Utilize distraction and/or relaxation techniques  - Monitor for opioid side effects  - Notify MD/LIP if interventions unsuccessful or patient reports new pain  - Anticipate increased pain with activity and pre-medicate as appropriate  Outcome: Progressing     Problem: SAFETY ADULT - FALL  Goal: Free from fall injury  Description: INTERVENTIONS:  - Assess pt frequently for physical needs  - Identify cognitive and physical deficits and  behaviors that affect risk of falls.  - [place] fall precautions as indicated by assessment.  - Educate pt/family on patient safety including physical limitations  - Instruct pt to call for assistance with activity based on assessment  - Modify environment to reduce risk of injury  - Provide assistive devices as appropriate  - Consider OT/PT consult to assist with strengthening/mobility  - Encourage toileting schedule  Outcome: Progressing

## 2024-11-10 NOTE — PLAN OF CARE
Assumed care of patient @ 0730 patient resting in bed, AOX1-2, reports no pain. Lung sounds diminished with crackles bilaterally, O2 saturation adequate on room air, breathing labored at times at rest.  Afib on tele, S1-S2 present, no adventitious sounds noted. Bowel sounds present and active in all quadrants, last bowel movement today, some pink tinge noted in bowel movement. Patient with edmond catheter in place, pink tinge noted in  urine, MDs aware. Pt able to pivot with 2 person assist with walker.     Plan of Care: IV lasix daily. Daily weights. Strict I/O. Increase activity as able.     Discussed plan of care with patient, family at bedside, verbalized understanding. Call light within reach.     Approx 1000 patient very agitated when waking from sleep. Called son to speak with him, was able to calm him down.     Problem: Patient/Family Goals  Goal: Patient/Family Long Term Goal  Description: Patient's Long Term Goal: Stay out of the hospital    Interventions:  - Take medications as ordered  - Attend follow up appointments  - See additional Care Plan goals for specific interventions  Outcome: Progressing  Goal: Patient/Family Short Term Goal  Description: Patient's Short Term Goal: Breathe better    Interventions:   - Labs, tele, consults  - Medications as ordered  - IV lasix  - See additional Care Plan goals for specific interventions  Outcome: Progressing     Problem: CARDIOVASCULAR - ADULT  Goal: Maintains optimal cardiac output and hemodynamic stability  Description: INTERVENTIONS:  - Monitor vital signs, rhythm, and trends  - Monitor for bleeding, hypotension and signs of decreased cardiac output  - Evaluate effectiveness of vasoactive medications to optimize hemodynamic stability  - Monitor arterial and/or venous puncture sites for bleeding and/or hematoma  - Assess quality of pulses, skin color and temperature  - Assess for signs of decreased coronary artery perfusion - ex. Angina  - Evaluate fluid  balance, assess for edema, trend weights  Outcome: Progressing  Goal: Absence of cardiac arrhythmias or at baseline  Description: INTERVENTIONS:  - Continuous cardiac monitoring, monitor vital signs, obtain 12 lead EKG if indicated  - Evaluate effectiveness of antiarrhythmic and heart rate control medications as ordered  - Initiate emergency measures for life threatening arrhythmias  - Monitor electrolytes and administer replacement therapy as ordered  Outcome: Progressing     Problem: RESPIRATORY - ADULT  Goal: Achieves optimal ventilation and oxygenation  Description: INTERVENTIONS:  - Assess for changes in respiratory status  - Assess for changes in mentation and behavior  - Position to facilitate oxygenation and minimize respiratory effort  - Oxygen supplementation based on oxygen saturation or ABGs  - Provide Smoking Cessation handout, if applicable  - Encourage broncho-pulmonary hygiene including cough, deep breathe, Incentive Spirometry  - Assess the need for suctioning and perform as needed  - Assess and instruct to report SOB or any respiratory difficulty  - Respiratory Therapy support as indicated  - Manage/alleviate anxiety  - Monitor for signs/symptoms of CO2 retention  Outcome: Progressing     Problem: PAIN - ADULT  Goal: Verbalizes/displays adequate comfort level or patient's stated pain goal  Description: INTERVENTIONS:  - Encourage pt to monitor pain and request assistance  - Assess pain using appropriate pain scale  - Administer analgesics based on type and severity of pain and evaluate response  - Implement non-pharmacological measures as appropriate and evaluate response  - Consider cultural and social influences on pain and pain management  - Manage/alleviate anxiety  - Utilize distraction and/or relaxation techniques  - Monitor for opioid side effects  - Notify MD/LIP if interventions unsuccessful or patient reports new pain  - Anticipate increased pain with activity and pre-medicate as  appropriate  Outcome: Progressing     Problem: SAFETY ADULT - FALL  Goal: Free from fall injury  Description: INTERVENTIONS:  - Assess pt frequently for physical needs  - Identify cognitive and physical deficits and behaviors that affect risk of falls.  - [place] fall precautions as indicated by assessment.  - Educate pt/family on patient safety including physical limitations  - Instruct pt to call for assistance with activity based on assessment  - Modify environment to reduce risk of injury  - Provide assistive devices as appropriate  - Consider OT/PT consult to assist with strengthening/mobility  - Encourage toileting schedule  Outcome: Progressing

## 2024-11-10 NOTE — PROGRESS NOTES
[facility] Hospitalist Progress Note                                                                     [facility]   part of [facility]      Reginaldo Hdz  8/10/1940    SUBJECTIVE: Pt mor confused overnight. No mention of chest pain, palpitations, shortness of breath, cough, nausea, vomiting ,abdominal pain.     OBJECTIVE:  Temp:  [97.6 °F (36.4 °C)-98.4 °F (36.9 °C)] 97.6 °F (36.4 °C)  Pulse:  [73-80] 73  Resp:  [18-19] 19  BP: (146-169)/(77-85) 151/77  SpO2:  [96 %-99 %] 97 %  Gen: No acute distress, alert and oriented to name only  Pulm: Lungs clear bilaterally, normal respiratory effort, no crackles, no wheezing  CV: Heart with regular rate and rhythm, no murmur.   Abd: Abdomen soft, nontender, nondistended, bowel sounds present  MSK: No significant pitting edema or tenderness of the LE  Skin: no new rashes or lesions    Labs:   Recent Labs   Lab 11/08/24 1717 11/09/24  0542 11/09/24  1522 11/10/24  0517   WBC 4.3 3.5*  --  2.9*   HGB 9.1* 7.9* 8.5* 8.7*   MCV 86.7 86.8  --  85.7   PLT 92.0* 79.0*  --  92.0*       Recent Labs   Lab 11/08/24 1717 11/09/24  0532 11/10/24  0517    140 137   K 4.5 3.8 3.7  3.7    106 105   CO2 30.0 30.0 29.0   BUN 13 12 11   CREATSERUM 1.07 0.98 0.93   CA 8.8 8.6* 8.7   MG  --  1.8 1.9   PHOS  --  3.6  --    * 83 87       Recent Labs   Lab 11/08/24 1717   ALT <7*   AST 40*   ALB 3.6       No results for input(s): \"PGLU\" in the last 168 hours.    Meds:   Scheduled:    potassium chloride  40 mEq Oral Once    furosemide  20 mg Intravenous Daily    carbidopa-levodopa  1.5 tablet Oral QID    ezetimibe  10 mg Oral Daily    finasteride  5 mg Oral Daily    hydroxychloroquine  200 mg Oral BID    pantoprazole  40 mg Oral QAM AC    polyethylene glycol (PEG 3350)  17 g Oral Daily    atorvastatin  20 mg Oral Daily    rivaroxaban  20 mg Oral Daily with food    carbidopa-levodopa ER  1 tablet Oral BID     Continuous Infusions:     lactated ringers       PRN:   haloperidol **OR** haloperidol lactate    traMADol    melatonin    ondansetron    ASSESSMENT / PLAN:   84 year old male with history of anxiety, cad with stents, atrial fibrillation, htn, hld, gerd, lupus, PD and neuropathy presenting with sob.      Acute CHF  -cardiology following  -strict I/o  -daily weights  -lasix iv     AMS  -??some underlying dementia  -pt ua abn, cx pending  -monitor clinically    Abnormal UA  -cx pending  -given confusion  will tx  -iv ceftriaxone    HLD  -atorvastatin  -zetia     PD  -sinemet     Lupus hx  -hydroxychloroquine     GERD  -pantoprazole     PAF  -xarelto     Quality:  DVT Prophylaxis: scd, xarelto  CODE status: DNR select per chart  Mil[place]: yes     Plan of care discussed with patient and staff     Dispo: no discharge     Delvin Arevalo MD  [facility] Hospitalist  [phone]

## 2024-11-10 NOTE — PHYSICAL THERAPY NOTE
PHYSICAL THERAPY TREATMENT NOTE - INPATIENT    Room Number: 8622/8622-A     Session: 1     Number of Visits to Meet Established Goals: 3  History related to current admission: Patient is a 84 year old male admitted on 11/8/2024 from [facility] for SOB.  Pt diagnosed with Acute on chronic CHF.Recent hosp in end of 10/24 and multiple hosp this year     HOME SITUATION  Type of Home: Assisted living facility  Home Layout: One level  Lives With: Spouse;Caregiver 24 hours     Toilet and Equipment: Comfort height toilet;Grab bar  Shower/Tub and Equipment: Walk-in shower;Grab bar;Shower chair  Other Equipment:  (wheelchair)     Hand Dominance: Right  Drives: No     Prior Level of Function: PLOF per caregiver. Pt has 2 caregivers 22 hours a day in addition to [facility] staff. Pt has assist of one for supine to sit, SBA/CGA for ambulation via walker to dining douglas, assist of one for dressing/bathing/toileting, and assist of 2 for sit to supine. Pt feeds himself. He can normally pull up/fasten his own pants.   Presenting Problem: Acute on Chronic CHF  Co-Morbidities : PD, a fib, CABG, chronic edmond, HTN, rib fractures Aug 2024, recent admit for syncope 10/28/24    PHYSICAL THERAPY ASSESSMENT   Patient demonstrates fair progress this session, goals  remain in progress.  Improved participation and decreased agitation noted with CG Dayanna present and cuing during session.     Patient is requiring minimal assist and moderate assist as a result of the following impairments: decreased functional strength, impaired motor planning, decreased muscular endurance, cognitive deficits (intermittent agitation with activity, requires increased time and encouragement to participate), and inc tremors with activity (CG reports worse than baseline) .     Patient continues to function below baseline with bed mobility, transfers, and gait.  Next session anticipate patient to progress transfers and gait.  Physical Therapy will continue to follow patient for  duration of hospitalization.    Patient continues to benefit from continued skilled PT services: at discharge to promote prior level of function and safety with additional support and return home with home health PT. OT discussed with CG who was present throughout session.     PLAN DURING HOSPITALIZATION  Nursing Mobility Recommendation : 1 Assist  PT Device Recommendation: Rolling walker  PT Treatment Plan: Bed mobility;Body mechanics;Patient education;Gait training;Strengthening;Stair training;Transfer training;Balance training;Range of motion;Family education  Frequency (Obs): 3x/week     CURRENT GOALS     Goal #1 Patient is able to demonstrate supine - sit EOB @ level: supervision      Goal #2 Patient is able to demonstrate transfers Sit to/from Stand at assistance level: supervision      Goal #3 Patient is able to ambulate 50 feet with assist device: walker - rolling at assistance level: supervision/CGA      Goal #4     Goal #5     Goal #6     Goal Comments: Goals established on 11/9/2024     11/10/2024 all goals ongoing     SUBJECTIVE  Intermittently crying out and pulling on gown around abdomen near gait belt     OBJECTIVE  Precautions: Bed/chair alarm    WEIGHT BEARING RESTRICTION     PAIN ASSESSMENT   Rating: Unable to rate  Location: possibly abdomen but also maybe not a fan of the gait belt  Management Techniques: Repositioning;Activity promotion;Body mechanics    BALANCE                                                                                                                       Static Sitting: Fair -  Dynamic Sitting: Fair -           Static Standing: Poor +  Dynamic Standing: Poor +    ACTIVITY TOLERANCE           BP: 96/66  BP Location: Left arm  BP Method: Automatic  Patient Position: Sitting    O2 WALK       AM-PAC '6-Clicks' INPATIENT SHORT FORM - BASIC MOBILITY  How much difficulty does the patient currently have...  Patient Difficulty: Turning over in bed (including adjusting bedclothes,  sheets and blankets)?: A Little   Patient Difficulty: Sitting down on and standing up from a chair with arms (e.g., wheelchair, bedside commode, etc.): A Little   Patient Difficulty: Moving from lying on back to sitting on the side of the bed?: A Little   How much help from another person does the patient currently need...   Help from Another: Moving to and from a bed to a chair (including a wheelchair)?: A Little   Help from Another: Need to walk in hospital room?: A Little   Help from Another: Climbing 3-5 steps with a railing?: A Little     AM-PAC Score:  Raw Score: 18   Approx Degree of Impairment: 46.58%   Standardized Score (AM-PAC Scale): 43.63   CMS Modifier (G-Code): CK    FUNCTIONAL ABILITY STATUS  Gait Assessment   Functional Mobility/Gait Assessment  Gait Assistance: Minimum assistance  Distance (ft): 15,15  Assistive Device: Rolling walker  Pattern: Shuffle;Festinating    Skilled Therapy Provided    Bed Mobility:  Rolling: NT  Supine<>Sit: mod A x1   Sit<>Supine: mod A x2     Transfer Mobility:  Sit<>Stand: mod A    Stand<>Sit: mod  A   Gait: min A     Therapist's Comments: Discussed case with RN prior to session initiation. Pt agreeable to participation in therapy. Gait belt donned for out of bed mobility - pt was not a fan, attempted to keep loose and minimize tightness. CG and spouse present throughout session. Noted improved participation and dec agitation when cues provided by CG. Able to perform bed mobility comparable to how he performs it with her with hand held assist.  BP 96/66 sitting EOB. Does best for STS transfer with hand held assist and then cued to place BUE on RW handles. Able to ambulate with RW with seated rest break between gait trials. Assist with RW navigation, cues for task sequencing, encouragement for continued participation of task.       Patient End of Session: In bed;Needs met;Call light within reach;RN aware of session/findings;All patient questions and concerns  addressed;Hospital anti-slip socks;Alarm set;Family present    PT Session Time: 23 minutes  Gait Training:  minutes  Therapeutic Activity: 23 minutes  Therapeutic Exercise:  minutes   Neuromuscular Re-education:  minutes

## 2024-11-10 NOTE — PLAN OF CARE
Assumed care of patient @ 0730 patient resting in bed, AOX1-2, reports no pain. Increasingly more agitated today, suspicious with staff, declining some medications and vital signs. Attempting to shove and bite, prn medication utilized for agitation with some improvement. Lung sounds diminished with crackles bilaterally, O2 saturation adequate on room air, breathing labored at times at rest. In and out of afib and sinus rhythm on tele. Bowel sounds present and active in all quadrants, last bowel movement today. Patient with edmond catheter in place, urine red , MDs aware. Pt able to pivot with 2 person assist with walker.      Plan of Care: IV lasix daily. Daily weights. Strict I/O. Increase activity as able. Xarelto on hold due to hematuria. IV antibiotics.      Discussed plan of care with patient, family at bedside, verbalized understanding. Call light within reach.    Problem: Patient/Family Goals  Goal: Patient/Family Long Term Goal  Description: Patient's Long Term Goal: Stay out of the hospital    Interventions:  - Take medications as ordered  - Attend follow up appointments  - See additional Care Plan goals for specific interventions  Outcome: Progressing  Goal: Patient/Family Short Term Goal  Description: Patient's Short Term Goal: Breathe better    Interventions:   - Labs, tele, consults  - Medications as ordered  - IV lasix  - See additional Care Plan goals for specific interventions  Outcome: Progressing     Problem: CARDIOVASCULAR - ADULT  Goal: Maintains optimal cardiac output and hemodynamic stability  Description: INTERVENTIONS:  - Monitor vital signs, rhythm, and trends  - Monitor for bleeding, hypotension and signs of decreased cardiac output  - Evaluate effectiveness of vasoactive medications to optimize hemodynamic stability  - Monitor arterial and/or venous puncture sites for bleeding and/or hematoma  - Assess quality of pulses, skin color and temperature  - Assess for signs of decreased coronary  artery perfusion - ex. Angina  - Evaluate fluid balance, assess for edema, trend weights  Outcome: Progressing  Goal: Absence of cardiac arrhythmias or at baseline  Description: INTERVENTIONS:  - Continuous cardiac monitoring, monitor vital signs, obtain 12 lead EKG if indicated  - Evaluate effectiveness of antiarrhythmic and heart rate control medications as ordered  - Initiate emergency measures for life threatening arrhythmias  - Monitor electrolytes and administer replacement therapy as ordered  Outcome: Progressing     Problem: RESPIRATORY - ADULT  Goal: Achieves optimal ventilation and oxygenation  Description: INTERVENTIONS:  - Assess for changes in respiratory status  - Assess for changes in mentation and behavior  - Position to facilitate oxygenation and minimize respiratory effort  - Oxygen supplementation based on oxygen saturation or ABGs  - Provide Smoking Cessation handout, if applicable  - Encourage broncho-pulmonary hygiene including cough, deep breathe, Incentive Spirometry  - Assess the need for suctioning and perform as needed  - Assess and instruct to report SOB or any respiratory difficulty  - Respiratory Therapy support as indicated  - Manage/alleviate anxiety  - Monitor for signs/symptoms of CO2 retention  Outcome: Progressing     Problem: PAIN - ADULT  Goal: Verbalizes/displays adequate comfort level or patient's stated pain goal  Description: INTERVENTIONS:  - Encourage pt to monitor pain and request assistance  - Assess pain using appropriate pain scale  - Administer analgesics based on type and severity of pain and evaluate response  - Implement non-pharmacological measures as appropriate and evaluate response  - Consider cultural and social influences on pain and pain management  - Manage/alleviate anxiety  - Utilize distraction and/or relaxation techniques  - Monitor for opioid side effects  - Notify MD/LIP if interventions unsuccessful or patient reports new pain  - Anticipate increased  pain with activity and pre-medicate as appropriate  Outcome: Progressing     Problem: SAFETY ADULT - FALL  Goal: Free from fall injury  Description: INTERVENTIONS:  - Assess pt frequently for physical needs  - Identify cognitive and physical deficits and behaviors that affect risk of falls.  - Fleming fall precautions as indicated by assessment.  - Educate pt/family on patient safety including physical limitations  - Instruct pt to call for assistance with activity based on assessment  - Modify environment to reduce risk of injury  - Provide assistive devices as appropriate  - Consider OT/PT consult to assist with strengthening/mobility  - Encourage toileting schedule  Outcome: Progressing

## 2024-11-10 NOTE — PLAN OF CARE
Agitated and restless overnight, better after Haldol, took am medications with ice cream. Milan and kait-care completed, urine remains pink to red, no clots. Stat lock secured and change to the left leg.

## 2024-11-10 NOTE — OCCUPATIONAL THERAPY NOTE
OCCUPATIONAL THERAPY EVALUATION - INPATIENT     Room Number: 8622/8622-A  Evaluation Date: 11/10/2024  Type of Evaluation: Initial  Presenting Problem: CHF, recent UTI    Physician Order: IP Consult to Occupational Therapy  Reason for Therapy: ADL/IADL Dysfunction and Discharge Planning    OCCUPATIONAL THERAPY ASSESSMENT   Patient is currently functioning below baseline with ADL and mobility. Prior to admission, patient's baseline is CGA/SBA mobility via walker, assist of one dressing/bathing/toileting, independent eating.  Patient is requiring mod-max assist as a result of the following impairments: balance, endurance, acute on chronic cognitive deficits, strength, acute on chronic tremors, deconditioning.  Occupational Therapy will continue to follow for duration of hospitalization.    Pt is significantly below baseline and would qualify for gradual rehabilitation if desired by family. However, pt has incredibly supportive caregiver who is able to provide increased assistance as pt recovers. Pt demonstrates increased ability to participate in therapy when familiar caregiver is present and will progress best in home environment with increased assistance.    Patient will benefit from continued skilled OT Services at discharge to promote prior level of function and safety with additional support and return home with home health OT      History Related to Current Admission: Patient is a 84 year old male admitted on 11/8/2024 with Presenting Problem: CHF, recent UTI. Co-Morbidities : PD, a fib, CABG, chronic edmond, HTN, rib fractures Aug 2024, recent admit for syncope 10/28/24    WEIGHT BEARING RESTRICTION  Weight Bearing Restriction: None                Recommendations for nursing staff:   Transfers: 2 assist with RW and gait belt  Toileting location: bedside commode    EVALUATION SESSION:  Patient Start of Session: semi supine, caregiver and spouse present  FUNCTIONAL TRANSFER ASSESSMENT  Sit to Stand: Edge of  Bed  Edge of Bed: Moderate Assist    BED MOBILITY  Supine to Sit : Moderate Assist  Sit to Supine (OT): -- (mod of 2)    BALANCE ASSESSMENT     FUNCTIONAL ADL ASSESSMENT  Eating: Minimal Assist (increased tremors compared to baseline per caregiver)  LB Dressing Seated: Dependent (lifts LEs to facilitate socks)      ACTIVITY TOLERANCE: 96/66 EOB      COGNITION  Attention Span:  difficulty attending to directions  Following Commands:  follows one-step commands inconsistently and follows consistently when directed by familiar caregiver  Safety Judgement:  decreased awareness of need for safety    Upper Extremity   ROM: within functional limits   Strength: unable to formally test. Good  strength demonstrated during transfers  Coordination  Gross motor: impaired  Fine motor: impaired      EDUCATION PROVIDED  Patient Education : Role of Occupational Therapy; Plan of Care; Discharge Recommendations; Functional Transfer Techniques; Fall Prevention  Patient's Response to Education: Verbalized Understanding  Family/Caregiver Education : -- (same)  Family/Caregiver's Response to Education: Verbalized Understanding    Equipment used: RW  Demonstrates functional use, Would benefit from additional trial      Therapist comments: Lifts LEs to don socks bed level. Mod of one to EOB via hand held assist. CGA static sitting balance EOB. Mod (A) sit to stand via RW, second person for safety. Initial posterior lean, able to correct with time and cueing. Ambulates into douglas with assist and chair follow. Requires seated break prior to return ambulation. Transfer to EOB min of 2 with RW. Mod of 2 to supine. Increased assist for bed level eating due to tremors.     Discussed with pt/caregiver/spouse therapy after hospital. Caregiver states she is able to provide increased physical assist at [facility]. Spouse in agreement with plan for return to [facility] with therapy.     Patient End of Session: In bed;Needs met;Call light within reach;RN aware of  session/findings;All patient questions and concerns addressed;Hospital anti-slip socks;Alarm set;Family present    OCCUPATIONAL PROFILE    HOME SITUATION  Type of Home: Assisted living facility  Home Layout: One level  Lives With: Spouse;Caregiver 24 hours    Toilet and Equipment: Comfort height toilet;Grab bar  Shower/Tub and Equipment: Walk-in shower;Grab bar;Shower chair  Other Equipment:  (wheelchair)       Hand Dominance: Right  Drives: No       Prior Level of Function: PLOF per caregiver. Pt has 2 caregivers 22 hours a day in addition to [facility] staff. Pt has assist of one for supine to sit, SBA/CGA for ambulation via walker to dining douglas, assist of one for dressing/bathing/toileting, and assist of 2 for sit to supine. Pt feeds himself. He can normally pull up/fasten his own pants.     SUBJECTIVE   Caregiver present. Pt consistently follows commands for caregiver and displays calmer demeanor with caregiver presence and reassurance. Improved participation with familiar caregiver compared to staff alone.     PAIN ASSESSMENT     Location: none reported       OBJECTIVE  Precautions: Bed/chair alarm  Fall Risk: High fall risk      ASSESSMENTS    AM-PAC ‘6-Clicks’ Inpatient Daily Activity Short Form  -   Putting on and taking off regular lower body clothing?: Total  -   Bathing (including washing, rinsing, drying)?: A Lot  -   Toileting, which includes using toilet, bedpan or urinal? : Total  -   Putting on and taking off regular upper body clothing?: A Lot  -   Taking care of personal grooming such as brushing teeth?: A Lot  -   Eating meals?: A Little    AM-PAC Score:  Score: 11  Approx Degree of Impairment: 70.42%  Standardized Score (AM-PAC Scale): 29.04    ADDITIONAL TESTS     NEUROLOGICAL FINDINGS      COGNITION ASSESSMENTS       PLAN  OT Treatment Plan: Balance activities;ADL training;Functional transfer training;UE strengthening/ROM;Endurance training;Patient/Family education;Patient/Family  training;Compensatory technique education  Rehab Potential : Fair  Frequency: 3-5x/week  Number of Visits to Meet Established Goals: 5    ADL Goals   Patient will perform eating: with set up  Patient will perform lower body dressing:  with mod assist  Patient will perform toileting: with mod assist    Functional Transfer Goals  Patient will transfer to toilet:  with min assist    UE Exercise Program Goal  Patient will be supervision with bilateral AROM HEP (home exercise program).    Additional Goals  Pt will stand 3 minutes during light ADL CGA      Patient Evaluation Complexity Level:   Occupational Profile/Medical History LOW - Brief history including review of medical or therapy records    Specific performance deficits impacting engagement in ADL/IADL LOW  1 - 3 performance deficits    Client Assessment/Performance Deficits LOW - No comorbidities nor modifications of tasks    Clinical Decision Making LOW - Analysis of occupational profile, problem-focused assessments, limited treatment options    Overall Complexity LOW     OT Session Time: 35 minutes  Self-Care Home Management:  minutes  Therapeutic Activity: 15 minutes  Neuromuscular Re-education:  minutes  Therapeutic Exercise:  minutes  Orthotic Management and Training:  minutes

## 2024-11-10 NOTE — PROGRESS NOTES
[facility] Cardiology Progress Note        Reginaldo Hdz Patient Status:  Inpatient    8/10/1940 MRN [id number]   [facility] 8NE-A Attending Delvin Arevalo MD   Hosp Day # 2 PCP Olvin Dinh MD     Subjective:  The patient denies  chest pain and shortness of breath.  Confused  agitated    Medications:   potassium chloride  40 mEq Oral Once    cefTRIAXone  1 g Intravenous Q24H    furosemide  20 mg Intravenous Daily    carbidopa-levodopa  1.5 tablet Oral QID    ezetimibe  10 mg Oral Daily    finasteride  5 mg Oral Daily    hydroxychloroquine  200 mg Oral BID    pantoprazole  40 mg Oral QAM AC    polyethylene glycol (PEG 3350)  17 g Oral Daily    atorvastatin  20 mg Oral Daily    rivaroxaban  20 mg Oral Daily with food    carbidopa-levodopa ER  1 tablet Oral BID       Continuous Infusions:   lactated ringers           Allergies:  Allergies[1]      Objective:        Intake/Output:      Intake/Output Summary (Last 24 hours) at 11/10/2024 0945  Last data filed at 11/10/2024 0223  Gross per 24 hour   Intake 120 ml   Output 1400 ml   Net -1280 ml     Wt Readings from Last 3 Encounters:   24 152 lb (68.9 kg)   10/28/24 154 lb 5.2 oz (70 kg)   10/11/24 165 lb 5.5 oz (75 kg)       Physical Exam:        Vitals:    24 1300 24 1600 24 1952 11/10/24 0223   BP: (!) 169/77 146/82 151/77    BP Location: Left arm Left arm Left arm    Pulse: 74 80 73 73   Resp: 18 19 19    Temp: 98.1 °F (36.7 °C) 98.4 °F (36.9 °C) 97.6 °F (36.4 °C)    TempSrc: Oral Oral Oral    SpO2: 99% 96% 97%    Weight:           Temp:  [97.6 °F (36.4 °C)-98.4 °F (36.9 °C)] 97.6 °F (36.4 °C)  Pulse:  [73-80] 73  Resp:  [18-19] 19  BP: (146-169)/(77-82) 151/77  SpO2:  [96 %-99 %] 97 %      Temp: 97.6 °F (36.4 °C)  Pulse: 73  Resp: 19  BP: 151/77  General:  Appears agitated  HEENT: No focal deficits.  Neck: No JVD, carotids 2+ no bruits.  Cardiac: Regular S1S2.  No S3, S4, rub, click.  No murmur.  Lungs:  Clear to auscultation and percussion.  Abdomen: Soft, non-tender.   Extremities: No LE edema.  No clubbing or cyanosis.    Neurologic:  agitated  Skin: Warm and dry.           LABS:      HEM:  Recent Labs   Lab 11/08/24 1717 11/09/24  0542 11/09/24  1522 11/10/24  0517   WBC 4.3 3.5*  --  2.9*   HGB 9.1* 7.9* 8.5* 8.7*   HCT 28.6* 24.9*  --  27.5*   PLT 92.0* 79.0*  --  92.0*       Chem:  Recent Labs   Lab 11/08/24 1717 11/09/24  0532 11/10/24  0517    140 137   K 4.5 3.8 3.7  3.7    106 105   CO2 30.0 30.0 29.0   BUN 13 12 11   CREATSERUM 1.07 0.98 0.93   CA 8.8 8.6* 8.7   MG  --  1.8 1.9   PHOS  --  3.6  --    * 83 87       Recent Labs   Lab 11/08/24 1717   ALT <7*   AST 40*   ALB 3.6       No results for input(s): \"PT\", \"PTT\", \"INR\" in the last 168 hours.        Lab Results   Component Value Date    TROP <0.045 12/06/2019    TROP <0.045 12/05/2019    TROP <0.045 12/05/2019         Invalid input(s): \"PBNPML\"                       Diagnostics:   Telemetry:      EKG, 11/8/2024:  NSR, FAV, LAD, PRWP     CXR, 11/8/2024:    Impression   CONCLUSION:  Cardiomegaly with pulmonary venous congestion.     Prominent interstitial markings throughout both lungs concerning for pulmonary edema.     Bibasilar infiltrates right greater than left with bilateral pleural effusions right greater than left.     Fracture deformities of posterior lateral right ribs unchanged from 8/30/2024.  No measurable pneumothorax.         Echo, 10/14/24:     Conclusions:     1. Left ventricle: The cavity size was normal. Wall thickness was normal.      Systolic function was normal. The estimated ejection fraction was 60%.   2. Right ventricle: The cavity size was normal. Systolic function was      normal. Systolic pressure was at the upper limits of normal.   3. Left atrium: The atrium was moderately dilated.   4. Mitral valve: There was mild regurgitation.   5. Pulmonary arteries: Systolic pressure was mildly increased,  estimated to      be 32mm Hg. The peak systolic pressure is 32mm Hg. The end-diastolic      pressure is 10mm Hg.   6. Pericardium, extracardiac: There was no pericardial effusion. There were      bilateral pleural effusions present.   7. No valvular vegetations were seen.   Impressions:  This study is compared with previous dated 07-10-24:   *            Impression:     1.  Dyspnea, LE edema, pleural effusions, elevated BNP. All c/w CHF      -    net out = 3.3 liters     2. Intermittent hypotension: autonomic dysfunction 2/2 PD. On midodrine     3. CAD, s/p CABG 1984      4. Paroxysmal Atrial Fibrillation. NSR today. On DOAC  5. Hypertension   6. PD  7. SLE  8. Right hemothorax 2/2 falls with  rib fx, and xarelto use 8/2024---> right thora   9. Gross hematuria     Recommend:     1.  Telemetry  2. Transition to po diuretic in am  3. Monitor BMP's, weights, I/O's and bp's closely  4. Recent echo reviewed. Will avoid duplication of testing   5. Will hold xarelto for worsening hematuria  6. Off midodrine for low bp's    Magdaleno Do MD  11/10/2024  9:45 AM           [1]   Allergies  Allergen Reactions    Hydrocodone-Acetaminophen CONFUSION, DIZZINESS and HALLUCINATION    Inderal [Propranolol] NAUSEA AND VOMITING and UNKNOWN    Isosorbide NAUSEA AND VOMITING    Other OTHER (SEE COMMENTS)

## 2024-11-11 LAB
ANION GAP SERPL CALC-SCNC: 2 MMOL/L (ref 0–18)
BASOPHILS # BLD AUTO: 0 X10(3) UL (ref 0–0.2)
BASOPHILS NFR BLD AUTO: 0 %
BUN BLD-MCNC: 17 MG/DL (ref 9–23)
CALCIUM BLD-MCNC: 8.4 MG/DL (ref 8.7–10.4)
CHLORIDE SERPL-SCNC: 106 MMOL/L (ref 98–112)
CO2 SERPL-SCNC: 28 MMOL/L (ref 21–32)
CREAT BLD-MCNC: 0.85 MG/DL
EGFRCR SERPLBLD CKD-EPI 2021: 85 ML/MIN/1.73M2 (ref 60–?)
EOSINOPHIL # BLD AUTO: 0 X10(3) UL (ref 0–0.7)
EOSINOPHIL NFR BLD AUTO: 0 %
ERYTHROCYTE [DISTWIDTH] IN BLOOD BY AUTOMATED COUNT: 14.2 %
GLUCOSE BLD-MCNC: 109 MG/DL (ref 70–99)
HCT VFR BLD AUTO: 28.4 %
HGB BLD-MCNC: 9.3 G/DL
IMM GRANULOCYTES # BLD AUTO: 0.02 X10(3) UL (ref 0–1)
IMM GRANULOCYTES NFR BLD: 0.6 %
LYMPHOCYTES # BLD AUTO: 0.25 X10(3) UL (ref 1–4)
LYMPHOCYTES NFR BLD AUTO: 7.6 %
MAGNESIUM SERPL-MCNC: 1.8 MG/DL (ref 1.6–2.6)
MCH RBC QN AUTO: 27.6 PG (ref 26–34)
MCHC RBC AUTO-ENTMCNC: 32.7 G/DL (ref 31–37)
MCV RBC AUTO: 84.3 FL
MONOCYTES # BLD AUTO: 0.34 X10(3) UL (ref 0.1–1)
MONOCYTES NFR BLD AUTO: 10.4 %
NEUTROPHILS # BLD AUTO: 2.67 X10 (3) UL (ref 1.5–7.7)
NEUTROPHILS # BLD AUTO: 2.67 X10(3) UL (ref 1.5–7.7)
NEUTROPHILS NFR BLD AUTO: 81.4 %
OSMOLALITY SERPL CALC.SUM OF ELEC: 284 MOSM/KG (ref 275–295)
PLATELET # BLD AUTO: 80 10(3)UL (ref 150–450)
POTASSIUM SERPL-SCNC: 3.6 MMOL/L (ref 3.5–5.1)
POTASSIUM SERPL-SCNC: 3.6 MMOL/L (ref 3.5–5.1)
POTASSIUM SERPL-SCNC: 4.2 MMOL/L (ref 3.5–5.1)
RBC # BLD AUTO: 3.37 X10(6)UL
SODIUM SERPL-SCNC: 136 MMOL/L (ref 136–145)
WBC # BLD AUTO: 3.3 X10(3) UL (ref 4–11)

## 2024-11-11 PROCEDURE — 80048 BASIC METABOLIC PNL TOTAL CA: CPT | Performed by: HOSPITALIST

## 2024-11-11 PROCEDURE — 84132 ASSAY OF SERUM POTASSIUM: CPT | Performed by: HOSPITALIST

## 2024-11-11 PROCEDURE — 83735 ASSAY OF MAGNESIUM: CPT | Performed by: HOSPITALIST

## 2024-11-11 PROCEDURE — 84132 ASSAY OF SERUM POTASSIUM: CPT | Performed by: INTERNAL MEDICINE

## 2024-11-11 PROCEDURE — 85025 COMPLETE CBC W/AUTO DIFF WBC: CPT | Performed by: HOSPITALIST

## 2024-11-11 RX ORDER — SULFAMETHOXAZOLE AND TRIMETHOPRIM 800; 160 MG/1; MG/1
1 TABLET ORAL EVERY 12 HOURS SCHEDULED
Status: DISCONTINUED | OUTPATIENT
Start: 2024-11-11 | End: 2024-11-12

## 2024-11-11 RX ORDER — POTASSIUM CHLORIDE 1500 MG/1
40 TABLET, EXTENDED RELEASE ORAL EVERY 4 HOURS
Status: COMPLETED | OUTPATIENT
Start: 2024-11-11 | End: 2024-11-11

## 2024-11-11 RX ORDER — MAGNESIUM OXIDE 400 MG/1
400 TABLET ORAL ONCE
Status: COMPLETED | OUTPATIENT
Start: 2024-11-11 | End: 2024-11-11

## 2024-11-11 NOTE — CM/SW NOTE
Pt is an 83 yo male admitted for CHF.  Pt came from [facility] in [place] where he lives with his wife and a caregiver who is there for 22 hrs/day.  Pt is +UTI.  PT saw pt:  Anticipated therapy need: Home with Home Healthcare.  Pt is current with [facility] order written.  SW following.

## 2024-11-11 NOTE — PROGRESS NOTES
[facility] Cardiology Progress Note        Reginaldo Hdz Patient Status:  Inpatient    8/10/1940 MRN [id number]   [facility] 8NE-A Attending Delvin Arevalo MD   Hosp Day # 3 PCP Olvin Dinh MD     Subjective:  The patient denies  chest pain and shortness of breath.  Confused  No longer agitated    Medications:   cefTRIAXone  1 g Intravenous Q24H    furosemide  20 mg Oral Daily    carbidopa-levodopa  1.5 tablet Oral QID    ezetimibe  10 mg Oral Daily    finasteride  5 mg Oral Daily    hydroxychloroquine  200 mg Oral BID    pantoprazole  40 mg Oral QAM AC    polyethylene glycol (PEG 3350)  17 g Oral Daily    atorvastatin  20 mg Oral Daily    [Held by provider] rivaroxaban  20 mg Oral Daily with food    carbidopa-levodopa ER  1 tablet Oral BID       Continuous Infusions:   lactated ringers           Allergies:  Allergies[1]      Objective:        Intake/Output:      Intake/Output Summary (Last 24 hours) at 2024 0931  Last data filed at 2024 0531  Gross per 24 hour   Intake 240 ml   Output 1450 ml   Net -1210 ml     Wt Readings from Last 3 Encounters:   24 152 lb (68.9 kg)   10/28/24 154 lb 5.2 oz (70 kg)   10/11/24 165 lb 5.5 oz (75 kg)       Physical Exam:        Vitals:    24 0024 24 0400 24 0525 24 0835   BP:  111/69  135/81   BP Location:  Left arm  Left arm   Pulse: 81 74 80 69   Resp:   22 20   Temp: 98.1 °F (36.7 °C)  97.8 °F (36.6 °C) 98 °F (36.7 °C)   TempSrc: Oral  Oral Oral   SpO2: 98% 96% 98% 98%   Weight:           Temp:  [97.8 °F (36.6 °C)-101.3 °F (38.5 °C)] 98 °F (36.7 °C)  Pulse:  [69-81] 69  Resp:  [20-25] 20  BP: ()/(54-81) 135/81  SpO2:  [95 %-98 %] 98 %      Temp: 98 °F (36.7 °C)  Pulse: 69  Resp: 20  BP: 135/81  General:  Appears agitated  HEENT: No focal deficits.  Neck: No JVD, carotids 2+ no bruits.  Cardiac: Regular S1S2.  No S3, S4, rub, click.  No murmur.  Lungs: Clear to auscultation and  percussion.  Abdomen: Soft, non-tender.   Extremities: No LE edema.  No clubbing or cyanosis.    Neurologic:  agitated  Skin: Warm and dry.           LABS:      HEM:  Recent Labs   Lab 11/08/24  1717 11/09/24  0542 11/09/24  1522 11/10/24  0517 11/11/24  0837   WBC 4.3 3.5*  --  2.9* 3.3*   HGB 9.1* 7.9* 8.5* 8.7* 9.3*   HCT 28.6* 24.9*  --  27.5* 28.4*   PLT 92.0* 79.0*  --  92.0* 80.0*       Chem:  Recent Labs   Lab 11/08/24 1717 11/09/24  0532 11/10/24  0517 11/11/24  0837    140 137 136   K 4.5 3.8 3.7  3.7 3.6  3.6    106 105 106   CO2 30.0 30.0 29.0 28.0   BUN 13 12 11 17   CREATSERUM 1.07 0.98 0.93 0.85   CA 8.8 8.6* 8.7 8.4*   MG  --  1.8 1.9 1.8   PHOS  --  3.6  --   --    * 83 87 109*       Recent Labs   Lab 11/08/24 1717   ALT <7*   AST 40*   ALB 3.6       No results for input(s): \"PT\", \"PTT\", \"INR\" in the last 168 hours.        Lab Results   Component Value Date    TROP <0.045 12/06/2019    TROP <0.045 12/05/2019    TROP <0.045 12/05/2019         Invalid input(s): \"PBNPML\"                       Diagnostics:   Telemetry:      EKG, 11/8/2024:  NSR, FAV, LAD, PRWP     CXR, 11/8/2024:    Impression   CONCLUSION:  Cardiomegaly with pulmonary venous congestion.     Prominent interstitial markings throughout both lungs concerning for pulmonary edema.     Bibasilar infiltrates right greater than left with bilateral pleural effusions right greater than left.     Fracture deformities of posterior lateral right ribs unchanged from 8/30/2024.  No measurable pneumothorax.         Echo, 10/14/24:     Conclusions:     1. Left ventricle: The cavity size was normal. Wall thickness was normal.      Systolic function was normal. The estimated ejection fraction was 60%.   2. Right ventricle: The cavity size was normal. Systolic function was      normal. Systolic pressure was at the upper limits of normal.   3. Left atrium: The atrium was moderately dilated.   4. Mitral valve: There was mild  regurgitation.   5. Pulmonary arteries: Systolic pressure was mildly increased, estimated to      be 32mm Hg. The peak systolic pressure is 32mm Hg. The end-diastolic      pressure is 10mm Hg.   6. Pericardium, extracardiac: There was no pericardial effusion. There were      bilateral pleural effusions present.   7. No valvular vegetations were seen.   Impressions:  This study is compared with previous dated 07-10-24:   *            Impression:     1.  Dyspnea, LE edema, pleural effusions, elevated BNP. All c/w CHF      -    net out = 4.5 liters     2. Intermittent hypotension: autonomic dysfunction 2/2 PD. On midodrine     3. CAD, s/p CABG 1984      4. Paroxysmal Atrial Fibrillation. NSR today. On DOAC  5. Hypertension   6. PD  7. SLE  8. Right hemothorax 2/2 falls with  rib fx, and xarelto use 8/2024---> right thora   9. Gross hematuria/UTI on IV Abx     Recommend:     1.  Telemetry  2. Po lasix  3. Monitor BMP's, weights, I/O's and bp's closely  4. Recent echo reviewed. Will avoid duplication of testing   5. Will hold xarelto for worsening hematuria  6. Off midodrine for low bp's , however his bp's are now \"soft\"    Magdaleno Do MD           [1]   Allergies  Allergen Reactions    Hydrocodone-Acetaminophen CONFUSION, DIZZINESS and HALLUCINATION    Inderal [Propranolol] NAUSEA AND VOMITING and UNKNOWN    Isosorbide NAUSEA AND VOMITING    Other OTHER (SEE COMMENTS)

## 2024-11-11 NOTE — CDS QUERY
Dear Doctor Genevieve,    Can Congestive Heart Failure be further specified?    (    ) Acute Diastolic Heart Failure  (  x  ) Acute on Chronic Diastolic Heart Failure   (    ) Other - please specify:         DOCUMENTATION FROM THE MEDICAL RECORD      CLINICAL INDICATORS: 11/8 ED 84 Y/M- KENIA    BNP- 995     CXR- Cardiomegaly with pulmonary venous congestion    10/2024 Historic Echo- Systolic function was normal. The estimated ejection fraction was 60%.       ED- # ED- Exam: 1+ bilateral pretibial edema  Clinical Impression #Acute on chronic right-sided congestive heart failure    11/9 Cardiology- Dyspnea, LE edema, pleural effusions, elevated BNP. All c/w CHF   11/11- net out = 4.5 liters     11/9-11/10 Hospitalist PN- Acute CHF     RISKS: HTN    TREATMENT: IV lasix, cardiology consult, strict I/Os, daily weights, tele, cardiac diet/1.5 Gm Na restriction        Use of terms such as suspected, possible, or probable (associated with a specific diagnosis that is being evaluated, monitored, or treated as if it exists) are acceptable and can be coded in the inpatient setting, when documented at the time of discharge.     Please add any additional documentation to your progress note and continue to document this through discharge.    Clinical : Mary Grace Love -[phone]. Thank You.    THIS FORM IS A PERMANENT PART OF THE MEDICAL RECORD

## 2024-11-11 NOTE — DISCHARGE INSTRUCTIONS
*Please hold taking Xarelto until you see urology for your procedure this week. Also, prescriptions for lasix (water pill) and bactrim (UTI antibiotic) has been sent to your pharmacy.     Going Home Instructions  In this section you will find the tools which will guide you through the first few days after you leave the hospital. Continued use of these tools will help you develop the skills necessary to keep your heart failure under control.     Home Care Instructions Following Heart Failure - the most important things to do every day include:   Weigh yourself and review the “Self-Check Plan” sheet every morning.   Call your cardiologist office if you are in the “Pay Attention-Use Caution” (yellow zone) or “Medical Alert-Warning!” (red zone) as outlined in the Self-Check Plan sheet.  Take your medicines as prescribed.  Limit your sodium (salt) intake.  Know when to call your cardiologist, primary doctor, or nurse.  Know when to seek emergency care.      Things for You to Remember:   1. See your doctor or healthcare provider as written on your discharge instructions.  It is important that you attend this appointment to make sure your symptoms are under control.     2. Your recommended sodium intake is 3034-8199 mg daily.    3.  Weigh yourself every day.    4. Some exercise and activity is important to help keep your heart functioning and strong. Unless instructed not to exercise, you may walk at a slow to moderate pace for 10-15 minutes 2-3 days per week to start. Pace your activity to prevent shortness of breath or fatigue. Stop exercising if you develop chest pain, lightheadedness, or significant shortness of breath.       Call Your Cardiologist If:   You gain 2-3 pounds in one day or 5 pounds in one week.  You have more difficulty breathing.  You are getting more tired with normal activity.  You are more short of breath lying down, or awaken at night short of breath.  You have swelling of your feet or legs.  You  urinate less often during the day and more often at night.  You have cramps in your legs.  You have blurred vision or see yellowish-green halos around objects of lights.    Go to the Emergency Room If:   You have pain or tightness in your chest  You are extremely short of breath  You are coughing up pink-frothy mucus  You are traveling and develop symptoms of worsening heart failure      ** Please follow up with your cardiologist or Advanced Practice Provider as written on your discharge instructions. If you are not provided with an appointment, let your nurse know so you can get an appointment**  Transportation Resources   -Ride Assist [place] - [phone] [email or web address]  -Ride [place] [phone] or [phone] [email or web address]  -First Transit [phone] [email or web address]  -[place] Ride Around [facility] [phone]  -[facility] Transit System [phone]  [facility] Transportation [phone] Ext. [phone]   -[facility] Pace Dial-a-Ride Service  Reservations: [phone]  -[facility] H-art ([employer]) Shuttle Bus Line at [phone]  -[facility] Senior Bus Service [phone]  -[facility] [phone].  -[facility] Transit 7-229-FPD-4KAT  -[facility] [phone]   -[employer] Transportation [phone]  -Go GO Grandparent Transportation [phone] [email or web address]  -[employer] Transport [phone]  -Disabled on the Go [phone]  -[employer] Safety Transfer [phone]  -[facility] Wheelchair Transport [phone]   -[employer]: [phone]  -Connections thro mobile [place] [phone]  -[employer] [phone]  -[facility], [place], [facility] [place], and [facility]. [phone] [email or web address]  -[facility]  [facility] [phone] [email or web address]

## 2024-11-11 NOTE — PLAN OF CARE
Assumed patient care around 0730 this AM. Patient alert and oriented to self only. Disoriented to time, place, and situation. Patient seems to be resting comfortably in bed. Sitter at bedside. Spo2 maintained on RA. NSR with first degree AVB on tele. Patient incontinent of bowel, edmond in place draining shad, cloudy urine. Denies pain at this time. Patient is up with moderate assist in the room. Updated on POC. Needs met.         Problem: Patient/Family Goals  Goal: Patient/Family Long Term Goal  Description: Patient's Long Term Goal: Stay out of the hospital    Interventions:  - Take medications as ordered  - Attend follow up appointments  - See additional Care Plan goals for specific interventions  Outcome: Progressing  Goal: Patient/Family Short Term Goal  Description: Patient's Short Term Goal: Breathe better  11/11: Go home    Interventions:   - Labs, tele, consults  - Medications as ordered  - IV lasix  11/11:  -Tele monitoring  -Monitor labs  -IV abx  -Ambulate with staff in the halls throughout the day as tolerated  - See additional Care Plan goals for specific interventions  Outcome: Progressing     Problem: CARDIOVASCULAR - ADULT  Goal: Maintains optimal cardiac output and hemodynamic stability  Description: INTERVENTIONS:  - Monitor vital signs, rhythm, and trends  - Monitor for bleeding, hypotension and signs of decreased cardiac output  - Evaluate effectiveness of vasoactive medications to optimize hemodynamic stability  - Monitor arterial and/or venous puncture sites for bleeding and/or hematoma  - Assess quality of pulses, skin color and temperature  - Assess for signs of decreased coronary artery perfusion - ex. Angina  - Evaluate fluid balance, assess for edema, trend weights  Outcome: Progressing  Goal: Absence of cardiac arrhythmias or at baseline  Description: INTERVENTIONS:  - Continuous cardiac monitoring, monitor vital signs, obtain 12 lead EKG if indicated  - Evaluate effectiveness of  antiarrhythmic and heart rate control medications as ordered  - Initiate emergency measures for life threatening arrhythmias  - Monitor electrolytes and administer replacement therapy as ordered  Outcome: Progressing     Problem: RESPIRATORY - ADULT  Goal: Achieves optimal ventilation and oxygenation  Description: INTERVENTIONS:  - Assess for changes in respiratory status  - Assess for changes in mentation and behavior  - Position to facilitate oxygenation and minimize respiratory effort  - Oxygen supplementation based on oxygen saturation or ABGs  - Provide Smoking Cessation handout, if applicable  - Encourage broncho-pulmonary hygiene including cough, deep breathe, Incentive Spirometry  - Assess the need for suctioning and perform as needed  - Assess and instruct to report SOB or any respiratory difficulty  - Respiratory Therapy support as indicated  - Manage/alleviate anxiety  - Monitor for signs/symptoms of CO2 retention  Outcome: Progressing     Problem: PAIN - ADULT  Goal: Verbalizes/displays adequate comfort level or patient's stated pain goal  Description: INTERVENTIONS:  - Encourage pt to monitor pain and request assistance  - Assess pain using appropriate pain scale  - Administer analgesics based on type and severity of pain and evaluate response  - Implement non-pharmacological measures as appropriate and evaluate response  - Consider cultural and social influences on pain and pain management  - Manage/alleviate anxiety  - Utilize distraction and/or relaxation techniques  - Monitor for opioid side effects  - Notify MD/LIP if interventions unsuccessful or patient reports new pain  - Anticipate increased pain with activity and pre-medicate as appropriate  Outcome: Progressing     Problem: SAFETY ADULT - FALL  Goal: Free from fall injury  Description: INTERVENTIONS:  - Assess pt frequently for physical needs  - Identify cognitive and physical deficits and behaviors that affect risk of falls.  - [place]  fall precautions as indicated by assessment.  - Educate pt/family on patient safety including physical limitations  - Instruct pt to call for assistance with activity based on assessment  - Modify environment to reduce risk of injury  - Provide assistive devices as appropriate  - Consider OT/PT consult to assist with strengthening/mobility  - Encourage toileting schedule  Outcome: Progressing

## 2024-11-11 NOTE — PLAN OF CARE
Assumed patient care at 1930. Patient is in bed. Alert to self. Restless and impulsive, asking to go home, attempting to get out of bed and pulling at edmond catheter, drainage now pink tinged. Sitter at bedside , attempting to redirect and reposition in bed.  Vital signs are stable, febrile ,   Tylenol given with ice cream reluctantly  taking any medication , food or liquid orally at this time. /Not easily redirected.   Haldol given IV, now appears calm, VS remain stable, continues to refuse to drink fluids.  Telemetry monitoring in progress, NSR with occasional PAC's /PVC'. Denies any discomfort.  Frequent bedside rounds/ video monitoring in progress. Sitter at bedside  Problem: Patient/Family Goals  Goal: Patient/Family Long Term Goal  Description: Patient's Long Term Goal: Stay out of the hospital    Interventions:  - Take medications as ordered  - Attend follow up appointments  - See additional Care Plan goals for specific interventions  Outcome: Progressing  Goal: Patient/Family Short Term Goal  Description: Patient's Short Term Goal: Breathe better    Interventions:   - Labs, tele, consults  - Medications as ordered  - IV lasix  - See additional Care Plan goals for specific interventions  Outcome: Progressing     Problem: CARDIOVASCULAR - ADULT  Goal: Maintains optimal cardiac output and hemodynamic stability  Description: INTERVENTIONS:  - Monitor vital signs, rhythm, and trends  - Monitor for bleeding, hypotension and signs of decreased cardiac output  - Evaluate effectiveness of vasoactive medications to optimize hemodynamic stability  - Monitor arterial and/or venous puncture sites for bleeding and/or hematoma  - Assess quality of pulses, skin color and temperature  - Assess for signs of decreased coronary artery perfusion - ex. Angina  - Evaluate fluid balance, assess for edema, trend weights  Outcome: Progressing  Goal: Absence of cardiac arrhythmias or at baseline  Description: INTERVENTIONS:  -  Continuous cardiac monitoring, monitor vital signs, obtain 12 lead EKG if indicated  - Evaluate effectiveness of antiarrhythmic and heart rate control medications as ordered  - Initiate emergency measures for life threatening arrhythmias  - Monitor electrolytes and administer replacement therapy as ordered  Outcome: Progressing     Problem: RESPIRATORY - ADULT  Goal: Achieves optimal ventilation and oxygenation  Description: INTERVENTIONS:  - Assess for changes in respiratory status  - Assess for changes in mentation and behavior  - Position to facilitate oxygenation and minimize respiratory effort  - Oxygen supplementation based on oxygen saturation or ABGs  - Provide Smoking Cessation handout, if applicable  - Encourage broncho-pulmonary hygiene including cough, deep breathe, Incentive Spirometry  - Assess the need for suctioning and perform as needed  - Assess and instruct to report SOB or any respiratory difficulty  - Respiratory Therapy support as indicated  - Manage/alleviate anxiety  - Monitor for signs/symptoms of CO2 retention  Outcome: Progressing     Problem: PAIN - ADULT  Goal: Verbalizes/displays adequate comfort level or patient's stated pain goal  Description: INTERVENTIONS:  - Encourage pt to monitor pain and request assistance  - Assess pain using appropriate pain scale  - Administer analgesics based on type and severity of pain and evaluate response  - Implement non-pharmacological measures as appropriate and evaluate response  - Consider cultural and social influences on pain and pain management  - Manage/alleviate anxiety  - Utilize distraction and/or relaxation techniques  - Monitor for opioid side effects  - Notify MD/LIP if interventions unsuccessful or patient reports new pain  - Anticipate increased pain with activity and pre-medicate as appropriate  Outcome: Progressing     Problem: SAFETY ADULT - FALL  Goal: Free from fall injury  Description: INTERVENTIONS:  - Assess pt frequently for  physical needs  - Identify cognitive and physical deficits and behaviors that affect risk of falls.  - [place] fall precautions as indicated by assessment.  - Educate pt/family on patient safety including physical limitations  - Instruct pt to call for assistance with activity based on assessment  - Modify environment to reduce risk of injury  - Provide assistive devices as appropriate  - Consider OT/PT consult to assist with strengthening/mobility  - Encourage toileting schedule  Outcome: Progressing

## 2024-11-11 NOTE — PLAN OF CARE
Heart Failure Nurse  Progress Note    Patient was evaluated by the Heart Failure Nurse  for understanding, verbalization, demonstration, and recall of education related to heart failure, overall adherence to the behaviors necessary to maintain a compensated status, and risk for readmission.     Patient assessment: Patient is confused, agitated at times. Spoke with daughter Kyra over the phone. She lives with patient and helps care for him. Limitations on weighing daily due to mobility and diet due to living in an assisted living facility. Extensive HF education provided, folder left a bedside for daughter.     Patient is able to verbalize signs/symptoms fluid overload/impending HF exacerbation and who to contact with problems                                          _x__ yes  ___ no      Patient is following a 2000 mg sodium diet                                             ___ yes  _x__ no    If no, barriers to 2000 mg sodium diet:  refusal to eat, lives at assisted living    Patient informed of 2-Part dietician classes that is free if sign up within 30 days of discharge or $40  __x_ yes  ___ no      Patient is adherent to medication regimen                                              ___ yes  __x_ no    If no, barriers to medication regimen: refusal to take medications     Patient has sufficient funds to purchase medication                      _x__ yes  ___ no      Patient has a scale in the home              _x__ yes  ___ no      Patient is adherent to daily weight monitoring                                        ___ yes   __x_ no    If no, barriers to daily weight monitoring: mobility    Symptom Tracker Worksheet reviewed with patient  _x__ yes   ___ no      Patient verbalizes understanding of stoplight/heart failure zones          _x__ yes   ___ no      Patient understands the importance of 7-day follow-up appointment      _x__ yes  ___ no    Appointment Date: patient daughter will request            Patient has adequate transportation to attend follow-up appointments  - rideshare information provided   _x__ yes  ___ no    If no, was referral to Social Work made  __x_yes  ___ no      Family/Friend present during education: daughterChloé    Additional consultations required: social work, did not consult cardiac rehab or dietician due to patient's confusion and functional status.     Risa LLANOSN, RN, PCCN  [id number]

## 2024-11-12 PROBLEM — R06.02 SHORTNESS OF BREATH: Status: RESOLVED | Noted: 2024-11-08 | Resolved: 2024-11-12

## 2024-11-12 PROBLEM — I50.813 ACUTE ON CHRONIC RIGHT-SIDED CONGESTIVE HEART FAILURE (HCC): Status: RESOLVED | Noted: 2024-11-08 | Resolved: 2024-11-12

## 2024-11-12 LAB
ALBUMIN SERPL-MCNC: 3 G/DL (ref 3.2–4.8)
ANION GAP SERPL CALC-SCNC: 2 MMOL/L (ref 0–18)
ATRIAL RATE: 81 BPM
BUN BLD-MCNC: 20 MG/DL (ref 9–23)
CALCIUM BLD-MCNC: 8.3 MG/DL (ref 8.7–10.4)
CHLORIDE SERPL-SCNC: 104 MMOL/L (ref 98–112)
CO2 SERPL-SCNC: 27 MMOL/L (ref 21–32)
CREAT BLD-MCNC: 0.82 MG/DL
EGFRCR SERPLBLD CKD-EPI 2021: 86 ML/MIN/1.73M2 (ref 60–?)
ERYTHROCYTE [DISTWIDTH] IN BLOOD BY AUTOMATED COUNT: 14.3 %
GLUCOSE BLD-MCNC: 83 MG/DL (ref 70–99)
HCT VFR BLD AUTO: 27 %
HGB BLD-MCNC: 8.9 G/DL
MAGNESIUM SERPL-MCNC: 1.8 MG/DL (ref 1.6–2.6)
MCH RBC QN AUTO: 27.9 PG (ref 26–34)
MCHC RBC AUTO-ENTMCNC: 33 G/DL (ref 31–37)
MCV RBC AUTO: 84.6 FL
NT-PROBNP SERPL-MCNC: 1997 PG/ML (ref ?–450)
OSMOLALITY SERPL CALC.SUM OF ELEC: 278 MOSM/KG (ref 275–295)
P AXIS: 55 DEGREES
P-R INTERVAL: 226 MS
PHOSPHATE SERPL-MCNC: 3.4 MG/DL (ref 2.4–5.1)
PLATELET # BLD AUTO: 78 10(3)UL (ref 150–450)
POTASSIUM SERPL-SCNC: 3.8 MMOL/L (ref 3.5–5.1)
Q-T INTERVAL: 416 MS
QRS DURATION: 90 MS
QTC CALCULATION (BEZET): 483 MS
R AXIS: -40 DEGREES
RBC # BLD AUTO: 3.19 X10(6)UL
SODIUM SERPL-SCNC: 133 MMOL/L (ref 136–145)
T AXIS: 18 DEGREES
TROPONIN I SERPL HS-MCNC: 20 NG/L
VENTRICULAR RATE: 81 BPM
WBC # BLD AUTO: 3.1 X10(3) UL (ref 4–11)

## 2024-11-12 PROCEDURE — 80069 RENAL FUNCTION PANEL: CPT

## 2024-11-12 PROCEDURE — 97530 THERAPEUTIC ACTIVITIES: CPT

## 2024-11-12 PROCEDURE — 93010 ELECTROCARDIOGRAM REPORT: CPT | Performed by: INTERNAL MEDICINE

## 2024-11-12 PROCEDURE — 84484 ASSAY OF TROPONIN QUANT: CPT

## 2024-11-12 PROCEDURE — 83880 ASSAY OF NATRIURETIC PEPTIDE: CPT | Performed by: INTERNAL MEDICINE

## 2024-11-12 PROCEDURE — 85027 COMPLETE CBC AUTOMATED: CPT

## 2024-11-12 PROCEDURE — 83735 ASSAY OF MAGNESIUM: CPT | Performed by: HOSPITALIST

## 2024-11-12 PROCEDURE — 93005 ELECTROCARDIOGRAM TRACING: CPT

## 2024-11-12 RX ORDER — FUROSEMIDE 20 MG/1
20 TABLET ORAL DAILY
Qty: 30 TABLET | Refills: 1 | Status: SHIPPED | OUTPATIENT
Start: 2024-11-13

## 2024-11-12 RX ORDER — SULFAMETHOXAZOLE AND TRIMETHOPRIM 800; 160 MG/1; MG/1
1 TABLET ORAL 2 TIMES DAILY
Qty: 10 TABLET | Refills: 0 | Status: SHIPPED | OUTPATIENT
Start: 2024-11-12 | End: 2024-11-18

## 2024-11-12 RX ORDER — FUROSEMIDE 20 MG/1
20 TABLET ORAL DAILY
Qty: 30 TABLET | Refills: 1 | Status: SHIPPED | OUTPATIENT
Start: 2024-11-13 | End: 2024-11-12

## 2024-11-12 RX ORDER — SULFAMETHOXAZOLE AND TRIMETHOPRIM 800; 160 MG/1; MG/1
1 TABLET ORAL 2 TIMES DAILY
Qty: 10 TABLET | Refills: 0 | Status: SHIPPED | OUTPATIENT
Start: 2024-11-12 | End: 2024-11-12

## 2024-11-12 RX ORDER — POTASSIUM CHLORIDE 1500 MG/1
40 TABLET, EXTENDED RELEASE ORAL ONCE
Status: COMPLETED | OUTPATIENT
Start: 2024-11-12 | End: 2024-11-12

## 2024-11-12 RX ORDER — NITROGLYCERIN 0.6 MG/1
0.6 TABLET SUBLINGUAL EVERY 5 MIN PRN
Status: DISCONTINUED | OUTPATIENT
Start: 2024-11-12 | End: 2024-11-12

## 2024-11-12 RX ORDER — MAGNESIUM OXIDE 400 MG/1
400 TABLET ORAL ONCE
Status: COMPLETED | OUTPATIENT
Start: 2024-11-12 | End: 2024-11-12

## 2024-11-12 NOTE — PLAN OF CARE
Assumed patient care around 0730 this AM. Patient alert and oriented to self only, disoriented to time, place, and situation. Fall precautions in place, call light within reach. Spo2 maintained on RA. NSR with first degree AVB on tele. Patient incontinent of bowel, edmond in place draining clear, shad urine. Denies pain at this time. Patient is up with minimal assist and a walker in the room. Updated on POC. Needs met.     Approx 1710: Attempted to call Chloé hernadez for update on DC planning. Voicemail left by this RN for daughterChloé.       Problem: Patient/Family Goals  Goal: Patient/Family Long Term Goal  Description: Patient's Long Term Goal: Stay out of the hospital    Interventions:  - Take medications as ordered  - Attend follow up appointments  - See additional Care Plan goals for specific interventions  Outcome: Progressing  Goal: Patient/Family Short Term Goal  Description: Patient's Short Term Goal: Breathe better  11/11: Go home    Interventions:   - Labs, tele, consults  - Medications as ordered  - IV lasix  11/11:  -Tele monitoring  -Monitor labs  -IV abx  11/12:  -Tele monitoring  -Monitor labs  -PO abx  -Ambulate as tolerated in the halls with staff  -Up in chair for 3 meals a day   -Encourage PO intake   -Ambulate with staff in the halls throughout the day as tolerated  - See additional Care Plan goals for specific interventions  Outcome: Progressing     Problem: CARDIOVASCULAR - ADULT  Goal: Maintains optimal cardiac output and hemodynamic stability  Description: INTERVENTIONS:  - Monitor vital signs, rhythm, and trends  - Monitor for bleeding, hypotension and signs of decreased cardiac output  - Evaluate effectiveness of vasoactive medications to optimize hemodynamic stability  - Monitor arterial and/or venous puncture sites for bleeding and/or hematoma  - Assess quality of pulses, skin color and temperature  - Assess for signs of decreased coronary artery perfusion - ex. Angina  -  Evaluate fluid balance, assess for edema, trend weights  Outcome: Progressing  Goal: Absence of cardiac arrhythmias or at baseline  Description: INTERVENTIONS:  - Continuous cardiac monitoring, monitor vital signs, obtain 12 lead EKG if indicated  - Evaluate effectiveness of antiarrhythmic and heart rate control medications as ordered  - Initiate emergency measures for life threatening arrhythmias  - Monitor electrolytes and administer replacement therapy as ordered  Outcome: Progressing     Problem: RESPIRATORY - ADULT  Goal: Achieves optimal ventilation and oxygenation  Description: INTERVENTIONS:  - Assess for changes in respiratory status  - Assess for changes in mentation and behavior  - Position to facilitate oxygenation and minimize respiratory effort  - Oxygen supplementation based on oxygen saturation or ABGs  - Provide Smoking Cessation handout, if applicable  - Encourage broncho-pulmonary hygiene including cough, deep breathe, Incentive Spirometry  - Assess the need for suctioning and perform as needed  - Assess and instruct to report SOB or any respiratory difficulty  - Respiratory Therapy support as indicated  - Manage/alleviate anxiety  - Monitor for signs/symptoms of CO2 retention  Outcome: Progressing     Problem: PAIN - ADULT  Goal: Verbalizes/displays adequate comfort level or patient's stated pain goal  Description: INTERVENTIONS:  - Encourage pt to monitor pain and request assistance  - Assess pain using appropriate pain scale  - Administer analgesics based on type and severity of pain and evaluate response  - Implement non-pharmacological measures as appropriate and evaluate response  - Consider cultural and social influences on pain and pain management  - Manage/alleviate anxiety  - Utilize distraction and/or relaxation techniques  - Monitor for opioid side effects  - Notify MD/LIP if interventions unsuccessful or patient reports new pain  - Anticipate increased pain with activity and  pre-medicate as appropriate  Outcome: Progressing     Problem: SAFETY ADULT - FALL  Goal: Free from fall injury  Description: INTERVENTIONS:  - Assess pt frequently for physical needs  - Identify cognitive and physical deficits and behaviors that affect risk of falls.  - [place] fall precautions as indicated by assessment.  - Educate pt/family on patient safety including physical limitations  - Instruct pt to call for assistance with activity based on assessment  - Modify environment to reduce risk of injury  - Provide assistive devices as appropriate  - Consider OT/PT consult to assist with strengthening/mobility  - Encourage toileting schedule  Outcome: Progressing

## 2024-11-12 NOTE — DISCHARGE SUMMARY
General Medicine Discharge Summary     Patient ID:  Reginaldo Hdz  84 year old  8/10/1940    Admit date: 11/8/2024    Discharge date and time: 11/12/2204    Attending Physician: Giselle Vallejo DO     Consults: IP CONSULT TO HOSPITALIST  IP CONSULT TO CARDIOLOGY  IP CONSULT TO SOCIAL WORK    Primary Care Physician: Olvin Dinh MD     Reason for admission: CHF    Risk For Readmission: Low    Discharge Diagnoses: Shortness of breath [R06.02]  Acute on chronic right-sided congestive heart failure (HCC) [I50.813]  See Additional Discharge Diagnoses in Hospital Course    Discharged Condition: good    Follow-up with labs/images appointments: PCP, urology, cardiology     Exam  Gen: No acute distress  Pulm: Lungs clear, normal respiratory effort  CV: Heart with regular rate and rhythm  Abd: Abdomen soft,   EXT: no edema     Hospital Course: 84-year-old male with a history of anxiety, CAD s/p stents, atrial fibrillation on Xarelto, hypertension, hyperlipidemia, GERD, lupus, PAD and neuropathy which presented to the hospital with shortness of breath.  Patient was admitted for acute decompensated HFpEF exacerbation and was diuresed, cardiology consulted.  Patient's symptoms did improve, transition to oral Lasix 20 mg daily per cardiology.  Patient's hospitalization was complicated by UTI, started on IV Rocephin and cultures came back for stenotrophomonas and Serratia, sensitive to Bactrim.  Patient was switched to oral double strength Bactrim, prescription sent with end date of 11/17.  Patient was also noted to have some hematuria in the [place], is supposed to follow-up with urology this week for cystoscopy.  Per cardiology, it was okay to hold Xarelto on discharge until patient follows with urology.  Otherwise stable for discharge at this time.    Operative Procedures:      Imaging: No results found.      Disposition: home    Activity: activity as tolerated  Diet: cardiac diet    Home Medication Changes:     Med list      Medication List        START taking these medications      furosemide 20 MG Tabs  Commonly known as: Lasix  Take 1 tablet (20 mg total) by mouth daily.  Start taking on: November 13, 2024     * sulfamethoxazole-trimethoprim -160 MG Tabs per tablet  Commonly known as: Bactrim DS  Take 1 tablet by mouth 2 (two) times daily for 5 days.           * This list has 1 medication(s) that are the same as other medications prescribed for you. Read the directions carefully, and ask your doctor or other care provider to review them with you.                CHANGE how you take these medications      Niacin  MG Tbcr  Commonly known as: SLO-NIACIN  Take 1 tablet (500 mg total) by mouth 2 (two) times daily.  What changed: when to take this     Pravastatin Sodium 80 MG Tabs  Commonly known as: PRAVACHOL  TAKE 1 TABLET BY MOUTH NIGHTLY  What changed: when to take this            CONTINUE taking these medications      acetaminophen 500 MG Tabs  Commonly known as: Tylenol Extra Strength     acidophilus-pectin Caps  Commonly known as: Probiotic     * carbidopa-levodopa  MG Tabs  Commonly known as: SINEMET     * carbidopa-levodopa ER  MG Tbcr  Commonly known as: SINEMET CR     cetirizine 10 MG Tabs  Commonly known as: ZyrTEC     docusate sodium 100 MG Caps  Commonly known as: Colace     ezetimibe 10 MG Tabs  Commonly known as: Zetia  TAKE ONE TABLET BY MOUTH ONE TIME DAILY     finasteride 5 MG Tabs  Commonly known as: Proscar  Take 1 tablet (5 mg total) by mouth daily.     hydroxychloroquine 200 MG Tabs  Commonly known as: Plaquenil     melatonin 5 MG Caps     midodrine 10 MG Tabs  Commonly known as: ProAmatine  Take 1 tablet (10 mg total) by mouth in the morning and 1 tablet (10 mg total) at noon and 1 tablet (10 mg total) in the evening.     multivitamin Chew     nystatin 100,000 Units/g Crea  Commonly known as: Mycostatin     pantoprazole 40 MG Tbec  Commonly known as: Protonix     Polyethylene Glycol 3350 17  g Pack  Commonly known as: MIRALAX     pregabalin 75 MG Caps  Commonly known as: Lyrica     traMADol 50 MG Tabs  Commonly known as: Ultram           * This list has 2 medication(s) that are the same as other medications prescribed for you. Read the directions carefully, and ask your doctor or other care provider to review them with you.                STOP taking these medications      rivaroxaban 20 MG Tabs  Commonly known as: Xarelto            ASK your doctor about these medications      * sulfamethoxazole-trimethoprim -160 MG Tabs per tablet  Commonly known as: Bactrim DS  Take 1 tablet by mouth 2 (two) times daily for 7 days.  Ask about: Should I take this medication?           * This list has 1 medication(s) that are the same as other medications prescribed for you. Read the directions carefully, and ask your doctor or other care provider to review them with you.                   Where to Get Your Medications        These medications were sent to [id number] - [place], [place] - [address] [phone], [phone] [address], [facility] [address]      Phone: [phone]   furosemide 20 MG Tabs  sulfamethoxazole-trimethoprim -160 MG Tabs per tablet         FU   Follow-up Information       Olvin Dinh MD Follow up in 1 week(s).    Specialty: Internal Medicine  Contact information:  [address]  [facility] [address]  [phone]               Magdaleno Do MD Follow up in 2 week(s).    Specialties: Cardiovascular Diseases, CARDIOLOGY  Contact information:  [address]  [address]  [facility] [address] [phone]                             IA instructions:      Other Discharge Instructions:         *Please hold taking Xarelto until you see urology for your procedure this week. Also, prescriptions for lasix (water pill) and bactrim (UTI antibiotic) has been sent to your pharmacy.     Going Home Instructions  In this section you will find the tools  which will guide you through the first few days after you leave the hospital. Continued use of these tools will help you develop the skills necessary to keep your heart failure under control.     Home Care Instructions Following Heart Failure - the most important things to do every day include:   Weigh yourself and review the “Self-Check Plan” sheet every morning.   Call your cardiologist office if you are in the “Pay Attention-Use Caution” (yellow zone) or “Medical Alert-Warning!” (red zone) as outlined in the Self-Check Plan sheet.  Take your medicines as prescribed.  Limit your sodium (salt) intake.  Know when to call your cardiologist, primary doctor, or nurse.  Know when to seek emergency care.      Things for You to Remember:   1. See your doctor or healthcare provider as written on your discharge instructions.  It is important that you attend this appointment to make sure your symptoms are under control.     2. Your recommended sodium intake is 5815-5475 mg daily.    3.  Weigh yourself every day.    4. Some exercise and activity is important to help keep your heart functioning and strong. Unless instructed not to exercise, you may walk at a slow to moderate pace for 10-15 minutes 2-3 days per week to start. Pace your activity to prevent shortness of breath or fatigue. Stop exercising if you develop chest pain, lightheadedness, or significant shortness of breath.       Call Your Cardiologist If:   You gain 2-3 pounds in one day or 5 pounds in one week.  You have more difficulty breathing.  You are getting more tired with normal activity.  You are more short of breath lying down, or awaken at night short of breath.  You have swelling of your feet or legs.  You urinate less often during the day and more often at night.  You have cramps in your legs.  You have blurred vision or see yellowish-green halos around objects of lights.    Go to the Emergency Room If:   You have pain or tightness in your chest  You are  extremely short of breath  You are coughing up pink-frothy mucus  You are traveling and develop symptoms of worsening heart failure      ** Please follow up with your cardiologist or Advanced Practice Provider as written on your discharge instructions. If you are not provided with an appointment, let your nurse know so you can get an appointment**  Transportation Resources   -Ride Assist [place] - [phone] [email or web address]  -Ride [place] [phone] or [phone] [email or web address]  -First Transit [phone] [facility]/Lists of hospitals in the [place]/SiteCollectionDocuments/[email or web address].pdf  -[employer] [place] Ride Around [facility] [phone]  -[facility] Transit System [phone]  [facility] Transportation [phone] Ext. 8395   -[facility] Pace Dial-a-Ride Service  Reservations: [phone]  -[facility] [employer] Shuttle Bus Line at [phone]  -[facility] Senior Bus Service [phone]  -[facility] [phone].  -[facility] Transit 8-604-AUX-4KAT  -[facility] [phone]   -[employer] Transportation [phone]  -Go  Grandparent Transportation [phone] [email or web address]  -[employer] Transport [phone]  -Disabled on the Go [phone]  -[employer] Transfer [phone]  -[employer] [place] Wheel[employer] Transport [phone]   -[employer]: [phone]  -[facility] mobile [facility] [phone]  -[employer] [phone]  -[employer] [facility] [place], [facility], and [facility]. [phone] [email or web address]  -[employer] Transportation [facility] and surrounding communities [phone] [email or web address]          I reconciled current and discharge medications on day of discharge, discussed changes with patient and noted changes above.       Total Time Coordinating Care: 41 minutes.     Patient had opportunity  to ask questions and state understanding and agreement with therapeutic plan as outlined.    Giselle Vallejo DO  [facility]  Hospitalist  Contact via [employer]

## 2024-11-12 NOTE — PROGRESS NOTES
[facility] Cardiology Progress Note        Reginaldo Hdz Patient Status:  Inpatient    8/10/1940 MRN [id number]   [facility] 8NE-A Attending Delvin Arevalo MD   Hosp Day # 4 PCP Olvin Dinh MD     Subjective:  The patient denies  chest pain and shortness of breath.  Confused  Not  agitated    Medications:   sulfamethoxazole-trimethoprim DS  1 tablet Oral Q12H    furosemide  20 mg Oral Daily    carbidopa-levodopa  1.5 tablet Oral QID    ezetimibe  10 mg Oral Daily    finasteride  5 mg Oral Daily    hydroxychloroquine  200 mg Oral BID    pantoprazole  40 mg Oral QAM AC    polyethylene glycol (PEG 3350)  17 g Oral Daily    atorvastatin  20 mg Oral Daily    [Held by provider] rivaroxaban  20 mg Oral Daily with food    carbidopa-levodopa ER  1 tablet Oral BID       Continuous Infusions:   lactated ringers           Allergies:  Allergies[1]      Objective:        Intake/Output:      Intake/Output Summary (Last 24 hours) at 2024 0848  Last data filed at 2024 0557  Gross per 24 hour   Intake 462 ml   Output 825 ml   Net -363 ml     Wt Readings from Last 3 Encounters:   24 138 lb 3.7 oz (62.7 kg)   10/28/24 154 lb 5.2 oz (70 kg)   10/11/24 165 lb 5.5 oz (75 kg)       Physical Exam:        Vitals:    24 1930 24 2308 24 0615 24 0819   BP: (!) 150/92 148/89 141/80 (!) 136/97   BP Location: Right arm Right arm Left arm Left arm   Pulse: 86 80 73 73   Resp: 20 18 20 16   Temp: 98.8 °F (37.1 °C) 99.6 °F (37.6 °C) 97.8 °F (36.6 °C) 98 °F (36.7 °C)   TempSrc: Oral Oral Oral Oral   SpO2: 94% 95% 100% 96%   Weight:   138 lb 3.7 oz (62.7 kg)        Temp:  [97.8 °F (36.6 °C)-99.6 °F (37.6 °C)] 98 °F (36.7 °C)  Pulse:  [73-86] 73  Resp:  [16-20] 16  BP: (136-150)/(75-97) 136/97  SpO2:  [94 %-100 %] 96 %      Temp: 98 °F (36.7 °C)  Pulse: 73  Resp: 16  BP: 136/97  General:  Appears agitated  HEENT: No focal deficits.  Neck: No JVD, carotids 2+ no  bruits.  Cardiac: Regular S1S2.  No S3, S4, rub, click.  No murmur.  Lungs: Clear to auscultation and percussion.  Abdomen: Soft, non-tender.   Extremities: No LE edema.  No clubbing or cyanosis.    Neurologic:  agitated  Skin: Warm and dry.           LABS:      HEM:  Recent Labs   Lab 11/08/24 1717 11/09/24  0542 11/09/24  1522 11/10/24  0517 11/11/24  0837 11/12/24  0742   WBC 4.3 3.5*  --  2.9* 3.3* 3.1*   HGB 9.1* 7.9* 8.5* 8.7* 9.3* 8.9*   HCT 28.6* 24.9*  --  27.5* 28.4* 27.0*   PLT 92.0* 79.0*  --  92.0* 80.0* 78.0*       Chem:  Recent Labs   Lab 11/08/24 1717 11/09/24  0532 11/10/24  0517 11/11/24  0837 11/11/24  1836    140 137 136  --    K 4.5 3.8 3.7  3.7 3.6  3.6 4.2    106 105 106  --    CO2 30.0 30.0 29.0 28.0  --    BUN 13 12 11 17  --    CREATSERUM 1.07 0.98 0.93 0.85  --    CA 8.8 8.6* 8.7 8.4*  --    MG  --  1.8 1.9 1.8  --    PHOS  --  3.6  --   --   --    * 83 87 109*  --        Recent Labs   Lab 11/08/24 1717   ALT <7*   AST 40*   ALB 3.6       No results for input(s): \"PT\", \"PTT\", \"INR\" in the last 168 hours.        Lab Results   Component Value Date    TROP <0.045 12/06/2019    TROP <0.045 12/05/2019    TROP <0.045 12/05/2019         Invalid input(s): \"PBNPML\"                       Diagnostics:   Telemetry:      EKG, 11/8/2024:  NSR, FAV, LAD, PRWP     CXR, 11/8/2024:    Impression   CONCLUSION:  Cardiomegaly with pulmonary venous congestion.     Prominent interstitial markings throughout both lungs concerning for pulmonary edema.     Bibasilar infiltrates right greater than left with bilateral pleural effusions right greater than left.     Fracture deformities of posterior lateral right ribs unchanged from 8/30/2024.  No measurable pneumothorax.         Echo, 10/14/24:     Conclusions:     1. Left ventricle: The cavity size was normal. Wall thickness was normal.      Systolic function was normal. The estimated ejection fraction was 60%.   2. Right ventricle: The cavity  size was normal. Systolic function was      normal. Systolic pressure was at the upper limits of normal.   3. Left atrium: The atrium was moderately dilated.   4. Mitral valve: There was mild regurgitation.   5. Pulmonary arteries: Systolic pressure was mildly increased, estimated to      be 32mm Hg. The peak systolic pressure is 32mm Hg. The end-diastolic      pressure is 10mm Hg.   6. Pericardium, extracardiac: There was no pericardial effusion. There were      bilateral pleural effusions present.   7. No valvular vegetations were seen.   Impressions:  This study is compared with previous dated 07-10-24:   *            Impression:     1.  Dyspnea, LE edema, pleural effusions, elevated BNP. All c/w CHF      -    net out = 4.8 liters     2. Intermittent hypotension: autonomic dysfunction 2/2 PD. On midodrine     3. CAD, s/p CABG 1984      4. Paroxysmal Atrial Fibrillation. NSR today. On DOAC  5. Hypertension   6. PD  7. SLE  8. Right hemothorax 2/2 falls with  rib fx, and xarelto use 8/2024---> right thora   9. Gross hematuria/UTI on IV Abx     Recommend:     1.  Telemetry  2. Po lasix  3. Monitor BMP's, weights, I/O's and bp's closely  4. Recent echo reviewed. Will avoid duplication of testing   5. Will hold xarelto for worsening hematuria  6. Off midodrine due to elevated bp's.  SBP = 135-150 in last 24 hours.  monitor    Magdaleno Do MD         [1]   Allergies  Allergen Reactions    Hydrocodone-Acetaminophen CONFUSION, DIZZINESS and HALLUCINATION    Inderal [Propranolol] NAUSEA AND VOMITING and UNKNOWN    Isosorbide NAUSEA AND VOMITING    Other OTHER (SEE COMMENTS)

## 2024-11-12 NOTE — CM/SW NOTE
Pt is ready for dc today.  He will go back to [facility] in [place].  They are aware of him returning.  Family will reinstate pt's 24 hr caregiver.  RN sandee frazier report to [phone].  Family requesting transportation to return.  [facility] is scheduled to  pt at 6:00pm.  PCS form completed.  Family aware of pt's dc time.

## 2024-11-12 NOTE — PLAN OF CARE
Patient is alert & oriented to self, on room air. Up x1-2 assist with walker. NSR with 1st degree block on tele monitor. Incontinent of bowel and edmond in place. Patient denies any pain, shortness of breath, or chest pain/discomfort. PO lasix, PO bactrim. Updated on plan of care. Fall precautions in place. Call light in reach.     Problem: Patient/Family Goals  Goal: Patient/Family Long Term Goal  Description: Patient's Long Term Goal: Stay out of the hospital    Interventions:  - Take medications as ordered  - Attend follow up appointments  - See additional Care Plan goals for specific interventions  Outcome: Progressing  Goal: Patient/Family Short Term Goal  Description: Patient's Short Term Goal: Breathe better  11/11: Go home    Interventions:   - Labs, tele, consults  - Medications as ordered  - PO lasix  - PO abx  - See additional Care Plan goals for specific interventions  Outcome: Progressing     Problem: CARDIOVASCULAR - ADULT  Goal: Maintains optimal cardiac output and hemodynamic stability  Description: INTERVENTIONS:  - Monitor vital signs, rhythm, and trends  - Monitor for bleeding, hypotension and signs of decreased cardiac output  - Evaluate effectiveness of vasoactive medications to optimize hemodynamic stability  - Monitor arterial and/or venous puncture sites for bleeding and/or hematoma  - Assess quality of pulses, skin color and temperature  - Assess for signs of decreased coronary artery perfusion - ex. Angina  - Evaluate fluid balance, assess for edema, trend weights  Outcome: Progressing  Goal: Absence of cardiac arrhythmias or at baseline  Description: INTERVENTIONS:  - Continuous cardiac monitoring, monitor vital signs, obtain 12 lead EKG if indicated  - Evaluate effectiveness of antiarrhythmic and heart rate control medications as ordered  - Initiate emergency measures for life threatening arrhythmias  - Monitor electrolytes and administer replacement therapy as ordered  Outcome:  Progressing     Problem: RESPIRATORY - ADULT  Goal: Achieves optimal ventilation and oxygenation  Description: INTERVENTIONS:  - Assess for changes in respiratory status  - Assess for changes in mentation and behavior  - Position to facilitate oxygenation and minimize respiratory effort  - Oxygen supplementation based on oxygen saturation or ABGs  - Provide Smoking Cessation handout, if applicable  - Encourage broncho-pulmonary hygiene including cough, deep breathe, Incentive Spirometry  - Assess the need for suctioning and perform as needed  - Assess and instruct to report SOB or any respiratory difficulty  - Respiratory Therapy support as indicated  - Manage/alleviate anxiety  - Monitor for signs/symptoms of CO2 retention  Outcome: Progressing     Problem: PAIN - ADULT  Goal: Verbalizes/displays adequate comfort level or patient's stated pain goal  Description: INTERVENTIONS:  - Encourage pt to monitor pain and request assistance  - Assess pain using appropriate pain scale  - Administer analgesics based on type and severity of pain and evaluate response  - Implement non-pharmacological measures as appropriate and evaluate response  - Consider cultural and social influences on pain and pain management  - Manage/alleviate anxiety  - Utilize distraction and/or relaxation techniques  - Monitor for opioid side effects  - Notify MD/LIP if interventions unsuccessful or patient reports new pain  - Anticipate increased pain with activity and pre-medicate as appropriate  Outcome: Progressing     Problem: SAFETY ADULT - FALL  Goal: Free from fall injury  Description: INTERVENTIONS:  - Assess pt frequently for physical needs  - Identify cognitive and physical deficits and behaviors that affect risk of falls.  - [place] fall precautions as indicated by assessment.  - Educate pt/family on patient safety including physical limitations  - Instruct pt to call for assistance with activity based on assessment  - Modify environment  to reduce risk of injury  - Provide assistive devices as appropriate  - Consider OT/PT consult to assist with strengthening/mobility  - Encourage toileting schedule  Outcome: Progressing

## 2024-11-12 NOTE — PROGRESS NOTES
[facility] Hospitalist Progress Note                                                                     [facility]   part of [facility]      Reginaldo Hdz  8/10/1940    SUBJECTIVE: Hematuria overnight, resolved this morning.     OBJECTIVE:  Temp:  [97.8 °F (36.6 °C)-100.9 °F (38.3 °C)] 98.8 °F (37.1 °C)  Pulse:  [69-81] 76  Resp:  [20-22] 20  BP: (111-144)/(69-88) 144/88  SpO2:  [96 %-99 %] 99 %  Gen: No acute distress, alert and oriented to name only  Pulm: Lungs clear bilaterally, normal respiratory effort, no crackles, no wheezing  CV: Heart with regular rate and rhythm, no murmur.   Abd: Abdomen soft, nontender, nondistended, bowel sounds present  MSK: No significant pitting edema or tenderness of the LE  Skin: no new rashes or lesions    Labs:   Recent Labs   Lab 11/08/24 1717 11/09/24  0542 11/09/24  1522 11/10/24  0517 11/11/24  0837   WBC 4.3 3.5*  --  2.9* 3.3*   HGB 9.1* 7.9* 8.5* 8.7* 9.3*   MCV 86.7 86.8  --  85.7 84.3   PLT 92.0* 79.0*  --  92.0* 80.0*       Recent Labs   Lab 11/08/24 1717 11/09/24  0532 11/10/24  0517 11/11/24  0837 11/11/24  1836    140 137 136  --    K 4.5 3.8 3.7  3.7 3.6  3.6 4.2    106 105 106  --    CO2 30.0 30.0 29.0 28.0  --    BUN 13 12 11 17  --    CREATSERUM 1.07 0.98 0.93 0.85  --    CA 8.8 8.6* 8.7 8.4*  --    MG  --  1.8 1.9 1.8  --    PHOS  --  3.6  --   --   --    * 83 87 109*  --        Recent Labs   Lab 11/08/24 1717   ALT <7*   AST 40*   ALB 3.6       No results for input(s): \"PGLU\" in the last 168 hours.    Meds:   Scheduled:    sulfamethoxazole-trimethoprim DS  1 tablet Oral Q12H    furosemide  20 mg Oral Daily    carbidopa-levodopa  1.5 tablet Oral QID    ezetimibe  10 mg Oral Daily    finasteride  5 mg Oral Daily    hydroxychloroquine  200 mg Oral BID    pantoprazole  40 mg Oral QAM AC    polyethylene glycol (PEG 3350)  17 g Oral Daily    atorvastatin  20 mg Oral Daily    [Held by provider]  rivaroxaban  20 mg Oral Daily with food    carbidopa-levodopa ER  1 tablet Oral BID     Continuous Infusions:    lactated ringers       PRN:   acetaminophen    haloperidol **OR** haloperidol lactate    traMADol    melatonin    ondansetron    ASSESSMENT / PLAN:   84 year old male with history of anxiety, cad with stents, atrial fibrillation, htn, hld, gerd, lupus, PD and neuropathy presenting with sob.      Acute CHF  -cardiology following  -strict I/o  -daily weights  -lasix iv-> transitioned to oral lasix 20 mg daily today. Discussed with cardiology and in agreement.      AMS  -??some underlying dementia  -pt ua abn, cx pending  -monitor clinically    UTI, uncomplicated  -abnormal ua, given confusion  will tx  -iv ceftriaxone-> cultures came back positive for stenotrophomonas and serratia, sensitive to bactrim. Switched to oral DS bactrim.     Hematuria  -Noted to have hematuria last night and [place].  Patient is to  undergo cystoscopy this week with urology.  -Xarelto held    HLD  -atorvastatin  -zetia     PD  -sinemet     Lupus hx  -hydroxychloroquine     GERD  -pantoprazole     PAF  -xarelto held today because of hematuria     Quality:  DVT Prophylaxis: scd, xarelto  CODE status: DNR select per chart  [place]: yes     Plan of care discussed with patient and staff     Dispo: no discharge, likely tomorrow     DO [facility]  Hospitalist  Contact via [employer]

## 2024-11-12 NOTE — PHYSICAL THERAPY NOTE
PHYSICAL THERAPY TREATMENT NOTE - INPATIENT    Room Number: 8622/8622-A       Session: 2     Number of Visits to Meet Established Goals: 3  History related to current admission: Patient is a 84 year old male admitted on 11/8/2024 from [facility] for SOB.  Pt diagnosed with Acute on chronic CHF.Recent hosp in end of 10/24 and multiple hosp this year     HOME SITUATION  Type of Home: Assisted living facility  Home Layout: One level  Lives With: Spouse;Caregiver 24 hours     Toilet and Equipment: Comfort height toilet;Grab bar  Shower/Tub and Equipment: Walk-in shower;Grab bar;Shower chair  Other Equipment:  (wheelchair)     Hand Dominance: Right  Drives: No     Prior Level of Function: PLOF per caregiver. Pt has 2 caregivers 22 hours a day in addition to [facility] staff. Pt has assist of one for supine to sit, SBA/CGA for ambulation via walker to dining douglas, assist of one for dressing/bathing/toileting, and assist of 2 for sit to supine. Pt feeds himself. He can normally pull up/fasten his own pants.     Presenting Problem: Acute on Chronic CHF  Co-Morbidities : PD, a fib, CABG, chronic edmond, HTN, rib fractures Aug 2024, recent admit for syncope 10/28/24    PHYSICAL THERAPY ASSESSMENT   Patient demonstrates fair progress this session, goals  remain in progress.  Improved participation and decreased agitation noted with CG Dayanna present and cuing during session.     Patient is requiring minimal assist and moderate assist as a result of the following impairments: decreased functional strength, impaired motor planning, decreased muscular endurance, cognitive deficits (intermittent agitation with activity, requires increased time and encouragement to participate), and inc tremors with activity (CG reports worse than baseline) .     Patient continues to function below baseline with bed mobility, transfers, and gait.  Next session anticipate patient to progress transfers and gait.  Physical Therapy will continue to follow patient for  duration of hospitalization.    Patient continues to benefit from continued skilled PT services: at discharge to promote prior level of function and safety with additional support and return home with home health PT. OT discussed with CG who was present throughout session.     PLAN DURING HOSPITALIZATION  Nursing Mobility Recommendation : 1 Assist  PT Device Recommendation: Rolling walker  PT Treatment Plan: Bed mobility;Body mechanics;Patient education;Gait training;Strengthening;Stair training;Transfer training;Balance training;Range of motion;Family education  Frequency (Obs): 3x/week     CURRENT GOALS     Goal #1 Patient is able to demonstrate supine - sit EOB @ level: supervision      Goal #2 Patient is able to demonstrate transfers Sit to/from Stand at assistance level: supervision      Goal #3 Patient is able to ambulate 50 feet with assist device: walker - rolling at assistance level: supervision/CGA      Goal #4     Goal #5     Goal #6     Goal Comments: Goals established on 2024 all goals ongoing     SUBJECTIVE  \"Yekathi Ivelisse helps me out\"    OBJECTIVE  Precautions: Bed/chair alarm    WEIGHT BEARING RESTRICTION     PAIN ASSESSMENT   Ratin  Location: denied  Management Techniques: Repositioning;Activity promotion;Body mechanics    BALANCE                                                                                                                       Static Sitting: Fair -  Dynamic Sitting: Fair -           Static Standing: Poor +  Dynamic Standing: Poor +    ACTIVITY TOLERANCE                         O2 WALK       AM-PAC '6-Clicks' INPATIENT SHORT FORM - BASIC MOBILITY  How much difficulty does the patient currently have...  Patient Difficulty: Turning over in bed (including adjusting bedclothes, sheets and blankets)?: A Little   Patient Difficulty: Sitting down on and standing up from a chair with arms (e.g., wheelchair, bedside commode, etc.): A Little   Patient Difficulty:  Moving from lying on back to sitting on the side of the bed?: A Little   How much help from another person does the patient currently need...   Help from Another: Moving to and from a bed to a chair (including a wheelchair)?: A Little   Help from Another: Need to walk in hospital room?: A Little   Help from Another: Climbing 3-5 steps with a railing?: A Little     AM-PAC Score:  Raw Score: 18   Approx Degree of Impairment: 46.58%   Standardized Score (AM-PAC Scale): 43.63   CMS Modifier (G-Code): CK    FUNCTIONAL ABILITY STATUS  Gait Assessment   Functional Mobility/Gait Assessment  Gait Assistance: Minimum assistance  Distance (ft): 3  Assistive Device: None (hand held)  Pattern: Shuffle;Festinating    Skilled Therapy Provided    Bed Mobility:  Rolling: NT  Supine<>Sit: Min A   Sit<>Supine: NT     Transfer Mobility:  Sit<>Stand: Min A    Stand<>Sit: Min  A   Gait: min A     Therapist's Comments: Discussed case with RN prior to session initiation. Pt agreeable to participation in therapy. Utilized handheld assist (as documented per previous note).       Patient End of Session: Up in chair;Needs met;Call light within reach;RN aware of session/findings;All patient questions and concerns addressed;Hospital anti-slip socks;Alarm set    PT Session Time: 8 minutes  Gait Training:  minutes  Therapeutic Activity: 8 minutes  Therapeutic Exercise:  minutes   Neuromuscular Re-education:  minutes

## 2024-11-13 NOTE — DISCHARGE PLANNING
This RN gave report to JARRED Frias at [facility] around approx 1728 this PM. PIV removed, tele monitor discontinued. Patient transported via EMS to [facility] for discharge.

## 2024-11-15 VITALS
DIASTOLIC BLOOD PRESSURE: 85 MMHG | BODY MASS INDEX: 22 KG/M2 | OXYGEN SATURATION: 100 % | SYSTOLIC BLOOD PRESSURE: 148 MMHG | HEART RATE: 78 BPM | WEIGHT: 138.25 LBS | RESPIRATION RATE: 16 BRPM | TEMPERATURE: 98 F

## 2024-11-15 NOTE — PAT NURSING NOTE
RN left message for Ciel at [facility] in regard to rescreening patient for surgery on 11/27 with Dr. Leon. Per [facility]SS there is a request to start antibiotics 5 days prior to procedure. Called and spoke to Ximena  for Dr. Leon who stated that Dr. Leon had previously faxed antibiotic order request to [facility] previously for patient for upcoming surgery.

## 2024-11-18 ENCOUNTER — TELEPHONE (OUTPATIENT)
Dept: SURGERY | Facility: CLINIC | Age: 84
End: 2024-11-18

## 2024-11-18 NOTE — TELEPHONE ENCOUNTER
Per Jocelyn STERN (PAT) needs orders sent to nursing home with abx and directions pt will need to take prior to surgery 11/27/24 Facility: [facility] [place] [phone]

## 2024-11-18 NOTE — TELEPHONE ENCOUNTER
April, [facility] calling for patient that is scheduled for procedure on 11/27. Please clarify when patient is to stop/start blood thinner Xarelto. Please call at [phone],thanks.

## 2024-11-19 NOTE — TELEPHONE ENCOUNTER
Pt. Currently taking Rivaroxaban (Xarelto) 20mg @ [facility].  Patient nurse needs to verify when to stop medication prior to surgery on 11/27.

## 2024-11-19 NOTE — TELEPHONE ENCOUNTER
Return call to [facility].    Spoke to Cristy regarding when to stop Xarelto 20mg prior to patients surgery of 11/27.    RN to request Providers advise to give orders to Yuriy.

## 2024-11-20 NOTE — PAT NURSING NOTE
RN called Dr. Do office and spoke to triage RN regarding cardiac clearance for upcoming procedure on 11/27, per RN patient will need to be seen in clinic by Dr. Do in order to receive cardiac clearance since patient was recently admitted. Called patient's daughter Chloé to notify who stated she will contact Dr. Do office to see about getting patient in for an appointment prior to surgery date. In addition, left message with Dr. Leon's surgery scheduler Chelsie to notify/ update regarding request for cardiac clearance.

## 2024-11-20 NOTE — TELEPHONE ENCOUNTER
Pt still pending cardiac clearance to hold Xarelto. Per pt's daughter today, still trying to schedule appointment to see Dr. Do this week.

## 2024-11-21 ENCOUNTER — TELEPHONE (OUTPATIENT)
Dept: SURGERY | Facility: CLINIC | Age: 84
End: 2024-11-21

## 2024-11-21 DIAGNOSIS — N21.0 BLADDER STONES: ICD-10-CM

## 2024-11-21 DIAGNOSIS — N32.89 BLADDER MASS: ICD-10-CM

## 2024-11-21 DIAGNOSIS — N40.1 BENIGN PROSTATIC HYPERPLASIA WITH LOWER URINARY TRACT SYMPTOMS, SYMPTOM DETAILS UNSPECIFIED: Primary | ICD-10-CM

## 2024-11-21 NOTE — TELEPHONE ENCOUNTER
Please add TURP to this patient's procedure next Wednesday in addition to the other things listed. This is an overnight stay as well.    Thanks  MP[facility]

## 2024-11-22 ENCOUNTER — ANESTHESIA EVENT (OUTPATIENT)
Dept: SURGERY | Facility: HOSPITAL | Age: 84
End: 2024-11-22
Payer: MEDICARE

## 2024-11-25 ENCOUNTER — HOSPITAL ENCOUNTER (EMERGENCY)
Facility: HOSPITAL | Age: 84
Discharge: HOME | End: 2024-11-25
Attending: EMERGENCY MEDICINE
Payer: MEDICARE

## 2024-11-25 VITALS
DIASTOLIC BLOOD PRESSURE: 94 MMHG | RESPIRATION RATE: 23 BRPM | HEART RATE: 61 BPM | TEMPERATURE: 98 F | BODY MASS INDEX: 23.54 KG/M2 | OXYGEN SATURATION: 100 % | WEIGHT: 150 LBS | HEIGHT: 67 IN | SYSTOLIC BLOOD PRESSURE: 115 MMHG

## 2024-11-25 DIAGNOSIS — R11.2 NAUSEA AND VOMITING, UNSPECIFIED VOMITING TYPE: Primary | ICD-10-CM

## 2024-11-25 LAB
ALBUMIN SERPL-MCNC: 3.2 G/DL (ref 3.2–4.8)
ALBUMIN/GLOB SERPL: 0.6 {RATIO} (ref 1–2)
ALP LIVER SERPL-CCNC: 146 U/L
ALT SERPL-CCNC: <7 U/L
ANION GAP SERPL CALC-SCNC: 7 MMOL/L (ref 0–18)
AST SERPL-CCNC: 48 U/L (ref ?–34)
BASOPHILS # BLD AUTO: 0.02 X10(3) UL (ref 0–0.2)
BASOPHILS NFR BLD AUTO: 0.4 %
BILIRUB SERPL-MCNC: 0.4 MG/DL (ref 0.2–1.1)
BILIRUB UR QL STRIP.AUTO: NEGATIVE
BUN BLD-MCNC: 15 MG/DL (ref 9–23)
CALCIUM BLD-MCNC: 9.6 MG/DL (ref 8.7–10.4)
CHLORIDE SERPL-SCNC: 104 MMOL/L (ref 98–112)
CO2 SERPL-SCNC: 22 MMOL/L (ref 21–32)
COLOR UR AUTO: YELLOW
CREAT BLD-MCNC: 1.48 MG/DL
EGFRCR SERPLBLD CKD-EPI 2021: 46 ML/MIN/1.73M2 (ref 60–?)
EOSINOPHIL # BLD AUTO: 0.03 X10(3) UL (ref 0–0.7)
EOSINOPHIL NFR BLD AUTO: 0.7 %
ERYTHROCYTE [DISTWIDTH] IN BLOOD BY AUTOMATED COUNT: 15.5 %
GLOBULIN PLAS-MCNC: 5 G/DL (ref 2–3.5)
GLUCOSE BLD-MCNC: 143 MG/DL (ref 70–99)
GLUCOSE UR STRIP.AUTO-MCNC: NORMAL MG/DL
HCT VFR BLD AUTO: 35.1 %
HGB BLD-MCNC: 11.2 G/DL
IMM GRANULOCYTES # BLD AUTO: 0.03 X10(3) UL (ref 0–1)
IMM GRANULOCYTES NFR BLD: 0.7 %
LEUKOCYTE ESTERASE UR QL STRIP.AUTO: 25
LYMPHOCYTES # BLD AUTO: 1.03 X10(3) UL (ref 1–4)
LYMPHOCYTES NFR BLD AUTO: 22.8 %
MCH RBC QN AUTO: 26.8 PG (ref 26–34)
MCHC RBC AUTO-ENTMCNC: 31.9 G/DL (ref 31–37)
MCV RBC AUTO: 84 FL
MONOCYTES # BLD AUTO: 0.5 X10(3) UL (ref 0.1–1)
MONOCYTES NFR BLD AUTO: 11.1 %
NEUTROPHILS # BLD AUTO: 2.9 X10 (3) UL (ref 1.5–7.7)
NEUTROPHILS # BLD AUTO: 2.9 X10(3) UL (ref 1.5–7.7)
NEUTROPHILS NFR BLD AUTO: 64.3 %
NITRITE UR QL STRIP.AUTO: NEGATIVE
OSMOLALITY SERPL CALC.SUM OF ELEC: 279 MOSM/KG (ref 275–295)
PH UR STRIP.AUTO: 6 [PH] (ref 5–8)
PLATELET # BLD AUTO: 121 10(3)UL (ref 150–450)
POTASSIUM SERPL-SCNC: 4.8 MMOL/L (ref 3.5–5.1)
PROT SERPL-MCNC: 8.2 G/DL (ref 5.7–8.2)
PROT UR STRIP.AUTO-MCNC: 200 MG/DL
RBC # BLD AUTO: 4.18 X10(6)UL
RBC #/AREA URNS AUTO: >10 /HPF
SODIUM SERPL-SCNC: 133 MMOL/L (ref 136–145)
SP GR UR STRIP.AUTO: 1.03 (ref 1–1.03)
UROBILINOGEN UR STRIP.AUTO-MCNC: NORMAL MG/DL
WBC # BLD AUTO: 4.5 X10(3) UL (ref 4–11)

## 2024-11-25 PROCEDURE — 99284 EMERGENCY DEPT VISIT MOD MDM: CPT

## 2024-11-25 PROCEDURE — 96374 THER/PROPH/DIAG INJ IV PUSH: CPT

## 2024-11-25 PROCEDURE — 87086 URINE CULTURE/COLONY COUNT: CPT | Performed by: EMERGENCY MEDICINE

## 2024-11-25 PROCEDURE — 81001 URINALYSIS AUTO W/SCOPE: CPT | Performed by: EMERGENCY MEDICINE

## 2024-11-25 PROCEDURE — 85025 COMPLETE CBC W/AUTO DIFF WBC: CPT | Performed by: EMERGENCY MEDICINE

## 2024-11-25 PROCEDURE — 80053 COMPREHEN METABOLIC PANEL: CPT | Performed by: EMERGENCY MEDICINE

## 2024-11-25 PROCEDURE — 96361 HYDRATE IV INFUSION ADD-ON: CPT

## 2024-11-25 RX ORDER — ONDANSETRON 4 MG/1
4 TABLET, ORALLY DISINTEGRATING ORAL EVERY 4 HOURS PRN
Qty: 10 TABLET | Refills: 0 | Status: SHIPPED | OUTPATIENT
Start: 2024-11-25 | End: 2024-12-02

## 2024-11-25 RX ORDER — ONDANSETRON 2 MG/ML
4 INJECTION INTRAMUSCULAR; INTRAVENOUS ONCE
Status: COMPLETED | OUTPATIENT
Start: 2024-11-25 | End: 2024-11-25

## 2024-11-26 NOTE — ED PROVIDER NOTES
Patient Seen in: [facility] Emergency Department      History     Chief Complaint   Patient presents with    Nausea/Vomiting/Diarrhea     Stated Complaint: n/v    Subjective:   HPI      84-year-old male comes to the hospital with nausea and 2 episodes of vomiting today.  The patient is scheduled for a TURP on Wednesday.  The patient is a relatively poor historian.  He is denying any headaches or dizziness.  No chest pain or shortness of breath.  Denies any fevers, chills or biotics.  No flank pain.  He denies any diarrhea.  He states there is nothing specific makes it better or worse.  He is denying any other specific complaints this time.  He does have an indwelling [place] at this time.    Objective:     Past Medical History:    Anemia    Anesthesia complication    Can become agitated/combative coming out of anesthesia    Anxiety state    Arrhythmia    Atherosclerosis of coronary artery    Back problem    Blood disorder    Chronic atrial fibrillation (HCC)    Coronary atherosclerosis    Esophageal reflux    Heart attack (HCC)    High blood pressure    High cholesterol    History of adverse reaction to anesthesia    Hyperlipidemia    Hypertension    Incontinence    Has an indwelling urinary catheter    Lupus    Osteoarthritis    Parkinson disease (HCC)    Peripheral neuropathy    Visual impairment    Glasses              Past Surgical History:   Procedure Laterality Date    Angiogram  1984    Angiogram  1996    [facility] 1.Successful PTCA of the infarct related artery which was the proximal graft of the saphenous vein graft to the right posterior descending/LV branch which was dilated and stented using a 4 x 13 BX Velocity to a maximum diameter of 4.45 mm.  2.Proximal PDA primarily stented using a 3 x 18 Penta stent from about 70 to 80% to 0%, HUGH III flow, no dissection.     [facility]  [facility] Successful stenting with a drug-eluting  stent to the graft to the right coronary artery. Severe native CAD. Patent vein graft to LAD. Severe disease in the vein graft to the right coronary artery.    Angiogram  2011    [facility], Dr. Steele: Successful stenting of the circumflex artery in the proximal and mid portions. Relook angiography of the vein graft to the right coronary artery showed that this vessel is widely patent.    Cabg  1984    x2    Cath bare metal stent (bms)      Cath drug eluting stent      Shoulder surg proc unlisted Left     fracture                Social History     Socioeconomic History    Marital status:    Tobacco Use    Smoking status: Former     Current packs/day: 0.00     Types: Cigarettes     Quit date: 1982     Years since quittin.6    Smokeless tobacco: Never   Vaping Use    Vaping status: Never Used   Substance and Sexual Activity    Alcohol use: Not Currently     Comment: Social    Drug use: No     Social Drivers of Health     Food Insecurity: No Food Insecurity (2024)    Food Insecurity     Food Insecurity: Never true   Transportation Needs: No Transportation Needs (2024)    Transportation Needs     Lack of Transportation: No   Housing Stability: Low Risk  (2024)    Housing Stability     Housing Instability: No                  Physical Exam     ED Triage Vitals [24]   BP (!) 115/94   Pulse 72   Resp 15   Temp 97.9 °F (36.6 °C)   Temp src Temporal   SpO2 100 %   O2 Device None (Room air)       Current Vitals:   Vital Signs  BP: (!) 115/94  Pulse: 72  Resp: 15  Temp: 97.9 °F (36.6 °C)  Temp src: Temporal    Oxygen Therapy  SpO2: 100 %  O2 Device: None (Room air)        Physical Exam  HEENT : NCAT, EOMI, PEERL,  neck supple, no JVD, trachea midline, No LAD  Heart: S1S2 normal. No murmurs, regular rate and rhythm  Lungs: Clear to auscultation bilaterally  Abdomen: Soft nontender nondistended normal active bowel sounds without rebound, guarding or masses noted  Back nontender  without CVA tenderness  Extremity no clubbing, cyanosis or edema noted.  Full range of motion noted without tenderness  Neuro: No focal deficits noted    All measures to prevent infection transmission during my interaction with the patient were taken.  The patient was already wearing droplet mask on my arrival to the room.  Personal protective equipment including a droplet mask as well as gloves were worn throughout the duration of my exam.  Hand washing was performed prior to and after the exam.  Stethoscope and equipment used during my examination was cleaned with a super Sani cloth germicidal wipe following the exam.    ED Course     Labs Reviewed   COMP METABOLIC PANEL (14) - Abnormal; Notable for the following components:       Result Value    Glucose 143 (*)     Sodium 133 (*)     Creatinine 1.48 (*)     eGFR-Cr 46 (*)     AST 48 (*)     ALT <7 (*)     Alkaline Phosphatase 146 (*)     Globulin  5.0 (*)     A/G Ratio 0.6 (*)     All other components within normal limits   CBC WITH DIFFERENTIAL WITH PLATELET - Abnormal; Notable for the following components:    HGB 11.2 (*)     HCT 35.1 (*)     .0 (*)     All other components within normal limits   URINALYSIS WITH CULTURE REFLEX - Abnormal; Notable for the following components:    Clarity Urine Turbid (*)     Ketones Urine Trace (*)     Blood Urine 3+ (*)     Protein Urine 200 (*)     Leukocyte Esterase Urine 25 (*)     WBC Urine 11-20 (*)     RBC Urine >10 (*)     Ca Oxalate Crystals Few (*)     All other components within normal limits   SCAN SLIDE   RAINBOW DRAW LAVENDER   RAINBOW DRAW LIGHT GREEN   URINE CULTURE, ROUTINE       ED Course as of 11/25/24 2158  ------------------------------------------------------------  Time: 11/25 2156  Comment: While here the patient's urine showed no significant fraction.  The white count was normal.  The patient did have a creatinine 1.40.  Was given a liter of fluid while here.  He was given Zofran as well and feels  much better and would like to go home.       Medications   sodium chloride 0.9 % IV bolus 1,000 mL (1,000 mL Intravenous New Bag 11/25/24 2026)   ondansetron (Zofran) 4 MG/2ML injection 4 mg (4 mg Intravenous Given 11/25/24 2026)            MDM      Differential diagnosis included urinary tract infection, gastroenteritis, tractable vomiting but not limited such.  Patient states he is feeling fine its particular time.  He was given IV fluids and Zofran and at this time will be discharged home for further outpatient care.    Patient was screened and evaluated during this visit.   As a treating physician attending to the patient, I determined, within reasonable clinical confidence and prior to discharge, that an emergency medical condition was not or was no longer present.  There was no indication for further evaluation, treatment or admission on an emergency basis.       The usual and customary discharge instuctions were discussed given the patient's ER course.  We discussed signs and symptoms that should prompt the patient's immediate return to the emergency department.   Reasonable over the counter and prescription treatment options and Physician follow up plan was discussed.       The patient is discharged in good condition.     This note was prepared using Dragon Medical voice recognition dictation software.  As a result errors may occur.  When identified to these areas have been corrected.  While every attempt is made to correct errors during dictation discrepancies may still exist.  Please contact if there are any errors.        Medical Decision Making      Disposition and Plan     Clinical Impression:  1. Nausea and vomiting, unspecified vomiting type         Disposition:  Discharge  11/25/2024  9:57 pm    Follow-up:  Olvin Dinh MD  [address] [facility] [address]  [phone]    Follow up            Medications Prescribed:  Current Discharge Medication List        START taking these  medications    Details   ondansetron 4 MG Oral Tablet Dispersible Take 1 tablet (4 mg total) by mouth every 4 (four) hours as needed for Nausea.  Qty: 10 tablet, Refills: 0                 Supplementary Documentation:

## 2024-11-26 NOTE — ED INITIAL ASSESSMENT (HPI)
BIBA from NH c/o nausea and vomiting x 4 days. Pt states not eating in 4 days. Pt denies abdominal pain. Pt has surgery scheduled for 11/27.

## 2024-11-27 ENCOUNTER — HOSPITAL ENCOUNTER (OUTPATIENT)
Facility: HOSPITAL | Age: 84
Discharge: HOME HEALTH | End: 2024-11-29
Attending: UROLOGY | Admitting: UROLOGY
Payer: MEDICARE

## 2024-11-27 ENCOUNTER — ANESTHESIA (OUTPATIENT)
Dept: SURGERY | Facility: HOSPITAL | Age: 84
End: 2024-11-27
Payer: MEDICARE

## 2024-11-27 DIAGNOSIS — N40.1 BENIGN PROSTATIC HYPERPLASIA WITH LOWER URINARY TRACT SYMPTOMS, SYMPTOM DETAILS UNSPECIFIED: ICD-10-CM

## 2024-11-27 DIAGNOSIS — N32.89 BLADDER MASS: ICD-10-CM

## 2024-11-27 DIAGNOSIS — N21.0 BLADDER STONES: ICD-10-CM

## 2024-11-27 PROCEDURE — 52317 REMOVE BLADDER STONE: CPT | Performed by: UROLOGY

## 2024-11-27 PROCEDURE — 52601 PROSTATECTOMY (TURP): CPT | Performed by: UROLOGY

## 2024-11-27 PROCEDURE — 0VB08ZZ EXCISION OF PROSTATE, VIA NATURAL OR ARTIFICIAL OPENING ENDOSCOPIC: ICD-10-PCS | Performed by: STUDENT IN AN ORGANIZED HEALTH CARE EDUCATION/TRAINING PROGRAM

## 2024-11-27 PROCEDURE — 0TBB8ZZ EXCISION OF BLADDER, VIA NATURAL OR ARTIFICIAL OPENING ENDOSCOPIC: ICD-10-PCS | Performed by: STUDENT IN AN ORGANIZED HEALTH CARE EDUCATION/TRAINING PROGRAM

## 2024-11-27 PROCEDURE — 0TCB8ZZ EXTIRPATION OF MATTER FROM BLADDER, VIA NATURAL OR ARTIFICIAL OPENING ENDOSCOPIC: ICD-10-PCS | Performed by: STUDENT IN AN ORGANIZED HEALTH CARE EDUCATION/TRAINING PROGRAM

## 2024-11-27 PROCEDURE — 52240 CYSTOSCOPY AND TREATMENT: CPT | Performed by: UROLOGY

## 2024-11-27 RX ORDER — PREGABALIN 75 MG/1
75 CAPSULE ORAL 2 TIMES DAILY
Status: DISCONTINUED | OUTPATIENT
Start: 2024-11-27 | End: 2024-11-29

## 2024-11-27 RX ORDER — HYDROMORPHONE HYDROCHLORIDE 1 MG/ML
0.4 INJECTION, SOLUTION INTRAMUSCULAR; INTRAVENOUS; SUBCUTANEOUS EVERY 2 HOUR PRN
Status: DISCONTINUED | OUTPATIENT
Start: 2024-11-27 | End: 2024-11-29

## 2024-11-27 RX ORDER — LEVOFLOXACIN 5 MG/ML
500 INJECTION, SOLUTION INTRAVENOUS ONCE
Status: COMPLETED | OUTPATIENT
Start: 2024-11-27 | End: 2024-11-27

## 2024-11-27 RX ORDER — NALOXONE HYDROCHLORIDE 0.4 MG/ML
0.08 INJECTION, SOLUTION INTRAMUSCULAR; INTRAVENOUS; SUBCUTANEOUS AS NEEDED
Status: DISCONTINUED | OUTPATIENT
Start: 2024-11-27 | End: 2024-11-27 | Stop reason: HOSPADM

## 2024-11-27 RX ORDER — CIPROFLOXACIN 500 MG/1
500 TABLET, FILM COATED ORAL 2 TIMES DAILY
Qty: 10 TABLET | Refills: 0 | Status: SHIPPED | OUTPATIENT
Start: 2024-11-27 | End: 2024-12-02

## 2024-11-27 RX ORDER — HYDROMORPHONE HYDROCHLORIDE 1 MG/ML
0.2 INJECTION, SOLUTION INTRAMUSCULAR; INTRAVENOUS; SUBCUTANEOUS EVERY 5 MIN PRN
Status: DISCONTINUED | OUTPATIENT
Start: 2024-11-27 | End: 2024-11-27 | Stop reason: HOSPADM

## 2024-11-27 RX ORDER — HYDROCODONE BITARTRATE AND ACETAMINOPHEN 5; 325 MG/1; MG/1
1 TABLET ORAL EVERY 6 HOURS PRN
Qty: 30 TABLET | Refills: 0 | Status: SHIPPED | OUTPATIENT
Start: 2024-11-27

## 2024-11-27 RX ORDER — HYDROMORPHONE HYDROCHLORIDE 1 MG/ML
0.6 INJECTION, SOLUTION INTRAMUSCULAR; INTRAVENOUS; SUBCUTANEOUS EVERY 5 MIN PRN
Status: DISCONTINUED | OUTPATIENT
Start: 2024-11-27 | End: 2024-11-27 | Stop reason: HOSPADM

## 2024-11-27 RX ORDER — ONDANSETRON 2 MG/ML
4 INJECTION INTRAMUSCULAR; INTRAVENOUS EVERY 6 HOURS PRN
Status: DISCONTINUED | OUTPATIENT
Start: 2024-11-27 | End: 2024-11-27 | Stop reason: HOSPADM

## 2024-11-27 RX ORDER — POLYETHYLENE GLYCOL 3350 17 G/17G
17 POWDER, FOR SOLUTION ORAL DAILY
Status: DISCONTINUED | OUTPATIENT
Start: 2024-11-27 | End: 2024-11-29

## 2024-11-27 RX ORDER — PHENYLEPHRINE HCL 10 MG/ML
VIAL (ML) INJECTION AS NEEDED
Status: DISCONTINUED | OUTPATIENT
Start: 2024-11-27 | End: 2024-11-27 | Stop reason: SURG

## 2024-11-27 RX ORDER — MIDODRINE HYDROCHLORIDE 10 MG/1
10 TABLET ORAL 3 TIMES DAILY
Status: DISCONTINUED | OUTPATIENT
Start: 2024-11-27 | End: 2024-11-29

## 2024-11-27 RX ORDER — SENNOSIDES 8.6 MG
17.2 TABLET ORAL NIGHTLY PRN
Status: DISCONTINUED | OUTPATIENT
Start: 2024-11-27 | End: 2024-11-29

## 2024-11-27 RX ORDER — EPHEDRINE SULFATE 50 MG/ML
INJECTION INTRAVENOUS AS NEEDED
Status: DISCONTINUED | OUTPATIENT
Start: 2024-11-27 | End: 2024-11-27 | Stop reason: SURG

## 2024-11-27 RX ORDER — HYDROXYCHLOROQUINE SULFATE 200 MG/1
200 TABLET, FILM COATED ORAL 2 TIMES DAILY
Status: DISCONTINUED | OUTPATIENT
Start: 2024-11-27 | End: 2024-11-29

## 2024-11-27 RX ORDER — ONDANSETRON 4 MG/1
4 TABLET, ORALLY DISINTEGRATING ORAL EVERY 4 HOURS PRN
Status: DISCONTINUED | OUTPATIENT
Start: 2024-11-27 | End: 2024-11-27

## 2024-11-27 RX ORDER — ACETAMINOPHEN 500 MG
1000 TABLET ORAL ONCE AS NEEDED
Status: DISCONTINUED | OUTPATIENT
Start: 2024-11-27 | End: 2024-11-27 | Stop reason: HOSPADM

## 2024-11-27 RX ORDER — PANTOPRAZOLE SODIUM 40 MG/1
40 TABLET, DELAYED RELEASE ORAL
Status: DISCONTINUED | OUTPATIENT
Start: 2024-11-28 | End: 2024-11-29

## 2024-11-27 RX ORDER — LEVOFLOXACIN 500 MG/1
500 TABLET, FILM COATED ORAL DAILY
Status: DISCONTINUED | OUTPATIENT
Start: 2024-11-28 | End: 2024-11-29

## 2024-11-27 RX ORDER — ONDANSETRON 2 MG/ML
INJECTION INTRAMUSCULAR; INTRAVENOUS AS NEEDED
Status: DISCONTINUED | OUTPATIENT
Start: 2024-11-27 | End: 2024-11-27 | Stop reason: SURG

## 2024-11-27 RX ORDER — NIACIN 500 MG
500 TABLET ORAL 2 TIMES DAILY WITH MEALS
COMMUNITY

## 2024-11-27 RX ORDER — CETIRIZINE HYDROCHLORIDE 10 MG/1
10 TABLET ORAL DAILY
Status: DISCONTINUED | OUTPATIENT
Start: 2024-11-27 | End: 2024-11-27

## 2024-11-27 RX ORDER — ONDANSETRON 2 MG/ML
4 INJECTION INTRAMUSCULAR; INTRAVENOUS EVERY 6 HOURS PRN
Status: DISCONTINUED | OUTPATIENT
Start: 2024-11-27 | End: 2024-11-29

## 2024-11-27 RX ORDER — OXYCODONE HYDROCHLORIDE 10 MG/1
10 TABLET ORAL EVERY 4 HOURS PRN
Status: DISCONTINUED | OUTPATIENT
Start: 2024-11-27 | End: 2024-11-29

## 2024-11-27 RX ORDER — METOCLOPRAMIDE HYDROCHLORIDE 5 MG/ML
5 INJECTION INTRAMUSCULAR; INTRAVENOUS EVERY 8 HOURS PRN
Status: DISCONTINUED | OUTPATIENT
Start: 2024-11-27 | End: 2024-11-27 | Stop reason: HOSPADM

## 2024-11-27 RX ORDER — ONDANSETRON 4 MG/1
4 TABLET, ORALLY DISINTEGRATING ORAL EVERY 6 HOURS PRN
Status: DISCONTINUED | OUTPATIENT
Start: 2024-11-27 | End: 2024-11-29

## 2024-11-27 RX ORDER — FUROSEMIDE 20 MG/1
20 TABLET ORAL DAILY
Status: DISCONTINUED | OUTPATIENT
Start: 2024-11-28 | End: 2024-11-27

## 2024-11-27 RX ORDER — ACETAMINOPHEN 500 MG
1000 TABLET ORAL EVERY 8 HOURS SCHEDULED
Status: DISCONTINUED | OUTPATIENT
Start: 2024-11-27 | End: 2024-11-29

## 2024-11-27 RX ORDER — LABETALOL HYDROCHLORIDE 5 MG/ML
5 INJECTION, SOLUTION INTRAVENOUS EVERY 5 MIN PRN
Status: DISCONTINUED | OUTPATIENT
Start: 2024-11-27 | End: 2024-11-27 | Stop reason: HOSPADM

## 2024-11-27 RX ORDER — SODIUM CHLORIDE, SODIUM LACTATE, POTASSIUM CHLORIDE, CALCIUM CHLORIDE 600; 310; 30; 20 MG/100ML; MG/100ML; MG/100ML; MG/100ML
INJECTION, SOLUTION INTRAVENOUS CONTINUOUS
Status: DISCONTINUED | OUTPATIENT
Start: 2024-11-27 | End: 2024-11-27

## 2024-11-27 RX ORDER — OXYCODONE HYDROCHLORIDE 5 MG/1
5 TABLET ORAL EVERY 4 HOURS PRN
Status: DISCONTINUED | OUTPATIENT
Start: 2024-11-27 | End: 2024-11-29

## 2024-11-27 RX ORDER — POLYETHYLENE GLYCOL 3350 17 G/17G
17 POWDER, FOR SOLUTION ORAL DAILY PRN
Status: DISCONTINUED | OUTPATIENT
Start: 2024-11-27 | End: 2024-11-29

## 2024-11-27 RX ORDER — HYDROMORPHONE HYDROCHLORIDE 1 MG/ML
0.8 INJECTION, SOLUTION INTRAMUSCULAR; INTRAVENOUS; SUBCUTANEOUS EVERY 2 HOUR PRN
Status: DISCONTINUED | OUTPATIENT
Start: 2024-11-27 | End: 2024-11-29

## 2024-11-27 RX ORDER — DOCUSATE SODIUM 100 MG/1
100 CAPSULE, LIQUID FILLED ORAL 2 TIMES DAILY
Qty: 28 CAPSULE | Refills: 0 | Status: SHIPPED | OUTPATIENT
Start: 2024-11-27 | End: 2024-12-11

## 2024-11-27 RX ORDER — DEXAMETHASONE SODIUM PHOSPHATE 4 MG/ML
VIAL (ML) INJECTION AS NEEDED
Status: DISCONTINUED | OUTPATIENT
Start: 2024-11-27 | End: 2024-11-27 | Stop reason: SURG

## 2024-11-27 RX ORDER — LEVOFLOXACIN 5 MG/ML
500 INJECTION, SOLUTION INTRAVENOUS DAILY
Status: DISCONTINUED | OUTPATIENT
Start: 2024-11-28 | End: 2024-11-29

## 2024-11-27 RX ORDER — CARBIDOPA AND LEVODOPA 25; 100 MG/1; MG/1
1 TABLET, EXTENDED RELEASE ORAL 2 TIMES DAILY
Status: DISCONTINUED | OUTPATIENT
Start: 2024-11-27 | End: 2024-11-29

## 2024-11-27 RX ORDER — LIDOCAINE HYDROCHLORIDE 10 MG/ML
INJECTION, SOLUTION EPIDURAL; INFILTRATION; INTRACAUDAL; PERINEURAL AS NEEDED
Status: DISCONTINUED | OUTPATIENT
Start: 2024-11-27 | End: 2024-11-27 | Stop reason: SURG

## 2024-11-27 RX ORDER — HYDROMORPHONE HYDROCHLORIDE 1 MG/ML
0.4 INJECTION, SOLUTION INTRAMUSCULAR; INTRAVENOUS; SUBCUTANEOUS EVERY 5 MIN PRN
Status: DISCONTINUED | OUTPATIENT
Start: 2024-11-27 | End: 2024-11-27 | Stop reason: HOSPADM

## 2024-11-27 RX ORDER — BISACODYL 10 MG
10 SUPPOSITORY, RECTAL RECTAL
Status: DISCONTINUED | OUTPATIENT
Start: 2024-11-27 | End: 2024-11-29

## 2024-11-27 RX ORDER — IBUPROFEN 600 MG/1
600 TABLET, FILM COATED ORAL ONCE AS NEEDED
Status: DISCONTINUED | OUTPATIENT
Start: 2024-11-27 | End: 2024-11-27 | Stop reason: HOSPADM

## 2024-11-27 RX ORDER — FINASTERIDE 5 MG/1
5 TABLET, FILM COATED ORAL DAILY
Status: DISCONTINUED | OUTPATIENT
Start: 2024-11-28 | End: 2024-11-29

## 2024-11-27 RX ORDER — SODIUM CHLORIDE 9 MG/ML
INJECTION, SOLUTION INTRAVENOUS CONTINUOUS
Status: DISCONTINUED | OUTPATIENT
Start: 2024-11-27 | End: 2024-11-29

## 2024-11-27 RX ORDER — CETIRIZINE HYDROCHLORIDE 5 MG/1
5 TABLET ORAL DAILY
Status: DISCONTINUED | OUTPATIENT
Start: 2024-11-28 | End: 2024-11-29

## 2024-11-27 RX ORDER — CARBIDOPA AND LEVODOPA 25; 100 MG/1; MG/1
1.5 TABLET ORAL 4 TIMES DAILY
Status: DISCONTINUED | OUTPATIENT
Start: 2024-11-27 | End: 2024-11-29

## 2024-11-27 RX ORDER — SODIUM CHLORIDE, SODIUM LACTATE, POTASSIUM CHLORIDE, CALCIUM CHLORIDE 600; 310; 30; 20 MG/100ML; MG/100ML; MG/100ML; MG/100ML
INJECTION, SOLUTION INTRAVENOUS CONTINUOUS
Status: DISCONTINUED | OUTPATIENT
Start: 2024-11-27 | End: 2024-11-27 | Stop reason: HOSPADM

## 2024-11-27 RX ADMIN — ONDANSETRON 4 MG: 2 INJECTION INTRAMUSCULAR; INTRAVENOUS at 14:06:00

## 2024-11-27 RX ADMIN — PHENYLEPHRINE HCL 100 MCG: 10 MG/ML VIAL (ML) INJECTION at 14:13:00

## 2024-11-27 RX ADMIN — LEVOFLOXACIN 500 MG: 5 INJECTION, SOLUTION INTRAVENOUS at 14:07:00

## 2024-11-27 RX ADMIN — SODIUM CHLORIDE, SODIUM LACTATE, POTASSIUM CHLORIDE, CALCIUM CHLORIDE: 600; 310; 30; 20 INJECTION, SOLUTION INTRAVENOUS at 15:14:00

## 2024-11-27 RX ADMIN — EPHEDRINE SULFATE 5 MG: 50 INJECTION INTRAVENOUS at 14:13:00

## 2024-11-27 RX ADMIN — PHENYLEPHRINE HCL 100 MCG: 10 MG/ML VIAL (ML) INJECTION at 14:50:00

## 2024-11-27 RX ADMIN — EPHEDRINE SULFATE 5 MG: 50 INJECTION INTRAVENOUS at 15:08:00

## 2024-11-27 RX ADMIN — PHENYLEPHRINE HCL 100 MCG: 10 MG/ML VIAL (ML) INJECTION at 15:08:00

## 2024-11-27 RX ADMIN — SODIUM CHLORIDE, SODIUM LACTATE, POTASSIUM CHLORIDE, CALCIUM CHLORIDE: 600; 310; 30; 20 INJECTION, SOLUTION INTRAVENOUS at 13:47:00

## 2024-11-27 RX ADMIN — LIDOCAINE HYDROCHLORIDE 50 MG: 10 INJECTION, SOLUTION EPIDURAL; INFILTRATION; INTRACAUDAL; PERINEURAL at 13:58:00

## 2024-11-27 RX ADMIN — DEXAMETHASONE SODIUM PHOSPHATE 4 MG: 4 MG/ML VIAL (ML) INJECTION at 14:05:00

## 2024-11-27 RX ADMIN — EPHEDRINE SULFATE 5 MG: 50 INJECTION INTRAVENOUS at 14:50:00

## 2024-11-27 NOTE — ANESTHESIA PREPROCEDURE EVALUATION
PRE-OP EVALUATION    Patient Name: Reginaldo Hdz    Admit Diagnosis: Benign prostatic hyperplasia with lower urinary tract symptoms, symptom details unspecified [N40.1]  Bladder mass [N32.89]  Bladder stones [N21.0]    Pre-op Diagnosis: Benign prostatic hyperplasia with lower urinary tract symptoms, symptom details unspecified [N40.1]  Bladder mass [N32.89]  Bladder stones [N21.0]    CYSTOSCOPY, TRANSURETHRAL RESECTION BLADDER TUMOR, REMOVAL OF BLADDER STONES,TRANSURETHRAL RESECTION PROSTATE    Anesthesia Procedure: CYSTOSCOPY, TRANSURETHRAL RESECTION BLADDER TUMOR, REMOVAL OF BLADDER STONES,TRANSURETHRAL RESECTION PROSTATE    Surgeons and Role:     * William Leon MD - Primary    Pre-op vitals reviewed.  Temp: 96.8 °F (36 °C)  Pulse: 68  Resp: 10  BP: 111/55  SpO2: 100 %  Body mass index is 25.37 kg/m².    Current medications reviewed.  Hospital Medications:   lactated ringers infusion   Intravenous Continuous    [COMPLETED] ceFAZolin (Ancef) 2g in 10mL IV syringe premix  2 g Intravenous Once    [COMPLETED] levoFLOXacin in dextrose 5% (Levaquin) 500 mg/100mL IVPB premix 500 mg  500 mg Intravenous Once    lactated ringers infusion   Intravenous Continuous    lactated ringers IV bolus 500 mL  500 mL Intravenous Once PRN    atropine 0.1 MG/ML injection 0.5 mg  0.5 mg Intravenous PRN    naloxone (Narcan) 0.4 MG/ML injection 0.08 mg  0.08 mg Intravenous PRN    fentaNYL (Sublimaze) 50 mcg/mL injection 25 mcg  25 mcg Intravenous Q5 Min PRN    Or    fentaNYL (Sublimaze) 50 mcg/mL injection 50 mcg  50 mcg Intravenous Q5 Min PRN    HYDROmorphone (Dilaudid) 1 MG/ML injection 0.2 mg  0.2 mg Intravenous Q5 Min PRN    Or    HYDROmorphone (Dilaudid) 1 MG/ML injection 0.4 mg  0.4 mg Intravenous Q5 Min PRN    Or    HYDROmorphone (Dilaudid) 1 MG/ML injection 0.6 mg  0.6 mg Intravenous Q5 Min PRN    ibuprofen (Motrin) tab 600 mg  600 mg Oral Once PRN    acetaminophen (Tylenol Extra Strength) tab 1,000 mg  1,000 mg Oral Once PRN     labetalol (Trandate) 5 mg/mL injection 5 mg  5 mg Intravenous Q5 Min PRN    ondansetron (Zofran) 4 MG/2ML injection 4 mg  4 mg Intravenous Q6H PRN    metoclopramide (Reglan) 5 mg/mL injection 5 mg  5 mg Intravenous Q8H PRN       Outpatient Medications:   Prescriptions Prior to Admission[1]    Allergies: Hydrocodone-acetaminophen, Inderal [propranolol], Isosorbide, and Other      Anesthesia Evaluation    Patient summary reviewed.    Anesthetic Complications  (+) history of anesthetic complications         GI/Hepatic/Renal      (+) GERD                           Cardiovascular      ECG reviewed.            (+) hypertension   (+) hyperlipidemia  (+) CAD    (+) CABG/stent         (+) dysrhythmias and atrial fibrillation                  Endo/Other    Negative endo/other ROS.                              Pulmonary    Negative pulmonary ROS.           (+) shortness of breath            Neuro/Psych                 (+) neuromuscular disease             Parkinson disease      Past Surgical History:   Procedure Laterality Date    Angiogram  1984    Angiogram  1996    Angiogram  03/27/2002    [facility], Dr. Scott/ Mikayla: 1.Successful PTCA of the infarct related artery which was the proximal graft of the saphenous vein graft to the right posterior descending/LV branch which was dilated and stented using a 4 x 13 BX Velocity to a maximum diameter of 4.45 mm.  2.Proximal PDA primarily stented using a 3 x 18 Penta stent from about 70 to 80% to 0%, HUGH III flow, no dissection.     [facility] Successful stenting with a drug-eluting stent to the graft to the right coronary artery. Severe native CAD. Patent vein graft to LAD. Severe disease in the vein graft to the right coronary artery.    [facility]  [facility] Successful stenting of the circumflex artery in the proximal and mid portions. Relook angiography of the vein graft to the right coronary artery showed that this  vessel is widely patent.    Cabg  1984    x2    Cath bare metal stent (bms)      Cath drug eluting stent      Shoulder surg proc unlisted Left     fracture     Social History     Socioeconomic History    Marital status:    Tobacco Use    Smoking status: Former     Current packs/day: 0.00     Types: Cigarettes     Quit date: 1982     Years since quittin.6    Smokeless tobacco: Never   Vaping Use    Vaping status: Never Used   Substance and Sexual Activity    Alcohol use: Not Currently     Comment: Social    Drug use: No     History   Drug Use No     Available pre-op labs reviewed.  Lab Results   Component Value Date    WBC 4.5 2024    RBC 4.18 2024    HGB 11.2 (L) 2024    HCT 35.1 (L) 2024    MCV 84.0 2024    MCH 26.8 2024    MCHC 31.9 2024    RDW 15.5 2024    .0 (L) 2024     Lab Results   Component Value Date     (L) 2024    K 4.8 2024     2024    CO2 22.0 2024    BUN 15 2024    CREATSERUM 1.48 (H) 2024     (H) 2024    CA 9.6 2024            Airway      Mallampati: II  Mouth opening: 3 FB  TM distance: 4 - 6 cm  Neck ROM: full Cardiovascular      Rhythm: regular  Rate: normal     Dental             Pulmonary      Breath sounds clear to auscultation bilaterally.               Other findings              ASA: 3   Plan: general  NPO status verified and patient meets guidelines.    Post-procedure pain management plan discussed with surgeon and patient.      Plan/risks discussed with: patient and child/children                Present on Admission:  **None**           [1]   Medications Prior to Admission   Medication Sig Dispense Refill Last Dose/Taking    niacin 500 MG Oral Tab Take 1 tablet (500 mg total) by mouth 2 (two) times daily with meals.   2024 at  9:30 AM    ondansetron 4 MG Oral Tablet Dispersible Take 1 tablet (4 mg total) by mouth every 4 (four) hours as needed  for Nausea. 10 tablet 0 Past Week    rivaroxaban (XARELTO) 20 MG Oral Tab Take 1 tablet (20 mg total) by mouth daily with food.   11/21/2024    furosemide 20 MG Oral Tab Take 1 tablet (20 mg total) by mouth daily. 30 tablet 1 11/27/2024 at  9:30 AM    midodrine 10 MG Oral Tab Take 1 tablet (10 mg total) by mouth in the morning and 1 tablet (10 mg total) at noon and 1 tablet (10 mg total) in the evening. 90 tablet 0 11/27/2024 at  9:30 AM    traMADol 50 MG Oral Tab Take 1 tablet (50 mg total) by mouth every 12 (twelve) hours as needed.   Past Week    finasteride 5 MG Oral Tab Take 1 tablet (5 mg total) by mouth daily. 90 tablet 6 11/27/2024 at  9:30 AM    pantoprazole 40 MG Oral Tab EC Take 1 tablet (40 mg total) by mouth every morning before breakfast.   11/26/2024 at  7:00 PM    Polyethylene Glycol 3350 17 g Oral Powd Pack Take 17 g by mouth daily.   11/26/2024 at  9:00 AM    acetaminophen 500 MG Oral Tab Take 1 tablet (500 mg total) by mouth every 4 (four) hours as needed for Pain.   Past Week    docusate sodium 100 MG Oral Cap Take 1 capsule (100 mg total) by mouth daily as needed for constipation.   Past Week    melatonin 3 MG Oral Tab Take 1 tablet (3 mg total) by mouth nightly as needed.   11/26/2024 at  9:00 PM    pregabalin 75 MG Oral Cap Take 1 capsule (75 mg total) by mouth 2 (two) times daily.   11/27/2024 at  9:30 AM    nystatin 100,000 Units/g External Cream Apply topically as needed.   Past Week    carbidopa-levodopa ER  MG Oral Tab CR Take 1 tablet by mouth 2 (two) times daily. Takes 1 tablets by mouth twice daily at 6am and 11pm   11/27/2024 at  9:30 AM    hydroxychloroquine 200 MG Oral Tab Take 1 tablet (200 mg total) by mouth 2 (two) times daily.   11/27/2024 at  9:30 AM    EZETIMIBE 10 MG Oral Tab TAKE ONE TABLET BY MOUTH ONE TIME DAILY 90 tablet 0 11/27/2024 at  9:30 AM    carbidopa-levodopa  MG Oral Tab Take 1.5 tablets by mouth in the morning, at noon, in the evening, and at  bedtime. Takes four times a day 9am, 1pm, 5pm, 9pm   11/27/2024 at  9:30 AM    Multivitamin Chewtab, ADULT, Oral Chew Tab Chew 1 tablet by mouth daily.   11/27/2024 at  9:30 AM    PRAVASTATIN SODIUM 80 MG Oral Tab TAKE 1 TABLET BY MOUTH NIGHTLY 90 tablet 3 11/26/2024 at  7:00 PM    cetirizine 10 MG Oral Tab Take 1 tablet (10 mg total) by mouth daily.   11/27/2024 at  9:30 AM    Acidophilus/Pectin Oral Cap Take 1 capsule by mouth daily.   11/27/2024 at  9:30 AM

## 2024-11-27 NOTE — DISCHARGE INSTRUCTIONS
Continue home health services with: [facility]. They can be reached by phone at [phone]. The fax number for your reference is [phone].   -----------------------------------------------------------    You had a procedure called a CYSTOSCOPY today    - No heavy lifting or strenuous activity for 4 weeks.    - You may have a post-operative appointment that is already scheduled for you. If the appointment date or time does not work with your schedule please call our office to reschedule ([phone]). Alternatively our office may be reaching out to you to schedule the appointment.     - You may experience pain after the procedure for 1-2 days.  If pain becomes intolerable please contact our office or go to the nearest Emergency Room/Immediate Care. You make take over-the counter ibuprofen for mild pain (provided you do not have a medical condition such as stomach ulcers or kidney disease which prohibits you from taking). You may take this in addition to tylenol or narcotic pain medication. Hot packs may help for discomfort as well.    - If you take blood thinners (such as xarelto, aspirin, or plavix) please DO NOT take them when you go home. You may resume these medications in 4 weeks.    - If you are medically allowed to take ibuprofen then you may take 200-400 mg every 8 hours as needed for pain with plenty of fluids. You may take this in addition to tylenol or other pain medication which may be prescribed.     - You may experience burning with urination and frequency of urination over the next few days.     - You may see blood in your urine that should clear up within a few days. If you have a stent in place then you may see blood in the urine until the stent is eventually removed.     - Try to abstain from alcohol, coffee, tea, artificial sweeteners, and spicy food for the next 48 hours as these can irritate the bladder.     - If you develop a fever, chills, difficulty urinating or  abdominal pain in the next 24 hours, call the office.     - Try to drink at least 1.5 to 2 liters of fluid per day to stay hydrated, water is preferable. If you are on a fluid restriction due to other medical reasons then you need to adhere to your fluid restriction recommendations.

## 2024-11-27 NOTE — PLAN OF CARE
Pt here from: [facility].   Neuro: A&Ox2, VSS, RA, , IS. Denies cough, chest pain, and SOB.   GI: Abdomen soft, passing gas and belching. Denies nausea.   : Voids via edmond. CBI running slowly, clear pink urine.   Pt denies pain.   Resting in bed.   Diet: Tolerating clears-ADAT.   IVF running per order through PIV L FA.   All appropriate safety measures in place. All questions and concerns addressed. Bed locked and in lowest position. Call light in reach.

## 2024-11-27 NOTE — PROGRESS NOTES
NURSING ADMISSION NOTE      Patient admitted via Cart  Oriented to room.  Safety precautions initiated.  Bed in low position.  Call light in reach.    Admission skin check completed with Nita STERN. Three skin tears to right shin, one skin tear to left calf, and large bruise to left calf noted.

## 2024-11-27 NOTE — ANESTHESIA PROCEDURE NOTES
Airway  Date/Time: 11/27/2024 2:01 PM  Urgency: elective    Airway not difficult    General Information and Staff    Patient location during procedure: OR  Anesthesiologist: Khoa Pacheco MD  Resident/CRNA: Sharon Keller CRNA  Performed: CRNA   Performed by: Sharon Keller CRNA  Authorized by: Khoa Pacheco MD      Indications and Patient Condition  Indications for airway management: anesthesia  Sedation level: deep  Preoxygenated: yes  Patient position: sniffing  Mask difficulty assessment: 1 - vent by mask    Final Airway Details  Final airway type: supraglottic airway      Successful airway: classic  Size 3       Number of attempts at approach: 1    Additional Comments  Atraumatic insertion. Placement with ease. Dentition and OP as per preop.

## 2024-11-27 NOTE — SPIRITUAL CARE NOTE
Spiritual Care Visit Note    Patient Name: Reginaldo Hdz Date of Spiritual Care Visit: 24   : 8/10/1940 Primary Dx: <principal problem not specified>       Referred By:      Spiritual Care Taxonomy:    Intended Effects: Build relationship of care and support    Methods: Offer support;Offer emotional support    Interventions: Assist someone with Advance Directives;Explain  role;Acknowledge current situation    Visit Type/Summary:     - Spiritual Care: Responded to a request via the on call phone Consulted with RN prior to visit.  - PoA: New PoAH Created: Visited patient in response to PoA for Healthcare consult/request. Created a new PoAH for Healthcare document that, per the patient, accurately reflects their wishes. Gave the patient the original PoAH document along with copies. Confirmed that the PoAH primary agent name and contact information have been entered into the Epic, Advance Directives section. A copy of the new PoAH document has been placed on the patient's paper chart.  verified with nurse that patient is decisional.  remains available for follow up.    Spiritual Care support can be requested via an Epic consult. For urgent/immediate needs, please contact the On Call  at: [place]: ext [phone]     Intern, Carlos Avendaño MA

## 2024-11-27 NOTE — H&P
HPI:     Reginaldo Hdz is a 84 year old male with a PMH of HTN, HL, AF, CAD, pleural effusion.    Following for:  1. Gross hematuria   - cysto 9/18/24: mod BPH with ANANYA. Papillary lesions along the bladder trigone c/f either edema versus malignancy with some friability and bleeding.  Also with small bladder stones  2. BPH/LUTS  - proscar 9/18/24  3. Urinary retention  - edmond placed 8/2/24 during episode of epididymitis and incidentally noted to be in retention  3. Recurrent UTI  - UCx 8/23/24: Enterococcus    PCP - Allegra Do  Prior Urologist - Dhara 8/24/24  LOV 9/18/2024    Presents for OR for TURP + TURBT + removal of bladder stones.  His longterm nurse is with him today and reports intermittent gross hematuria with small clots since edmond placement.    Has been in assisted living for the past few months. Was living at home prior. Had no trouble urinating prior to fall in August but was having UTIs and frequency prior.  IFC inserted during ER visit on 08/02/2024 at the time of an episode of epididymoorchitis and found found to be in asymptomatic retention.     Returned and hospitalized 8/4/2024 following a mechanical fall where he suffered rib fractures.  He had gross hematuria at the time.  His blood thinners were held and his hematuria resolved.    Has has pretty constant hematuria with activity.    Currently ambulating with walker.    He is taking proscar.  Prior issues with retention: none    AUA SS is 26/35 with 5 s; 4 n, w, u, SHANELLE; 3 I; 2 f. Unhappy with LUTS.  Incontinence prior to edmond: rare dribbles    Tobacco hx: 30 pack years, quit 1982  Kidney stone hx: none  Fam h/o  malignancy: none    No PSAs in system. I recommended we check and pt forgot to do this.    Drinks ~ 20 oz water, 20-30 soda/juice with dark urine.    CT AP 8/4/24: BWT    We discussed both flomax and proscar as options for LUTS and reviewed SEs (including low risk of hypotension with flomax and possible sexual side  effects with both medications). He would like to try both.    We discussed options for urinary retention and recurrent hematuria.  He would like to proceed to OR for cystoscopy, TURBT, TURP. We discussed the risks and benefits to the procedure including, but not limited to, bleeding, infection, possible damage to surrounding structures. The patient understands and would like to proceed.    Will continue flomax/proscar for BPH/LUTS. Increase water intake for UTI prevention. OR for TURP, TURBT.    HISTORY:  Past Medical History:    Anemia    Anesthesia complication    Can become agitated/combative coming out of anesthesia    Anxiety state    Arrhythmia    Atherosclerosis of coronary artery    Back problem    Blood disorder    Chronic atrial fibrillation (HCC)    Coronary atherosclerosis    Esophageal reflux    Heart attack (HCC)    High blood pressure    High cholesterol    History of adverse reaction to anesthesia    Hyperlipidemia    Hypertension    Incontinence    Has an indwelling urinary catheter    Lupus    Osteoarthritis    Parkinson disease (HCC)    Peripheral neuropathy    Visual impairment    Glasses      Past Surgical History:   Procedure Laterality Date    Angiogram  1984    Angiogram  1996    Angiogram  03/27/2002    [facility], Dr. Scott/ Mikayla: 1.Successful PTCA of the infarct related artery which was the proximal graft of the saphenous vein graft to the right posterior descending/LV branch which was dilated and stented using a 4 x 13 BX Velocity to a maximum diameter of 4.45 mm.  2.Proximal PDA primarily stented using a 3 x 18 Penta stent from about 70 to 80% to 0%, HUGH III flow, no dissection.     [facility] Successful stenting with a drug-eluting stent to the graft to the right coronary artery. Severe native CAD. Patent vein graft to LAD. Severe disease in the vein graft to the right coronary artery.    [facility] Success[facility]  stenting of the circumflex artery in the proximal and mid portions. Relook angiography of the vein graft to the right coronary artery showed that this vessel is widely patent.    Cabg  1984    x2    Cath bare metal stent (bms)      Cath drug eluting stent      Shoulder surg proc unlisted Left     fracture      Family History   Problem Relation Age of Onset    Cancer Father         Liver    Other (Other) Father         Heart Attack    Cancer Mother         uterine    Other (Other) Daughter         Hosimotos    Other (Other) Son         Heart Problem      Social History:   Social History     Socioeconomic History    Marital status:    Tobacco Use    Smoking status: Former     Current packs/day: 0.00     Types: Cigarettes     Quit date: 1982     Years since quittin.6    Smokeless tobacco: Never   Vaping Use    Vaping status: Never Used   Substance and Sexual Activity    Alcohol use: Not Currently     Comment: Social    Drug use: No     Social Drivers of Health     Food Insecurity: No Food Insecurity (2024)    Food Insecurity     Food Insecurity: Never true   Transportation Needs: No Transportation Needs (2024)    Transportation Needs     Lack of Transportation: No   Housing Stability: Low Risk  (2024)    Housing Stability     Housing Instability: No        Medications (Active prior to today's visit):  No current outpatient medications on file.       Allergies:  Allergies   Allergen Reactions    Hydrocodone-Acetaminophen CONFUSION, DIZZINESS and HALLUCINATION    Inderal [Propranolol] NAUSEA AND VOMITING and UNKNOWN    Isosorbide NAUSEA AND VOMITING    Other OTHER (SEE COMMENTS)         ROS:     A comprehensive 10 point review of systems was completed.  Pertinent positives and negatives noted in the the HPI.    PHYSICAL EXAM:     GENERAL APPEARANCE: well, developed, well nourished, in no acute distress  NEUROLOGIC: nonfocal, alert and oriented  HEAD: normocephalic, atraumatic  EYES: sclera  non-icteric  EARS: hearing intact  ORAL CAVITY: mucosa moist  NECK/THYROID: no obvious goiter or masses  LUNGS: nonlabored breathing  ABDOMEN: soft, no obvious masses or tenderness  SKIN: no obvious rashes    : as noted above    ASSESSMENT/PLAN:   There are no diagnoses linked to this encounter.  - as noted above.    Thanks again for this consult.    William Leon MD, FACS  Urologist  [facility]  Office: [phone]

## 2024-11-27 NOTE — OPERATIVE REPORT
Patient's insurance does not cover Ventolin.  They will cover levalbuterol aer act, proair hfa, proair respi aer   Urology Operative Note    Attending Surgeon: William Leon MD    Patient Name: Reginaldo Hdz    Date of Surgery: 11/27/2024    Preoperative Diagnosis: BPH with retention, bladder stones, bladder tumor, gross hematuria.    Postoperative Diagnosis: same    Procedure Performed: Cystoscopy, removal of bladder stones,  transurethral resection of bladder tumor, transurethral resection of the prostate    Indication for procedure:  Patient is a 84 year old male who presented with BPH with urinary retention, intermittent gross hematuria, bladder stones, and bladder tumors (comprising > 5 cm of bladder surface area).  He was counseled on options and elected to undergo the aforementioned procedure. We discussed the risks and benefits to surgery. We discussed risks including, but not limited to, bleeding, infection, possible damage to surrounding structures. The patient understood these risks and wished to proceed with surgery.    Description of the procedure:  The patient was taken to the operating room and prepped and draped in lithotomy position after undergoing general anesthesia.   The cystoscope was inserted. He was again noted to have a severely obstructing prostate, multiple bladder stones, as well as papillary lesions scattered throughout the bladder trigone which were very friable. I removed the bladder stones. We then proceeded with transurethral resection of bladder tumor and the prostate. AlI visible bladder tumor was resected and he had > 5 cm of total affected bladder surface area. I dissected down to the prostatic capsule on all sides and fulgurated oozing vessels. The prostatic urethra was wide open at the end of the case. There was no significant bleeding noted at the end of the case. The bladder tumor, bladder stones, and TURP chips were removed. I inserted a 22-[place]banian 3 way [place] catheter. The urine was clear on a low rate of bladder irrigation.   The patient was awoken having tolerated the procedure  well.    Specimen: bladder stones, bladder tumor, TURP chips    Complications: No known complications    Condition on Discharge from the operating room was stable    Plan: The patient will be admitted overnight and likely send home with edmond catheter tomorrow.    William Leon MD  Date: 11/27/2024  Time: 3:22 PM

## 2024-11-28 LAB
ANION GAP SERPL CALC-SCNC: 6 MMOL/L (ref 0–18)
BUN BLD-MCNC: 16 MG/DL (ref 9–23)
CALCIUM BLD-MCNC: 8.7 MG/DL (ref 8.7–10.4)
CHLORIDE SERPL-SCNC: 103 MMOL/L (ref 98–112)
CO2 SERPL-SCNC: 26 MMOL/L (ref 21–32)
CREAT BLD-MCNC: 1.13 MG/DL
EGFRCR SERPLBLD CKD-EPI 2021: 64 ML/MIN/1.73M2 (ref 60–?)
GLUCOSE BLD-MCNC: 108 MG/DL (ref 70–99)
MAGNESIUM SERPL-MCNC: 1.6 MG/DL (ref 1.6–2.6)
OSMOLALITY SERPL CALC.SUM OF ELEC: 282 MOSM/KG (ref 275–295)
POTASSIUM SERPL-SCNC: 4.3 MMOL/L (ref 3.5–5.1)
SODIUM SERPL-SCNC: 135 MMOL/L (ref 136–145)

## 2024-11-28 RX ORDER — MAGNESIUM OXIDE 400 MG/1
400 TABLET ORAL ONCE
Status: COMPLETED | OUTPATIENT
Start: 2024-11-28 | End: 2024-11-28

## 2024-11-28 NOTE — PLAN OF CARE
Pt resting comfortably.  Alert x2.  VSS on RA. Denies chest pain, sob, nausea or emesis.   SCDS on bilaterally.  Abdomen soft nondistended, belching present, BS hypoactive. CBI running slow, light pink in color no clots. Ivf running per order.  Skin check done upon admission, scattered bruising, and skin tears noted.  Pt initially declined to take medication, reattempted and pt was agreeable.  States minimal pain. Bed alarm on, call light within reach.     0615: CBI clamped per order.

## 2024-11-28 NOTE — PLAN OF CARE
Pt A&Ox1-2, resting in bed.  Resp: RA,   GI/: Kayli (CBI clamped)  Activity/Safety: up x2 w/walker  Skin: intact  Pain: no pain reported at this time  Pt updated on and agreeable to POC; FLMICHAEL, from [facility] US w/

## 2024-11-28 NOTE — PROGRESS NOTES
[facility]   part of [facility]    Progress Note    Reginaldo Hdz Patient Status:  Outpatient in a Bed    8/10/1940 MRN [id number]   Location [facility] 3NW-A Attending William Leon MD   Hosp Day # 0 PCP Olvin Dinh MD        Subjective:   Pt feels well. No complaints. Urine dark red wine consistency off CBI. Cleared to light pink within a minute of resuming CBI        Objective:   Vital Signs:  Blood pressure 142/69, pulse 70, temperature 98.6 °F (37 °C), temperature source Oral, resp. rate 18, height 5' 6\" (1.676 m), weight 162 lb (73.5 kg), SpO2 94%.     General: Alert and oriented. No acute distress.  Eyes: Sclera non-icteric.  Neck: No obvious masses or goiter.  CV/Resp: nonlabored breathing  Abdomen: Soft, NTND  : edmond in place draining dark wine colored urine  Extremities: warm, well perfused, no clubbing cyanosis or edema          Results:     Lab Results   Component Value Date    WBC 4.5 2024    HGB 11.2 (L) 2024    HCT 35.1 (L) 2024    .0 (L) 2024    CREATSERUM 1.13 2024    BUN 16 2024     (L) 2024    K 4.3 2024     2024    CO2 26.0 2024     (H) 2024    CA 8.7 2024    ALB 3.2 2024    ALKPHO 146 (H) 2024    BILT 0.4 2024    TP 8.2 2024    AST 48 (H) 2024    ALT <7 (L) 2024    PTT 43.7 (H) 10/11/2024    INR 3.33 (H) 10/12/2024    TSH 1.670 2019    MG 1.6 2024    PHOS 3.4 2024    TROP <0.045 2019       No results found.                  Assessment and Plan:     Pt is a 84 year old male who is s/p TURP, TURBT on 24. Has poor mobility, lives at home with wife.    - Resume CBI today. Can wean down today and hopefully home with edmond catheter tomorrow/Friday  - OK for diet, OOB/ambulation  - Hospitalist following, appreciate help         William Leon MD, FACS  Urologist  [facility]  Office: [phone]

## 2024-11-28 NOTE — CONSULTS
[facility] General Medicine Consult      Reason for consult:  Post op medical management     Consulted by: Dr. Leon    PCP: Olvin Dinh MD      History of Present Illness: Patient is a 84 year old male with PMH sig for CAD s/p stents, a-fib on xarelto, HTN, HLD, GERD, SLE, PAD, and neuropathy who presents s/p cysto with removal of bladder stones and TURBT.  He tolerated the procedure well without immediate complications.  States pain controlled.  No F/C, N/V.  He lives in assisted living with his spouse.  Son Macario lives close by.       PMH:  Past Medical History:    Anemia    Anesthesia complication    Can become agitated/combative coming out of anesthesia    Anxiety state    Arrhythmia    Atherosclerosis of coronary artery    Back problem    Blood disorder    Chronic atrial fibrillation (HCC)    Coronary atherosclerosis    Esophageal reflux    Heart attack (HCC)    High blood pressure    High cholesterol    History of adverse reaction to anesthesia    Hyperlipidemia    Hypertension    Incontinence    Has an indwelling urinary catheter    Lupus    Osteoarthritis    Parkinson disease (HCC)    Peripheral neuropathy    Visual impairment    Glasses        PSH:  Past Surgical History:   Procedure Laterality Date    Angiogram  1984    Angiogram  1996    Angiogram  03/27/2002    [facility], Dr. Scott/ Mikayla: 1.Successful PTCA of the infarct related artery which was the proximal graft of the saphenous vein graft to the right posterior descending/LV branch which was dilated and stented using a 4 x 13 BX Velocity to a maximum diameter of 4.45 mm.  2.Proximal PDA primarily stented using a 3 x 18 Penta stent from about 70 to 80% to 0%, HUGH III flow, no dissection.     [facility] Successful stenting with a drug-eluting stent to the graft to the right coronary artery. Severe native CAD. Patent vein graft to LAD. Severe disease in the vein graft to the right coronary artery.     Angiogram  2011    [facility], Dr. Steele: Successful stenting of the circumflex artery in the proximal and mid portions. Relook angiography of the vein graft to the right coronary artery showed that this vessel is widely patent.    Cabg  1984    x2    Cath bare metal stent (bms)      Cath drug eluting stent      Shoulder surg proc unlisted Left     fracture        Home Medications:  Medications Taking[1]    Scheduled Medication:   carbidopa-levodopa  1.5 tablet Oral 4x daily    carbidopa-levodopa ER  1 tablet Oral BID    finasteride  5 mg Oral Daily    hydroxychloroquine  200 mg Oral BID    midodrine  10 mg Oral TID    pantoprazole  40 mg Oral QAM AC    polyethylene glycol (PEG 3350)  17 g Oral Daily    pregabalin  75 mg Oral BID    acetaminophen  1,000 mg Oral Q8H EDDA    levofloxacin  500 mg Intravenous Daily    Or    levoFLOXacin  500 mg Oral Daily    cetirizine  5 mg Oral Daily     Continuous Infusing Medication:   sodium chloride 100 mL/hr at 24 1738     PRN Medication:  melatonin    oxyCODONE **OR** oxyCODONE    HYDROmorphone **OR** HYDROmorphone    polyethylene glycol (PEG 3350)    sennosides    bisacodyl    ondansetron **OR** ondansetron     ALL:  Allergies[2]     Soc Hx:  Social History     Tobacco Use    Smoking status: Former     Current packs/day: 0.00     Types: Cigarettes     Quit date: 1982     Years since quittin.6    Smokeless tobacco: Never   Substance Use Topics    Alcohol use: Not Currently     Comment: Social        Fam Hx  Family History   Problem Relation Age of Onset    Cancer Father         Liver    Other (Other) Father         Heart Attack    Cancer Mother         uterine    Other (Other) Daughter         Hosimotos    Other (Other) Son         Heart Problem       Review of Systems  Comprehensive ROS reviewed and negative except for what's stated above.  Including negative for chest pain, shortness of breath, syncope.       OBJECTIVE:  BP 94/81 (BP Location: Right arm)    Pulse 77   Temp 97.9 °F (36.6 °C) (Oral)   Resp 16   Ht 5' 6\" (1.676 m)   Wt 162 lb (73.5 kg)   SpO2 97%   BMI 25.37 kg/m²   Gen: No acute distress, alert and oriented x3, no focal neurologic deficits. Elderly.    HEENT:  EOMI, PERRLA, OP clear, MMM  Pulm: Lungs clear bilaterally, normal respiratory effort  CV: Heart with regular rate and rhythm, no murmur.  Normal PMI.    Abd: Abdomen soft, nontender, nondistended, no organomegaly, bowel sounds present  MSK: Full range of motion in extremities, no clubbing, no cyanosis  Skin: no rashes or lesions  Neuro:  Grossly intact, no sensory deficits   :  [place] cath with gross hematuria     Diagnostics:   CBC/Chem  Recent Labs   Lab 11/25/24 2019   WBC 4.5   HGB 11.2*   MCV 84.0   .0*       Recent Labs   Lab 11/25/24 2019 11/28/24  0602   * 135*   K 4.8 4.3    103   CO2 22.0 26.0   BUN 15 16   CREATSERUM 1.48* 1.13   * 108*   CA 9.6 8.7   MG  --  1.6       Recent Labs   Lab 11/25/24 2019   ALT <7*   AST 48*   ALB 3.2       Radiology: XR CHEST AP PORTABLE  (CPT=71045)    Result Date: 11/8/2024  PROCEDURE:  XR CHEST AP PORTABLE  (CPT=71045)  TECHNIQUE:  AP chest radiograph was obtained.  COMPARISON:  [facility] , XR, XR CHEST AP PORTABLE  (CPT=71045), 8/30/2024, 11:24 AM.  INDICATIONS:  KENIA  PATIENT STATED HISTORY: (As transcribed by Technologist)                CONCLUSION:  Cardiomegaly with pulmonary venous congestion.  Prominent interstitial markings throughout both lungs concerning for pulmonary edema.  Bibasilar infiltrates right greater than left with bilateral pleural effusions right greater than left.  Fracture deformities of posterior lateral right ribs unchanged from 8/30/2024.  No measurable pneumothorax.   LOCATION:  [facility]      Dictated by (CST): Pia Connell MD on 11/08/2024 at 6:20 PM     Finalized by (CST): Pia Connell MD on 11/08/2024 at 6:21 PM            ASSESSMENT / PLAN:    84 yr old male with PMH sig for CAD s/p  stents, a-fib on xarelto, HTN, HLD, GERD, SLE, PAD, and neuropathy who presents s/p cysto with removal of bladder stones and TURBT.      # BPH with urinary retention   s/p cysto with removal of bladder stones and TURBT 11/27  Post op care per     # Parkinson's disease   controlled  Cont sinimet     # Chronic diastolic HF  Compensated   Cont lasix    # SLE  Controlled  Cont plaquenil    # Paroxysmal a-fib  - rate controlled  - hold xarelto post op.  Resume when cleared by     # HLD  - cont statin    Greater than 76 minutes spent on care of this complex pt, more than 50% face to face time.     Advanced Care Planning  While discussing goals of care with pt, Reginaldo voluntarily participated in an advanced care planning discussion.  Additionally, his son Macario participated in the conversation.  The following was discussed: POA and code status. His son Macario confirms he has pt's healthcare POA.  Pt confirms DNAR/select status.   17 minutes were spent discussing advanced care planning.  This time was exclusive of the documented time for this visit.     Outpatient records reviewed confirming patient's medical history and medications.     Further recommendations pending patient's clinical course.  [facility] hospitalist to continue to follow patient while in house    Patient and/or patient's family given opportunity to ask questions and note understanding and agreeing with therapeutic plan as outlined      Thank you for allowing me to participate in the care of this patient.     Thank You,    Sigifredo Balbuena DO  [facility] Hospitalist  Answering Service number: [phone]                            [1]   Outpatient Medications Marked as Taking for the 11/27/24 encounter (Hospital Encounter)   Medication Sig Dispense Refill    niacin 500 MG Oral Tab Take 1 tablet (500 mg total) by mouth 2 (two) times daily with meals.      HYDROcodone-acetaminophen (NORCO) 5-325 MG Oral Tab Take 1 tablet by mouth every 6 (six) hours as  needed for Pain. 30 tablet 0    docusate sodium 100 MG Oral Cap Take 1 capsule (100 mg total) by mouth 2 (two) times daily for 14 days. Stop taking if loose stools/diarrhea. 28 capsule 0    ciprofloxacin 500 MG Oral Tab Take 1 tablet (500 mg total) by mouth 2 (two) times daily for 5 days. 10 tablet 0    ondansetron 4 MG Oral Tablet Dispersible Take 1 tablet (4 mg total) by mouth every 4 (four) hours as needed for Nausea. 10 tablet 0    rivaroxaban (XARELTO) 20 MG Oral Tab Take 1 tablet (20 mg total) by mouth daily with food.      furosemide 20 MG Oral Tab Take 1 tablet (20 mg total) by mouth daily. 30 tablet 1    midodrine 10 MG Oral Tab Take 1 tablet (10 mg total) by mouth in the morning and 1 tablet (10 mg total) at noon and 1 tablet (10 mg total) in the evening. 90 tablet 0    [DISCONTINUED] amoxicillin 500 MG Oral Cap Take 2 capsules (1,000 mg total) by mouth 3 (three) times daily for 8 days. 48 capsule 0    traMADol 50 MG Oral Tab Take 1 tablet (50 mg total) by mouth every 12 (twelve) hours as needed.      [DISCONTINUED] tamsulosin 0.4 MG Oral Cap Take 1 capsule (0.4 mg total) by mouth every evening. 90 capsule 6    finasteride 5 MG Oral Tab Take 1 tablet (5 mg total) by mouth daily. 90 tablet 6    pantoprazole 40 MG Oral Tab EC Take 1 tablet (40 mg total) by mouth every morning before breakfast.      Polyethylene Glycol 3350 17 g Oral Powd Pack Take 17 g by mouth daily.      acetaminophen 500 MG Oral Tab Take 1 tablet (500 mg total) by mouth every 4 (four) hours as needed for Pain.      docusate sodium 100 MG Oral Cap Take 1 capsule (100 mg total) by mouth daily as needed for constipation.      melatonin 3 MG Oral Tab Take 1 tablet (3 mg total) by mouth nightly as needed.      [DISCONTINUED] rivaroxaban (XARELTO) 20 MG Oral Tab Take 1 tablet (20 mg total) by mouth daily with food. 90 tablet 0    pregabalin 75 MG Oral Cap Take 1 capsule (75 mg total) by mouth 2 (two) times daily.      nystatin 100,000 Units/g  External Cream Apply topically as needed.      carbidopa-levodopa ER  MG Oral Tab CR Take 1 tablet by mouth 2 (two) times daily. Takes 1 tablets by mouth twice daily at 6am and 11pm      hydroxychloroquine 200 MG Oral Tab Take 1 tablet (200 mg total) by mouth 2 (two) times daily.      EZETIMIBE 10 MG Oral Tab TAKE ONE TABLET BY MOUTH ONE TIME DAILY 90 tablet 0    carbidopa-levodopa  MG Oral Tab Take 1.5 tablets by mouth in the morning, at noon, in the evening, and at bedtime. Takes four times a day 9am, 1pm, 5pm, 9pm      Multivitamin Chewtab, ADULT, Oral Chew Tab Chew 1 tablet by mouth daily.      PRAVASTATIN SODIUM 80 MG Oral Tab TAKE 1 TABLET BY MOUTH NIGHTLY 90 tablet 3    cetirizine 10 MG Oral Tab Take 1 tablet (10 mg total) by mouth daily.      Acidophilus/Pectin Oral Cap Take 1 capsule by mouth daily.     [2]   Allergies  Allergen Reactions    Hydrocodone-Acetaminophen CONFUSION, DIZZINESS and HALLUCINATION    Inderal [Propranolol] NAUSEA AND VOMITING and UNKNOWN    Isosorbide NAUSEA AND VOMITING    Other OTHER (SEE COMMENTS)

## 2024-11-29 ENCOUNTER — TELEPHONE (OUTPATIENT)
Dept: SURGERY | Facility: CLINIC | Age: 84
End: 2024-11-29

## 2024-11-29 VITALS
TEMPERATURE: 98 F | BODY MASS INDEX: 26.03 KG/M2 | OXYGEN SATURATION: 98 % | SYSTOLIC BLOOD PRESSURE: 143 MMHG | HEIGHT: 66 IN | WEIGHT: 162 LBS | HEART RATE: 79 BPM | DIASTOLIC BLOOD PRESSURE: 84 MMHG | RESPIRATION RATE: 18 BRPM

## 2024-11-29 PROBLEM — N40.1 BENIGN PROSTATIC HYPERPLASIA WITH LOWER URINARY TRACT SYMPTOMS: Status: ACTIVE | Noted: 2024-11-29

## 2024-11-29 LAB — MAGNESIUM SERPL-MCNC: 1.7 MG/DL (ref 1.6–2.6)

## 2024-11-29 PROCEDURE — 99024 POSTOP FOLLOW-UP VISIT: CPT

## 2024-11-29 RX ORDER — MAGNESIUM OXIDE 400 MG/1
400 TABLET ORAL ONCE
Status: COMPLETED | OUTPATIENT
Start: 2024-11-29 | End: 2024-11-29

## 2024-11-29 NOTE — PHYSICAL THERAPY NOTE
PHYSICAL THERAPY EVALUATION - INPATIENT     Room Number: 307/307-A  Evaluation Date: 11/29/2024  Type of Evaluation: Initial  Physician Order: PT Eval and Treat    Presenting Problem: Benign Prostatic Hyperplasia with lower UTI c s/p 11/27 CYSTOSCOPY, TRANSURETHRAL RESECTION BLADDER TUMOR, REMOVAL OF BLADDER STONES,TRANSURETHRAL RESECTION PROSTATE  Co-Morbidities : CAD s/p stents, a-fib on xarelto, HTN, HLD, GERD, SLE, PAD, and neuropathy  Reason for Therapy: Mobility Dysfunction and Discharge Planning    PHYSICAL THERAPY ASSESSMENT   Patient is a 84 year old male admitted 11/27/2024 for  s/p 11/27 CYSTOSCOPY, TRANSURETHRAL RESECTION BLADDER TUMOR, REMOVAL OF BLADDER STONES,TRANSURETHRAL RESECTION PROSTATE .  Prior to admission, patient's baseline is is that he requires assistance with his ADLs and SBA when ambulating using the rollator.  Patient is currently functioning near baseline with bed mobility, transfers, and gait.  Patient is requiring contact guard assist as a result of the following impairments: decreased endurance/aerobic capacity, pain, impaired Gait and standing  balance, and decreased muscular endurance.  Physical Therapy will continue to follow for duration of hospitalization.    Patient will benefit from continued skilled PT Services at discharge to promote prior level of function.  Anticipate patient will return home with home health PT.    PLAN DURING HOSPITALIZATION  Nursing Mobility Recommendation : 1 Assist  PT Device Recommendation: Rolling walker  PT Treatment Plan: Bed mobility;Body mechanics;Gait training;Strengthening;Stair training;Transfer training;Balance training  Rehab Potential : Good  Frequency (Obs): 3-5x/week     CURRENT GOALS    Goal #1 Patient is able to demonstrate supine - sit EOB @ level: CGA     Goal #2 Patient is able to demonstrate transfers EOB to/from Chair/Wheelchair at assistance level: CGA     Goal #3 Patient is able to ambulate 50 feet with assist device: walker -  rolling at assistance level: CGA     Goal #4    Goal #5    Goal #6    Goal Comments: Goals established on 2024      PHYSICAL THERAPY MEDICAL/SOCIAL HISTORY  History related to current admission: Patient is a 84 year old male admitted on 2024 from Home for s/p  CYSTOSCOPY, TRANSURETHRAL RESECTION BLADDER TUMOR, REMOVAL OF BLADDER STONES,TRANSURETHRAL RESECTION PROSTATE.     HOME SITUATION  Type of Home: Assisted living facility ([facility])  Home Layout: One level                     Lives With: Caregiver part-time (2 caregivers)               Prior Level of [place]: Pt states that he lives at [facility]. Pt lives with his spouse. Pt has 2 caregivers that assist pt with his ADLs. Pt uses ambulate with the rollator with SBA.     SUBJECTIVE  \"I feel ok today\"    OBJECTIVE  Precautions: None  Fall Risk: High fall risk    WEIGHT BEARING RESTRICTION     PAIN ASSESSMENT  Ratin  Location: Pt reports no pain       COGNITION  Overall Cognitive Status:  WFL - within functional limits    RANGE OF MOTION AND STRENGTH ASSESSMENT  Upper extremity ROM and strength are within functional limits     Lower extremity ROM is within functional limits     Lower extremity strength is within functional limits     BALANCE  Static Sitting: Fair +  Dynamic Sitting: Fair +  Static Standing: Fair -  Dynamic Standing: Fair -    ADDITIONAL TESTS                                    ACTIVITY TOLERANCE                         O2 WALK       NEUROLOGICAL FINDINGS                        AM-PAC '6-Clicks' INPATIENT SHORT FORM - BASIC MOBILITY  How much difficulty does the patient currently have...  Patient Difficulty: Turning over in bed (including adjusting bedclothes, sheets and blankets)?: A Little   Patient Difficulty: Sitting down on and standing up from a chair with arms (e.g., wheelchair, bedside commode, etc.): A Little   Patient Difficulty: Moving from lying on back to sitting on the side of the bed?: A Little   How much help  from another person does the patient currently need...   Help from Another: Moving to and from a bed to a chair (including a wheelchair)?: A Little   Help from Another: Need to walk in hospital room?: A Little   Help from Another: Climbing 3-5 steps with a railing?: A Lot     AM-PAC Score:  Raw Score: 17   Approx Degree of Impairment: 50.57%   Standardized Score (AM-PAC Scale): 42.13   CMS Modifier (G-Code): CK    FUNCTIONAL ABILITY STATUS  Gait Assessment   Functional Mobility/Gait Assessment  Gait Assistance: Contact guard assist  Distance (ft): 5  Assistive Device: Rolling walker  Pattern: Shuffle    Skilled Therapy Provided     Bed Mobility:  Rolling: NT  Supine to sit: Min A   Sit to supine: NT     Transfer Mobility:  Sit to stand: Min A. Pt required min a at the end of the session to anteriorly shift pt's COG to maintain SHERLEY   Stand to sit: CGA  Gait = NT    Therapist's Comments: Pt was able to perform step stand transfer from bed to Chair with the use of the RW.. Pt was was able to take steps to the chair with CGA using a RW. Pt was educated on performing high amplitude movements.     Exercise/Education Provided:  Bed mobility  Body mechanics  Functional activity tolerated  Transfer training    Patient End of Session: Up in chair;Needs met;RN aware of session/findings;All patient questions and concerns addressed;Alarm set      Patient Evaluation Complexity Level:  History Moderate - 1 or 2 personal factors and/or co-morbidities   Examination of body systems Low -  addressing 1-2 elements   Clinical Presentation Low- Stable   Clinical Decision Making Low Complexity       PT Session Time: 23 minutes  Therapeutic Activity: 15 minutes

## 2024-11-29 NOTE — PLAN OF CARE
Aox3. Normo/hypertensive, afebrile.     Ambulated from chair to bed, mod-max 2 person assist with RW. Patient tolerated poorly, required several breaks and extra time with ambulation.    CBI infused fast to moderate rates, some times of pressure but later relieved after bloody urine passes. No clots present.  Line is clear, intermittently bloody. No need for manual irrigation.    Left hand skin tear noted, cleansed, antibiotic cream, gauze, and mesh wrap added.    Plan: CBI to be clamped per order    0600  CBI clamped, [place] colored urine at this time; no clots.

## 2024-11-29 NOTE — PROGRESS NOTES
Significant Event - Fall Note    Date/Time of Fall: November 29, 2024 at 1123    Fall huddle completed: Yes    Description of patient fall:     Patient fell from: Standing     Activity when fall occurred: Ambulating with assistance or assistive devices     Where did fall occur: Patient room     Was the fall assisted: Assisted to floor    Who witnessed the fall: Staff    Patient narrative of fall: States that he lost balance and got dizzy    Staff narrative of fall: Patient became dead weight and staff assisted patient to floor, sliding down PCT's leg. Patient did not hit ground with injury    Name of Provider notified of fall: Sigifredo Balbuena    Family notification: Patient declined    Factors contributing to fall:     Physical: Balance impairment, Deconditioned, Unsteady gait, and Weakness/fatigue     Psychological: Alert     Environmental: n/a   Medications received in the past 8 hours:   Medication(s) Administered in past 8 Hours from 11/29/2024 0335 to 11/29/2024 1135       Date/Time Order Dose Route Action Action by Comments    11/29/2024 0606 CST acetaminophen (Tylenol Extra Strength) tab 1,000 mg 1,000 mg Oral Given Lorenzo Duran, RN --    11/29/2024 0940 CST carbidopa-levodopa (SINEMET)  MG per tab 1.5 tablet 1.5 tablet Oral Given Joshua Mata --    11/29/2024 0940 CST carbidopa-levodopa ER (SINEMET CR)  MG per tab 1 tablet 1 tablet Oral Given Joshua Mata --    11/29/2024 0940 CST cetirizine (ZyrTEC) tab 5 mg 5 mg Oral Given Joshua Mata --    11/29/2024 0940 CST finasteride (Proscar) tab 5 mg 5 mg Oral Given Joshua Mata --    11/29/2024 0940 CST hydroxychloroquine (Plaquenil) tab 200 mg 200 mg Oral Given Joshua Mata --    11/29/2024 0612 CST magnesium oxide (Mag-Ox) tab 400 mg 400 mg Oral Given Lorenzo Duran, RN --    11/29/2024 0606 CST pantoprazole (Protonix) DR tab 40 mg 40 mg Oral Given Lorenzo Duran, RN --    11/29/2024 0941 CST polyethylene  glycol (PEG 3350) (Miralax) 17 g oral packet 17 g 17 g Oral Given Joshua Mata --    11/29/2024 0940 CST pregabalin (Lyrica) cap 75 mg 75 mg Oral Given Joshua Mata --            Was patient identified as high fall risk prior to fall:                                What interventions were in place prior to fall: Assistive devices (wheelchair, commode, walker, gait belt), Chair alarm, [place] footwear, and Toileting regimen, call liqght in place, PT/OT    Interventions post fall: Bed alarm, Call light within reach, [place] footwear, and Personal items within reach    Additional comments: Patient states that this is not out of the norm for him. He has a caregiver at this assisted living facility

## 2024-11-29 NOTE — PROGRESS NOTES
NURSING DISCHARGE NOTE    Discharged Home via Wheelchair.  Accompanied by  [facility] services  Belongings Taken by patient/family.    Patient left via [facility] at 1410

## 2024-11-29 NOTE — PROGRESS NOTES
Alert and oriented x2. Room air. Ambulates with 2 assist and walker. Stand pivot to commode. Very weak. Clamped catheter - urine dark red, no clots. Encouraged to drink more fluids. Patient updated on plan of care. Questions and concerns addressed.

## 2024-11-29 NOTE — TELEPHONE ENCOUNTER
Can we get this patient set up with a post-op visit in 3 weeks with Dr Leon or one of the PAs? He is s/p TURP on 11/27 and is being discharged with his edmond

## 2024-11-29 NOTE — OCCUPATIONAL THERAPY NOTE
OCCUPATIONAL THERAPY EVALUATION - INPATIENT     Room Number: 307/307-A  Evaluation Date: 11/29/2024  Type of Evaluation: Initial       Physician Order: IP Consult to Occupational Therapy  Reason for Therapy: ADL/IADL Dysfunction and Discharge Planning    OCCUPATIONAL THERAPY ASSESSMENT   Patient is currently functioning near baseline with {OT ADLs:03206}. Prior to admission, patient's baseline is ***.  Patient is requiring {OT Functional Score:3920} as a result of the following impairments: {OT Limitations/Impairments:80861}. Occupational Therapy will continue to follow for duration of hospitalization.    Patient will benefit from continued skilled OT Services {OT Services:16246}    History Related to Current Admission: Patient is a 84 year old male admitted on 11/27/2024 with  . Co-Morbidities : CAD s/p stents, a-fib on xarelto, HTN, HLD, GERD, SLE, PAD, and neuropathy    WEIGHT BEARING RESTRICTION       Recommendations for nursing staff:   Transfers: ***  Toileting location: ***    EVALUATION SESSION:  Patient Start of Session: ***    FUNCTIONAL TRANSFER ASSESSMENT     BED MOBILITY     BALANCE ASSESSMENT     FUNCTIONAL ADL ASSESSMENT     ACTIVITY TOLERANCE: ***                         O2 SATURATIONS       COGNITION  {Cognition:3840:::0}    Upper Extremity   ROM: within functional limits {OT UE ROM Exception to WFLs:5379:::0}  Strength: within functional limits {OT UE Strength Exception to WFLs:4400:::0}  Coordination  Gross motor: ***  Fine motor: ***  Sensation: {Sensation:3868:::0}    EDUCATION PROVIDED     Equipment used: ***  Demonstrates functional use, Would benefit from additional trial ***     Therapist comments: ***         OCCUPATIONAL PROFILE    HOME SITUATION  Type of Home: Assisted living facility ([facility])  Home Layout: One level  Lives With: Caregiver part-time (2 caregivers)                             Prior Level of Function: ***    SUBJECTIVE   ***    PAIN ASSESSMENT              OBJECTIVE  Precautions: None  Fall Risk: High fall risk    ASSESSMENTS    AM-PAC ‘6-Clicks’ Inpatient Daily Activity Short Form                      AM-PAC Score:             ADDITIONAL TESTS     NEUROLOGICAL FINDINGS      COGNITION ASSESSMENTS     PLAN                   ADL Goals   {OT ADL Goals:3959:::0}    Functional Transfer Goals  {OT Functional Goals:3966:::0}    UE Exercise Program Goal  Patient will be {Functional Score:3920:::0} with {Right/Left/Bilateral:4004:::0} {ROM Selection:3971:::0} HEP (home exercise program).    Additional Goals  ***    Therapist Goals  ***  Patient Evaluation Complexity Level:   Occupational Profile/Medical History {Occupational Profile/Medical History:7260}   Specific performance deficits impacting engagement in ADL/IADL {Specific performance deficits impacting engagement in ADL/IADL:7261}   Client Assessment/Performance Deficits {Client Assessment/Performance Deficits:7262}   Clinical Decision Making {Clinical Decision Makin}   Overall Complexity {Overall Complexity:7264}     OT Session Time: *** minutes  Self-Care Home Management: *** minutes  Therapeutic Activity: *** minutes  Neuromuscular Re-education: *** minutes  Therapeutic Exercise: *** minutes  Cognitive Skills: *** minutes  Sensory Integrative: *** minutes  Orthotic Management and Training: *** minutes  Can add/delete any of these

## 2024-11-29 NOTE — DISCHARGE SUMMARY
[facility]   part of [facility]    Discharge Summary    Reginaldo Hdz Patient Status:  Outpatient in a Bed    8/10/1940 MRN [id number]   Location [facility] 3NW-A Attending William Leon MD   Hosp Day # 0 PCP Olvin Dinh MD     Date of Admission: 2024     Date of Discharge: 2024    Disposition: Home or Self Care     Admitting Diagnosis: Benign prostatic hyperplasia with lower urinary tract symptoms, symptom details unspecified [N40.1]  Bladder mass [N32.89]  Bladder stones [N21.0]    Discharge Diagnosis: Benign prostatic hyperplasia with lower urinary tract symptoms, symptom details unspecified [N40.1]  Bladder mass [N32.89]  Bladder stones [N21.0]    Reason for Admission:  You came to the hospital for a transurethral resection of the prostate (TURP) in the operating room.    Physical Exam:    CONSTITUTIONAL: Well developed, well nourished, in no acute distress  NEUROLOGIC: Alert and oriented  HEAD: Normocephalic, atraumatic  EYES: Sclera non-icteric  ENT: Hearing intact, moist mucous membranes  NECK: No obvious goiter or masses  RESPIRATORY: Normal respiratory effort  SKIN: No evident rashes  ABDOMEN: Soft, non-tender, non-distended  GENITOURINARY: Milan draining dark pink urine    History of Present Illness:   Reginaldo Hdz is a 84 year old male with history of bladder outlet obstruction due to BPH, refractory to medical therapy, here for TURP.    Hospital Course:  Patient underwent an uncomplicated TURP, and was admitted to the floor pos-operatively. Continuous bladder irrigation was run, and then was clamped in the morning.     Given that he has had long-standing urinary retention, the [place] catheter was left in place with plans for a void trial a few days after discharge. It was draining well with pink/light red urine in the tubing without clots.    By time of discharge, the patient was ambulating and tolerating a regular diet without issue. Pain was well controlled with oral pain  medications.     Consultations: None    Procedures: Transurethral resection of the prostate (TURP)    Complications: None    Discharge Condition: Good         Discharge Medications:      Discharge Medications        START taking these medications        Instructions Prescription details   ciprofloxacin 500 MG Tabs  Commonly known as: Cipro      Take 1 tablet (500 mg total) by mouth 2 (two) times daily for 5 days.   Stop taking on: December 2, 2024  Quantity: 10 tablet  Refills: 0     HYDROcodone-acetaminophen 5-325 MG Tabs  Commonly known as: Norco      Take 1 tablet by mouth every 6 (six) hours as needed for Pain.   Quantity: 30 tablet  Refills: 0            CHANGE how you take these medications        Instructions Prescription details   docusate sodium 100 MG Caps  Commonly known as: Colace  What changed: Another medication with the same name was added. Make sure you understand how and when to take each.      Take 1 capsule (100 mg total) by mouth daily as needed for constipation.   Refills: 0     docusate sodium 100 MG Caps  Commonly known as: Colace  What changed: You were already taking a medication with the same name, and this prescription was added. Make sure you understand how and when to take each.      Take 1 capsule (100 mg total) by mouth 2 (two) times daily for 14 days. Stop taking if loose stools/diarrhea.   Stop taking on: December 11, 2024  Quantity: 28 capsule  Refills: 0            CONTINUE taking these medications        Instructions Prescription details   acetaminophen 500 MG Tabs  Commonly known as: Tylenol Extra Strength      Take 1 tablet (500 mg total) by mouth every 4 (four) hours as needed for Pain.   Refills: 0     acidophilus-pectin Caps  Commonly known as: Probiotic      Take 1 capsule by mouth daily.   Refills: 0     carbidopa-levodopa  MG Tabs  Commonly known as: SINEMET      Take 1.5 tablets by mouth in the morning, at noon, in the evening, and at bedtime. Takes four times a day  9am, 1pm, 5pm, 9pm   Refills: 0     carbidopa-levodopa ER  MG Tbcr  Commonly known as: SINEMET CR      Take 1 tablet by mouth 2 (two) times daily. Takes 1 tablets by mouth twice daily at 6am and 11pm   Refills: 0     cetirizine 10 MG Tabs  Commonly known as: ZyrTEC      Take 1 tablet (10 mg total) by mouth daily.   Refills: 0     ezetimibe 10 MG Tabs  Commonly known as: Zetia      TAKE ONE TABLET BY MOUTH ONE TIME DAILY   Quantity: 90 tablet  Refills: 0     finasteride 5 MG Tabs  Commonly known as: Proscar      Take 1 tablet (5 mg total) by mouth daily.   Quantity: 90 tablet  Refills: 6     furosemide 20 MG Tabs  Commonly known as: Lasix      Take 1 tablet (20 mg total) by mouth daily.   Quantity: 30 tablet  Refills: 1     hydroxychloroquine 200 MG Tabs  Commonly known as: Plaquenil      Take 1 tablet (200 mg total) by mouth 2 (two) times daily.   Refills: 0     melatonin 3 MG Tabs      Take 1 tablet (3 mg total) by mouth nightly as needed.   Refills: 0     midodrine 10 MG Tabs  Commonly known as: ProAmatine      Take 1 tablet (10 mg total) by mouth in the morning and 1 tablet (10 mg total) at noon and 1 tablet (10 mg total) in the evening.   Stop taking on: November 30, 2024  Quantity: 90 tablet  Refills: 0     multivitamin Chew      Chew 1 tablet by mouth daily.   Refills: 0     niacin 500 MG Tabs      Take 1 tablet (500 mg total) by mouth 2 (two) times daily with meals.   Refills: 0     nystatin 100,000 Units/g Crea  Commonly known as: Mycostatin      Apply topically as needed.   Refills: 0     ondansetron 4 MG Tbdp  Commonly known as: Zofran-ODT      Take 1 tablet (4 mg total) by mouth every 4 (four) hours as needed for Nausea.   Stop taking on: December 2, 2024  Quantity: 10 tablet  Refills: 0     pantoprazole 40 MG Tbec  Commonly known as: Protonix      Take 1 tablet (40 mg total) by mouth every morning before breakfast.   Refills: 0     Polyethylene Glycol 3350 17 g Pack  Commonly known as: MIRALAX       Take 17 g by mouth daily.   Refills: 0     Pravastatin Sodium 80 MG Tabs  Commonly known as: PRAVACHOL      TAKE 1 TABLET BY MOUTH NIGHTLY   Quantity: 90 tablet  Refills: 3     pregabalin 75 MG Caps  Commonly known as: Lyrica      Take 1 capsule (75 mg total) by mouth 2 (two) times daily.   Refills: 0     traMADol 50 MG Tabs  Commonly known as: Ultram      Take 1 tablet (50 mg total) by mouth every 12 (twelve) hours as needed.   Refills: 0            STOP taking these medications      Xarelto 20 MG Tabs  Generic drug: rivaroxaban                  Where to Get Your Medications        These medications were sent to [id number] - [place], [place] - [address] [phone], [phone]  [address], [facility] [address]      Phone: [phone]   ciprofloxacin 500 MG Tabs  docusate sodium 100 MG Caps  HYDROcodone-acetaminophen 5-325 MG Tabs         Follow up Visits:          Follow up Labs: None    Discharge Instructions:     - No heavy lifting or strenuous activity for 4 weeks.    - You may have a post-operative appointment that is already scheduled for you. If the appointment date or time does not work with your schedule please call our office to reschedule ([phone]). Alternatively our office may be reaching out to you to schedule the appointment.     - You may experience pain after the procedure for 1-2 days.  If pain becomes intolerable please contact our office or go to the nearest Emergency Room/Immediate Care. You make take over-the counter ibuprofen for mild pain (provided you do not have a medical condition such as stomach ulcers or kidney disease which prohibits you from taking). You may take this in addition to tylenol or narcotic pain medication. Hot packs may help for discomfort as well.    - If you take blood thinners (such as xarelto, aspirin, or plavix) please DO NOT take them when you go home. You may resume these medications in 4 weeks.    - If you are medically allowed to take  ibuprofen then you may take 200-400 mg every 8 hours as needed for pain with plenty of fluids. You may take this in addition to tylenol or other pain medication which may be prescribed.     - You may experience burning with urination and frequency of urination over the next few days.     - You may see blood in your urine that should clear up within a few days. If you have a stent in place then you may see blood in the urine until the stent is eventually removed.     - Try to abstain from alcohol, coffee, tea, artificial sweeteners, and spicy food for the next 48 hours as these can irritate the bladder.     - If you develop a fever, chills, difficulty urinating or abdominal pain in the next 24 hours, call the office.     - Try to drink at least 1.5 to 2 liters of fluid per day to stay hydrated, water is preferable. If you are on a fluid restriction due to other medical reasons then you need to adhere to your fluid restriction recommendations.

## 2024-11-29 NOTE — PROGRESS NOTES
AVS reviewed, IV dc'd, CBI bag changed to standard [place] bag,,will dc to [facility] w/ [facility], Milan & leg bag teaching done, verbalized understanding , states has help at [facility] if any questions arise.

## 2024-11-29 NOTE — PROGRESS NOTES
[facility]   part of [facility] Hospitalist Progress Note     Reginaldo Hdz Patient Status:  Outpatient in a Bed    8/10/1940 MRN [id number]   Location [facility] 3NW-A Attending William Leon MD   Hosp Day # 0 PCP Olvin Dinh MD     Follow Up:  Diagnoses of Benign prostatic hyperplasia with lower urinary tract symptoms, symptom details unspecified, Bladder mass, and Bladder stones were pertinent to this visit.    Subjective:     Patient seen and examined.  States he is feeling better.  No F/C, N/V.  Urine still red but no clots.     Objective:    Review of Systems:   10 point ROS completed and was negative, except for pertinent positive and negatives stated in subjective.    Vital signs:  Temp:  [97 °F (36.1 °C)-97.8 °F (36.6 °C)] 97.7 °F (36.5 °C)  Pulse:  [63-79] 79  Resp:  [16-18] 18  BP: (128-165)/(69-84) 143/84  SpO2:  [96 %-98 %] 98 %    Physical Exam:    Gen: No acute distress, alert and oriented x3, no focal neurologic deficits. Elderly.    HEENT:  EOMI, PERRLA, OP clear, MMM  Pulm: Lungs clear bilaterally, normal respiratory effort  CV: Heart with regular rate and rhythm, no murmur.  Normal PMI.    Abd: Abdomen soft, nontender, nondistended, no organomegaly, bowel sounds present  MSK: Full range of motion in extremities, no clubbing, no cyanosis  Skin: no rashes or lesions  Neuro:  Grossly intact, no sensory deficits   :  Milan cath with gross hematuria, no clots.      Diagnostic Data:    Labs:  Recent Labs   Lab 24   WBC 4.5   HGB 11.2*   MCV 84.0   .0*       Recent Labs   Lab 24  0602   * 108*   BUN 15 16   CREATSERUM 1.48* 1.13   CA 9.6 8.7   ALB 3.2  --    * 135*   K 4.8 4.3    103   CO2 22.0 26.0   ALKPHO 146*  --    AST 48*  --    ALT <7*  --    BILT 0.4  --    TP 8.2  --        Estimated Creatinine Clearance: 43.1 mL/min (based on SCr of 1.13 mg/dL).    No results for input(s): \"PTP\", \"INR\" in  the last 168 hours.         COVID-19 Lab Results    COVID-19  Lab Results   Component Value Date    COVID19 Not Detected 11/08/2024    COVID19 Not Detected 10/28/2024    COVID19 Detected (A) 07/09/2024       Pro-Calcitonin  No results for input(s): \"PCT\" in the last 168 hours.    Cardiac  No results for input(s): \"TROP\", \"PBNP\" in the last 168 hours.    Creatinine Kinase  No results for input(s): \"CK\" in the last 168 hours.    Inflammatory Markers  No results for input(s): \"CRP\", \"CALDERON\", \"LDH\", \"DDIMER\" in the last 168 hours.    Imaging: Imaging data reviewed in Epic.    Medications:    carbidopa-levodopa  1.5 tablet Oral 4x daily    carbidopa-levodopa ER  1 tablet Oral BID    finasteride  5 mg Oral Daily    hydroxychloroquine  200 mg Oral BID    midodrine  10 mg Oral TID    pantoprazole  40 mg Oral QAM AC    polyethylene glycol (PEG 3350)  17 g Oral Daily    pregabalin  75 mg Oral BID    acetaminophen  1,000 mg Oral Q8H EDDA    levofloxacin  500 mg Intravenous Daily    Or    levoFLOXacin  500 mg Oral Daily    cetirizine  5 mg Oral Daily       Assessment & Plan:      84 yr old male with PMH sig for CAD s/p stents, a-fib on xarelto, HTN, HLD, GERD, SLE, PAD, and neuropathy who presents s/p cysto with removal of bladder stones and TURBT.       # BPH with urinary retention   s/p cysto with removal of bladder stones and TURBT 11/27  Post op care per      # Parkinson's disease   controlled  Cont sinimet      # Chronic diastolic HF  Compensated   Cont lasix     # SLE  Controlled  Cont plaquenil     # Paroxysmal a-fib  - rate controlled  - hold xarelto post op, need to stay off for at least 4 weeks per .      # HLD  - cont statin    Plan of care: pending  clearance     Plan of care discussed with patient or family at bedside.    Sigifredo Balbuena,     Supplementary Documentation:   DVT Mechanical Prophylaxis:   SCDs, Early ambuation  DVT Pharmacologic Prophylaxis   Medication   None                Code Status:  DNAR/Selective Treatment  Milan: Milan catheter in place  Milan Duration (in days): 3  Central line:    WICHO: 11/29/2024

## 2024-11-29 NOTE — CM/SW NOTE
Pt medically cleared for DC today. Pt is a resident at [facility]. He is current with [facility] and has caregiver services 22 hrs/day. Pt here for Cysto with TURP under outpatient in a bed status. No new orders required to continue [facility] services.     [facility] called and confirmed pt can return, they request that pt return before 4:00 pm. RN reports pt can return by 12. Pt's DANII Luevano called and she requests 2:00 PM to allow caregiver services to be in place. [facility] transport requested. RN updated.     [facility] scheduled for 2:00 PM pickup. PCS form completed and available for RN.    RN to call report to receiving RN at [phone]    CM/SW will remain available for DC planning and/or support.     ROMI LondonoN, CMSRN    [id number]

## 2024-11-29 NOTE — PROGRESS NOTES
[facility]   part of [facility]    Progress Note    Reginaldo Hdz Patient Status:  Outpatient in a Bed    8/10/1940 MRN [id number]   Location [facility] 3NW-A Attending William Leon MD   Hosp Day # 0 PCP Olvin Dinh MD        Subjective:   Pt is resting comfortably in bed. Afebrile, VSS.  CBI clamped this morning at 0600. Urine light to medium pink. No c/o chest pain or difficulty breathing.    Cr 1.13        Objective:   Vital Signs:  Blood pressure (!) 165/71, pulse 63, temperature 97 °F (36.1 °C), temperature source Oral, resp. rate 16, height 5' 6\" (1.676 m), weight 162 lb (73.5 kg), SpO2 98%.     General: Alert and oriented. No acute distress.  Eyes: Sclera non-icteric.  Neck: No obvious masses or goiter.  CV/Resp: nonlabored breathing  Abdomen: Soft, NTND  : edmond in place draining light to medium pink urine  Extremities: warm, well perfused, no clubbing cyanosis or edema          Results:     Lab Results   Component Value Date    WBC 4.5 2024    HGB 11.2 (L) 2024    HCT 35.1 (L) 2024    .0 (L) 2024    CREATSERUM 1.13 2024    BUN 16 2024     (L) 2024    K 4.3 2024     2024    CO2 26.0 2024     (H) 2024    CA 8.7 2024    ALB 3.2 2024    ALKPHO 146 (H) 2024    BILT 0.4 2024    TP 8.2 2024    AST 48 (H) 2024    ALT <7 (L) 2024    PTT 43.7 (H) 10/11/2024    INR 3.33 (H) 10/12/2024    TSH 1.670 2019    MG 1.7 2024    PHOS 3.4 2024    TROP <0.045 2019       No results found.                  Assessment and Plan:     Pt is a 84 year old male who is s/p TURP, TURBT on 24. Has poor mobility, lives at home with wife.    - OK for diet, OOB/ambulation  - Hospitalist following, appreciate help  - Okay for discharge with edmond in place    OUSMANE Arechiga  2024

## 2024-12-03 NOTE — PROGRESS NOTES
Results reviewed. Please inform patient I left a VM with his son as the primary number listed just kept ringing.  He has a small amount of low-grade prostate cancer which we would not recommend treatment for as this is unlikely to cause the patient any harm.  Please see if he would like to come in ~ 2 to 3 weeks for a voiding trial.  We could potentially have his facility remove the catheter first thing in the morning and then he can follow-up with me or a PA in the afternoon with a PVR.  Alternatively if he wants to leave the catheter in he can just follow-up with us in a few weeks for checkup.    Thanks,  MPH

## 2024-12-04 ENCOUNTER — TELEPHONE (OUTPATIENT)
Dept: SURGERY | Facility: CLINIC | Age: 84
End: 2024-12-04

## 2024-12-04 NOTE — TELEPHONE ENCOUNTER
----- Message from William Leon sent at 12/3/2024  1:14 PM CST -----  Results reviewed. Please inform patient I left a VM with his son as the primary number listed just kept ringing.  He has a small amount of low-grade prostate cancer which we would not recommend treatment for as this is unlikely to cause the patient any harm.  Please see if he would like to come in ~ 2 to 3 weeks for a voiding trial.  We could potentially have his facility remove the catheter first thing in the morning and then he can follow-up with me or a PA in the afternoon with a PVR.  Alternatively if he wants to leave the catheter in he can just follow-up with us in a few weeks for checkup.    Thanks,  MPH

## 2025-01-30 ENCOUNTER — HOSPITAL ENCOUNTER (EMERGENCY)
Facility: HOSPITAL | Age: 85
Discharge: HOME | End: 2025-01-30
Attending: EMERGENCY MEDICINE
Payer: MEDICARE

## 2025-01-30 VITALS
WEIGHT: 139 LBS | SYSTOLIC BLOOD PRESSURE: 151 MMHG | HEIGHT: 65 IN | BODY MASS INDEX: 23.16 KG/M2 | OXYGEN SATURATION: 100 % | DIASTOLIC BLOOD PRESSURE: 78 MMHG | RESPIRATION RATE: 24 BRPM | HEART RATE: 74 BPM | TEMPERATURE: 98 F

## 2025-01-30 DIAGNOSIS — N39.0 URINARY TRACT INFECTION WITH HEMATURIA, SITE UNSPECIFIED: Primary | ICD-10-CM

## 2025-01-30 DIAGNOSIS — R31.9 URINARY TRACT INFECTION WITH HEMATURIA, SITE UNSPECIFIED: Primary | ICD-10-CM

## 2025-01-30 LAB
ALBUMIN SERPL-MCNC: 3.7 G/DL (ref 3.2–4.8)
ALBUMIN/GLOB SERPL: 1 {RATIO} (ref 1–2)
ALP LIVER SERPL-CCNC: 84 U/L
ALT SERPL-CCNC: <7 U/L
ANION GAP SERPL CALC-SCNC: 9 MMOL/L (ref 0–18)
AST SERPL-CCNC: 41 U/L (ref ?–34)
BASOPHILS # BLD AUTO: 0.02 X10(3) UL (ref 0–0.2)
BASOPHILS NFR BLD AUTO: 0.4 %
BILIRUB SERPL-MCNC: 0.6 MG/DL (ref 0.2–1.1)
BILIRUB UR QL STRIP.AUTO: NEGATIVE
BUN BLD-MCNC: 26 MG/DL (ref 9–23)
CALCIUM BLD-MCNC: 9.2 MG/DL (ref 8.7–10.6)
CHLORIDE SERPL-SCNC: 105 MMOL/L (ref 98–112)
CO2 SERPL-SCNC: 28 MMOL/L (ref 21–32)
COLOR UR AUTO: YELLOW
CREAT BLD-MCNC: 0.9 MG/DL
EGFRCR SERPLBLD CKD-EPI 2021: 84 ML/MIN/1.73M2 (ref 60–?)
EOSINOPHIL # BLD AUTO: 0.03 X10(3) UL (ref 0–0.7)
EOSINOPHIL NFR BLD AUTO: 0.5 %
ERYTHROCYTE [DISTWIDTH] IN BLOOD BY AUTOMATED COUNT: 17.2 %
GLOBULIN PLAS-MCNC: 3.8 G/DL (ref 2–3.5)
GLUCOSE BLD-MCNC: 100 MG/DL (ref 70–99)
GLUCOSE UR STRIP.AUTO-MCNC: NORMAL MG/DL
HCT VFR BLD AUTO: 29.9 %
HGB BLD-MCNC: 9.2 G/DL
IMM GRANULOCYTES # BLD AUTO: 0.01 X10(3) UL (ref 0–1)
IMM GRANULOCYTES NFR BLD: 0.2 %
LEUKOCYTE ESTERASE UR QL STRIP.AUTO: 500
LYMPHOCYTES # BLD AUTO: 1.08 X10(3) UL (ref 1–4)
LYMPHOCYTES NFR BLD AUTO: 19.5 %
MCH RBC QN AUTO: 27.7 PG (ref 26–34)
MCHC RBC AUTO-ENTMCNC: 30.8 G/DL (ref 31–37)
MCV RBC AUTO: 90.1 FL
MONOCYTES # BLD AUTO: 0.59 X10(3) UL (ref 0.1–1)
MONOCYTES NFR BLD AUTO: 10.6 %
NEUTROPHILS # BLD AUTO: 3.81 X10 (3) UL (ref 1.5–7.7)
NEUTROPHILS # BLD AUTO: 3.81 X10(3) UL (ref 1.5–7.7)
NEUTROPHILS NFR BLD AUTO: 68.8 %
NITRITE UR QL STRIP.AUTO: NEGATIVE
OSMOLALITY SERPL CALC.SUM OF ELEC: 299 MOSM/KG (ref 275–295)
PH UR STRIP.AUTO: 5.5 [PH] (ref 5–8)
PLATELET # BLD AUTO: 91 10(3)UL (ref 150–450)
PLATELETS.RETICULATED NFR BLD AUTO: 3.7 % (ref 0–7)
POTASSIUM SERPL-SCNC: 4.7 MMOL/L (ref 3.5–5.1)
PROT SERPL-MCNC: 7.5 G/DL (ref 5.7–8.2)
PROT UR STRIP.AUTO-MCNC: 50 MG/DL
RBC # BLD AUTO: 3.32 X10(6)UL
RBC #/AREA URNS AUTO: >10 /HPF
SODIUM SERPL-SCNC: 142 MMOL/L (ref 136–145)
SP GR UR STRIP.AUTO: 1.02 (ref 1–1.03)
UROBILINOGEN UR STRIP.AUTO-MCNC: NORMAL MG/DL
WBC # BLD AUTO: 5.5 X10(3) UL (ref 4–11)
WBC #/AREA URNS AUTO: >50 /HPF
WBC CLUMPS UR QL AUTO: PRESENT /HPF
YEAST UR QL: PRESENT /HPF

## 2025-01-30 PROCEDURE — 36415 COLL VENOUS BLD VENIPUNCTURE: CPT

## 2025-01-30 PROCEDURE — 81001 URINALYSIS AUTO W/SCOPE: CPT | Performed by: EMERGENCY MEDICINE

## 2025-01-30 PROCEDURE — 85025 COMPLETE CBC W/AUTO DIFF WBC: CPT

## 2025-01-30 PROCEDURE — 99283 EMERGENCY DEPT VISIT LOW MDM: CPT

## 2025-01-30 PROCEDURE — 99284 EMERGENCY DEPT VISIT MOD MDM: CPT

## 2025-01-30 PROCEDURE — 51798 US URINE CAPACITY MEASURE: CPT

## 2025-01-30 PROCEDURE — 80053 COMPREHEN METABOLIC PANEL: CPT | Performed by: EMERGENCY MEDICINE

## 2025-01-30 PROCEDURE — 85025 COMPLETE CBC W/AUTO DIFF WBC: CPT | Performed by: EMERGENCY MEDICINE

## 2025-01-30 PROCEDURE — 80053 COMPREHEN METABOLIC PANEL: CPT

## 2025-01-30 PROCEDURE — 87086 URINE CULTURE/COLONY COUNT: CPT | Performed by: EMERGENCY MEDICINE

## 2025-01-30 RX ORDER — SULFAMETHOXAZOLE AND TRIMETHOPRIM 800; 160 MG/1; MG/1
1 TABLET ORAL 2 TIMES DAILY
Qty: 14 TABLET | Refills: 0 | Status: SHIPPED | OUTPATIENT
Start: 2025-01-30 | End: 2025-02-06

## 2025-01-30 NOTE — ED PROVIDER NOTES
Patient Seen in: [facility] Emergency Department      History     Chief Complaint   Patient presents with    Urinary Retention     Stated Complaint: sent from [facility] for bladder distention/retention    Subjective:   HPI      84-year-old male sent from [facility] for evaluation of evidence of a UTI and concern for possible urinary retention.  Family says that they did a urine dip over there and told the patient there was evidence of an infection.  When they examined his belly they felt that it was \"hard\" over the bladder and they were concerned he could be retaining urine.  He denies any fever.  Denies abdominal pain.  Family reports he was treated for urinary tract infection a couple of months ago after a bladder procedure.  He follows with urology here at [facility].    Objective:     Past Medical History:    Anemia    Anesthesia complication    Can become agitated/combative coming out of anesthesia    Anxiety state    Arrhythmia    Atherosclerosis of coronary artery    Back problem    Blood disorder    Chronic atrial fibrillation (HCC)    Coronary atherosclerosis    Esophageal reflux    Heart attack (HCC)    High blood pressure    High cholesterol    History of adverse reaction to anesthesia    Hyperlipidemia    Hypertension    Incontinence    Has an indwelling urinary catheter    Lupus    Osteoarthritis    Parkinson disease (HCC)    Peripheral neuropathy    Visual impairment    Glasses              Past Surgical History:   Procedure Laterality Date    Angiogram  1984    Angiogram  1996    Angiogram  03/27/2002    [facility], Dr. Scott/ Mikayla: 1.Successful PTCA of the infarct related artery which was the proximal graft of the saphenous vein graft to the right posterior descending/LV branch which was dilated and stented using a 4 x 13 BX Velocity to a maximum diameter of 4.45 mm.  2.Proximal PDA primarily stented using a 3 x 18 Penta stent from about 70 to 80% to 0%, HUGH III flow, no  dissection.     Angiogram  2011    [facility], Dr. Steele: Successful stenting with a drug-eluting stent to the graft to the right coronary artery. Severe native CAD. Patent vein graft to LAD. Severe disease in the vein graft to the right coronary artery.    Angiogram  2011    [facility], Dr. Steele: Successful stenting of the circumflex artery in the proximal and mid portions. Relook angiography of the vein graft to the right coronary artery showed that this vessel is widely patent.    Cabg  1984    x2    Cath bare metal stent (bms)      Cath drug eluting stent      Shoulder surg proc unlisted Left     fracture                Social History     Socioeconomic History    Marital status:    Tobacco Use    Smoking status: Former     Current packs/day: 0.00     Types: Cigarettes     Quit date: 1982     Years since quittin.8    Smokeless tobacco: Never   Vaping Use    Vaping status: Never Used   Substance and Sexual Activity    Alcohol use: Not Currently     Comment: Social    Drug use: No     Social Drivers of Health     Food Insecurity: No Food Insecurity (2024)    Food Insecurity     Food Insecurity: Never true   Transportation Needs: No Transportation Needs (2024)    Transportation Needs     Lack of Transportation: No   Housing Stability: Low Risk  (2024)    Housing Stability     Housing Instability: No                  Physical Exam     ED Triage Vitals [25 1252]   /75   Pulse 72   Resp 18   Temp 97.5 °F (36.4 °C)   Temp src Oral   SpO2 100 %   O2 Device None (Room air)       Current Vitals:   Vital Signs  BP: 140/71  Pulse: 70  Resp: 19  Temp: 97.5 °F (36.4 °C)  Temp src: Oral  MAP (mmHg): 91    Oxygen Therapy  SpO2: 100 %  O2 Device: None (Room air)        Physical Exam  General:  Vitals as listed.  No acute distress   Lungs: good air exchange and clear   Heart: regular rate rhythm and no murmur   Abdomen: Soft and nontender.  No abdominal masses.  No peritoneal  signs   Extremities: no edema, normal peripheral pulses   Neuro: Alert oriented and nonfocal   Skin: no rashes or nodules    ED Course     Labs Reviewed   CBC WITH DIFFERENTIAL WITH PLATELET - Abnormal; Notable for the following components:       Result Value    RBC 3.32 (*)     HGB 9.2 (*)     HCT 29.9 (*)     PLT 91.0 (*)     MCHC 30.8 (*)     All other components within normal limits   COMP METABOLIC PANEL (14) - Abnormal; Notable for the following components:    Glucose 100 (*)     BUN 26 (*)     Calculated Osmolality 299 (*)     AST 41 (*)     ALT <7 (*)     Globulin  3.8 (*)     All other components within normal limits   URINALYSIS WITH CULTURE REFLEX - Abnormal; Notable for the following components:    Clarity Urine Ex.Turbid (*)     Ketones Urine Trace (*)     Blood Urine 1+ (*)     Protein Urine 50 (*)     Leukocyte Esterase Urine 500 (*)     WBC Urine >50 (*)     RBC Urine >10 (*)     Bacteria Urine 2+ (*)     Yeast Urine Present (*)     WBC Clump Present (*)     All other components within normal limits   SCAN SLIDE   RAINBOW DRAW LAVENDER   RAINBOW DRAW LIGHT GREEN   RAINBOW DRAW BLUE   URINE CULTURE, ROUTINE                   MDM      84-year-old male presents after home health noted foul-smelling cloudy urine at home.  History of previous UTI following a urologic procedure.  Went to immediate care where they had concern that he could be retaining urine.  On examination here he has no fullness in the suprapubic region to suggest significant bladder distention.  No tenderness on palpation of the area.  Bladder scan was performed and shows only 30 mL of urine.  He urinated here and provided a sample without issue.    Additional history obtained by duly immediate care reports that patient sent to the ER for evaluation of possible urinary retention.    Differential includes but is not limited to urinary tract infection, urinary retention, a life threat.    CBC, CMP, urinalysis WBC within normal limits and  does not suggest significant infection.  Patient's urinalysis shows greater than 50 WBCs with 2+ bacteria.      Previous urine culture susceptible to Bactrim, which the patient was treated with.  Urine culture since that treatment showed resolution of infection.  Home with Bactrim and recommend follow-up with urology or PCP.  Patient and family are comfortable with this plan.                Medical Decision Making      Disposition and Plan     Clinical Impression:  1. Urinary tract infection with hematuria, site unspecified         Disposition:  Discharge  1/30/2025  3:28 pm    Follow-up:  Olvin Dinh MD  [address]  [facility] [address]  [phone]    Schedule an appointment as soon as possible for a visit      William Leon MD  [address]  [facility] [address] [phone]    Call            Medications Prescribed:  Current Discharge Medication List        START taking these medications    Details   sulfamethoxazole-trimethoprim -160 MG Oral Tab per tablet Take 1 tablet by mouth 2 (two) times daily for 7 days.  Qty: 14 tablet, Refills: 0                 Supplementary Documentation:

## 2025-01-30 NOTE — ED INITIAL ASSESSMENT (HPI)
Altered level of consciousness  and urinary symptoms since  2 weeks  no fever . His care taker notice his urine out put is decreased and his bladder is distended . Seen in immediate care today  urine test done shows positive  for UTI .  Sent here for further  management . Patient had UTI  2 weeks ago and he completed antibiotic already.

## 2025-02-02 ENCOUNTER — HOSPITAL ENCOUNTER (EMERGENCY)
Facility: HOSPITAL | Age: 85
Discharge: HOME | End: 2025-02-02
Attending: EMERGENCY MEDICINE
Payer: MEDICARE

## 2025-02-02 VITALS
RESPIRATION RATE: 20 BRPM | SYSTOLIC BLOOD PRESSURE: 111 MMHG | HEART RATE: 67 BPM | DIASTOLIC BLOOD PRESSURE: 64 MMHG | OXYGEN SATURATION: 98 % | TEMPERATURE: 98 F

## 2025-02-02 DIAGNOSIS — R82.71 BACTERIA IN URINE: ICD-10-CM

## 2025-02-02 DIAGNOSIS — Z22.8: Primary | ICD-10-CM

## 2025-02-02 LAB
ALBUMIN SERPL-MCNC: 4 G/DL (ref 3.2–4.8)
ALBUMIN/GLOB SERPL: 1 {RATIO} (ref 1–2)
ALP LIVER SERPL-CCNC: 95 U/L
ALT SERPL-CCNC: <7 U/L
ANION GAP SERPL CALC-SCNC: 10 MMOL/L (ref 0–18)
AST SERPL-CCNC: 49 U/L (ref ?–34)
BASOPHILS # BLD AUTO: 0.02 X10(3) UL (ref 0–0.2)
BASOPHILS NFR BLD AUTO: 0.4 %
BILIRUB SERPL-MCNC: 0.5 MG/DL (ref 0.2–1.1)
BILIRUB UR QL STRIP.AUTO: NEGATIVE
BUN BLD-MCNC: 31 MG/DL (ref 9–23)
CALCIUM BLD-MCNC: 9.3 MG/DL (ref 8.7–10.6)
CHLORIDE SERPL-SCNC: 104 MMOL/L (ref 98–112)
CO2 SERPL-SCNC: 25 MMOL/L (ref 21–32)
COLOR UR AUTO: YELLOW
CREAT BLD-MCNC: 1.19 MG/DL
EGFRCR SERPLBLD CKD-EPI 2021: 60 ML/MIN/1.73M2 (ref 60–?)
EOSINOPHIL # BLD AUTO: 0.07 X10(3) UL (ref 0–0.7)
EOSINOPHIL NFR BLD AUTO: 1.5 %
ERYTHROCYTE [DISTWIDTH] IN BLOOD BY AUTOMATED COUNT: 17.1 %
GLOBULIN PLAS-MCNC: 4 G/DL (ref 2–3.5)
GLUCOSE BLD-MCNC: 124 MG/DL (ref 70–99)
GLUCOSE UR STRIP.AUTO-MCNC: NORMAL MG/DL
HCT VFR BLD AUTO: 31.9 %
HGB BLD-MCNC: 9.8 G/DL
HYALINE CASTS #/AREA URNS AUTO: PRESENT /LPF
IMM GRANULOCYTES # BLD AUTO: 0.01 X10(3) UL (ref 0–1)
IMM GRANULOCYTES NFR BLD: 0.2 %
LEUKOCYTE ESTERASE UR QL STRIP.AUTO: 500
LYMPHOCYTES # BLD AUTO: 0.91 X10(3) UL (ref 1–4)
LYMPHOCYTES NFR BLD AUTO: 19.1 %
MCH RBC QN AUTO: 27.1 PG (ref 26–34)
MCHC RBC AUTO-ENTMCNC: 30.7 G/DL (ref 31–37)
MCV RBC AUTO: 88.1 FL
MONOCYTES # BLD AUTO: 0.54 X10(3) UL (ref 0.1–1)
MONOCYTES NFR BLD AUTO: 11.3 %
NEUTROPHILS # BLD AUTO: 3.22 X10 (3) UL (ref 1.5–7.7)
NEUTROPHILS # BLD AUTO: 3.22 X10(3) UL (ref 1.5–7.7)
NEUTROPHILS NFR BLD AUTO: 67.5 %
NITRITE UR QL STRIP.AUTO: NEGATIVE
OSMOLALITY SERPL CALC.SUM OF ELEC: 296 MOSM/KG (ref 275–295)
PH UR STRIP.AUTO: 6 [PH] (ref 5–8)
PLATELET # BLD AUTO: 91 10(3)UL (ref 150–450)
PLATELETS.RETICULATED NFR BLD AUTO: 4.4 % (ref 0–7)
POTASSIUM SERPL-SCNC: 4.5 MMOL/L (ref 3.5–5.1)
PROT SERPL-MCNC: 8 G/DL (ref 5.7–8.2)
PROT UR STRIP.AUTO-MCNC: 70 MG/DL
RBC # BLD AUTO: 3.62 X10(6)UL
RBC #/AREA URNS AUTO: >10 /HPF
SODIUM SERPL-SCNC: 139 MMOL/L (ref 136–145)
SP GR UR STRIP.AUTO: 1.02 (ref 1–1.03)
UROBILINOGEN UR STRIP.AUTO-MCNC: NORMAL MG/DL
WBC # BLD AUTO: 4.8 X10(3) UL (ref 4–11)
WBC #/AREA URNS AUTO: >50 /HPF
YEAST UR QL: PRESENT /HPF

## 2025-02-02 PROCEDURE — 80053 COMPREHEN METABOLIC PANEL: CPT

## 2025-02-02 PROCEDURE — 87186 SC STD MICRODIL/AGAR DIL: CPT | Performed by: EMERGENCY MEDICINE

## 2025-02-02 PROCEDURE — 87086 URINE CULTURE/COLONY COUNT: CPT | Performed by: EMERGENCY MEDICINE

## 2025-02-02 PROCEDURE — 99284 EMERGENCY DEPT VISIT MOD MDM: CPT

## 2025-02-02 PROCEDURE — 87077 CULTURE AEROBIC IDENTIFY: CPT | Performed by: EMERGENCY MEDICINE

## 2025-02-02 PROCEDURE — 85025 COMPLETE CBC W/AUTO DIFF WBC: CPT

## 2025-02-02 PROCEDURE — 81001 URINALYSIS AUTO W/SCOPE: CPT | Performed by: EMERGENCY MEDICINE

## 2025-02-02 PROCEDURE — 81001 URINALYSIS AUTO W/SCOPE: CPT

## 2025-02-02 PROCEDURE — 99283 EMERGENCY DEPT VISIT LOW MDM: CPT

## 2025-02-02 PROCEDURE — 36415 COLL VENOUS BLD VENIPUNCTURE: CPT

## 2025-02-02 PROCEDURE — 80053 COMPREHEN METABOLIC PANEL: CPT | Performed by: EMERGENCY MEDICINE

## 2025-02-02 PROCEDURE — 85025 COMPLETE CBC W/AUTO DIFF WBC: CPT | Performed by: EMERGENCY MEDICINE

## 2025-02-02 RX ORDER — FLUCONAZOLE 100 MG/1
200 TABLET ORAL ONCE
Status: COMPLETED | OUTPATIENT
Start: 2025-02-02 | End: 2025-02-02

## 2025-02-02 RX ORDER — FLUCONAZOLE 200 MG/1
200 TABLET ORAL DAILY
Qty: 5 TABLET | Refills: 0 | Status: SHIPPED | OUTPATIENT
Start: 2025-02-02 | End: 2025-02-07

## 2025-02-02 NOTE — DISCHARGE INSTRUCTIONS
Take fluconazole with lots of fluids daily to decrease the amount of yeast colonies in the urine.  See Dr. Fernández next week.

## 2025-02-02 NOTE — ED INITIAL ASSESSMENT (HPI)
Pt here for abnormal labs, UTI. Pt denies dysuria, endorses feeling of fullness & foul smelling urine.

## 2025-02-02 NOTE — ED PROVIDER NOTES
Patient Seen in: [facility] Emergency Department      History     Chief Complaint   Patient presents with    Abnormal Labs     Stated Complaint:     Subjective:   HPI    Concern for ongoing uti- no fevers no chills         Objective:     No pertinent past medical history.            No pertinent past surgical history.              No pertinent social history.                Physical Exam     ED Triage Vitals [02/02/25 1326]   /66   Pulse 68   Resp 16   Temp 97.5 °F (36.4 °C)   Temp src Temporal   SpO2 97 %   O2 Device None (Room air)       Current Vitals:   Vital Signs  BP: 133/68  Pulse: 71  Resp: 16  Temp: 97.5 °F (36.4 °C)  Temp src: Temporal  MAP (mmHg): 87    Oxygen Therapy  SpO2: 100 %  O2 Device: None (Room air)        Physical Exam  ***      ED Course     Labs Reviewed   URINALYSIS WITH CULTURE REFLEX - Abnormal; Notable for the following components:       Result Value    Clarity Urine Ex.Turbid (*)     Ketones Urine Trace (*)     Blood Urine 1+ (*)     Protein Urine 70 (*)     Leukocyte Esterase Urine 500 (*)     WBC Urine >50 (*)     RBC Urine >10 (*)     Bacteria Urine Rare (*)     Squamous Epi. Cells Few (*)     Hyaline Casts Present (*)     Yeast Urine Present (*)     All other components within normal limits   CBC WITH DIFFERENTIAL WITH PLATELET - Abnormal; Notable for the following components:    RBC 3.62 (*)     HGB 9.8 (*)     HCT 31.9 (*)     PLT 91.0 (*)     MCHC 30.7 (*)     Lymphocyte Absolute 0.91 (*)     All other components within normal limits   COMP METABOLIC PANEL (14) - Abnormal; Notable for the following components:    Glucose 124 (*)     BUN 31 (*)     Calculated Osmolality 296 (*)     AST 49 (*)     ALT <7 (*)     Globulin  4.0 (*)     All other components within normal limits   RAINBOW DRAW LAVENDER   RAINBOW DRAW LIGHT GREEN   RAINBOW DRAW GOLD   URINE CULTURE, ROUTINE            ***       MDM      ***        Medical Decision Making      Disposition and Plan     Clinical  Impression:  No diagnosis found.     Disposition:  There is no disposition on file for this visit.  There is no disposition time on file for this visit.    Follow-up:  No follow-up provider specified.        Medications Prescribed:  Current Discharge Medication List              Supplementary Documentation:                                                            appreciable illness or disease  Reached out to ID- Dr Lagos- suspect colonization over active disease and to follow with Dr Mercado next week, stop all ABX now- no clinical disease and patient with significant side efx from Abx    Do not believe Pyelo or toxic, no signs bacerema  No leukocytosis or ARF        Medical Decision Making      Disposition and Plan     Clinical Impression:  1. Fungal colonization of urinary tract    2. Bacteria in urine         Disposition:  Discharge  2/2/2025  5:34 pm    Follow-up:  Tosin Mercado MD  [address]  [place] [address]  [phone]    Schedule an appointment as soon as possible for a visit            Medications Prescribed:  Discharge Medication List as of 2/2/2025  5:37 PM        START taking these medications    Details   fluconazole 200 MG Oral Tab Take 1 tablet (200 mg total) by mouth daily for 5 days., Normal, Disp-5 tablet, R-0                 Supplementary Documentation:

## 2025-02-05 RX ORDER — AMOXICILLIN 875 MG/1
875 TABLET, COATED ORAL EVERY 12 HOURS
Qty: 10 TABLET | Refills: 0 | Status: SHIPPED | OUTPATIENT
Start: 2025-02-05 | End: 2025-02-10

## 2025-08-30 ENCOUNTER — APPOINTMENT (OUTPATIENT)
Dept: GENERAL RADIOLOGY | Age: 85
End: 2025-08-30
Attending: EMERGENCY MEDICINE

## 2025-08-30 ENCOUNTER — HOSPITAL ENCOUNTER (EMERGENCY)
Age: 85
Discharge: HOME | End: 2025-08-30
Attending: EMERGENCY MEDICINE

## 2025-08-30 VITALS
WEIGHT: 170 LBS | TEMPERATURE: 98 F | RESPIRATION RATE: 18 BRPM | HEART RATE: 75 BPM | HEIGHT: 67 IN | OXYGEN SATURATION: 96 % | DIASTOLIC BLOOD PRESSURE: 82 MMHG | BODY MASS INDEX: 26.68 KG/M2 | SYSTOLIC BLOOD PRESSURE: 150 MMHG
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
## 2025-08-30 DIAGNOSIS — S49.91XA ARM INJURY, RIGHT, INITIAL ENCOUNTER: Primary | ICD-10-CM

## 2025-08-30 PROCEDURE — 73060 X-RAY EXAM OF HUMERUS: CPT | Performed by: EMERGENCY MEDICINE

## 2025-08-30 PROCEDURE — 73080 X-RAY EXAM OF ELBOW: CPT | Performed by: EMERGENCY MEDICINE

## 2025-08-30 PROCEDURE — 73030 X-RAY EXAM OF SHOULDER: CPT | Performed by: EMERGENCY MEDICINE

## 2025-08-30 RX ORDER — TRAMADOL HYDROCHLORIDE 50 MG/1
50 TABLET ORAL EVERY 6 HOURS PRN
Qty: 10 TABLET | Refills: 0 | Status: SHIPPED | OUTPATIENT
Start: 2025-08-30 | End: 2025-09-04

## 2025-08-30 RX ORDER — TRAMADOL HYDROCHLORIDE 50 MG/1
100 TABLET ORAL ONCE
Status: COMPLETED | OUTPATIENT
Start: 2025-08-30 | End: 2025-08-30

## (undated) DEVICE — SPONGE 4X4 10PK

## (undated) DEVICE — DEVICE STATLOCK 2WAY STBL

## (undated) DEVICE — SYRINGE MED 30ML STD CLR PLAS LL TIP N CTRL

## (undated) DEVICE — HF-RESECTION ELECTRODE PLASMALOOP LOOP, LARGE, 24 FR., 12°/16°, ESG TURIS: Brand: OLYMPUS

## (undated) DEVICE — GLOVE SUR 7 SENSICARE PI PIP CRM PWD F

## (undated) DEVICE — HF-RESECTION ELECTRODE PLASMA-OVALBUTTON BUTTON, OVAL, 24 FR., 12°-30°, ESG TURIS: Brand: OLYMPUS

## (undated) DEVICE — PACK PBDS CYSTOSCOPY

## (undated) DEVICE — Device

## (undated) DEVICE — SYRINGE,TOOMEY,IRRIGATION,70CC,STERILE: Brand: MEDLINE

## (undated) DEVICE — SOLUTION IRRIG 1000ML ST H2O AQUALITE PLAS

## (undated) DEVICE — SOLUTION IRRIG 3000ML 0.9% NACL FLX CONT

## (undated) DEVICE — SLEEVE COMPR MD KNEE LEN SGL USE KENDALL SCD

## (undated) NOTE — ED AVS SNAPSHOT
Susi Buchananez   MRN: [id number]    Department:  [address] [facility] in [place]   Date of Visit:  3/4/2018           Disclosure     Insurance plans vary and the physician(s) referred by the ER may not be covered by your plan.  Please contact you tell this physician (or your personal doctor if your instructions are to return to your personal doctor) about any new or lasting problems. The primary care or specialist physician will see patients referred from the [facility] Emergency Department.  Susan Hargrove

## (undated) NOTE — ED AVS SNAPSHOT
Ema Toño   MRN: [id number]    Department:  [facility] Emergency Department in [facility]   Date of Visit:  4/2/2018           Disclosure     Insurance plans vary and the physician(s) referred by the ER may not be covered by your plan.  Please contact you tell this physician (or your personal doctor if your instructions are to return to your personal doctor) about any new or lasting problems. The primary care or specialist physician will see patients referred from the [facility] Emergency Department.  Salvadore Skiff

## (undated) NOTE — LETTER
[facility]-Admission [facility] Department  Phone: [phone]  Right Fax: [phone]    Patient Name: Reginaldo Hdz  : 8/10/1940 - A: 84 y    Sex: male  Medical Record: [id number]  [facility]: [id number]    Date of Surgery: 2024  Surgeon: William Leon MD  Surgery/Procedure: CYSTOSCOPY TRANSURETHRAL RESECTION BLADDER TUMOR, REMOVAL OF BLADDER STONES  Requested Documents:  Please fax the following information to Pre-Admission Testing [phone]:  Face sheet with current diagnosis  Medication list  Copy of any advanced directives (POA/Legal guardian, Living Will, DNR, etc.)  Is the patient able to provide their own medical history information? If not, who should we contact (POA, Legal guardian, etc.)?  : _____________________  Phone Number: _____________________

## (undated) NOTE — ED AVS SNAPSHOT
Marla Resendiz   MRN: [id number]    Department:  [facility] in [place]   Date of Visit:  12/20/2019           Disclosure     Insurance plans vary and the physician(s) referred by the ER may not be covered by your plan.  Please contact y tell this physician (or your personal doctor if your instructions are to return to your personal doctor) about any new or lasting problems. The primary care or specialist physician will see patients referred from the [facility] Emergency Department.  Reynaldo Handley

## (undated) NOTE — LETTER
To:  Dr. Magdaleno Do Date:  11/15/2024  Patient Name: Reginaldo Hdz DOB-Age / Sex: 8/10/1940-A: 84 y  male  Medical Records: [id number]   CSN: [id number]      Clearance for Surgery Requested by Surgeon    Request for:  Cardiac Clearance    Requested by Surgeon: Dr. William Leon    Surgical Date: 2024    Procedure: CYSTOSCOPY TRANSURETHRAL RESECTION BLADDER TUMOR, REMOVAL OF BLADDER STONES, N/A      Please fax the clearance note to the Pre-Admission Testing department.  Thank you.

## (undated) NOTE — LETTER
[address]  [address]  Authorization for Surgical Operation and Procedure     Date:___________                                                                                                         Time:__________  I hereby authorize * Surgery not found *, my physician and his/her assistants (if applicable), which may include medical students, residents, and/or fellows, to perform the following surgical operation/ procedure and administer such anesthesia as may be determined necessary by my physician:  Operation/Procedure name (s)  on Reginaldo Hdz   2.   I recognize that during the surgical operation/procedure, unforeseen conditions may necessitate additional or different procedures than those listed above.  I, therefore, further authorize and request that the above-named surgeon, assistants, or designees perform such procedures as are, in their judgment, necessary and desirable.    3.   My surgeon/physician has discussed prior to my surgery the potential benefits, risks and side effects of this procedure; the likelihood of achieving goals; and potential problems that might occur during recuperation.  They also discussed reasonable alternatives to the procedure, including risks, benefits, and side effects related to the alternatives and risks related to not receiving this procedure.  I have had all my questions answered and I acknowledge that no guarantee has been made as to the result that may be obtained.    4.   Should the need arise during my operation/procedure, which includes change of level of care prior to discharge, I also consent to the administration of blood and/or blood products.  Further, I understand that despite careful testing and screening of blood or blood products by collecting agencies, I may still be subject to ill effects as a result of receiving a blood transfusion and/or blood products.  The following are some, but not all, of the potential risks that  can occur: fever and allergic reactions, hemolytic reactions, transmission of diseases such as Hepatitis, AIDS and Cytomegalovirus (CMV) and fluid overload.  In the event that I wish to have an autologous transfusion of my own blood, or a directed donor transfusion, I will discuss this with my physician.  Check only if Refusing Blood or Blood Products  I understand refusal of blood or blood products as deemed necessary by my physician may have serious consequences to my condition to include possible death. I hereby assume responsibility for my refusal and release the hospital, its personnel, and my physicians from any responsibility for the consequences of my refusal.          o  Refuse      5.   I authorize the use of any specimen, organs, tissues, body parts or foreign objects that may be removed from my body during the operation/procedure for diagnosis, research or teaching purposes and their subsequent disposal by hospital authorities.  I also authorize the release of specimen test results and/or written reports to my treating physician on the hospital medical staff or other referring or consulting physicians involved in my care, at the discretion of the Pathologist or my treating physician.    6.   I consent to the photographing or videotaping of the operations or procedures to be performed, including appropriate portions of my body for medical, scientific, or educational purposes, provided my identity is not revealed by the pictures or by descriptive texts accompanying them.  If the procedure has been photographed/videotaped, the surgeon will obtain the original picture, image, videotape or CD.  The hospital will not be responsible for storage, release or maintenance of the picture, image, tape or CD.    7.   I consent to the presence of a  or observers in the operating room as deemed necessary by my physician or their designees.    8.   I recognize that in the event my procedure results in  extended X-Ray/fluoroscopy time, I may develop a skin reaction.    9. If I have a Do Not Attempt Resuscitation (DNAR) order in place, that status will be suspended while in the operating room, procedural suite, and during the recovery period unless otherwise explicitly stated by me (or a person authorized to consent on my behalf). The surgeon or my attending physician will determine when the applicable recovery period ends for purposes of reinstating the DNAR order.  10. Patients having a sterilization procedure: I understand that if the procedure is successful the results will be permanent and it will therefore be impossible for me to inseminate, conceive, or bear children.  I also understand that the procedure is intended to result in sterility, although the result has not been guaranteed.   11. I acknowledge that my physician has explained sedation/analgesia administration to me including the risk and benefits I consent to the administration of sedation/analgesia as may be necessary or desirable in the judgment of my physician.    I CERTIFY THAT I HAVE READ AND FULLY UNDERSTAND THE ABOVE CONSENT TO OPERATION and/or OTHER PROCEDURE.    _________________________________________  __________________________________  Signature of Patient     Signature of Responsible Person         ___________________________________         Printed Name of Responsible Person           _________________________________                 Relationship to Patient  _________________________________________  ______________________________  Signature of Witness          Date  Time      Patient Name: Reginaldo Hdz     : 8/10/1940                 Printed: 2024     Medical Record #: [id number]                     Page 1 of [address] [place] [address]  [id number]    Consent for Anesthesia    I, Reginaldo Hdz agree to be cared for by an anesthesiologist, who is specially trained to  monitor me and give me medicine to put me to sleep or keep me comfortable during my procedure    I understand that my anesthesiologist is not an employee or agent of [facility] or [employer]. He or she works for [employer].    As the patient asking for anesthesia services, I agree to:  Allow the anesthesiologist (anesthesia doctor) to give me medicine and do additional procedures as necessary. Some examples are: Starting or using an “IV” to give me medicine, fluids or blood during my procedure, and having a breathing tube placed to help me breathe when I’m asleep (intubation). In the event that my heart stops working properly, I understand that my anesthesiologist will make every effort to sustain my life, unless otherwise directed by [facility] Do Not Resuscitate documents.  Tell my anesthesia doctor before my procedure:  If I am pregnant.  The last time that I ate or drank.  All of the medicines I take (including prescriptions, herbal supplements, and pills I can buy without a prescription (including street drugs/illegal medications). Failure to inform my anesthesiologist about these medicines may increase my risk of anesthetic complications.  If I am allergic to anything or have had a reaction to anesthesia before.  I understand how the anesthesia medicine will help me (benefits).  I understand that with any type of anesthesia medicine there are risks:  The most common risks are: nausea, vomiting, sore throat, muscle soreness, damage to my eyes, mouth, or teeth (from breathing tube placement).  Rare risks include: remembering what happened during my procedure, allergic reactions to medications, injury to my airway, heart, lungs, vision, nerves, or muscles and in extremely rare instances death.  My doctor has explained to me other choices available to me for my care (alternatives).  Pregnant Patients (“epidural”):  I understand that the risks of having an epidural (medicine  given into my back to help control pain during labor), include itching, low blood pressure, difficulty urinating, headache or slowing of the baby’s heart. Very rare risks include infection, bleeding, seizure, irregular heart rhythms and nerve injury.  Regional Anesthesia (“spinal”, “epidural”, & “nerve blocks”):  I understand that rare but potential complications include headache, bleeding, infection, seizure, irregular heart rhythms, and nerve injury.    I can change my mind about having anesthesia services at any time before I get the medicine.    _____________________________________________________________________________  Patient (or Representative) Signature/Relationship to Patient  Date   Time    _____________________________________________________________________________   Name (if used)    Language/Organization   Time    _____________________________________________________________________________  Anesthesiologist Signature     Date   Time  I have discussed the procedure and information above with the patient (or patient’s representative) and answered their questions. The patient or their representative has agreed to have anesthesia services.    _____________________________________________________________________________  Witness        Date   Time  I have verified that the signature is that of the patient or patient’s representative, and that it was signed before the procedure  Patient Name: Reginaldo Hdz     : 8/10/1940                 Printed: 2024     Medical Record #: [id number]                     Page 2 of 2

## (undated) NOTE — LETTER
[address], [address] [facility], [facility] [address]  [address] [facility] [address] [facility], [address]   [phone]    [address] [facility], [address] [place] [address], [address] [place] [address]   [phone]       December 12, 2019    [facility]  2

## (undated) NOTE — LETTER
[facility] Pre-Admission Testing Department  Phone: [phone]  Right Fax: [phone]    Patient Name: Reginaldo Hdz  : 8/10/1940 - A: 84 y    Sex: male  Medical Record: [id number]  [facility]: [id number]    Date of Surgery: 2024  Surgeon: William Leon MD  Surgery/Procedure: CYSTOSCOPY TRANSURETHRAL RESECTION BLADDER TUMOR, REMOVAL OF BLADDER STONES  Requested Documents:  Please fax the following information to Pre-Admission Testing [phone]:  Copy of any advanced directives (POA/Legal guardian, Living Will, DNR, etc.)  Is the patient able to provide their own medical history information? If not, who should we contact (POA, Legal guardian, etc.)?  : _____________________  Phone Number: _____________________

## (undated) NOTE — LETTER
[facility] Pre-Admission Testing Department  Phone: [phone]  Right Fax: [phone]    Patient Name: Reginaldo Hdz  : 8/10/1940 - A: 84 y    Sex: male  Medical Record: [id number]  [facility]: [id number]    Date of Surgery: 2024  Surgeon: William Leon MD  Surgery/Procedure: CYSTOSCOPY TRANSURETHRAL RESECTION BLADDER TUMOR, REMOVAL OF BLADDER STONES  Requested Documents:  Please fax the following information to Pre-Admission Testing [phone]:  Face sheet with current diagnosis  UPDATED Medication list  Copy of any advanced directives (POA/Legal guardian, Living Will, DNR, etc.)  Is the patient able to provide their own medical history information? If not, who should we contact (POA, Legal guardian, etc.)?  : _____________________  Phone Number: _____________________

## (undated) NOTE — LETTER
Patient Name: Reginaldo Hdz  -Age / Sex: 8/10/1940-A: 84 y  male   Medical Records: [id number] CSN: [id number]    DNAR NOTIFICATION    Your patient listed above has a procedure scheduled at [facility] and has indicated that he/she currently has a DNAR (Do Not Attempt Resuscitattion) with their Advanced Directives.    Please note that you will be asked to address this matter with your patient pre-operatively.  Thank you.      Procedure Date 2024  Procedure CYSTOSCOPY TRANSURETHRAL RESECTION BLADDER TUMOR, REMOVAL OF BLADDER STONES, N/A  CYSTOSCOPY TRANSURETHRAL RESECTION PROSTATE, N/A    Surgeon(s):  William Leon MD Hoeh, Michael, MD

## (undated) NOTE — LETTER
To:  Dr. Magdaleno Do Date:  10/21/2024  Patient Name: Reginaldo Hdz DOB-Age / Sex: 8/10/1940-A: 84 y  male  Medical Records: [id number]   CSN: [id number]      Clearance for Surgery Requested by Surgeon    Request for:  Cardiac Clearance Coagulation management clearance     Requested by Surgeon: Dr. William Leon    Surgical Date: 2024    Procedure: CYSTOSCOPY TRANSURETHRAL RESECTION BLADDER TUMOR, REMOVAL OF BLADDER STONES, N/A  CYSTOSCOPY TRANSURETHRAL RESECTION PROSTATE, N/A      Please fax the clearance note to the Pre-Admission Testing department.  Thank you.

## (undated) NOTE — LETTER
Patient Name: Reginaldo Hdz  -Age / Sex: 8/10/1940-A: 84 y  male   Medical Records: [id number] CSN: [id number]    DNAR NOTIFICATION    Your patient listed above has a procedure scheduled at [facility] and has indicated that he/she currently has a DNAR (Do Not Attempt Resuscitattion) with their Advanced Directives.    Please note that you will be asked to address this matter with your patient pre-operatively.  Thank you.      Procedure Date 2024  Procedure CYSTOSCOPY TRANSURETHRAL RESECTION BLADDER TUMOR, REMOVAL OF BLADDER STONES, N/A  CYSTOSCOPY TRANSURETHRAL RESECTION PROSTATE, N/A    Hello, previous DNAR fax retracted in error. Patient is a DNAR, patient's daughter (POA) to fax in paperwork.    Surgeon(s):  William Leon MD Hoeh, Michael, MD